# Patient Record
Sex: FEMALE | Race: WHITE | NOT HISPANIC OR LATINO | Employment: FULL TIME | URBAN - METROPOLITAN AREA
[De-identification: names, ages, dates, MRNs, and addresses within clinical notes are randomized per-mention and may not be internally consistent; named-entity substitution may affect disease eponyms.]

---

## 2017-01-11 ENCOUNTER — TELEPHONE (OUTPATIENT)
Dept: FAMILY MEDICINE | Facility: CLINIC | Age: 47
End: 2017-01-11

## 2017-01-11 DIAGNOSIS — G43.109 MIGRAINE WITH AURA AND WITHOUT STATUS MIGRAINOSUS, NOT INTRACTABLE: Primary | ICD-10-CM

## 2017-01-11 RX ORDER — RIZATRIPTAN BENZOATE 10 MG/1
10 TABLET, ORALLY DISINTEGRATING ORAL
Qty: 18 TABLET | Refills: 3 | Status: SHIPPED | OUTPATIENT
Start: 2017-01-11

## 2017-01-11 NOTE — TELEPHONE ENCOUNTER
Pt requested the disintegrating tablets of the rizatriptans. She can't keep the regular tablets down when she has a migraine.  Thanks!  Livia Baptiste [facility]  [phone]

## 2017-04-26 ENCOUNTER — OFFICE VISIT (OUTPATIENT)
Dept: OPTOMETRY | Facility: CLINIC | Age: 47
End: 2017-04-26
Payer: COMMERCIAL

## 2017-04-26 DIAGNOSIS — H10.10 SEASONAL ALLERGIC CONJUNCTIVITIS: ICD-10-CM

## 2017-04-26 DIAGNOSIS — H52.4 MYOPIA OF BOTH EYES WITH ASTIGMATISM AND PRESBYOPIA: Primary | ICD-10-CM

## 2017-04-26 DIAGNOSIS — H52.203 MYOPIA OF BOTH EYES WITH ASTIGMATISM AND PRESBYOPIA: Primary | ICD-10-CM

## 2017-04-26 DIAGNOSIS — H52.13 MYOPIA OF BOTH EYES WITH ASTIGMATISM AND PRESBYOPIA: Primary | ICD-10-CM

## 2017-04-26 PROCEDURE — 92004 COMPRE OPH EXAM NEW PT 1/>: CPT | Performed by: OPTOMETRIST

## 2017-04-26 PROCEDURE — 92310 CONTACT LENS FITTING OU: CPT | Performed by: OPTOMETRIST

## 2017-04-26 PROCEDURE — 92015 DETERMINE REFRACTIVE STATE: CPT | Performed by: OPTOMETRIST

## 2017-04-26 ASSESSMENT — VISUAL ACUITY
OS_SC: 20/40
OD_SC+: -1
OD_SC: 20/30
OS_SC: 20/25
METHOD: SNELLEN - LINEAR
OD_SC: 20/70

## 2017-04-26 ASSESSMENT — KERATOMETRY
OD_K1POWER_DIOPTERS: 41.37
OS_K2POWER_DIOPTERS: 42.75
OS_AXISANGLE2_DEGREES: 37
METHOD_AUTO_MANUAL: AUTOMATED
OS_AXISANGLE_DEGREES: 127
OD_AXISANGLE_DEGREES: 63
OD_AXISANGLE2_DEGREES: 153
OD_K2POWER_DIOPTERS: 42.62
OS_K1POWER_DIOPTERS: 41.75

## 2017-04-26 ASSESSMENT — REFRACTION_CURRENTRX
OD_DIAMETER: 13.8
OS_BASECURVE: 8.6
OD_BRAND: FOCUS DAILIES
OS_DIAMETER: 13.8
OS_SPHERE: -1.75
OS_BRAND: FOCUS DAILIES
OD_BASECURVE: 8.6
OD_SPHERE: -1.75

## 2017-04-26 ASSESSMENT — REFRACTION_MANIFEST
OS_SPHERE: -0.75
OS_CYLINDER: +0.75
OD_AXIS: 164
OD_SPHERE: -2.50
OS_ADD: +1.25
OS_AXIS: 010
METHOD_AUTOREFRACTION: 1
OD_SPHERE: -2.75
OD_CYLINDER: SPHERE
OS_SPHERE: -0.75
OS_AXIS: 180
OS_CYLINDER: +0.50
OD_ADD: +1.25
OD_CYLINDER: +0.75

## 2017-04-26 ASSESSMENT — TONOMETRY
OS_IOP_MMHG: 16
OD_IOP_MMHG: 16
IOP_METHOD: APPLANATION

## 2017-04-26 ASSESSMENT — EXTERNAL EXAM - LEFT EYE: OS_EXAM: NORMAL

## 2017-04-26 ASSESSMENT — CUP TO DISC RATIO
OS_RATIO: 0.3
OD_RATIO: 0.25

## 2017-04-26 ASSESSMENT — SLIT LAMP EXAM - LIDS
COMMENTS: NORMAL
COMMENTS: NORMAL

## 2017-04-26 ASSESSMENT — CONF VISUAL FIELD
OS_NORMAL: 1
OD_NORMAL: 1

## 2017-04-26 ASSESSMENT — EXTERNAL EXAM - RIGHT EYE: OD_EXAM: NORMAL

## 2017-04-26 NOTE — MR AVS SNAPSHOT
"              After Visit Summary   2017    Paige Le    MRN: [id number]           Patient Information     Date Of Birth          1970        Visit Information        Provider Department      2017 12:00 PM Laura Lyon OD [facility]        Today's Diagnoses     Myopia of both eyes with astigmatism and presbyopia    -  1    Seasonal allergic conjunctivitis           Follow-ups after your visit        Follow-up notes from your care team     Return in about 1 year (around 2018) for Annual Visit.      Who to contact     If you have questions or need follow up information about today's clinic visit or your schedule please contact [facility] directly at [phone].  Normal or non-critical lab and imaging results will be communicated to you by MyChart, letter or phone within 4 business days after the clinic has received the results. If you do not hear from us within 7 days, please contact the clinic through MyChart or phone. If you have a critical or abnormal lab result, we will notify you by phone as soon as possible.  Submit refill requests through [employer] or call your pharmacy and they will forward the refill request to us. Please allow 3 business days for your refill to be completed.          Additional Information About Your Visit        MyChart Information     [employer] lets you send messages to your doctor, view your test results, renew your prescriptions, schedule appointments and more. To sign up, go to [email or web address] . Click on \"Log in\" on the left side of the screen, which will take you to the Welcome page. Then click on \"Sign up Now\" on the right side of the page.     You will be asked to enter the access code listed below, as well as some personal information. Please follow the directions to create your username and password.     Your access code is: [email or web address]  Expires: 2017  1:40 PM     Your access code will  in 90 days. If " you need help or a new code, please call your [place] clinic or [phone].        Care EveryWhere ID     This is your Care EveryWhere ID. This could be used by other organizations to access your [place] medical records  [id number]         Blood Pressure from Last 3 Encounters:   12/07/16 132/84   11/03/16 104/64   08/03/16 111/74    Weight from Last 3 Encounters:   12/07/16 110.9 kg (244 lb 9.6 oz)   11/03/16 109.8 kg (242 lb)   08/03/16 112.9 kg (248 lb 14.4 oz)              We Performed the Following     CONTACT LENS FITTING,BILAT     EYE EXAM (SIMPLE-NONBILLABLE)     REFRACTION          Today's Medication Changes          These changes are accurate as of: 4/26/17  1:40 PM.  If you have any questions, ask your nurse or doctor.               These medicines have changed or have updated prescriptions.        Dose/Directions    cetirizine-psuedoePHEDrine 5-120 MG per 12 hr tablet   Commonly known as:  ZYRTEC-D   This may have changed:  when to take this   Used for:  Allergic rhinitis, cause unspecified        Dose:  1 tablet   Take 1 tablet by mouth 2 times daily   Quantity:  90 tablet   Refills:  3                Primary Care Provider Office Phone # Fax #    Christine Farnsworth PA-C [phone]       [address] [address]        Thank you!     Thank you for choosing [facility]  for your care. Our goal is always to provide you with excellent care. Hearing back from our patients is one way we can continue to improve our services. Please take a few minutes to complete the written survey that you may receive in the mail after your visit with us. Thank you!             Your Updated Medication List - Protect others around you: Learn how to safely use, store and throw away your medicines at www.disposemymeds.org.          This list is accurate as of: 4/26/17  1:40 PM.  Always use your most recent med list.                   Brand Name Dispense  Instructions for use    * albuterol (2.5 MG/3ML) 0.083% neb solution     30 vial    Take 1 vial (2.5 mg) by nebulization every 6 hours as needed for shortness of breath / dyspnea or wheezing       * albuterol 108 (90 BASE) MCG/ACT Inhaler    PROAIR HFA/PROVENTIL HFA/VENTOLIN HFA    1 Inhaler    Inhale 2 puffs into the lungs every 6 hours as needed for shortness of breath / dyspnea or wheezing       ALEVE PO      Take 220 mg by mouth       beclomethasone 80 MCG/ACT Inhaler    QVAR    1 Inhaler    Inhale 2 puffs into the lungs 2 times daily       cetirizine-psuedoePHEDrine 5-120 MG per 12 hr tablet    ZYRTEC-D    90 tablet    Take 1 tablet by mouth 2 times daily       cholecalciferol 1000 UNIT tablet    vitamin D     Take 1 tablet by mouth daily.       FLUoxetine 40 MG capsule    PROzac    180 capsule    Take 2 capsules (80 mg) by mouth daily       gabapentin 300 MG capsule    NEURONTIN    270 capsule    Take 1 capsule (300 mg) by mouth 3 times daily       HERBALS          ipratropium - albuterol 0.5 mg/2.5 mg/3 mL 0.5-2.5 (3) MG/3ML neb solution    DUONEB    1 Box    Take 1 vial (3 mLs) by nebulization once for 1 dose       IRON SUPPLEMENT PO          lidocaine 2 % solution    XYLOCAINE    100 mL    Take 15 mLs by mouth every 2 hours as needed for moderate pain swish and spit every 3-8 hours as needed; max 8 doses/24 hour period       order for DME     1 Device    Equipment being ordered: Nebulizer       rizatriptan 10 MG ODT tab    MAXALT-MLT    18 tablet    Take 1 tablet (10 mg) by mouth at onset of headache for migraine May repeat in 2 hours. Max 3 tablets/24 hours.       topiramate 25 MG tablet    TOPAMAX    180 tablet    Take 1 tablet (25 mg) by mouth 2 times daily       traZODone 50 MG tablet    DESYREL    180 tablet    Take 2 tablets by mouth. 1 -2 TABs  PO at bedtime prn insomnia       VITAMIN B COMPLEX PO          * Notice:  This list has 2 medication(s) that are the same as other medications prescribed for  you. Read the directions carefully, and ask your doctor or other care provider to review them with you.

## 2017-04-26 NOTE — PROGRESS NOTES
Chief Complaint   Patient presents with     COMPREHENSIVE EYE EXAM      Would like contacts, Macula concerns in family,has strong family history of amd   watching for cataracts  Broke glasses   Ran out of contacts , so has been wearing same prescription in both eyes  Allergy season right now, very itchy   Does ok without contacts in and just artificial tears    Last Eye Exam: 3yrs  Dilated Previously: Yes    What are you currently using to see?  contacts       Distance Vision Acuity: Noticed gradual change in both eyes    Near Vision Acuity: Satisfied with vision while reading  unaided    Eye Comfort: dry  Do you use eye drops? : Yes: OTC Rewetting  Occupation or Hobbies: Reading     Contact  Lens Exam    Good fit and visual acuity with focus aqua comfort    Trial lenses  OD: -2.50 20/20  OS: -0.50 minimal improvement and detriments near     Cornea clear, no  Papillae, debris in tear film      The above trials, a contact lens care kit, along with a final contact lens prescription were dispensed today  Return to clinic with any problems before ordering your supply of lenses  .  A yearly eye exam is recommended, however, you do not need to have a contact lens fitting annually unless you are having problems with the contact lenses.         Medical, surgical and family histories reviewed and updated 4/26/2017.       OBJECTIVE: See Ophthalmology exam    ASSESSMENT:    ICD-10-CM    1. Myopia of both eyes with astigmatism and presbyopia H52.13 EYE EXAM (SIMPLE-NONBILLABLE)    H52.203 REFRACTION    H52.4 CONTACT LENS FITTING,BILAT          Recommend modified monovision  Seasonal allergic conjunctivitis     PLAN:   Refit dailies with no correction OS, to aid in near vision  Dispensed   -2.50 for OD focus dailies aqua comfort plus 8.7/14.0  Updated glasses and contact lens prescription   Discussed diet for macular health    Laura Lyon OD

## 2017-05-17 ENCOUNTER — OFFICE VISIT (OUTPATIENT)
Dept: FAMILY MEDICINE | Facility: CLINIC | Age: 47
End: 2017-05-17
Payer: COMMERCIAL

## 2017-05-17 ENCOUNTER — TELEPHONE (OUTPATIENT)
Dept: PALLIATIVE MEDICINE | Facility: CLINIC | Age: 47
End: 2017-05-17

## 2017-05-17 VITALS
DIASTOLIC BLOOD PRESSURE: 72 MMHG | OXYGEN SATURATION: 98 % | SYSTOLIC BLOOD PRESSURE: 113 MMHG | WEIGHT: 258 LBS | HEART RATE: 71 BPM | BODY MASS INDEX: 44.29 KG/M2 | TEMPERATURE: 98.2 F | RESPIRATION RATE: 12 BRPM

## 2017-05-17 DIAGNOSIS — M79.7 FIBROMYALGIA: Primary | ICD-10-CM

## 2017-05-17 DIAGNOSIS — R35.0 URINARY FREQUENCY: ICD-10-CM

## 2017-05-17 DIAGNOSIS — Z23 NEED FOR TDAP VACCINATION: ICD-10-CM

## 2017-05-17 DIAGNOSIS — F33.0 MAJOR DEPRESSIVE DISORDER, RECURRENT EPISODE, MILD (H): ICD-10-CM

## 2017-05-17 PROBLEM — E66.01 MORBID OBESITY (H): Status: ACTIVE | Noted: 2017-05-17

## 2017-05-17 LAB
ALBUMIN UR-MCNC: NEGATIVE MG/DL
APPEARANCE UR: CLEAR
BILIRUB UR QL STRIP: NEGATIVE
COLOR UR AUTO: YELLOW
GLUCOSE UR STRIP-MCNC: NEGATIVE MG/DL
HBA1C MFR BLD: 5.1 % (ref 4.3–6)
HGB UR QL STRIP: NEGATIVE
KETONES UR STRIP-MCNC: NEGATIVE MG/DL
LEUKOCYTE ESTERASE UR QL STRIP: NEGATIVE
NITRATE UR QL: NEGATIVE
PH UR STRIP: 8 PH (ref 5–7)
SP GR UR STRIP: 1.02 (ref 1–1.03)
URN SPEC COLLECT METH UR: ABNORMAL
UROBILINOGEN UR STRIP-ACNC: 0.2 EU/DL (ref 0.2–1)

## 2017-05-17 PROCEDURE — 90715 TDAP VACCINE 7 YRS/> IM: CPT | Performed by: FAMILY MEDICINE

## 2017-05-17 PROCEDURE — 90471 IMMUNIZATION ADMIN: CPT | Performed by: FAMILY MEDICINE

## 2017-05-17 PROCEDURE — 36415 COLL VENOUS BLD VENIPUNCTURE: CPT | Performed by: FAMILY MEDICINE

## 2017-05-17 PROCEDURE — 81003 URINALYSIS AUTO W/O SCOPE: CPT | Performed by: FAMILY MEDICINE

## 2017-05-17 PROCEDURE — 83036 HEMOGLOBIN GLYCOSYLATED A1C: CPT | Performed by: FAMILY MEDICINE

## 2017-05-17 PROCEDURE — 80053 COMPREHEN METABOLIC PANEL: CPT | Performed by: FAMILY MEDICINE

## 2017-05-17 PROCEDURE — 99213 OFFICE O/P EST LOW 20 MIN: CPT | Mod: 25 | Performed by: FAMILY MEDICINE

## 2017-05-17 ASSESSMENT — ANXIETY QUESTIONNAIRES
7. FEELING AFRAID AS IF SOMETHING AWFUL MIGHT HAPPEN: NOT AT ALL
GAD7 TOTAL SCORE: 1
IF YOU CHECKED OFF ANY PROBLEMS ON THIS QUESTIONNAIRE, HOW DIFFICULT HAVE THESE PROBLEMS MADE IT FOR YOU TO DO YOUR WORK, TAKE CARE OF THINGS AT HOME, OR GET ALONG WITH OTHER PEOPLE: VERY DIFFICULT
6. BECOMING EASILY ANNOYED OR IRRITABLE: SEVERAL DAYS
3. WORRYING TOO MUCH ABOUT DIFFERENT THINGS: NOT AT ALL
5. BEING SO RESTLESS THAT IT IS HARD TO SIT STILL: NOT AT ALL
2. NOT BEING ABLE TO STOP OR CONTROL WORRYING: NOT AT ALL
1. FEELING NERVOUS, ANXIOUS, OR ON EDGE: NOT AT ALL

## 2017-05-17 ASSESSMENT — PATIENT HEALTH QUESTIONNAIRE - PHQ9: 5. POOR APPETITE OR OVEREATING: NOT AT ALL

## 2017-05-17 NOTE — LETTER
[facility]  [address] [place], [address]  [phone]      May 18, 2017    Paige Le  [address] [place] [address]          Dear Paige,    The results of your recent tests were normal.  Enclosed is a copy of the results.  It was a pleasure to see you at your last appointment.    Results for orders placed or performed in visit on 05/17/17   Comprehensive metabolic panel (BMP + Alb, Alk Phos, ALT, AST, Total. Bili, TP)   Result Value Ref Range    Sodium 144 133 - 144 mmol/L    Potassium 4.1 3.4 - 5.3 mmol/L    Chloride 109 94 - 109 mmol/L    Carbon Dioxide 28 20 - 32 mmol/L    Anion Gap 7 3 - 14 mmol/L    Glucose 77 70 - 99 mg/dL    Urea Nitrogen 6 (L) 7 - 30 mg/dL    Creatinine 0.83 0.52 - 1.04 mg/dL    GFR Estimate 75 >60 mL/min/1.7m2    GFR Estimate If Black >90   GFR Calc   >60 mL/min/1.7m2    Calcium 8.8 8.5 - 10.1 mg/dL    Bilirubin Total 0.2 0.2 - 1.3 mg/dL    Albumin 3.6 3.4 - 5.0 g/dL    Protein Total 7.0 6.8 - 8.8 g/dL    Alkaline Phosphatase 40 40 - 150 U/L    ALT 20 0 - 50 U/L    AST 12 0 - 45 U/L   Hemoglobin A1c   Result Value Ref Range    Hemoglobin A1C 5.1 4.3 - 6.0 %   UA reflex to Microscopic and Culture   Result Value Ref Range    Color Urine Yellow     Appearance Urine Clear     Glucose Urine Negative NEG mg/dL    Bilirubin Urine Negative NEG    Ketones Urine Negative NEG mg/dL    Specific Gravity Urine 1.020 1.003 - 1.035    Blood Urine Negative NEG    pH Urine 8.0 (H) 5.0 - 7.0 pH    Protein Albumin Urine Negative NEG mg/dL    Urobilinogen Urine 0.2 0.2 - 1.0 EU/dL    Nitrite Urine Negative NEG    Leukocyte Esterase Urine Negative NEG    Source Midstream Urine          If you have any questions or concerns, please call myself or my nurse at [phone].          Sincerely,    Charis Buck DO/ZORAIDA

## 2017-05-17 NOTE — MR AVS SNAPSHOT
After Visit Summary   5/17/2017    Paige Le    MRN: [id number]           Patient Information     Date Of Birth          1970        Visit Information        Provider Department      5/17/2017 11:40 AM Charis Buck, DO [facility]        Today's Diagnoses     Fibromyalgia    -  1    Major depressive disorder, recurrent episode, mild (H)        Urinary frequency           Follow-ups after your visit        Additional Services     PAIN MANAGEMENT CENTER ([place]) REFERRAL       Your provider has referred you to the [facility].    Reason for Referral: Comprehensive Evaluation and Management    Please complete the following questions:    What is your diagnosis for the patient's pain? Fibromyalgia    Do you have any specific questions for the pain specialist? Yes: Patient is interested in alternative forms of pain management     Are there any red flags that may impact the assessment or management of the patient? None    **ANY DIAGNOSTIC TESTS THAT ARE NOT IN EPIC SHOULD BE SENT TO THE PAIN CENTER**    Please note the Pre-Op Pain Consult must be scheduled 2-3 weeks prior to the patient's surgery.  Patient's trying to schedule within 2 weeks of surgery may not be accommodated.     Pre-Op Pain Consults are only good for 30 days.    REGARDING OPIOID MEDICATIONS:  We will always address appropriateness of opioid pain medications, but we generally will not automatically take on a prescribing role. When we do take on prescribing of opioids for chronic pain, it is in collaboration with the referring physician for an intermediate period of time (months), with an expectation that the primary physician or provider will assume the prescribing role if medications are effective at stable doses with demonstrated compliance.  Therefore, please do not assume that your prescribing responsibilities end on the day of pain clinic consultation.  Is this agreeable to you?  "YES    For any questions, contact the [facility] at [phone].    Please be aware that coverage of these services is subject to the terms and limitations of your health insurance plan.  Call member services at your health plan with any benefit or coverage questions.      Please bring the following with you to your appointment:    (1) Any X-Rays, CTs or MRIs which have been performed.  Contact the facility where they were done to arrange for  prior to your scheduled appointment.    (2) List of current medications   (3) This referral request   (4) Any documents/labs given to you for this referral                  Who to contact     If you have questions or need follow up information about today's clinic visit or your schedule please contact [facility] directly at [phone].  Normal or non-critical lab and imaging results will be communicated to you by MyChart, letter or phone within 4 business days after the clinic has received the results. If you do not hear from us within 7 days, please contact the clinic through MyChart or phone. If you have a critical or abnormal lab result, we will notify you by phone as soon as possible.  Submit refill requests through [employer] or call your pharmacy and they will forward the refill request to us. Please allow 3 business days for your refill to be completed.          Additional Information About Your Visit        [employer] Information     [employer] lets you send messages to your doctor, view your test results, renew your prescriptions, schedule appointments and more. To sign up, go to [email or web address] . Click on \"Log in\" on the left side of the screen, which will take you to the Welcome page. Then click on \"Sign up Now\" on the right side of the page.     You will be asked to enter the access code listed below, as well as some personal information. Please follow the directions to create your username and password.     Your " access code is: [email or web address]  Expires: 2017  1:40 PM     Your access code will  in 90 days. If you need help or a new code, please call your [place] clinic or [phone].        Care EveryWhere ID     This is your Care EveryWhere ID. This could be used by other organizations to access your [place] medical records  [id number]        Your Vitals Were     Pulse Temperature Respirations Pulse Oximetry BMI (Body Mass Index)       71 98.2  F (36.8  C) (Oral) 12 98% 44.29 kg/m2        Blood Pressure from Last 3 Encounters:   17 113/72   16 132/84   16 104/64    Weight from Last 3 Encounters:   17 258 lb (117 kg)   16 244 lb 9.6 oz (110.9 kg)   16 242 lb (109.8 kg)              We Performed the Following     Comprehensive metabolic panel (BMP + Alb, Alk Phos, ALT, AST, Total. Bili, TP)     DEPRESSION ACTION PLAN (DAP)     Hemoglobin A1c     PAIN MANAGEMENT CENTER ([place]) REFERRAL     UA reflex to Microscopic and Culture          Today's Medication Changes          These changes are accurate as of: 17 12:12 PM.  If you have any questions, ask your nurse or doctor.               These medicines have changed or have updated prescriptions.        Dose/Directions    cetirizine-psuedoePHEDrine 5-120 MG per 12 hr tablet   Commonly known as:  ZYRTEC-D   This may have changed:  when to take this   Used for:  Allergic rhinitis, cause unspecified        Dose:  1 tablet   Take 1 tablet by mouth 2 times daily   Quantity:  90 tablet   Refills:  3                Primary Care Provider Office Phone # Fax #    Christine Farnsworth PA-C [phone]       [address] [address]        Thank you!     Thank you for choosing [facility]  for your care. Our goal is always to provide you with excellent care. Hearing back from our patients is one way we can continue to improve our services. Please take a few minutes  to complete the written survey that you may receive in the mail after your visit with us. Thank you!             Your Updated Medication List - Protect others around you: Learn how to safely use, store and throw away your medicines at www.disposemymeds.org.          This list is accurate as of: 5/17/17 12:12 PM.  Always use your most recent med list.                   Brand Name Dispense Instructions for use    * albuterol (2.5 MG/3ML) 0.083% neb solution     30 vial    Take 1 vial (2.5 mg) by nebulization every 6 hours as needed for shortness of breath / dyspnea or wheezing       * albuterol 108 (90 BASE) MCG/ACT Inhaler    PROAIR HFA/PROVENTIL HFA/VENTOLIN HFA    1 Inhaler    Inhale 2 puffs into the lungs every 6 hours as needed for shortness of breath / dyspnea or wheezing       ALEVE PO      Take 220 mg by mouth       beclomethasone 80 MCG/ACT Inhaler    QVAR    1 Inhaler    Inhale 2 puffs into the lungs 2 times daily       cetirizine-psuedoePHEDrine 5-120 MG per 12 hr tablet    ZYRTEC-D    90 tablet    Take 1 tablet by mouth 2 times daily       cholecalciferol 1000 UNIT tablet    vitamin D     Take 1 tablet by mouth daily.       FLUoxetine 40 MG capsule    PROzac    180 capsule    Take 2 capsules (80 mg) by mouth daily       gabapentin 300 MG capsule    NEURONTIN    270 capsule    Take 1 capsule (300 mg) by mouth 3 times daily       HERBALS          ipratropium - albuterol 0.5 mg/2.5 mg/3 mL 0.5-2.5 (3) MG/3ML neb solution    DUONEB    1 Box    Take 1 vial (3 mLs) by nebulization once for 1 dose       IRON SUPPLEMENT PO          lidocaine 2 % solution    XYLOCAINE    100 mL    Take 15 mLs by mouth every 2 hours as needed for moderate pain swish and spit every 3-8 hours as needed; max 8 doses/24 hour period       order for DME     1 Device    Equipment being ordered: Nebulizer       rizatriptan 10 MG ODT tab    MAXALT-MLT    18 tablet    Take 1 tablet (10 mg) by mouth at onset of headache for migraine May repeat  in 2 hours. Max 3 tablets/24 hours.       topiramate 25 MG tablet    TOPAMAX    180 tablet    Take 1 tablet (25 mg) by mouth 2 times daily       traZODone 50 MG tablet    DESYREL    180 tablet    Take 2 tablets by mouth. 1 -2 TABs  PO at bedtime prn insomnia       Turmeric 450 MG Caps          VITAMIN B COMPLEX PO          * Notice:  This list has 2 medication(s) that are the same as other medications prescribed for you. Read the directions carefully, and ask your doctor or other care provider to review them with you.

## 2017-05-17 NOTE — LETTER
May 23, 2017    Paige Le  [address] [place], [place] [id number]    Dear Paige,                                                                 Welcome to the [facility] at the [facility]. We are located on the 6th floor, [address], of the [facility] located at [address] [place] [address]. For general parking, the [address] is the closest to our building.     Your appointment at the [facility] has been scheduled on Friday, June 9th at 11:00 am with Yvonne Garrido MD.    At your first visit, you will meet your team of caregivers who will help you to develop pain management strategies that will last a lifetime. You will meet with our support staff to validate parking at a reduced rate, review your insurance information, and collect your co-payment if required by your insurance company. You will also meet with a medical pain specialist and care coordinator who will assess your pain and develop a plan of care for your successful pain rehabilitation. You should expect to spend 1-2 hours at your first visit with us. Usually, patients work with us for a period of 6-12 months, and eventually return to their primary doctor once their pain management has stabilized.      To help us make your visit go as smoothly as possible, please bring the following items with you on your visit:   Completed Pain Questionnaire enclosed in this packet.  If you do not bring the completed questionnaire, we may have to reschedule your appointment.  List of any medicines that you are currently taking or have been prescribed  Important NON-Gunlock medical information such as medical records or tests results (X-rays, or laboratory tests)  Your health insurance card  Financial resources to cover your co-payment or balance due at the time of service (cash, personal check, Visa, and MasterCard are acceptable methods of  payment)     Due to the demand for new patient evaluations, you must notify the scheduling department 48 hours in advance if you are not able to keep this appointment.  Failure to do so could affect your ability to reschedule with our clinic. Please do not assume that you will  receive any prescription medications at your first visit.    Please call [phone] with any questions regarding your appointment.  We look forward to meeting you and working to address your health care needs.       Sincerely,      [facility]

## 2017-05-17 NOTE — PROGRESS NOTES
SUBJECTIVE:                                                    Paige Le is a 46 year old female who presents to clinic today for the following health issues:    Chronic Pain Follow-Up       Type / Location of Pain: Fibromyalgia-neck, back, hands, hips  Analgesia/pain control:       Recent changes:  worse      Overall control: Inadequate pain control  Activity level/function:      Daily activities:  Can do most things most days, with some rest    Work:  Able to work part time with limitations  Adverse effects:  No  Adherance    Taking medication as directed?  Yes    Participating in other treatments: no -   Risk Factors:    Sleep:  Fair, can be woken up with pain    Mood/anxiety:  controlled    Recent family or social stressors:  none noted    Other aggravating factors: prolonged sitting and prolonged standing  PHQ-9 SCORE 9/18/2015 2/12/2016 7/14/2016   Total Score - - -   Total Score 0 3 3     KACEY-7 SCORE 9/18/2015 2/12/2016 7/14/2016   Total Score - - -   Total Score 0 0 3     Encounter-Level CSA:     There are no encounter-level csa.           Saw her last in November 2016 and at that time we increased her gabapentin to 300mg TID both for her fibromyalgia and for her menopausal hot flashes.  The increased dose helped with the hot flashes, but not as much with the pain.      In the past she has also tried:  Turmeric (just started this recently)  Massage (helpful for short time)  Yoga every morning (very helpful)  Frequent walking (very helpful)  Physical therapy (helpful)    She wants to avoid taking pain medications and is interested in alternative therapies.  She has worked with a pain clinic before at [facility], but her provider retired.         Urinary issue  Patient has noted increased urinary frequency that has progressively worsened over the past few months.  No dysuria or abd pain.  She drinks a lot of water at work.  Generally requires restroom almost every hour.        Amount of exercise or physical  activity: 6-7 days/week     Problems taking medications regularly: only taking Aleeve, gabapentin     Medication side effects: none    Diet: regular (no restrictions)      Problem list and histories reviewed & adjusted, as indicated.  Additional history: as documented        Reviewed and updated as needed this visit by clinical staff  Tobacco  Allergies  Meds  Med Hx  Surg Hx  Fam Hx  Soc Hx      Reviewed and updated as needed this visit by Provider         ROS:  Constitutional, HEENT, cardiovascular, pulmonary, gi and gu systems are negative, except as otherwise noted.    OBJECTIVE:                                                    /72 (BP Location: Left arm, Patient Position: Chair, Cuff Size: Adult Large)  Pulse 71  Temp 98.2  F (36.8  C) (Oral)  Resp 12  Wt 258 lb (117 kg)  SpO2 98%  BMI 44.29 kg/m2  Body mass index is 44.29 kg/(m^2).  GENERAL: healthy, alert and no distress  PSYCH: mentation appears normal, affect normal/bright     ASSESSMENT/PLAN:                                                      1. Fibromyalgia  - Currently taking gabapentin 300mg TID  - Patient has worked with [facility] pain management clinic before and is interested in establishing with  pain clinic down here instead   - [facility] ([place]) REFERRAL    2. Major depressive disorder, recurrent episode, mild (H)  - Stable on current meds    3. Urinary frequency  - Will check for diabetes, but more likely because she drinks a lot of water   - Comprehensive metabolic panel (BMP + Alb, Alk Phos, ALT, AST, Total. Bili, TP)  - Hemoglobin A1c  - UA reflex to Microscopic and Culture    4. Need for Tdap vaccination  - TDAP VACCINE (ADACEL)    F/U in 3-6 months or sooner PRN     Greater than 50% of this visit was spent counseling regarding the above diagnosis  Visit lasted approximately 15 minutes    Charis Buck,   [facility]

## 2017-05-17 NOTE — TELEPHONE ENCOUNTER
Left voicemail for patient to schedule new evaluation.       Christina HART    [facility] [place]

## 2017-05-17 NOTE — NURSING NOTE
"Chief Complaint   Patient presents with     Fibromyalgia     flare ups      Referral     not sure if needs to go to pain clinic?       Initial /72 (BP Location: Left arm, Patient Position: Chair, Cuff Size: Adult Large)  Pulse 71  Temp 98.2  F (36.8  C) (Oral)  Wt 258 lb (117 kg)  SpO2 98%  BMI 44.29 kg/m2 Estimated body mass index is 44.29 kg/(m^2) as calculated from the following:    Height as of 12/7/16: 5' 4\" (1.626 m).    Weight as of this encounter: 258 lb (117 kg).  Medication Reconciliation: complete   Michelle Almanzar, [facility]      "

## 2017-05-18 LAB
ALBUMIN SERPL-MCNC: 3.6 G/DL (ref 3.4–5)
ALP SERPL-CCNC: 40 U/L (ref 40–150)
ALT SERPL W P-5'-P-CCNC: 20 U/L (ref 0–50)
ANION GAP SERPL CALCULATED.3IONS-SCNC: 7 MMOL/L (ref 3–14)
AST SERPL W P-5'-P-CCNC: 12 U/L (ref 0–45)
BILIRUB SERPL-MCNC: 0.2 MG/DL (ref 0.2–1.3)
BUN SERPL-MCNC: 6 MG/DL (ref 7–30)
CALCIUM SERPL-MCNC: 8.8 MG/DL (ref 8.5–10.1)
CHLORIDE SERPL-SCNC: 109 MMOL/L (ref 94–109)
CO2 SERPL-SCNC: 28 MMOL/L (ref 20–32)
CREAT SERPL-MCNC: 0.83 MG/DL (ref 0.52–1.04)
GFR SERPL CREATININE-BSD FRML MDRD: 75 ML/MIN/1.7M2
GLUCOSE SERPL-MCNC: 77 MG/DL (ref 70–99)
POTASSIUM SERPL-SCNC: 4.1 MMOL/L (ref 3.4–5.3)
PROT SERPL-MCNC: 7 G/DL (ref 6.8–8.8)
SODIUM SERPL-SCNC: 144 MMOL/L (ref 133–144)

## 2017-05-18 ASSESSMENT — PATIENT HEALTH QUESTIONNAIRE - PHQ9: SUM OF ALL RESPONSES TO PHQ QUESTIONS 1-9: 5

## 2017-05-18 ASSESSMENT — ANXIETY QUESTIONNAIRES: GAD7 TOTAL SCORE: 1

## 2017-05-23 NOTE — TELEPHONE ENCOUNTER
Pain Management Center Referral      1. Confirmed address with patient? Yes  2. Confirmed phone number with patient? Yes  3. Confirmed referring provider? Yes  4. Is the PCP the same as the referring provider? No  5. Has the patient been to any previous pain clinics? Yes, [facility], about 7 years ago  (If yes, send TOMASZ with welcome letter)  6. Which insurance are we to bill for this appointment?  [employer]    7. Informed pt of cancellation (48 hour) policy? Yes    REGARDING OPIOID MEDICATIONS: We will always address appropriateness of opioid pain medications, but we generally will not automatically take on a prescribing role. When we do take on prescribing of opioids for chronic pain, it is in collaboration with the referring physician for an intermediate period of time (months), with an expectation that the primary physician or provider will assume the prescribing role if medications are effective at stable doses with demonstrated compliance. Therefore, please do not assume that your prescribing responsibilities end on the day of pain clinic consultation.  7. Informed pt of prescribing policy? Yes      8. Referring Provider: Charis Buck DO    9. Criteria for Triage Eval:   -Missed/Failed 1st DUAL appointment? N/A   -Medication Focused? N/A   -Mental Health Concerns? (e.g. Recent psych hospitalization/snap shot)? N/A   -Active substance abuse? N/A   -Patient behaviors (e.g. Offensive language/raised voice)? N/A    Christina HART    [facility] [place]

## 2017-05-30 ENCOUNTER — TELEPHONE (OUTPATIENT)
Dept: OBGYN | Facility: CLINIC | Age: 47
End: 2017-05-30

## 2017-05-30 DIAGNOSIS — Z30.430 ENCOUNTER FOR IUD INSERTION: Primary | ICD-10-CM

## 2017-05-30 NOTE — TELEPHONE ENCOUNTER
Pt is calling and would like to discuss her birth control options. She was last seen 5/2016.     She is not having regular periods and has recently become sexually active.     Please call pt.     Thanks.   Yin

## 2017-06-01 RX ORDER — MISOPROSTOL 200 UG/1
200 TABLET ORAL ONCE
Qty: 1 TABLET | Refills: 0 | Status: SHIPPED | OUTPATIENT
Start: 2017-06-01 | End: 2017-06-01

## 2017-06-01 NOTE — TELEPHONE ENCOUNTER
Pt called and has decided that she wanted to schedule her IUD.  She is currently bleeding, but only last 2-3 days.  Pt scheduled at  Clinic at 11 am, for IUD Insert  Do you need to order anything for her.  She thought she would get a medicine to open cervix, since she has never had any children.    Pablo ARMENTA RN

## 2017-06-01 NOTE — TELEPHONE ENCOUNTER
Please advise misoprostol called in, to take 1 hour prior to IUD placement   Dr. Livia Barnett, DO    Obstetrics and Gynecology  [facility] [place]

## 2017-06-05 ENCOUNTER — OFFICE VISIT (OUTPATIENT)
Dept: OBGYN | Facility: CLINIC | Age: 47
End: 2017-06-05
Payer: COMMERCIAL

## 2017-06-05 VITALS
TEMPERATURE: 98 F | OXYGEN SATURATION: 98 % | BODY MASS INDEX: 43.31 KG/M2 | HEART RATE: 86 BPM | DIASTOLIC BLOOD PRESSURE: 80 MMHG | HEIGHT: 64 IN | WEIGHT: 253.7 LBS | SYSTOLIC BLOOD PRESSURE: 120 MMHG

## 2017-06-05 DIAGNOSIS — Z30.430 ENCOUNTER FOR IUD INSERTION: Primary | ICD-10-CM

## 2017-06-05 LAB — BETA HCG QUAL IFA URINE: NEGATIVE

## 2017-06-05 PROCEDURE — 84703 CHORIONIC GONADOTROPIN ASSAY: CPT | Performed by: FAMILY MEDICINE

## 2017-06-05 PROCEDURE — 99213 OFFICE O/P EST LOW 20 MIN: CPT | Performed by: FAMILY MEDICINE

## 2017-06-05 NOTE — MR AVS SNAPSHOT
"              After Visit Summary   6/5/2017    Paige Le    MRN: [id number]           Patient Information     Date Of Birth          1970        Visit Information        Provider Department      6/5/2017 11:00 AM Livia Barnett DO [facility]        Today's Diagnoses     Encounter for IUD insertion    -  1       Follow-ups after your visit        Your next 10 appointments already scheduled     Jun 09, 2017 11:00 AM CDT   New Visit with Yvonne Garrido MD   [facility]    [address]  [facility] [address]  [facility] [address] [phone]              Who to contact     If you have questions or need follow up information about today's clinic visit or your schedule please contact [facility] directly at [phone].  Normal or non-critical lab and imaging results will be communicated to you by MyChart, letter or phone within 4 business days after the clinic has received the results. If you do not hear from us within 7 days, please contact the clinic through MyChart or phone. If you have a critical or abnormal lab result, we will notify you by phone as soon as possible.  Submit refill requests through [employer] or call your pharmacy and they will forward the refill request to us. Please allow 3 business days for your refill to be completed.          Additional Information About Your Visit        MyChart Information     [employer] lets you send messages to your doctor, view your test results, renew your prescriptions, schedule appointments and more. To sign up, go to [email or web address] . Click on \"Log in\" on the left side of the screen, which will take you to the Welcome page. Then click on \"Sign up Now\" on the right side of the page.     You will be asked to enter the access code listed below, as well as some personal information. Please follow the directions to create your username and password.     Your access code is: " [email or web address]  Expires: 2017  1:40 PM     Your access code will  in 90 days. If you need help or a new code, please call your [facility] or [phone].        Care EveryWhere ID     This is your Care EveryWhere ID. This could be used by other organizations to access your [facility] medical records  [id number]        Your Vitals Were     Pulse Temperature Height Last Period Pulse Oximetry Breastfeeding?    86 98  F (36.7  C) (Oral) 5' 4\" (1.626 m) 2017 (Exact Date) 98% No    BMI (Body Mass Index)                   43.55 kg/m2            Blood Pressure from Last 3 Encounters:   17 120/80   17 113/72   16 132/84    Weight from Last 3 Encounters:   17 253 lb 11.2 oz (115.1 kg)   17 258 lb (117 kg)   16 244 lb 9.6 oz (110.9 kg)              We Performed the Following     Beta HCG qual IFA urine          Today's Medication Changes          These changes are accurate as of: 17  1:47 PM.  If you have any questions, ask your nurse or doctor.               These medicines have changed or have updated prescriptions.        Dose/Directions    cetirizine-psuedoePHEDrine 5-120 MG per 12 hr tablet   Commonly known as:  ZYRTEC-D   This may have changed:  when to take this   Used for:  Allergic rhinitis, cause unspecified        Dose:  1 tablet   Take 1 tablet by mouth 2 times daily   Quantity:  90 tablet   Refills:  3                Primary Care Provider Office Phone # Fax #    Christine Farnsworth PA-C [phone]       [facility] [id number] [place] [id number]        Thank you!     Thank you for choosing [facility]  for your care. Our goal is always to provide you with excellent care. Hearing back from our patients is one way we can continue to improve our services. Please take a few minutes to complete the written survey that you may receive in the mail after your visit with us. Thank you!             Your Updated " Medication List - Protect others around you: Learn how to safely use, store and throw away your medicines at www.disposemymeds.org.          This list is accurate as of: 6/5/17  1:47 PM.  Always use your most recent med list.                   Brand Name Dispense Instructions for use    * albuterol (2.5 MG/3ML) 0.083% neb solution     30 vial    Take 1 vial (2.5 mg) by nebulization every 6 hours as needed for shortness of breath / dyspnea or wheezing       * albuterol 108 (90 BASE) MCG/ACT Inhaler    PROAIR HFA/PROVENTIL HFA/VENTOLIN HFA    1 Inhaler    Inhale 2 puffs into the lungs every 6 hours as needed for shortness of breath / dyspnea or wheezing       ALEVE PO      Take 220 mg by mouth       beclomethasone 80 MCG/ACT Inhaler    QVAR    1 Inhaler    Inhale 2 puffs into the lungs 2 times daily       cetirizine-psuedoePHEDrine 5-120 MG per 12 hr tablet    ZYRTEC-D    90 tablet    Take 1 tablet by mouth 2 times daily       cholecalciferol 1000 UNIT tablet    vitamin D     Take 1 tablet by mouth daily.       FLUoxetine 40 MG capsule    PROzac    180 capsule    Take 2 capsules (80 mg) by mouth daily       gabapentin 300 MG capsule    NEURONTIN    270 capsule    Take 1 capsule (300 mg) by mouth 3 times daily       HERBALS          ipratropium - albuterol 0.5 mg/2.5 mg/3 mL 0.5-2.5 (3) MG/3ML neb solution    DUONEB    1 Box    Take 1 vial (3 mLs) by nebulization once for 1 dose       IRON SUPPLEMENT PO          lidocaine 2 % solution    XYLOCAINE    100 mL    Take 15 mLs by mouth every 2 hours as needed for moderate pain swish and spit every 3-8 hours as needed; max 8 doses/24 hour period       order for DME     1 Device    Equipment being ordered: Nebulizer       rizatriptan 10 MG ODT tab    MAXALT-MLT    18 tablet    Take 1 tablet (10 mg) by mouth at onset of headache for migraine May repeat in 2 hours. Max 3 tablets/24 hours.       topiramate 25 MG tablet    TOPAMAX    180 tablet    Take 1 tablet (25 mg) by mouth 2  times daily       traZODone 50 MG tablet    DESYREL    180 tablet    Take 2 tablets by mouth. 1 -2 TABs  PO at bedtime prn insomnia       Turmeric 450 MG Caps          VITAMIN B COMPLEX PO          * Notice:  This list has 2 medication(s) that are the same as other medications prescribed for you. Read the directions carefully, and ask your doctor or other care provider to review them with you.

## 2017-06-05 NOTE — PROGRESS NOTES
"SUBJECTIVE:  Paige Le is an 46 year old  woman who presents for IUD insertion No LMP recorded. Periods are irregular Q 3 months. Moderate to severe bleeding with menses, but she notes some improvement. She notes she used to experience syncope with menses and donating platelets. She had pelvic US in July 2016.     Patient states she has hot flashes.         This document serves as a record of the services and decisions personally performed and made by Livia Barnett DO. It was created on her behalf by Jennifer Caceres, a trained medical scribe. The creation of this document is based on the provider's statements to the medical scribe.  Jennifer Caceres 10:45 AM June 5, 2017    OBJECTIVE:  /80 (BP Location: Right arm, Patient Position: Chair, Cuff Size: Adult Large)  Pulse 86  Temp 98  F (36.7  C) (Oral)  Ht 1.626 m (5' 4\")  Wt 115.1 kg (253 lb 11.2 oz)  LMP 06/01/2017 (Exact Date)  SpO2 98%  Breastfeeding? No  BMI 43.55 kg/m2  General appearance: healthy, alert and no distress  RESP: lungs clear to auscultation - no rales, rhonchi or wheezes  CV: regular rates and rhythm, normal S1 S2, no S3 or S4 and no murmur, click or rub  Pelvic: Pelvic:  Pelvic examination  External genitalia normal   and vagina normal rugatted not atrophic  Examination of urethra  normal no masses, tenderness, scarring  bladder, no masses or tenderness  Cervix no lesions or discharge  Bimanual exam with   Uterus 5 weeks size, mid position, mobile,non-tenderness, no descent   Adnexa/parametria  Normal no masses/no tenderness    Procedure:    After obtaining informed consent an exam was done, the pt prepped in the usual sterile fashion, the anterior lip of the cervix grasped w a tenaculum, unable to place Mirena IUD.    ASSESSMENT:  Paige Le is an 46 year old woman who presents for IUD insertion.  Unable to place.    Pre-op physical is done and patient is cleared for ultrasound guided IUD placement under "   anesthesia    PLAN:  Dx:  1)  IUD insertion was not successful. Will schedule IUD insertion under anesthesia at surgery center. Discussed alternative contraceptive options including nexplanon. Patient  Really desires MIRENA IUD.   Do not charge for attempted IUD, as we will do it in the OR.  Patient  Cleared for surgery.     The information in this document, created by the medical scribe for me, accurately reflects the services I personally performed and the decisions made by me. I have reviewed and approved this document for accuracy prior to leaving the patient care area.  June 5, 2017 11:16 AM    Dr. Livia Barnett, DO    Obstetrics and Gynecology  [facility]

## 2017-06-05 NOTE — NURSING NOTE
"Chief Complaint   Patient presents with     IUD       Initial /80 (BP Location: Right arm, Patient Position: Chair, Cuff Size: Adult Large)  Pulse 86  Temp 98  F (36.7  C) (Oral)  Ht 5' 4\" (1.626 m)  Wt 253 lb 11.2 oz (115.1 kg)  LMP 06/01/2017 (Exact Date)  SpO2 98%  Breastfeeding? No  BMI 43.55 kg/m2 Estimated body mass index is 43.55 kg/(m^2) as calculated from the following:    Height as of this encounter: 5' 4\" (1.626 m).    Weight as of this encounter: 253 lb 11.2 oz (115.1 kg).  Medication Reconciliation: complete   ADAM Sherman      "

## 2017-06-05 NOTE — PROGRESS NOTES
Surgeon:SONI VANCE  Assist:  No  Location: [facility]  Date/time preference:  This week or next week asap    Surgery:  Ultrasound guided cervical dilation and Mirena IUD placement, endometrial biopsy   Length of Surgery:  30 minutes   Diagnosis:  Cervical stenosis   Anesthesia type:  GENERAL    Special instructions / equipment:  None  Am admit or same day: SAME DAY  Bowel prep: No  Pre op: SURGEON  Office visit with surgeon prior to surgery: No

## 2017-06-06 ENCOUNTER — TELEPHONE (OUTPATIENT)
Dept: OBGYN | Facility: CLINIC | Age: 47
End: 2017-06-06

## 2017-06-06 NOTE — TELEPHONE ENCOUNTER
Surgeon:SONI VANCE  Assist:  No  Location: [facility]  Date/time preference:  This week or next week asap     Surgery:  Ultrasound guided cervical dilation and Mirena IUD placement, endometrial biopsy   Length of Surgery:  30 minutes   Diagnosis:  Cervical stenosis   Anesthesia type:  GENERAL     Special instructions / equipment:  None  Am admit or same day: SAME DAY  Bowel prep: No  Pre op: SURGEON  Office visit with surgeon prior to surgery: No

## 2017-06-06 NOTE — TELEPHONE ENCOUNTER
Left message for patient to return the call to inform of surgery details listed below.    Surgery:  ULTRASOUND GUIDED CERVICAL DILATION AND MIRENA IUD PLACEMENT, ENDOMETRIAL BIOPSY  Date:  6/14/17  Time:  8:50 AM  Hospital:  [facility]    Patient advised of the following:  The hospital will contact you 24-48 hours prior to surgery to discuss any pre op instructions.  Contact your insurance company to see if a prior authorization or second opinion is needed.  No aspirin or Ibuprofen 10 days prior to surgery.  Make arrangements to have someone drive you home from the hospital.    Surgery specific and hospital information mailed to patient.    Information was placed on the surgery calendar in [place].

## 2017-06-09 ENCOUNTER — TELEPHONE (OUTPATIENT)
Dept: OBGYN | Facility: CLINIC | Age: 47
End: 2017-06-09

## 2017-06-09 ENCOUNTER — OFFICE VISIT (OUTPATIENT)
Dept: PALLIATIVE MEDICINE | Facility: CLINIC | Age: 47
End: 2017-06-09
Payer: COMMERCIAL

## 2017-06-09 VITALS
RESPIRATION RATE: 16 BRPM | SYSTOLIC BLOOD PRESSURE: 118 MMHG | OXYGEN SATURATION: 100 % | HEART RATE: 72 BPM | DIASTOLIC BLOOD PRESSURE: 77 MMHG

## 2017-06-09 DIAGNOSIS — M79.7 FIBROMYALGIA: Primary | ICD-10-CM

## 2017-06-09 DIAGNOSIS — M79.7 FIBROMYALGIA: ICD-10-CM

## 2017-06-09 PROCEDURE — 99205 OFFICE O/P NEW HI 60 MIN: CPT | Performed by: ANESTHESIOLOGY

## 2017-06-09 NOTE — NURSING NOTE
"Chief Complaint   Patient presents with     Pain       Initial /77 (BP Location: Right arm, Patient Position: Chair, Cuff Size: Adult Large)  Pulse 72  Resp 16  LMP 06/01/2017 (Exact Date)  SpO2 100% Estimated body mass index is 43.55 kg/(m^2) as calculated from the following:    Height as of 6/5/17: 1.626 m (5' 4\").    Weight as of 6/5/17: 115.1 kg (253 lb 11.2 oz).  Medication Reconciliation: complete       Logan Vizcarra MA  [facility]      "

## 2017-06-09 NOTE — PROGRESS NOTES
[facility] Consultation    Date of visit: 6/9/2017    Reason for consultation:    Paige Le is a 46 year old female who is seen in consultation today at the request of her provider, Dr. Buck.    Primary Care Provider is Christine Farnsworth.  Pain medications are being prescribed by Dr. Buck.    Please see the [facility] health questionnaire which the patient completed and reviewed with me in detail.    Chief Complaint:    Chief Complaint   Patient presents with     Pain       Pain history:  Paige Le is a 46 year old female who first started having problems with pain between 5 and 10 years ago. At that time she was having a lot of hip, leg and shoulder pains. She would have difficulty getting out of bed and being active. 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 Overall she wasn't taking care of herself as well and not setting good boundaries.    She took a job in  at [employer] instead. She also started seeing Dr. Montoya and using the resources he provided for pain management. She had seen a psychiatrist at the head and neck clinic as well as doing physical therapy at that time. These improved her symptoms significantly. She feels that she was a lot stronger and her chronic pain symptoms were stable for a a couple years. She was engaged in different resources to help manage her chronic pain at that time.    Since that time she has taken a job with [employer] and is coordinator of a reach out program. This job is intermittently stressful. She is seeing a  to help deal with some of her stress, someone that goes to her [employer]. She is involved in spiritual activities. She just feels that overall she hasn't been managing well and wants to be reintroduced to the resources that she used to  use when she was previously involved in the chronic pain clinic.    Currently she states that she feels exhausted all the time and she crashes by the end of the work week. She also feels that her pain is much worse. The pain is worst in the neck and shoulder and when this gets significant it can work up her head and cause headaches, usually when it occurs more than 3 days in a row. She also has pain in her low back and legs. She gets muscle spasms in her back and she has seen a massage therapist to help with this as well as her shoulders and neck.     She has started getting about 2 migraines per month and about 2-3 headaches non migraine headaches per week. Her headaches starting in her posterior/superior neck area and radiates up into her head to the front and can cause migraines when severe.    Pain rating: intensity ranges from 2/10 to 8/10, and Averages 5/10 on a 0-10 scale.  Aggravating factors include: Moving, bending  Relieving factors include: Yoga, meditating, breathing  Any bowel or bladder incontinence: No    Current treatments include:  Gabapentin 300mg TID  Topamax 25mg BID - Preventative for headaches  Yoga exercises tries to do some stretches every morning    Previous medication treatments included:  None    Other treatments have included:  Paige LLANOS Mimi has been seen at a pain clinic in the past.  Used to see Dr. Dale.  PT: Has gone to PT in the past, last round of therapy was with the head and neck pain clinic at least 5 years ago.  Acupuncture: None  TENs Unit: With physical therapy, helped.  Injections: None    Past Medical History:  Past Medical History:   Diagnosis Date     Allergic rhinitis      ALLERGIC RHINITIS NOS 3/8/2005     ANXIETY STATE NOS 3/8/2005     Cystic Fibrosis Screening negative 5/1/2009    [facility]     Dysmenorrhea 10/12/2008     Family history of aneurysm 4/28/2011     Fibromyalgia     Dx by head/neck/pain & Rheumatology     Fibromyalgia 4/15/2010    Pain  Clinic     Hyperlipidemia LDL goal <160 11/1/2013     INSOMNIA NEC 3/8/2005     Migraine      Migraine 4/22/2012    Pain Clinic      Mild major depression (H)      Obesity 11/1/2013     Other and unspecified alcohol dependence, unspecified drinking behavior     Sober since 7/00     Other anxiety states      Patellofemoral disorder     bilateral     Uncomplicated asthma     Not considered Asthma but I have breathing problems     Past Surgical History:  Past Surgical History:   Procedure Laterality Date     ARTHROSCOPY KNEE RT/LT  1997    Left      HC TOOTH EXTRACTION W/FORCEP  1998    wisdom teeth      Medications:  Current Outpatient Prescriptions   Medication Sig Dispense Refill     Turmeric 450 MG CAPS        rizatriptan (MAXALT-MLT) 10 MG ODT tab Take 1 tablet (10 mg) by mouth at onset of headache for migraine May repeat in 2 hours. Max 3 tablets/24 hours. 18 tablet 3     HERBALS        albuterol (2.5 MG/3ML) 0.083% neb solution Take 1 vial (2.5 mg) by nebulization every 6 hours as needed for shortness of breath / dyspnea or wheezing 30 vial 1     albuterol (PROAIR HFA/PROVENTIL HFA/VENTOLIN HFA) 108 (90 BASE) MCG/ACT Inhaler Inhale 2 puffs into the lungs every 6 hours as needed for shortness of breath / dyspnea or wheezing 1 Inhaler 6     beclomethasone (QVAR) 80 MCG/ACT Inhaler Inhale 2 puffs into the lungs 2 times daily 1 Inhaler 3     ipratropium - albuterol 0.5 mg/2.5 mg/3 mL (DUONEB) 0.5-2.5 (3) MG/3ML neb solution Take 1 vial (3 mLs) by nebulization once for 1 dose 1 Box 3     gabapentin (NEURONTIN) 300 MG capsule Take 1 capsule (300 mg) by mouth 3 times daily 270 capsule 3     FLUoxetine (PROZAC) 40 MG capsule Take 2 capsules (80 mg) by mouth daily 180 capsule 3     topiramate (TOPAMAX) 25 MG tablet Take 1 tablet (25 mg) by mouth 2 times daily 180 tablet 3     traZODone (DESYREL) 50 MG tablet Take 2 tablets by mouth. 1 -2 TABs  PO at bedtime prn insomnia 180 tablet 6     lidocaine (XYLOCAINE) 2 % solution  (viscous) Take 15 mLs by mouth every 2 hours as needed for moderate pain swish and spit every 3-8 hours as needed; max 8 doses/24 hour period 100 mL 0     order for DME Equipment being ordered: Nebulizer 1 Device 0     B Complex Vitamins (VITAMIN B COMPLEX PO)        Ferrous Sulfate (IRON SUPPLEMENT PO)        Naproxen Sodium (ALEVE PO) Take 220 mg by mouth       cetirizine-psuedoePHEDrine (ZYRTEC-D) 5-120 MG per tablet Take 1 tablet by mouth 2 times daily (Patient taking differently: Take 1 tablet by mouth daily ) 90 tablet 3     cholecalciferol (VITAMIN D) 1000 UNIT tablet Take 1 tablet by mouth daily.       Allergies:     Allergies   Allergen Reactions     Augmentin      reacted to Augmentin--  stomach upset -- can take penicillin     Codeine Swelling     Milk Protein Extract Difficulty breathing     Products with milk cause congestion, sinus mucous, difficulty breathing     Social History:  Home situation: Lives in a town home in [place].  Occupation/Schooling: Some college level classes were completed. 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  Tobacco use: None  Alcohol use: None  Drug use: None  History of chemical dependency treatment: 15 years abstinent from alcohol use. Involved in the AA program.    Family history:  Family History   Problem Relation Age of Onset     Neurologic Disorder Mother      ruptured cerebral aneurysm age 60      Hypertension Mother      Depression/Anxiety Mother      CEREBROVASCULAR DISEASE Mother      Obesity Mother      Macular Degeneration Mother      Musculoskeletal Disorder Father      OA      GASTROINTESTINAL DISEASE Father      colon polyps age 60     Other Cancer Father      Brain Cancer-neuroblastoma     Depression/Anxiety Father      Obesity Father      Gynecology Sister      Rachel. irregular menses.  pre-eclampsia     HEART DISEASE Paternal Grandfather      Multiple MI's (first MI age 60)     DIABETES Paternal Grandfather      IDDM dx age 60   Severe/brittle/complications     Coronary Artery Disease Paternal Grandfather      Depression/Anxiety Paternal Grandfather      Obesity Paternal Grandfather      Gynecology Paternal Grandmother       of uterine CA in her 40's     Ovarian Cancer Paternal Grandmother       at age 40 from cancer     Eye Disorder Maternal Grandmother      glacucoma     DIABETES Maternal Grandmother      IDDM-onset age 70     Obesity Maternal Grandmother      Macular Degeneration Maternal Grandmother      Neurologic Disorder Sister      Harini aneurysm/screen age 35--watching      Gynecology Paternal Aunt      uterine CA diagnosed @ 40yrs     Family history of headaches: None.    Review of Systems:  Skin: negative  Eyes: Working on getting a good vision prescription, they have been having difficulty figuring out her vision script.  Ears/Nose/Throat: negative  Respiratory: No shortness of breath, dyspnea on exertion, cough, or hemoptysis. Has a history of asthma and allergies.  Cardiovascular: negative  Gastrointestinal: negative  Genitourinary: negative  Musculoskeletal: as above  Neurologic: negative and as above  Psychiatric: depression stable  Hematologic/Lymphatic/Immunologic: negative other than allergies  Endocrine: negative    Physical Exam:  Vitals:    17 1111   BP: 118/77   BP Location: Right arm   Patient Position: Chair   Cuff Size: Adult Large   Pulse: 72   Resp: 16   SpO2: 100%     Exam:  Constitutional: healthy, alert, no distress and over weight  Head: normocephalic. Atraumatic.   Eyes: no redness or jaundice noted   ENT: oropharnx normal.  MMM.  Cardiovascular: RRR   Respiratory: Non-labored on room air  Gastrointestinal: Obese, soft, non-tender  : deferred  Skin: no suspicious lesions or rashes  Psychiatric: mentation appears normal and affect normal/bright    Musculoskeletal exam:  Gait/Station/Posture: Non-antalgic gait, Exaggerated lumbar lordosis  Cervical spine: Side bending moderately limited  bilaterally. Otherwise ROM was intact and wnl.    Lumbar spine: ROM was mildly reduced in all planes.  Myofascial tenderness:  Tenderness along the trapezius, and along the entire paraspinals from cervical to lower lumbar. Tenderness along the lateral thighs as well.  Straight leg exam: negative  Luis's maneuver: negative    Neurologic exam:  CN:  Cranial nerves 2-12 are normal  Motor:  5/5 UE and LE strength, except for hip abduction bilaterally  Reflexes:  Symmetric throughout the upper and lower extremities.  Sensory: Sensation to light touch is symmetric and intact in the upper and lower extremities.    Diagnostic tests:  No pertinent tests available for review     [place] Prescription Monitoring Program reviewed yes, no concerns    Assessment:  1. Fibromyalgia  2. Fatigue  3. Depression  4. Remote history of alcohol abuse - in recovery  5. Chronic myofascial Pain      Paige Le is a 46 year old female who presents with the complaints of chronic generalized myofascial pain and fatigue. She was previously enrolled in the pain clinic and participated diligently with physical therapy and pain psychology. She wishes to re-enroll in these services today as she has had an acute worsening of her pain and fatigue over the past 6 months.    Plan:  Diagnosis reviewed, treatment option addressed, and risk/benefits discussed.  Self-care instructions given.  I am recommending a multidisciplinary treatment plan to help this patient better manage her pain.      1. Physical Therapy: Referred to Pain PT through [facility].  2. Pain Psychologist to address issues of relaxation, behavioral change, coping style, and other factors important to improvement: Referred to [facility] as this would be closer to her home  3. Diagnostic Studies: None  4. Medication Management:  1. Continue Gabapentin 300mg TID.    2. Discussed and prescribed Naltrexone 6mg qhs compound pharmacy  5. Further procedures recommended: None at this  time  6. Acupuncture: Could consider in the future.   7. Urine toxicology screen today: None as patient is not on opiates.   8. Follow up: with Dr. Garrido in 6-8 weeks    I saw and examined the patient with the Pain Fellow/Resident. I have reviewed and agree with the resident's note and plan of care and made changes and corrections directly to the body of the note.    TIME SPENT:  BY FELLOW/RESIDENT ALONE 45 MIN  BY MYSELF AND FELLOW/RESIDENT TOGETHER 20 MIN  BY MYSELF WITHOUT THE FELLOW/RESIDENT 0 MIN    These times included 20 minutes I spent counseling her about her diagnosis and treatment options and coordination of care with the primary team    Yvonne GarridoMD Pain Management

## 2017-06-09 NOTE — TELEPHONE ENCOUNTER
[place], [place] - [address] [address] Called and requested this be sent to the compounding pharmacy. The prescription has been renewed and the compounding pharmacy has been selected. The original will be deleted from the [facility] pharmacy. Routing to provider to sign.    KAYLA ReyesN, RN  Care Coordinator  [facility]

## 2017-06-09 NOTE — PATIENT INSTRUCTIONS
1. [employer] ami for iphone - mindfulness  2. Pain psychology        3. Pain physical therapy - schedule with Michelle at the  location  4. Low dose naltrexone - the [facility] pharmacy will call you after they put it through insurance and mail it to your home at no charge  5. Try to walk daily!!!  6. Follow up with Dr. Garrido in 6-8 weeks in [place], [place]         ----------------------------------------------------------------  Nurse Triage line:  [phone]   Call this number with any questions or concerns. You may leave a detailed message anytime. Calls are typically returned Monday through Friday between 8 AM and 4:30 PM. We usually get back to you within 2 business days depending on the issue/request.       Medication refills:    For non-narcotic medications, call your pharmacy directly to request a refill. The pharmacy will contact the [facility] for authorization. Please allow 3-4 days for these refills to be processed.     For narcotic refills, call the nurse triage line or send a General Blood message. Please contact us 7-10 days before your refill is due. The message MUST include the name of the specific medication(s) requested and how you would like to receive the prescription(s). The options are as follows:    Pain Clinic staff can mail the prescription to your pharmacy. Please tell us the name of the pharmacy.    You may pick the prescription up at the Pain Clinic (tell us the location) or during a clinic visit with your pain provider    Pain Clinic staff can deliver the prescription to the [facility] pharmacy in the clinic building. Please tell us the location.      Scheduling number: [phone].  Call this number to schedule or change appointments.    We believe regular attendance is key to your success in our program.    Any time you are unable to keep your appointment we ask that you call us at least 24 hours in advance to let us know. This will allow us to offer the appointment time to  another patient.

## 2017-06-09 NOTE — MR AVS SNAPSHOT
After Visit Summary   6/9/2017    Paige Le    MRN: [id number]           Patient Information     Date Of Birth          1970        Visit Information        Provider Department      6/9/2017 11:00 AM Yvonne Garrido MD [facility]        Today's Diagnoses     Fibromyalgia    -  1      Care Instructions    1. Headspace ami for iphone - mindfulness  2. Pain psychology        3. Pain physical therapy - schedule with Michelle at the  location  4. [facility] after [facility] through insurance and mail it to your home at no charge  5. Try to walk daily!!!  6. Follow up with Dr. Garrido in 6-8 weeks in [place], [place]         ----------------------------------------------------------------  Nurse Triage line:  [phone]   Call this number with any questions or concerns. You may leave a detailed message anytime. Calls are typically returned Monday through Friday between 8 AM and 4:30 PM. We usually get back to you within 2 business days depending on the issue/request.       Medication refills:    For non-narcotic medications, call your pharmacy directly to request a refill. The pharmacy will contact the [facility] for authorization. Please allow 3-4 days for these refills to be processed.     For narcotic refills, call the nurse triage line or send a [place] message. Please contact us 7-10 days before your refill is due. The message MUST include the name of the specific medication(s) requested and how you would like to receive the prescription(s). The options are as follows:    Pain Clinic staff can mail the prescription to your pharmacy. Please tell us the name of the pharmacy.    You may pick the prescription up at the Pain Clinic (tell us the location) or during a clinic visit with your pain provider    Pain Clinic staff can deliver the prescription to the [facility] pharmacy in the clinic building. Please tell us  the location.      Scheduling number: [phone].  Call this number to schedule or change appointments.    We believe regular attendance is key to your success in our program.    Any time you are unable to keep your appointment we ask that you call us at least 24 hours in advance to let us know. This will allow us to offer the appointment time to another patient.               Follow-ups after your visit        Additional Services     PAIN PHD EVAL/TREAT/FOLLOW UP           PAIN PT EVAL AND TREAT                 Your next 10 appointments already scheduled     Jun 14, 2017   Procedure with Livia Barnett, DO   [facility] PeriOp Services (--)    [address] [place] [address]   [phone]            Jun 19, 2017  8:40 AM CDT   US INTRAOPERATIVE with RHUS1   [facility] Ultrasound ([facility])    [address] [place] [address]   [phone]           Please bring a list of your medicines (including vitamins, minerals and over-the-counter drugs). Also, tell your doctor about any allergies you may have. Wear comfortable clothes and leave your valuables at home.  You do not need to do anything special to prepare for your exam.  Please call the Imaging Department at your exam site with any questions.              Who to contact     If you have questions or need follow up information about today's clinic visit or your schedule please contact [facility] directly at [phone].  Normal or non-critical lab and imaging results will be communicated to you by MyChart, letter or phone within 4 business days after the clinic has received the results. If you do not hear from us within 7 days, please contact the clinic through MyChart or phone. If you have a critical or abnormal lab result, we will notify you by phone as soon as possible.  Submit refill requests through [employer] or call your pharmacy and they will forward the refill request to us.  "Please allow 3 business days for your refill to be completed.          Additional Information About Your Visit        [employer] Information     [employer] lets you send messages to your doctor, view your test results, renew your prescriptions, schedule appointments and more. To sign up, go to [email or web address] . Click on \"Log in\" on the left side of the screen, which will take you to the Welcome page. Then click on \"Sign up Now\" on the right side of the page.     You will be asked to enter the access code listed below, as well as some personal information. Please follow the directions to create your username and password.     Your access code is: [email or web address]  Expires: 2017  1:40 PM     Your access code will  in 90 days. If you need help or a new code, please call your [place] clinic or [phone].        Care EveryWhere ID     This is your Care EveryWhere ID. This could be used by other organizations to access your [facility] medical records  [id number]        Your Vitals Were     Pulse Respirations Last Period Pulse Oximetry          72 16 2017 (Exact Date) 100%         Blood Pressure from Last 3 Encounters:   17 118/77   17 120/80   17 113/72    Weight from Last 3 Encounters:   17 115.1 kg (253 lb 11.2 oz)   17 117 kg (258 lb)   16 110.9 kg (244 lb 9.6 oz)              We Performed the Following     PAIN PHD EVAL/TREAT/FOLLOW UP     PAIN PT EVAL AND TREAT          Today's Medication Changes          These changes are accurate as of: 17 12:18 PM.  If you have any questions, ask your nurse or doctor.               Start taking these medicines.        Dose/Directions    COMPOUND - PHARMACY TO MIX COMPOUNDED MEDICATION   Commonly known as:  CMPD RX   Used for:  Fibromyalgia   Started by:  Yvonne Garrido MD        Low dose Naltrexone:  6mg capsules/tablets one PO qhs   Quantity:  30 capsule   Refills:  3         These medicines have changed or have updated " prescriptions.        Dose/Directions    cetirizine-psuedoePHEDrine 5-120 MG per 12 hr tablet   Commonly known as:  ZYRTEC-D   This may have changed:  when to take this   Used for:  Allergic rhinitis, cause unspecified        Dose:  1 tablet   Take 1 tablet by mouth 2 times daily   Quantity:  90 tablet   Refills:  3            Where to get your medicines      These medications were sent to [address] [address]     Phone:  [phone]     COMPOUND - PHARMACY TO MIX COMPOUNDED MEDICATION                Primary Care Provider Office Phone # Fax #    Christine Magali Farnsworth PA-C [phone]       [address] [address]        Thank you!     Thank you for choosing [facility]  for your care. Our goal is always to provide you with excellent care. Hearing back from our patients is one way we can continue to improve our services. Please take a few minutes to complete the written survey that you may receive in the mail after your visit with us. Thank you!             Your Updated Medication List - Protect others around you: Learn how to safely use, store and throw away your medicines at www.disposemymeds.org.          This list is accurate as of: 6/9/17 12:18 PM.  Always use your most recent med list.                   Brand Name Dispense Instructions for use    * albuterol (2.5 MG/3ML) 0.083% neb solution     30 vial    Take 1 vial (2.5 mg) by nebulization every 6 hours as needed for shortness of breath / dyspnea or wheezing       * albuterol 108 (90 BASE) MCG/ACT Inhaler    PROAIR HFA/PROVENTIL HFA/VENTOLIN HFA    1 Inhaler    Inhale 2 puffs into the lungs every 6 hours as needed for shortness of breath / dyspnea or wheezing       ALEVE PO      Take 220 mg by mouth       beclomethasone 80 MCG/ACT Inhaler    QVAR    1 Inhaler    Inhale 2 puffs into the lungs 2 times  daily       cetirizine-psuedoePHEDrine 5-120 MG per 12 hr tablet    ZYRTEC-D    90 tablet    Take 1 tablet by mouth 2 times daily       cholecalciferol 1000 UNIT tablet    vitamin D     Take 1 tablet by mouth daily.       COMPOUND - PHARMACY TO MIX COMPOUNDED MEDICATION    CMPD RX    30 capsule    Low dose Naltrexone:  6mg capsules/tablets one PO qhs       FLUoxetine 40 MG capsule    PROzac    180 capsule    Take 2 capsules (80 mg) by mouth daily       gabapentin 300 MG capsule    NEURONTIN    270 capsule    Take 1 capsule (300 mg) by mouth 3 times daily       HERBALS          ipratropium - albuterol 0.5 mg/2.5 mg/3 mL 0.5-2.5 (3) MG/3ML neb solution    DUONEB    1 Box    Take 1 vial (3 mLs) by nebulization once for 1 dose       IRON SUPPLEMENT PO          lidocaine 2 % solution    XYLOCAINE    100 mL    Take 15 mLs by mouth every 2 hours as needed for moderate pain swish and spit every 3-8 hours as needed; max 8 doses/24 hour period       order for DME     1 Device    Equipment being ordered: Nebulizer       rizatriptan 10 MG ODT tab    MAXALT-MLT    18 tablet    Take 1 tablet (10 mg) by mouth at onset of headache for migraine May repeat in 2 hours. Max 3 tablets/24 hours.       topiramate 25 MG tablet    TOPAMAX    180 tablet    Take 1 tablet (25 mg) by mouth 2 times daily       traZODone 50 MG tablet    DESYREL    180 tablet    Take 2 tablets by mouth. 1 -2 TABs  PO at bedtime prn insomnia       Turmeric 450 MG Caps          VITAMIN B COMPLEX PO          * Notice:  This list has 2 medication(s) that are the same as other medications prescribed for you. Read the directions carefully, and ask your doctor or other care provider to review them with you.

## 2017-06-09 NOTE — TELEPHONE ENCOUNTER
Call from [facility] preadmitting stating pt is scheduled for surgery 6/14. States MD used an abbreviation in the chart for the procedure that a consent is to be done for. Asking for MD to correct this.

## 2017-06-13 NOTE — H&P (VIEW-ONLY)
"SUBJECTIVE:  Paige Le is an 46 year old  woman who presents for IUD insertion No LMP recorded. Periods are irregular Q 3 months. Moderate to severe bleeding with menses, but she notes some improvement. She notes she used to experience syncope with menses and donating platelets. She had pelvic US in July 2016.     Patient states she has hot flashes.         This document serves as a record of the services and decisions personally performed and made by Livia Barnett DO. It was created on her behalf by Jennifer Caceres, a trained medical scribe. The creation of this document is based on the provider's statements to the medical scribe.  Jennifer Caceres 10:45 AM June 5, 2017    OBJECTIVE:  /80 (BP Location: Right arm, Patient Position: Chair, Cuff Size: Adult Large)  Pulse 86  Temp 98  F (36.7  C) (Oral)  Ht 1.626 m (5' 4\")  Wt 115.1 kg (253 lb 11.2 oz)  LMP 06/01/2017 (Exact Date)  SpO2 98%  Breastfeeding? No  BMI 43.55 kg/m2  General appearance: healthy, alert and no distress  RESP: lungs clear to auscultation - no rales, rhonchi or wheezes  CV: regular rates and rhythm, normal S1 S2, no S3 or S4 and no murmur, click or rub  Pelvic: Pelvic:  Pelvic examination  External genitalia normal   and vagina normal rugatted not atrophic  Examination of urethra  normal no masses, tenderness, scarring  bladder, no masses or tenderness  Cervix no lesions or discharge  Bimanual exam with   Uterus 5 weeks size, mid position, mobile,non-tenderness, no descent   Adnexa/parametria  Normal no masses/no tenderness    Procedure:    After obtaining informed consent an exam was done, the pt prepped in the usual sterile fashion, the anterior lip of the cervix grasped w a tenaculum, unable to place Mirena IUD.    ASSESSMENT:  Paige Le is an 46 year old woman who presents for IUD insertion.  Unable to place.    Pre-op physical is done and patient is cleared for ultrasound guided IUD placement under "   anesthesia    PLAN:  Dx:  1)  IUD insertion was not successful. Will schedule IUD insertion under anesthesia at surgery center. Discussed alternative contraceptive options including nexplanon. Patient  Really desires MIRENA IUD.   Do not charge for attempted IUD, as we will do it in the OR.  Patient  Cleared for surgery.     The information in this document, created by the medical scribe for me, accurately reflects the services I personally performed and the decisions made by me. I have reviewed and approved this document for accuracy prior to leaving the patient care area.  June 5, 2017 11:16 AM    Dr. Livia Barnett, DO    Obstetrics and Gynecology  [facility]

## 2017-06-14 ENCOUNTER — HOSPITAL ENCOUNTER (OUTPATIENT)
Facility: CLINIC | Age: 47
Discharge: HOME | End: 2017-06-14
Attending: FAMILY MEDICINE | Admitting: FAMILY MEDICINE
Payer: COMMERCIAL

## 2017-06-14 ENCOUNTER — HOSPITAL ENCOUNTER (OUTPATIENT)
Dept: ULTRASOUND IMAGING | Facility: CLINIC | Age: 47
End: 2017-06-14
Attending: FAMILY MEDICINE | Admitting: FAMILY MEDICINE
Payer: COMMERCIAL

## 2017-06-14 ENCOUNTER — ANESTHESIA EVENT (OUTPATIENT)
Dept: SURGERY | Facility: CLINIC | Age: 47
End: 2017-06-14
Payer: COMMERCIAL

## 2017-06-14 ENCOUNTER — ANESTHESIA (OUTPATIENT)
Dept: SURGERY | Facility: CLINIC | Age: 47
End: 2017-06-14
Payer: COMMERCIAL

## 2017-06-14 ENCOUNTER — SURGERY (OUTPATIENT)
Age: 47
End: 2017-06-14

## 2017-06-14 VITALS
WEIGHT: 251.4 LBS | DIASTOLIC BLOOD PRESSURE: 70 MMHG | SYSTOLIC BLOOD PRESSURE: 142 MMHG | OXYGEN SATURATION: 97 % | BODY MASS INDEX: 42.92 KG/M2 | RESPIRATION RATE: 16 BRPM | TEMPERATURE: 97.7 F | HEIGHT: 64 IN

## 2017-06-14 DIAGNOSIS — Z98.890 S/P DILATION AND CURETTAGE: Primary | ICD-10-CM

## 2017-06-14 DIAGNOSIS — N88.2 CERVICAL STENOSIS (UTERINE CERVIX): ICD-10-CM

## 2017-06-14 LAB — HCG SERPL QL: NEGATIVE

## 2017-06-14 PROCEDURE — 40000864 US INTRAOPERATIVE

## 2017-06-14 PROCEDURE — 36000052 ZZH SURGERY LEVEL 2 EA 15 ADDTL MIN: Performed by: FAMILY MEDICINE

## 2017-06-14 PROCEDURE — 71000027 ZZH RECOVERY PHASE 2 EACH 15 MINS: Performed by: FAMILY MEDICINE

## 2017-06-14 PROCEDURE — 88305 TISSUE EXAM BY PATHOLOGIST: CPT | Mod: 26 | Performed by: FAMILY MEDICINE

## 2017-06-14 PROCEDURE — 36000050 ZZH SURGERY LEVEL 2 1ST 30 MIN: Performed by: FAMILY MEDICINE

## 2017-06-14 PROCEDURE — 25000125 ZZHC RX 250: Performed by: ANESTHESIOLOGY

## 2017-06-14 PROCEDURE — 84703 CHORIONIC GONADOTROPIN ASSAY: CPT | Performed by: FAMILY MEDICINE

## 2017-06-14 PROCEDURE — 36415 COLL VENOUS BLD VENIPUNCTURE: CPT | Performed by: FAMILY MEDICINE

## 2017-06-14 PROCEDURE — 25000125 ZZHC RX 250: Performed by: NURSE ANESTHETIST, CERTIFIED REGISTERED

## 2017-06-14 PROCEDURE — 58120 DILATION AND CURETTAGE: CPT | Performed by: FAMILY MEDICINE

## 2017-06-14 PROCEDURE — 25000128 H RX IP 250 OP 636: Performed by: ANESTHESIOLOGY

## 2017-06-14 PROCEDURE — 71000012 ZZH RECOVERY PHASE 1 LEVEL 1 FIRST HR: Performed by: FAMILY MEDICINE

## 2017-06-14 PROCEDURE — 40000306 ZZH STATISTIC PRE PROC ASSESS II: Performed by: FAMILY MEDICINE

## 2017-06-14 PROCEDURE — 27210794 ZZH OR GENERAL SUPPLY STERILE: Performed by: FAMILY MEDICINE

## 2017-06-14 PROCEDURE — 58300 INSERT INTRAUTERINE DEVICE: CPT | Performed by: FAMILY MEDICINE

## 2017-06-14 PROCEDURE — 25000566 ZZH SEVOFLURANE, EA 15 MIN: Performed by: FAMILY MEDICINE

## 2017-06-14 PROCEDURE — 88305 TISSUE EXAM BY PATHOLOGIST: CPT | Performed by: FAMILY MEDICINE

## 2017-06-14 PROCEDURE — 37000009 ZZH ANESTHESIA TECHNICAL FEE, EACH ADDTL 15 MIN: Performed by: FAMILY MEDICINE

## 2017-06-14 PROCEDURE — 25000128 H RX IP 250 OP 636: Performed by: FAMILY MEDICINE

## 2017-06-14 PROCEDURE — 25000128 H RX IP 250 OP 636: Performed by: NURSE ANESTHETIST, CERTIFIED REGISTERED

## 2017-06-14 PROCEDURE — 37000008 ZZH ANESTHESIA TECHNICAL FEE, 1ST 30 MIN: Performed by: FAMILY MEDICINE

## 2017-06-14 PROCEDURE — 93010 ELECTROCARDIOGRAM REPORT: CPT | Performed by: INTERNAL MEDICINE

## 2017-06-14 PROCEDURE — 76998 US GUIDE INTRAOP: CPT | Mod: 26 | Performed by: FAMILY MEDICINE

## 2017-06-14 DEVICE — IMPLANTABLE DEVICE: Type: IMPLANTABLE DEVICE | Site: UTERUS | Status: FUNCTIONAL

## 2017-06-14 RX ORDER — MEPERIDINE HYDROCHLORIDE 25 MG/ML
12.5 INJECTION INTRAMUSCULAR; INTRAVENOUS; SUBCUTANEOUS EVERY 5 MIN PRN
Status: DISCONTINUED | OUTPATIENT
Start: 2017-06-14 | End: 2017-06-14 | Stop reason: HOSPADM

## 2017-06-14 RX ORDER — SODIUM CHLORIDE, SODIUM LACTATE, POTASSIUM CHLORIDE, CALCIUM CHLORIDE 600; 310; 30; 20 MG/100ML; MG/100ML; MG/100ML; MG/100ML
INJECTION, SOLUTION INTRAVENOUS CONTINUOUS
Status: DISCONTINUED | OUTPATIENT
Start: 2017-06-14 | End: 2017-06-14 | Stop reason: HOSPADM

## 2017-06-14 RX ORDER — LIDOCAINE 40 MG/G
CREAM TOPICAL
Status: DISCONTINUED | OUTPATIENT
Start: 2017-06-14 | End: 2017-06-14 | Stop reason: HOSPADM

## 2017-06-14 RX ORDER — HYDROMORPHONE HYDROCHLORIDE 1 MG/ML
.3-.5 INJECTION, SOLUTION INTRAMUSCULAR; INTRAVENOUS; SUBCUTANEOUS EVERY 5 MIN PRN
Status: DISCONTINUED | OUTPATIENT
Start: 2017-06-14 | End: 2017-06-14 | Stop reason: HOSPADM

## 2017-06-14 RX ORDER — ACETAMINOPHEN 10 MG/ML
1000 INJECTION, SOLUTION INTRAVENOUS ONCE
Status: COMPLETED | OUTPATIENT
Start: 2017-06-14 | End: 2017-06-14

## 2017-06-14 RX ORDER — KETOROLAC TROMETHAMINE 30 MG/ML
INJECTION, SOLUTION INTRAMUSCULAR; INTRAVENOUS PRN
Status: DISCONTINUED | OUTPATIENT
Start: 2017-06-14 | End: 2017-06-14

## 2017-06-14 RX ORDER — PROPOFOL 10 MG/ML
INJECTION, EMULSION INTRAVENOUS CONTINUOUS PRN
Status: DISCONTINUED | OUTPATIENT
Start: 2017-06-14 | End: 2017-06-14

## 2017-06-14 RX ORDER — GLYCOPYRROLATE 0.2 MG/ML
INJECTION, SOLUTION INTRAMUSCULAR; INTRAVENOUS PRN
Status: DISCONTINUED | OUTPATIENT
Start: 2017-06-14 | End: 2017-06-14

## 2017-06-14 RX ORDER — IBUPROFEN 800 MG/1
800 TABLET, FILM COATED ORAL EVERY 8 HOURS PRN
Qty: 90 TABLET | Refills: 1 | Status: SHIPPED | OUTPATIENT
Start: 2017-06-14 | End: 2018-01-31

## 2017-06-14 RX ORDER — ONDANSETRON 4 MG/1
4 TABLET, ORALLY DISINTEGRATING ORAL EVERY 30 MIN PRN
Status: DISCONTINUED | OUTPATIENT
Start: 2017-06-14 | End: 2017-06-14 | Stop reason: HOSPADM

## 2017-06-14 RX ORDER — ONDANSETRON 2 MG/ML
4 INJECTION INTRAMUSCULAR; INTRAVENOUS EVERY 30 MIN PRN
Status: DISCONTINUED | OUTPATIENT
Start: 2017-06-14 | End: 2017-06-14 | Stop reason: HOSPADM

## 2017-06-14 RX ORDER — FENTANYL CITRATE 50 UG/ML
INJECTION, SOLUTION INTRAMUSCULAR; INTRAVENOUS PRN
Status: DISCONTINUED | OUTPATIENT
Start: 2017-06-14 | End: 2017-06-14

## 2017-06-14 RX ORDER — DIAZEPAM 10 MG/2ML
2.5 INJECTION, SOLUTION INTRAMUSCULAR; INTRAVENOUS
Status: DISCONTINUED | OUTPATIENT
Start: 2017-06-14 | End: 2017-06-14 | Stop reason: HOSPADM

## 2017-06-14 RX ORDER — DEXAMETHASONE SODIUM PHOSPHATE 4 MG/ML
INJECTION, SOLUTION INTRA-ARTICULAR; INTRALESIONAL; INTRAMUSCULAR; INTRAVENOUS; SOFT TISSUE PRN
Status: DISCONTINUED | OUTPATIENT
Start: 2017-06-14 | End: 2017-06-14

## 2017-06-14 RX ORDER — DIMENHYDRINATE 50 MG/ML
25 INJECTION, SOLUTION INTRAMUSCULAR; INTRAVENOUS
Status: DISCONTINUED | OUTPATIENT
Start: 2017-06-14 | End: 2017-06-14 | Stop reason: HOSPADM

## 2017-06-14 RX ORDER — ALBUTEROL SULFATE 0.83 MG/ML
2.5 SOLUTION RESPIRATORY (INHALATION) EVERY 4 HOURS PRN
Status: DISCONTINUED | OUTPATIENT
Start: 2017-06-14 | End: 2017-06-14 | Stop reason: HOSPADM

## 2017-06-14 RX ORDER — LABETALOL HYDROCHLORIDE 5 MG/ML
10 INJECTION, SOLUTION INTRAVENOUS
Status: DISCONTINUED | OUTPATIENT
Start: 2017-06-14 | End: 2017-06-14 | Stop reason: HOSPADM

## 2017-06-14 RX ORDER — DROPERIDOL 2.5 MG/ML
0.62 INJECTION, SOLUTION INTRAMUSCULAR; INTRAVENOUS
Status: DISCONTINUED | OUTPATIENT
Start: 2017-06-14 | End: 2017-06-14 | Stop reason: RX

## 2017-06-14 RX ORDER — KETOROLAC TROMETHAMINE 30 MG/ML
30 INJECTION, SOLUTION INTRAMUSCULAR; INTRAVENOUS ONCE
Status: COMPLETED | OUTPATIENT
Start: 2017-06-14 | End: 2017-06-14

## 2017-06-14 RX ORDER — PROPOFOL 10 MG/ML
INJECTION, EMULSION INTRAVENOUS PRN
Status: DISCONTINUED | OUTPATIENT
Start: 2017-06-14 | End: 2017-06-14

## 2017-06-14 RX ORDER — FENTANYL CITRATE 50 UG/ML
25-50 INJECTION, SOLUTION INTRAMUSCULAR; INTRAVENOUS
Status: DISCONTINUED | OUTPATIENT
Start: 2017-06-14 | End: 2017-06-14 | Stop reason: HOSPADM

## 2017-06-14 RX ORDER — ONDANSETRON 2 MG/ML
INJECTION INTRAMUSCULAR; INTRAVENOUS PRN
Status: DISCONTINUED | OUTPATIENT
Start: 2017-06-14 | End: 2017-06-14

## 2017-06-14 RX ADMIN — ONDANSETRON 4 MG: 2 INJECTION INTRAMUSCULAR; INTRAVENOUS at 09:13

## 2017-06-14 RX ADMIN — SODIUM CHLORIDE, POTASSIUM CHLORIDE, SODIUM LACTATE AND CALCIUM CHLORIDE: 600; 310; 30; 20 INJECTION, SOLUTION INTRAVENOUS at 10:40

## 2017-06-14 RX ADMIN — GLYCOPYRROLATE 0.2 MG: 0.2 INJECTION, SOLUTION INTRAMUSCULAR; INTRAVENOUS at 09:13

## 2017-06-14 RX ADMIN — PROPOFOL 200 MG: 10 INJECTION, EMULSION INTRAVENOUS at 09:13

## 2017-06-14 RX ADMIN — PROPOFOL 80 MCG/KG/MIN: 10 INJECTION, EMULSION INTRAVENOUS at 09:13

## 2017-06-14 RX ADMIN — DEXAMETHASONE SODIUM PHOSPHATE 8 MG: 4 INJECTION, SOLUTION INTRA-ARTICULAR; INTRALESIONAL; INTRAMUSCULAR; INTRAVENOUS; SOFT TISSUE at 09:13

## 2017-06-14 RX ADMIN — SODIUM CHLORIDE, POTASSIUM CHLORIDE, SODIUM LACTATE AND CALCIUM CHLORIDE: 600; 310; 30; 20 INJECTION, SOLUTION INTRAVENOUS at 09:08

## 2017-06-14 RX ADMIN — KETOROLAC TROMETHAMINE 30 MG: 30 INJECTION, SOLUTION INTRAMUSCULAR at 09:13

## 2017-06-14 RX ADMIN — KETOROLAC TROMETHAMINE 30 MG: 30 INJECTION, SOLUTION INTRAMUSCULAR; INTRAVENOUS at 08:07

## 2017-06-14 RX ADMIN — FENTANYL CITRATE 100 MCG: 50 INJECTION, SOLUTION INTRAMUSCULAR; INTRAVENOUS at 09:13

## 2017-06-14 RX ADMIN — Medication 30 MG: at 09:13

## 2017-06-14 RX ADMIN — ACETAMINOPHEN 1000 MG: 10 INJECTION, SOLUTION INTRAVENOUS at 10:18

## 2017-06-14 RX ADMIN — MIDAZOLAM HYDROCHLORIDE 2 MG: 1 INJECTION, SOLUTION INTRAMUSCULAR; INTRAVENOUS at 09:08

## 2017-06-14 NOTE — BRIEF OP NOTE
[facility] Brief Operative Note    Pre-operative diagnosis: Cervical stenosis   Post-operative diagnosis 45 y/o  s/p ultrasound guided endometrial biopsy/dilation and curettage and mirena intrauterine device placement, repair of unavoidable cervical laceration   Procedure: Procedure(s):  Ultrasound guided cervical dilation, IUD placement, Endometrial biopsy, repair of unavoidable cervical laceration - Wound Class: II-Clean Contaminated   - Wound Class: II-Clean Contaminated   Surgeon(s): Surgeon(s) and Role:     * Livia Barnett DO - Primary   Estimated blood loss: * No values recorded between 2017  9:32 AM and 2017  9:51 AM *    Specimens:   ID Type Source Tests Collected by Time Destination   A : Endometrial Curettings Biopsy Tissue Endometrium SURGICAL PATHOLOGY EXAM Livia Barnett DO 2017  9:30 AM       Findings: Severely anteverted uterus,cervica stenosis

## 2017-06-14 NOTE — ANESTHESIA PREPROCEDURE EVALUATION
Anesthesia Evaluation     . Pt has had prior anesthetic. Type: General    No history of anesthetic complications          ROS/MED HX    ENT/Pulmonary:     (+)Intermittent asthma Treatment: Inhaler prn,  , . .    Neurologic:     (+)migraines, other neuro fibromyalgia    Cardiovascular:  - neg cardiovascular ROS       METS/Exercise Tolerance:     Hematologic:  - neg hematologic  ROS       Musculoskeletal:  - neg musculoskeletal ROS       GI/Hepatic:  - neg GI/hepatic ROS       Renal/Genitourinary:         Endo: Comment: Class 3 obesity    (+) Obesity, .      Psychiatric:     (+) psychiatric history anxiety and depression      Infectious Disease:  - neg infectious disease ROS       Malignancy:      - no malignancy   Other:    (+) H/O Chronic Pain,                   Physical Exam  Normal systems: cardiovascular, pulmonary and dental    Airway   Mallampati: III  TM distance: >3 FB  Neck ROM: full    Dental     Cardiovascular   Rhythm and rate: regular and normal      Pulmonary    breath sounds clear to auscultation    Other findings: Lab Test        07/24/16     05/04/16     03/02/15                       1402          1349          1143          WBC          9.9          4.6          5.2           HGB          12.3         12.1         12.7          MCV          90           86           88            PLT          207          207          155            Lab Test        05/17/17     05/04/16     03/02/15                       1216          1349          1143          NA           144          140          138           POTASSIUM    4.1          3.9          3.8           CHLORIDE     109          110*         108           CO2          28           21           24            BUN          6*           8            9             CR           0.83         0.76         0.77          ANIONGAP     7            9            6             DON          8.8          8.6          8.4*          GLC          77           91            85                                   Anesthesia Plan      History & Physical Review  History and physical reviewed and following examination; no interval change.    ASA Status:  3 .        Plan for General and LMA with Intravenous induction. Maintenance will be Balanced.    PONV prophylaxis:  Ondansetron (or other 5HT-3) and Dexamethasone or Solumedrol       Postoperative Care  Postoperative pain management:  IV analgesics, Oral pain medications and Multi-modal analgesia.      Consents  Anesthetic plan, risks, benefits and alternatives discussed with:  Patient.  Use of blood products discussed: No .   .                          .

## 2017-06-14 NOTE — OP NOTE
Pre-procedure diagnosis: 45 y/o  with cervical stenosis, desiring contraception with Mirena intrauterine device, and with menorrhagia.  Post-procedure diagnosis: 45 y/o  s/p ultrasound guided endometrial biopsy/dilation and curettage and mirena intrauterine device placement, repair of unavoidable cervical laceration  Procedure:   1.  Ultrasound guided cervical dilation and endometrial biopsy/curettage  2.  Ultrasound guided intrauterine device placement   3.  Repair of unavoidable cervical laceration  Surgeon: Dr. Barnett   Assistant:  none  Complications: Unavoidable cervical laceration  EBL: 5 cc   Indications: 45 y/o  with cervical stenosis, desiring contraception with Mirena intrauterine device, and with menorrhagia.  Due to cervical stenosis I was unable to place the Intrauterine device in the office.    Findings:  Severely anteverted uterus with cervical stenosis  Procedure: After informed consent was obtained, the patient was taken to the operating room and placed under general anesthesia. She was prepped and draped in the normal fashion. A time out was performed. Ultrasound is placed on the abdomen. The uterus is noted to be severely anteverted.  the anterior lip of the cervix grasped w a tenaculum and endometrial biopsy is attempted.  Due to severe cervical stenosis and severely anteverted uterus the endometrial pipelle would not pass through the cervix.  Dilation under ultrasound guidance was performed and then curettage under ultrasound was performed as well with tissue noted.  Then the mirena IUD was attempted but was not stiff enough to go through the cervix. And the tenaculum pulled off the cervix, resulting in a laceration of the cervix. The IUD then was dropped on the floor an and a new Mirena IUD was obtained.  I then replaced the tenaculum and dilated the cervix further under ultrasound guidance.  The new Mirena IUD was placed in the cervix and the speculum was removed because it  was noted to prevent proper placement of the IUD because it was not allowing for proper anteversion of the IUD placement device.  The Mirena IUD was then placed without concerns or complications under guidance at 8 cm depth. The speculum was replaced and the IUD strings trimmed to approx 1 inch from the os.  The cervical laceration was repaired with a figure-of-8 suture of O-vicryl on a UR-6 needle.  The patient tolerated the procedure well and was awakened from anesthesia and taken to the recovery room in stable condition. Sponge, lap and needle counts are correct x 2.   Dr. Livia Barnett, DO   Obstetrics and Gynecology   [facility]

## 2017-06-14 NOTE — ANESTHESIA POSTPROCEDURE EVALUATION
Patient: Paige Le    Procedure(s):  Ultrasound guided cervical dilation, IUD placement, Endometrial biopsy, repair of unavoidable cervical laceration - Wound Class: II-Clean Contaminated   - Wound Class: II-Clean Contaminated    Diagnosis:Cervical stenosis  Diagnosis Additional Information: 47 y/o  s/p ultrasound guided endometrial biopsy/dilation and curettage and mirena intrauterine device placement, repair of unavoidable cervical laceration  Dr. Barnett    Anesthesia Type:  General, LMA    Note:  Anesthesia Post Evaluation    Patient location during evaluation: PACU  Patient participation: Able to fully participate in evaluation  Level of consciousness: awake  Pain management: adequate  Airway patency: patent  Cardiovascular status: acceptable  Respiratory status: acceptable  Hydration status: acceptable  PONV: none             Last vitals:  Vitals:    17 1045 17 1050 17 1105   BP: 121/85  107/64   Resp: 16  16   Temp:   97  F (36.1  C)   SpO2: 95% 95% 97%         Electronically Signed By: Yousuf Rajput MD  2017  11:15 AM

## 2017-06-14 NOTE — IP AVS SNAPSHOT
MRN:[id number]                      After Visit Summary   6/14/2017    Paige Le    MRN: [id number]           Thank you!     Thank you for choosing [facility] for your care. Our goal is always to provide you with excellent care. Hearing back from our patients is one way we can continue to improve our services. Please take a few minutes to complete the written survey that you may receive in the mail after you visit. If you would like to speak to someone directly about your visit please contact Patient Relations at [phone]. Thank you!          Patient Information     Date Of Birth          1970        About your hospital stay     You were admitted on:  June 14, 2017 You last received care in the:  [facility] PreOP/PostOP    You were discharged on:  June 14, 2017       Who to Call     For medical emergencies, please call 111.  For non-urgent questions about your medical care, please call your primary care provider or clinic, [phone]  For questions related to your surgery, please call your surgery clinic        Attending Provider     Provider Livia Nunez DO OB/Gyn       Primary Care Provider Office Phone # Fax #    Christine Magali Farnsworth PA-C [phone]      Your next 10 appointments already scheduled     Jun 19, 2017  1:00 PM CDT   New Visit with Michelle Dhillon PT   [facility] ([facility])    [id number] [place] [address]   [phone]            Jul 21, 2017  1:30 PM CDT   Return Visit with Yvonne Garrido MD   [facility] ([facility])    [address] [place] [address]   [phone]              Further instructions from your care team       Follow up in 2-4 weeks   Call [phone] or [phone] for appointment     Call and ask to be seen or talk to a doctor for the following: (You can go  to the ob triage button   On answering service line) .     Increased bleeding, fever, general unwell feeling or increased pain.     Dr. Livia Barnett, DO    OB/GYN   [facility]    Maximum acetaminophen (Tylenol) dose from all sources should not exceed 4 grams (4000 mg) per day.  You received 1,000mg Tylenol in your IV today at 10:15.        GENERAL ANESTHESIA OR SEDATION ADULT DISCHARGE INSTRUCTIONS   SPECIAL PRECAUTIONS FOR 24 HOURS AFTER SURGERY    IT IS NOT UNUSUAL TO FEEL LIGHT-HEADED OR FAINT, UP TO 24 HOURS AFTER SURGERY OR WHILE TAKING PAIN MEDICATION.  IF YOU HAVE THESE SYMPTOMS; SIT FOR A FEW MINUTES BEFORE STANDING AND HAVE SOMEONE ASSIST YOU WHEN YOU GET UP TO WALK OR USE THE BATHROOM.    YOU SHOULD REST AND RELAX FOR THE NEXT 24 HOURS AND YOU MUST MAKE ARRANGEMENTS TO HAVE SOMEONE STAY WITH YOU FOR AT LEAST 24 HOURS AFTER YOUR DISCHARGE.  AVOID HAZARDOUS AND STRENUOUS ACTIVITIES.  DO NOT MAKE IMPORTANT DECISIONS FOR 24 HOURS.    DO NOT DRIVE ANY VEHICLE OR OPERATE MECHANICAL EQUIPMENT FOR 24 HOURS FOLLOWING THE END OF YOUR SURGERY.  EVEN THOUGH YOU MAY FEEL NORMAL, YOUR REACTIONS MAY BE AFFECTED BY THE MEDICATION YOU HAVE RECEIVED.    DO NOT DRINK ALCOHOLIC BEVERAGES FOR 24 HOURS FOLLOWING YOUR SURGERY.    DRINK CLEAR LIQUIDS (APPLE JUICE, GINGER ALE, 7-UP, BROTH, ETC.).  PROGRESS TO YOUR REGULAR DIET AS YOU FEEL ABLE.    YOU MAY HAVE A DRY MOUTH, A SORE THROAT, MUSCLES ACHES OR TROUBLE SLEEPING.  THESE SHOULD GO AWAY AFTER 24 HOURS.    CALL YOUR DOCTOR FOR ANY OF THE FOLLOWING:  SIGNS OF INFECTION (FEVER, GROWING TENDERNESS AT THE SURGERY SITE, A LARGE AMOUNT OF DRAINAGE OR BLEEDING, SEVERE PAIN, FOUL-SMELLING DRAINAGE, REDNESS OR SWELLING.    IT HAS BEEN OVER 8 TO 10 HOURS SINCE SURGERY AND YOU ARE STILL NOT ABLE TO URINATE (PASS WATER).         DILATION  DISCHARGE INSTRUCTIONS        DO NOT DRIVE A CAR, DRINK ALCOHOL OR USE MACHINERY FOR THE NEXT 24 HOURS.  YOU  "SHOULD WAIT UNTIL YOU HAVE RECOVERED BEFORE MAKING ANY IMPORTANT DECISIONS.    PAIN AND DISCOMFORT  YOU MAY HAVE CRAMPS OR A LOW BACKACHE FOR 24 TO 48 HOURS.  TYLENOL (ACETAMINOPHEN) OR MOTRIN (IBUPROFEN) MAY HELP, OR YOUR DOCTOR MAY GIVE YOU PAIN MEDICINE.  CALL YOUR DOCTOR IF PAIN CANNOT BE CONTROLLED.  YOU MAY FEEL DROWSY AND WEAK FOR A DAY OR TWO.    VAGINAL DISCHARGE  YOU MAY HAVE SOME BLEEDING OR DISCHARGE FOR UP TO TWO WEEKS.  DO NOT DOUCHE, USE TAMPONS OR HAVE SEX (INTERCOURSE) IN THE FIRST WEEK.  CALL YOUR DOCTOR IF YOU SOAK MORE THAN ONE MAXI PAD (SANITARY NAPKIN) PER HOUR, OR IF YOU PASS LARGE BLOOD CLOTS.    OTHER SYMPTOMS  YOU MAY HAVE A LOW FEVER FOR THE FIRST TWO DAYS.  CALL YOUR DOCTOR IF YOUR FEVER GOES OVER 101 DEGREES FAHRENHEIT.    IF YOU HAVE NAUSEA (FEEL SICK TO YOUR STOMACH), STAY IN BED.  TRY DRINKING A SMALL AMOUNT 7-UP, TEA OR SOUP.    DIET AND ACTIVITY  EAT LIGHT MEALS AND DRINK PLENTY OF FLUIDS FOR THE FIRST 24 HOURS (OR LONGER, IF YOU HAVE NAUSEA).    YOU MAY BATHE, SHOWER AND CLIMB STAIRS.  MOST WOMEN CAN RETURN TO WORK AFTER 24 HOURS.  YOU MAY GO BACK TO YOUR OTHER ACTIVITIES AFTER YOUR PAIN GOES AWAY.          Pending Results     Date and Time Order Name Status Description    6/14/2017 0943 Surgical pathology exam In process             Statement of Approval     Ordered          06/14/17 1002  I have reviewed and agree with all the recommendations and orders detailed in this document.  EFFECTIVE NOW     Approved and electronically signed by:  Livia Barnett DO             Admission Information     Date & Time Provider Department Dept. Phone    6/14/2017 Livia Barnett DO [facility] PreOP/PostOP [phone]      Your Vitals Were     Blood Pressure Temperature Respirations Height Weight Last Period    107/64 97  F (36.1  C) (Temporal) 16 1.626 m (5' 4\") 114 kg (251 lb 6.4 oz) 06/01/2017 (Exact Date)    Pulse Oximetry BMI (Body Mass Index)                97% 43.15 " "kg/m2          MyChart Information     [employer] lets you send messages to your doctor, view your test results, renew your prescriptions, schedule appointments and more. To sign up, go to [email or web address] . Click on \"Log in\" on the left side of the screen, which will take you to the Welcome page. Then click on \"Sign up Now\" on the right side of the page.     You will be asked to enter the access code listed below, as well as some personal information. Please follow the directions to create your username and password.     Your access code is: [email or web address]  Expires: 2017  1:40 PM     Your access code will  in 90 days. If you need help or a new code, please call your [place] clinic or [phone].        Care EveryWhere ID     This is your Care EveryWhere ID. This could be used by other organizations to access your [place] medical records  [id number]           Review of your medicines      START taking        Dose / Directions    ibuprofen 800 MG tablet   Commonly known as:  ADVIL/MOTRIN   Used for:  S/P dilation and curettage        Dose:  800 mg   Take 1 tablet (800 mg) by mouth every 8 hours as needed for moderate pain   Quantity:  90 tablet   Refills:  1         CONTINUE these medicines which may have CHANGED, or have new prescriptions. If we are uncertain of the size of tablets/capsules you have at home, strength may be listed as something that might have changed.        Dose / Directions    cetirizine-psuedoePHEDrine 5-120 MG per 12 hr tablet   Commonly known as:  ZYRTEC-D   This may have changed:  when to take this   Used for:  Allergic rhinitis, cause unspecified        Dose:  1 tablet   Take 1 tablet by mouth 2 times daily   Quantity:  90 tablet   Refills:  3         CONTINUE these medicines which have NOT CHANGED        Dose / Directions    * albuterol (2.5 MG/3ML) 0.083% neb solution   Used for:  Cough, Moderate persistent asthma with acute exacerbation        Dose:  1 vial   Take 1 vial " (2.5 mg) by nebulization every 6 hours as needed for shortness of breath / dyspnea or wheezing   Quantity:  30 vial   Refills:  1       * albuterol 108 (90 BASE) MCG/ACT Inhaler   Commonly known as:  PROAIR HFA/PROVENTIL HFA/VENTOLIN HFA   Used for:  Cough, Moderate persistent asthma with acute exacerbation        Dose:  2 puff   Inhale 2 puffs into the lungs every 6 hours as needed for shortness of breath / dyspnea or wheezing   Quantity:  1 Inhaler   Refills:  6       ALEVE PO        Dose:  220 mg   Take 220 mg by mouth   Refills:  0       beclomethasone 80 MCG/ACT Inhaler   Commonly known as:  QVAR   Used for:  Moderate persistent asthma with acute exacerbation, Cough        Dose:  2 puff   Inhale 2 puffs into the lungs 2 times daily   Quantity:  1 Inhaler   Refills:  3       CALCIUM CARBONATE PO        Take by mouth daily   Refills:  0       cholecalciferol 1000 UNIT tablet   Commonly known as:  vitamin D        Dose:  1 tablet   Take 1 tablet by mouth daily.   Refills:  0       COMPOUND - PHARMACY TO MIX COMPOUNDED MEDICATION   Commonly known as:  CMPD RX   Used for:  Fibromyalgia        Low dose Naltrexone:  6mg capsules/tablets one PO qhs   Quantity:  30 capsule   Refills:  3       FLUoxetine 40 MG capsule   Commonly known as:  PROzac   Used for:  Major depressive disorder, recurrent episode, mild (H)        Dose:  80 mg   Take 2 capsules (80 mg) by mouth daily   Quantity:  180 capsule   Refills:  3       gabapentin 300 MG capsule   Commonly known as:  NEURONTIN   Used for:  Fibromyalgia, Menopausal syndrome (hot flashes)        Dose:  300 mg   Take 1 capsule (300 mg) by mouth 3 times daily   Quantity:  270 capsule   Refills:  3       HERBALS        Refills:  0       ipratropium - albuterol 0.5 mg/2.5 mg/3 mL 0.5-2.5 (3) MG/3ML neb solution   Commonly known as:  DUONEB   Used for:  Cough, Moderate persistent asthma with acute exacerbation        Dose:  1 vial   Take 1 vial (3 mLs) by nebulization once for 1  dose   Quantity:  1 Box   Refills:  3       IRON SUPPLEMENT PO        Refills:  0       lidocaine 2 % solution   Commonly known as:  XYLOCAINE   Used for:  Tonsillitis        Dose:  15 mL   Take 15 mLs by mouth every 2 hours as needed for moderate pain swish and spit every 3-8 hours as needed; max 8 doses/24 hour period   Quantity:  100 mL   Refills:  0       MAGNESIUM CITRATE PO        Take by mouth daily   Refills:  0       order for DME   Used for:  CAP (community acquired pneumonia)        Equipment being ordered: Nebulizer   Quantity:  1 Device   Refills:  0       rizatriptan 10 MG ODT tab   Commonly known as:  MAXALT-MLT   Used for:  Migraine with aura and without status migrainosus, not intractable        Dose:  10 mg   Take 1 tablet (10 mg) by mouth at onset of headache for migraine May repeat in 2 hours. Max 3 tablets/24 hours.   Quantity:  18 tablet   Refills:  3       topiramate 25 MG tablet   Commonly known as:  TOPAMAX   Used for:  TMJ (temporomandibular joint syndrome), Cervicalgia        Dose:  25 mg   Take 1 tablet (25 mg) by mouth 2 times daily   Quantity:  180 tablet   Refills:  3       traZODone 50 MG tablet   Commonly known as:  DESYREL   Used for:  Insomnia, unspecified        Take 2 tablets by mouth. 1 -2 TABs  PO at bedtime prn insomnia   Quantity:  180 tablet   Refills:  6       Turmeric 450 MG Caps        Refills:  0       VITAMIN B COMPLEX PO        Refills:  0       * Notice:  This list has 2 medication(s) that are the same as other medications prescribed for you. Read the directions carefully, and ask your doctor or other care provider to review them with you.         Where to get your medicines      These medications were sent to [facility] [place], [place] - [address] [facility]  [address] [facility] [address]     Phone:  [phone]     ibuprofen 800 MG tablet                Protect others around you: Learn how to safely use, store and throw away your  medicines at www.disposemymeds.org.             Medication List: This is a list of all your medications and when to take them. Check marks below indicate your daily home schedule. Keep this list as a reference.      Medications           Morning Afternoon Evening Bedtime As Needed    * albuterol (2.5 MG/3ML) 0.083% neb solution   Take 1 vial (2.5 mg) by nebulization every 6 hours as needed for shortness of breath / dyspnea or wheezing                                * albuterol 108 (90 BASE) MCG/ACT Inhaler   Commonly known as:  PROAIR HFA/PROVENTIL HFA/VENTOLIN HFA   Inhale 2 puffs into the lungs every 6 hours as needed for shortness of breath / dyspnea or wheezing                                ALEVE PO   Take 220 mg by mouth                                beclomethasone 80 MCG/ACT Inhaler   Commonly known as:  QVAR   Inhale 2 puffs into the lungs 2 times daily                                CALCIUM CARBONATE PO   Take by mouth daily                                cetirizine-psuedoePHEDrine 5-120 MG per 12 hr tablet   Commonly known as:  ZYRTEC-D   Take 1 tablet by mouth 2 times daily                                cholecalciferol 1000 UNIT tablet   Commonly known as:  vitamin D   Take 1 tablet by mouth daily.                                COMPOUND - PHARMACY TO MIX COMPOUNDED MEDICATION   Commonly known as:  CMPD RX   Low dose Naltrexone:  6mg capsules/tablets one PO qhs                                FLUoxetine 40 MG capsule   Commonly known as:  PROzac   Take 2 capsules (80 mg) by mouth daily                                gabapentin 300 MG capsule   Commonly known as:  NEURONTIN   Take 1 capsule (300 mg) by mouth 3 times daily                                HERBALS                                ibuprofen 800 MG tablet   Commonly known as:  ADVIL/MOTRIN   Take 1 tablet (800 mg) by mouth every 8 hours as needed for moderate pain                                ipratropium - albuterol 0.5 mg/2.5 mg/3 mL 0.5-2.5  (3) MG/3ML neb solution   Commonly known as:  DUONEB   Take 1 vial (3 mLs) by nebulization once for 1 dose                                IRON SUPPLEMENT PO                                lidocaine 2 % solution   Commonly known as:  XYLOCAINE   Take 15 mLs by mouth every 2 hours as needed for moderate pain swish and spit every 3-8 hours as needed; max 8 doses/24 hour period                                MAGNESIUM CITRATE PO   Take by mouth daily                                order for DME   Equipment being ordered: Nebulizer                                rizatriptan 10 MG ODT tab   Commonly known as:  MAXALT-MLT   Take 1 tablet (10 mg) by mouth at onset of headache for migraine May repeat in 2 hours. Max 3 tablets/24 hours.                                topiramate 25 MG tablet   Commonly known as:  TOPAMAX   Take 1 tablet (25 mg) by mouth 2 times daily                                traZODone 50 MG tablet   Commonly known as:  DESYREL   Take 2 tablets by mouth. 1 -2 TABs  PO at bedtime prn insomnia                                Turmeric 450 MG Caps                                VITAMIN B COMPLEX PO                                * Notice:  This list has 2 medication(s) that are the same as other medications prescribed for you. Read the directions carefully, and ask your doctor or other care provider to review them with you.

## 2017-06-14 NOTE — DISCHARGE SUMMARY
47 y/o  s/p ultrasound guided dilation and endometrial biopsy/curettage and Mirena Intrauterine device placement and repair of unavoidable cervical laceration   Doc dc home   Doing well.

## 2017-06-14 NOTE — IP AVS SNAPSHOT
[facility] PreOP/PostOP    [address]    [facility] [address]    Phone:  [phone]    Fax:  [phone]                                       After Visit Summary   6/14/2017    Paige Le    MRN: [id number]           After Visit Summary Signature Page     I have received my discharge instructions, and my questions have been answered. I have discussed any challenges I see with this plan with the nurse or doctor.    ..........................................................................................................................................  Patient/Patient Representative Signature      ..........................................................................................................................................  Patient Representative Print Name and Relationship to Patient    ..................................................               ................................................  Date                                            Time    ..........................................................................................................................................  Reviewed by Signature/Title    ...................................................              ..............................................  Date                                                            Time

## 2017-06-14 NOTE — ANESTHESIA CARE TRANSFER NOTE
Patient: Paige Le    Procedure(s):  Ultrasound guided cervical dilation, IUD placement, Endometrial biopsy, repair of unavoidable cervical laceration - Wound Class: II-Clean Contaminated   - Wound Class: II-Clean Contaminated    Diagnosis: Cervical stenosis  Diagnosis Additional Information: No value filed.    Anesthesia Type:   General, LMA     Note:  Airway :Face Mask  Patient transferred to:PACU  Comments: To PACU, Awake, VSS, SV, O2, Report to RN      Vitals: (Last set prior to Anesthesia Care Transfer)    CRNA VITALS  6/14/2017 0925 - 6/14/2017 1001      6/14/2017             Resp Rate (observed): (!)  1                Electronically Signed By: Dean Dennis Severson, APRN CRNA  June 14, 2017  10:01 AM

## 2017-06-14 NOTE — DISCHARGE INSTRUCTIONS
Follow up in 2-4 weeks   Call [phone] or [phone] for appointment     Call and ask to be seen or talk to a doctor for the following: (You can go to the ob triage button   On answering service line) .     Increased bleeding, fever, general unwell feeling or increased pain.     Dr. Livia Barnett, DO    OB/GYN   [facility]    Maximum acetaminophen (Tylenol) dose from all sources should not exceed 4 grams (4000 mg) per day.  You received 1,000mg Tylenol in your IV today at 10:15.        GENERAL ANESTHESIA OR SEDATION ADULT DISCHARGE INSTRUCTIONS   SPECIAL PRECAUTIONS FOR 24 HOURS AFTER SURGERY    IT IS NOT UNUSUAL TO FEEL LIGHT-HEADED OR FAINT, UP TO 24 HOURS AFTER SURGERY OR WHILE TAKING PAIN MEDICATION.  IF YOU HAVE THESE SYMPTOMS; SIT FOR A FEW MINUTES BEFORE STANDING AND HAVE SOMEONE ASSIST YOU WHEN YOU GET UP TO WALK OR USE THE BATHROOM.    YOU SHOULD REST AND RELAX FOR THE NEXT 24 HOURS AND YOU MUST MAKE ARRANGEMENTS TO HAVE SOMEONE STAY WITH YOU FOR AT LEAST 24 HOURS AFTER YOUR DISCHARGE.  AVOID HAZARDOUS AND STRENUOUS ACTIVITIES.  DO NOT MAKE IMPORTANT DECISIONS FOR 24 HOURS.    DO NOT DRIVE ANY VEHICLE OR OPERATE MECHANICAL EQUIPMENT FOR 24 HOURS FOLLOWING THE END OF YOUR SURGERY.  EVEN THOUGH YOU MAY FEEL NORMAL, YOUR REACTIONS MAY BE AFFECTED BY THE MEDICATION YOU HAVE RECEIVED.    DO NOT DRINK ALCOHOLIC BEVERAGES FOR 24 HOURS FOLLOWING YOUR SURGERY.    DRINK CLEAR LIQUIDS (APPLE JUICE, GINGER ALE, 7-UP, BROTH, ETC.).  PROGRESS TO YOUR REGULAR DIET AS YOU FEEL ABLE.    YOU MAY HAVE A DRY MOUTH, A SORE THROAT, MUSCLES ACHES OR TROUBLE SLEEPING.  THESE SHOULD GO AWAY AFTER 24 HOURS.    CALL YOUR DOCTOR FOR ANY OF THE FOLLOWING:  SIGNS OF INFECTION (FEVER, GROWING TENDERNESS AT THE SURGERY SITE, A LARGE AMOUNT OF DRAINAGE OR BLEEDING, SEVERE PAIN, FOUL-SMELLING DRAINAGE, REDNESS OR SWELLING.    IT HAS BEEN OVER 8 TO 10 HOURS SINCE SURGERY AND YOU ARE STILL NOT  ABLE TO URINATE (PASS WATER).         DILATION  DISCHARGE INSTRUCTIONS        DO NOT DRIVE A CAR, DRINK ALCOHOL OR USE MACHINERY FOR THE NEXT 24 HOURS.  YOU SHOULD WAIT UNTIL YOU HAVE RECOVERED BEFORE MAKING ANY IMPORTANT DECISIONS.    PAIN AND DISCOMFORT  YOU MAY HAVE CRAMPS OR A LOW BACKACHE FOR 24 TO 48 HOURS.  TYLENOL (ACETAMINOPHEN) OR MOTRIN (IBUPROFEN) MAY HELP, OR YOUR DOCTOR MAY GIVE YOU PAIN MEDICINE.  CALL YOUR DOCTOR IF PAIN CANNOT BE CONTROLLED.  YOU MAY FEEL DROWSY AND WEAK FOR A DAY OR TWO.    VAGINAL DISCHARGE  YOU MAY HAVE SOME BLEEDING OR DISCHARGE FOR UP TO TWO WEEKS.  DO NOT DOUCHE, USE TAMPONS OR HAVE SEX (INTERCOURSE) IN THE FIRST WEEK.  CALL YOUR DOCTOR IF YOU SOAK MORE THAN ONE MAXI PAD (SANITARY NAPKIN) PER HOUR, OR IF YOU PASS LARGE BLOOD CLOTS.    OTHER SYMPTOMS  YOU MAY HAVE A LOW FEVER FOR THE FIRST TWO DAYS.  CALL YOUR DOCTOR IF YOUR FEVER GOES OVER 101 DEGREES FAHRENHEIT.    IF YOU HAVE NAUSEA (FEEL SICK TO YOUR STOMACH), STAY IN BED.  TRY DRINKING A SMALL AMOUNT 7-UP, TEA OR SOUP.    DIET AND ACTIVITY  EAT LIGHT MEALS AND DRINK PLENTY OF FLUIDS FOR THE FIRST 24 HOURS (OR LONGER, IF YOU HAVE NAUSEA).    YOU MAY BATHE, SHOWER AND CLIMB STAIRS.  MOST WOMEN CAN RETURN TO WORK AFTER 24 HOURS.  YOU MAY GO BACK TO YOUR OTHER ACTIVITIES AFTER YOUR PAIN GOES AWAY.

## 2017-06-15 LAB
COPATH REPORT: NORMAL
INTERPRETATION ECG - MUSE: NORMAL

## 2017-06-19 ENCOUNTER — OFFICE VISIT (OUTPATIENT)
Dept: PALLIATIVE MEDICINE | Facility: CLINIC | Age: 47
End: 2017-06-19
Payer: COMMERCIAL

## 2017-06-19 DIAGNOSIS — M79.7 FIBROMYALGIA: Primary | ICD-10-CM

## 2017-06-19 PROCEDURE — 97530 THERAPEUTIC ACTIVITIES: CPT | Mod: GP | Performed by: PHYSICAL THERAPIST

## 2017-06-19 PROCEDURE — 97162 PT EVAL MOD COMPLEX 30 MIN: CPT | Mod: GP | Performed by: PHYSICAL THERAPIST

## 2017-06-19 NOTE — MR AVS SNAPSHOT
After Visit Summary   6/19/2017    Paige Le    MRN: [id number]           Patient Information     Date Of Birth          1970        Visit Information        Provider Department      6/19/2017 1:00 PM Michelle Dhillon PT [facility]        Today's Diagnoses     Fibromyalgia    -  1       Follow-ups after your visit        Your next 10 appointments already scheduled     Jun 26, 2017  1:45 PM CDT   Return Visit with Michelle Dhillon PT   [facility] ([facility])    [id number] [place] [address]   [phone]            Jul 03, 2017  1:45 PM CDT   Return Visit with Michelle Dhillon PT   [facility] ([facility])    [address] [place] [address]   [phone]            Jul 10, 2017  1:45 PM CDT   Return Visit with Michelle Dhillon PT   [facility] ([facility])    [address] [place] [address]   [phone]            Jul 17, 2017  1:45 PM CDT   Return Visit with Michelle Dhillon PT   [facility] ([facility])    [address] [place] [address]   [phone]            Jul 21, 2017  1:30 PM CDT   Return Visit with Yvonne Garrido MD   [facility] ([facility])    [address] [place] [address]   [phone]              Who to contact     If you have questions or need follow up information about today's clinic visit or your schedule please contact [facility] directly at [phone].  Normal or non-critical lab and imaging results will be communicated to you by MyChart, letter or phone within 4 business days after the clinic has received the results. If you do not hear from us within 7 days, please contact the clinic through MyChart or phone. If you  "have a critical or abnormal lab result, we will notify you by phone as soon as possible.  Submit refill requests through [employer] or call your pharmacy and they will forward the refill request to us. Please allow 3 business days for your refill to be completed.          Additional Information About Your Visit        [employer] Information     [employer] lets you send messages to your doctor, view your test results, renew your prescriptions, schedule appointments and more. To sign up, go to [email or web address] . Click on \"Log in\" on the left side of the screen, which will take you to the Welcome page. Then click on \"Sign up Now\" on the right side of the page.     You will be asked to enter the access code listed below, as well as some personal information. Please follow the directions to create your username and password.     Your access code is: [email or web address]  Expires: 2017  1:40 PM     Your access code will  in 90 days. If you need help or a new code, please call your [facility] clinic or [phone].        Care EveryWhere ID     This is your Care EveryWhere ID. This could be used by other organizations to access your [facility] medical records  [id number]        Your Vitals Were     Last Period                   2017 (Exact Date)            Blood Pressure from Last 3 Encounters:   17 142/70   17 118/77   17 120/80    Weight from Last 3 Encounters:   17 114 kg (251 lb 6.4 oz)   17 115.1 kg (253 lb 11.2 oz)   17 117 kg (258 lb)              We Performed the Following     HC PT EVAL, MODERATE COMPLEXITY     THERAPEUTIC ACTIVITIES          Today's Medication Changes          These changes are accurate as of: 17 11:59 PM.  If you have any questions, ask your nurse or doctor.               These medicines have changed or have updated prescriptions.        Dose/Directions    cetirizine-psuedoePHEDrine 5-120 MG per 12 hr tablet   Commonly known as:  ZYRTEC-D   This may " have changed:  when to take this   Used for:  Allergic rhinitis, cause unspecified        Dose:  1 tablet   Take 1 tablet by mouth 2 times daily   Quantity:  90 tablet   Refills:  3                Primary Care Provider Office Phone # Fax #    Christine Magali Farnsworth PA-C [phone]       [address] [address]        Thank you!     Thank you for choosing [facility]  for your care. Our goal is always to provide you with excellent care. Hearing back from our patients is one way we can continue to improve our services. Please take a few minutes to complete the written survey that you may receive in the mail after your visit with us. Thank you!             Your Updated Medication List - Protect others around you: Learn how to safely use, store and throw away your medicines at www.disposemymeds.org.          This list is accurate as of: 6/19/17 11:59 PM.  Always use your most recent med list.                   Brand Name Dispense Instructions for use    * albuterol (2.5 MG/3ML) 0.083% neb solution     30 vial    Take 1 vial (2.5 mg) by nebulization every 6 hours as needed for shortness of breath / dyspnea or wheezing       * albuterol 108 (90 BASE) MCG/ACT Inhaler    PROAIR HFA/PROVENTIL HFA/VENTOLIN HFA    1 Inhaler    Inhale 2 puffs into the lungs every 6 hours as needed for shortness of breath / dyspnea or wheezing       ALEVE PO      Take 220 mg by mouth       beclomethasone 80 MCG/ACT Inhaler    QVAR    1 Inhaler    Inhale 2 puffs into the lungs 2 times daily       CALCIUM CARBONATE PO      Take by mouth daily       cetirizine-psuedoePHEDrine 5-120 MG per 12 hr tablet    ZYRTEC-D    90 tablet    Take 1 tablet by mouth 2 times daily       cholecalciferol 1000 UNIT tablet    vitamin D     Take 1 tablet by mouth daily.       COMPOUND - PHARMACY TO MIX COMPOUNDED MEDICATION    CMPD RX    30 capsule    Low dose Naltrexone:  6mg capsules/tablets one PO  qhs       FLUoxetine 40 MG capsule    PROzac    180 capsule    Take 2 capsules (80 mg) by mouth daily       gabapentin 300 MG capsule    NEURONTIN    270 capsule    Take 1 capsule (300 mg) by mouth 3 times daily       HERBALS          ibuprofen 800 MG tablet    ADVIL/MOTRIN    90 tablet    Take 1 tablet (800 mg) by mouth every 8 hours as needed for moderate pain       ipratropium - albuterol 0.5 mg/2.5 mg/3 mL 0.5-2.5 (3) MG/3ML neb solution    DUONEB    1 Box    Take 1 vial (3 mLs) by nebulization once for 1 dose       IRON SUPPLEMENT PO          lidocaine 2 % solution    XYLOCAINE    100 mL    Take 15 mLs by mouth every 2 hours as needed for moderate pain swish and spit every 3-8 hours as needed; max 8 doses/24 hour period       MAGNESIUM CITRATE PO      Take by mouth daily       order for DME     1 Device    Equipment being ordered: Nebulizer       rizatriptan 10 MG ODT tab    MAXALT-MLT    18 tablet    Take 1 tablet (10 mg) by mouth at onset of headache for migraine May repeat in 2 hours. Max 3 tablets/24 hours.       topiramate 25 MG tablet    TOPAMAX    180 tablet    Take 1 tablet (25 mg) by mouth 2 times daily       traZODone 50 MG tablet    DESYREL    180 tablet    Take 2 tablets by mouth. 1 -2 TABs  PO at bedtime prn insomnia       Turmeric 450 MG Caps          VITAMIN B COMPLEX PO          * Notice:  This list has 2 medication(s) that are the same as other medications prescribed for you. Read the directions carefully, and ask your doctor or other care provider to review them with you.

## 2017-06-19 NOTE — PROGRESS NOTES
PHYSICAL THERAPY INITIAL EVALUATION and PLAN OF CARE    Patient Name: Paige Le     : 1970    MRN: [id number]   Pain Management Provider:  Yvonne Garrido MD    Diagnosis: Fibromyalgia    SUBJECTIVE:    PRESENTATION AND ETIOLOGY    Chief Complaint: Pt presents to PT with primary complaints of shoulder, neck and upper back pain.  Worsening pain in her neck and shoulders will progress to tension headaches and sometimes migraine headaches.  Pt reports she began having problems with pain about 10 years ago.  She participated in the [facility] working with Dr. Montoya about 7 years ago.  Her symptoms have improved with self management until the last 6 months when she began to notice increased pain and exhaustion.  Her current self care routine includes morning meditation x 5-10 minutes followed by yoga stretches x 5-30 minutes.  She incorporates relaxation breathing into both meditation and yoga.  Other self care strategies include walking 30' 2x/daily and spending time with her pets.  She has been experiencing perimenopausal symptoms over the past year and feels this may be contributing to her symptoms.        Onset / Etiology: gradual onset 10 years ago    Pattern Since Onset: Worsened over the last 6 months with noticing increased pain and exhaustion      Frequency: Constant      Intensity: Best 2/10, Worst 8/10;  Current 6/10 ; Average 5/10    Fatigue Level: (scale 0-10)  9-10/10 average    LEVEL OF FUNCTION AT START OF CARE    Walking tolerance: varies from 5-60 minutes  Sitting tolerance: 30 minutes  Standing tolerance: 10-15 minutes  Housework tolerance:  20 minutes  Sleep:  Wakes 4-5x / night    Current Aggrevating Activities / Functional Limitations: moving, bending, stress      CURRENT / PREVIOUS INTERVENTION(S):     Relieving Activities / Self Care: yoga, meditation, breathing    Previous / Current therapies for current chief complaint: PT: 5 years ago at [facility]; TENS unit at PT helped    Prior Functional Level: No functional limitations prior to onset of chief complaint.       DEMOGRAPHICS  Employment Status: works full time as coordinator of a reach out program for [employer]    Social Support: lives alone with one dog and 2 cats    Pertinent Medical  / Surgical History: Epic Snapshot Reviewed, See provider's note: anxiety, dysmenorrhea, fibromyalgia, migraine, mild major depression,     Patient's goals for physical therapy: To review and learn additional self care pain management strategies so she is able to reduce pain and fatigue levels.    ===============================================================  OBJECTIVE:  POSTURE:  Observation: Pt went to wrong building so arrived 20 minutes late for her appointment.  She appears anxious and pleasant.  Demonstrates forward head, protracted shoulders and increased thoracic kyphosis in sitting posture.  Changes position frequently while sitting       GAIT, LOCOMOTION, and BALANCE:  Gait and Locomotion: Normal maged and velocity, non antalgic  Balance: next    RANGE OF MOTION:   Cervical: next  Lumbar: next  Shoulders: next  Hips: next      MUSCLE PERFORMANCE:   Strength: demonstrates core and scapular postural weakness      Flexibility: tightness noted in bilateral upper traps, and along entire paraspinals from cervical to lower lumbar    FUNCTIONAL TESTING/OBSERVATION: Pt performs transitional movements independently.     Pain behaviors: None    ===============================================================  Today's Treatment:  Initial evaluation  Therapeutic Activity:   For 15 minutes including review of current self cares home program; issued stress management CD for home use  ===============================================================  ASSESSMENT:  Physical Therapy Diagnosis:Impaired Posture and Impaired Muscle Performance    Patient requires PT intervention for the following impairments: Impaired  posture / muscle imbalance, Pain, ROM limitations and Deconditioning    Anticipated Goals and Expected Outcomes:  8 weeks  Patient will report the use of 2 self care practices during their day.  Patient will report the participation in 30 minutes of aerobic activity daily and practicing stretching daily.  Patient will demonstrate the ability to find core strength in neutral posture.  Patient will demonstrate the ability to relax muscle group before stretching.  16 weeks  Patient will be independent with a home exercise program.  Patient will be independent with posture correction.  Patient will report independence with a self care/flare management program.  Patient will demonstrate improved functional strength and endurance as reports by increased tolerance for IADLs and more consistent participation in daily activity.     Rehab potential for achieving goals: excellent.    ===============================================================  PLAN:   Patient will benefit from skilled physical therapy consisting of:  neuromuscular reeducation of: kinesthetic sense and posture for sitting and/or standing activities, education in self care / home management training to include instruction in: symptom control techniques, therapeutic activities to achieve improved functional performance in: daily actvities and therapeutic exercise to develop: strength and endurance, flexibility and core stability    Assessment will be ongoing with changes in treatment as indicated.  Benefits/risks/alternatives to treatment have been reviewed and the patient has been instructed to contact this office if they have any questions or concerns.  This plan of care has been discussed with the patient and the patient is in agreement.     Frequency / Duration:  Patient will be seen for a total of 13 visits; 16 weeks    Total Visit Time: 30  minutes            Michelle Dhillon, PT                                      Date:   6/19/2017      =====================================================  **  Referring Provider Certification: Referring Provider reviewing certifies that the above treatment / plan of care is required and authorized, and that the patient's plan will be reviewed every thirty (30) days **.   ======================================================     PRESENT:  NA    MULTIDISCIPLINARY PATIENT / FAMILY EDUCATION RECORD  Department:  Physical Therapy    Readiness to Learn: Ability to understand verbal instructions, Ability to understand written instructions, Knowledge of educational needs / treatment plan  Specific Barriers to Learning: None  Referrals: None  Learning Needs: Rehabilitation techniques to improve functional independence Pain management education to improve daily activity tolerance.  Who: Patient  How: Demonstration, Verbal instructions, Written instructions  Response: Appropriate verbal response, Asked questions, Demonstrated ability, Verbalized recall / understanding

## 2017-06-26 ENCOUNTER — OFFICE VISIT (OUTPATIENT)
Dept: PALLIATIVE MEDICINE | Facility: CLINIC | Age: 47
End: 2017-06-26
Payer: COMMERCIAL

## 2017-06-26 DIAGNOSIS — M79.7 FIBROMYALGIA: Primary | ICD-10-CM

## 2017-06-26 PROCEDURE — 97112 NEUROMUSCULAR REEDUCATION: CPT | Mod: GP | Performed by: PHYSICAL THERAPIST

## 2017-06-26 NOTE — MR AVS SNAPSHOT
After Visit Summary   6/26/2017    Paige Le    MRN: [id number]           Patient Information     Date Of Birth          1970        Visit Information        Provider Department      6/26/2017 1:45 PM Michelle Dhillon PT [facility]        Today's Diagnoses     Fibromyalgia    -  1       Follow-ups after your visit        Your next 10 appointments already scheduled     Jun 29, 2017  9:15 AM CDT   SHORT with Livia Barnett DO   [facility] ([facility])    [address]  [facility] [address]   [phone]            Jul 03, 2017  1:45 PM CDT   Return Visit with Michelle Dhillon PT   [facility] ([facility])    [address] [place] [address]   [phone]            Jul 10, 2017  1:45 PM CDT   Return Visit with Michelle Dhillon PT   [facility] ([facility])    [address] [place] [address]   [phone]            Jul 17, 2017  1:45 PM CDT   Return Visit with Michelle Dhiloln PT   [facility] ([facility])    [address] [place] [address]   [phone]            Jul 21, 2017  1:30 PM CDT   Return Visit with Yvonne Garrido MD   [facility] ([facility])    [address] [place] [address]   [phone]              Who to contact     If you have questions or need follow up information about today's clinic visit or your schedule please contact [facility] directly at [phone].  Normal or non-critical lab and imaging results will be communicated to you by MyChart, letter or phone within 4 business days after the clinic has received the results. If you do not hear from us within 7 days, please contact the clinic through MyChart or phone. If you have a critical  "or abnormal lab result, we will notify you by phone as soon as possible.  Submit refill requests through [employer] or call your pharmacy and they will forward the refill request to us. Please allow 3 business days for your refill to be completed.          Additional Information About Your Visit        [employer] Information     [employer] lets you send messages to your doctor, view your test results, renew your prescriptions, schedule appointments and more. To sign up, go to [email or web address] . Click on \"Log in\" on the left side of the screen, which will take you to the Welcome page. Then click on \"Sign up Now\" on the right side of the page.     You will be asked to enter the access code listed below, as well as some personal information. Please follow the directions to create your username and password.     Your access code is: [email or web address]  Expires: 2017  1:40 PM     Your access code will  in 90 days. If you need help or a new code, please call your [facility] clinic or [phone].        Care EveryWhere ID     This is your Care EveryWhere ID. This could be used by other organizations to access your [facility] medical records  [id number]        Your Vitals Were     Last Period                   2017 (Exact Date)            Blood Pressure from Last 3 Encounters:   17 142/70   17 118/77   17 120/80    Weight from Last 3 Encounters:   17 114 kg (251 lb 6.4 oz)   17 115.1 kg (253 lb 11.2 oz)   17 117 kg (258 lb)              We Performed the Following     NEUROMUSCULAR RE-EDUCATION          Today's Medication Changes          These changes are accurate as of: 17  4:22 PM.  If you have any questions, ask your nurse or doctor.               These medicines have changed or have updated prescriptions.        Dose/Directions    cetirizine-psuedoePHEDrine 5-120 MG per 12 hr tablet   Commonly known as:  ZYRTEC-D   This may have changed:  when to take this   Used for:  " Allergic rhinitis, cause unspecified        Dose:  1 tablet   Take 1 tablet by mouth 2 times daily   Quantity:  90 tablet   Refills:  3                Primary Care Provider Office Phone # Fax #    Christine Magali Farnsworth PA-C [phone]       [address] [address]        Equal Access to Services     CLARK KABA : Hadii aad ku hadasho Soomaali, waaxda luqadaha, qaybta kaalmada adeegyada, waxay idiin hayaan adeeg khshaniquesh lakassandran . So [facility] [phone].    ATENCIÓN: Si habla español, tiene a greene disposición servicios gratuitos de asistencia lingüística. Nila al [phone].    We comply with applicable federal civil rights laws and [place] laws. We do not discriminate on the basis of race, color, national origin, age, disability sex, sexual orientation or gender identity.            Thank you!     Thank you for choosing [facility]  for your care. Our goal is always to provide you with excellent care. Hearing back from our patients is one way we can continue to improve our services. Please take a few minutes to complete the written survey that you may receive in the mail after your visit with us. Thank you!             Your Updated Medication List - Protect others around you: Learn how to safely use, store and throw away your medicines at www.disposemymeds.org.          This list is accurate as of: 6/26/17  4:22 PM.  Always use your most recent med list.                   Brand Name Dispense Instructions for use Diagnosis    * albuterol (2.5 MG/3ML) 0.083% neb solution     30 vial    Take 1 vial (2.5 mg) by nebulization every 6 hours as needed for shortness of breath / dyspnea or wheezing    Cough, Moderate persistent asthma with acute exacerbation       * albuterol 108 (90 BASE) MCG/ACT Inhaler    PROAIR HFA/PROVENTIL HFA/VENTOLIN HFA    1 Inhaler    Inhale 2 puffs into the lungs every 6 hours as needed for shortness of breath / dyspnea or  wheezing    Cough, Moderate persistent asthma with acute exacerbation       ALEVE PO      Take 220 mg by mouth        beclomethasone 80 MCG/ACT Inhaler    QVAR    1 Inhaler    Inhale 2 puffs into the lungs 2 times daily    Moderate persistent asthma with acute exacerbation, Cough       CALCIUM CARBONATE PO      Take by mouth daily        cetirizine-psuedoePHEDrine 5-120 MG per 12 hr tablet    ZYRTEC-D    90 tablet    Take 1 tablet by mouth 2 times daily    Allergic rhinitis, cause unspecified       cholecalciferol 1000 UNIT tablet    vitamin D     Take 1 tablet by mouth daily.        COMPOUND - PHARMACY TO MIX COMPOUNDED MEDICATION    CMPD RX    30 capsule    Low dose Naltrexone:  6mg capsules/tablets one PO qhs    Fibromyalgia       FLUoxetine 40 MG capsule    PROzac    180 capsule    Take 2 capsules (80 mg) by mouth daily    Major depressive disorder, recurrent episode, mild (H)       gabapentin 300 MG capsule    NEURONTIN    270 capsule    Take 1 capsule (300 mg) by mouth 3 times daily    Fibromyalgia, Menopausal syndrome (hot flashes)       HERBALS           ibuprofen 800 MG tablet    ADVIL/MOTRIN    90 tablet    Take 1 tablet (800 mg) by mouth every 8 hours as needed for moderate pain    S/P dilation and curettage       ipratropium - albuterol 0.5 mg/2.5 mg/3 mL 0.5-2.5 (3) MG/3ML neb solution    DUONEB    1 Box    Take 1 vial (3 mLs) by nebulization once for 1 dose    Cough, Moderate persistent asthma with acute exacerbation       IRON SUPPLEMENT PO           lidocaine 2 % solution    XYLOCAINE    100 mL    Take 15 mLs by mouth every 2 hours as needed for moderate pain swish and spit every 3-8 hours as needed; max 8 doses/24 hour period    Tonsillitis       MAGNESIUM CITRATE PO      Take by mouth daily        order for DME     1 Device    Equipment being ordered: Nebulizer    CAP (community acquired pneumonia)       rizatriptan 10 MG ODT tab    MAXALT-MLT    18 tablet    Take 1 tablet (10 mg) by mouth at onset  of headache for migraine May repeat in 2 hours. Max 3 tablets/24 hours.    Migraine with aura and without status migrainosus, not intractable       topiramate 25 MG tablet    TOPAMAX    180 tablet    Take 1 tablet (25 mg) by mouth 2 times daily    TMJ (temporomandibular joint syndrome), Cervicalgia       traZODone 50 MG tablet    DESYREL    180 tablet    Take 2 tablets by mouth. 1 -2 TABs  PO at bedtime prn insomnia    Insomnia, unspecified       Turmeric 450 MG Caps           VITAMIN B COMPLEX PO           * Notice:  This list has 2 medication(s) that are the same as other medications prescribed for you. Read the directions carefully, and ask your doctor or other care provider to review them with you.

## 2017-06-26 NOTE — PROGRESS NOTES
PAIN PHYSICAL THERAPY PROGRESS NOTE  Patient Name: Paige Le      YOB: 1970     Medical Record Number: [id number]  Diagnosis: Fibromyalgia    Visit: 2/13    Subjective: Patient reports she listened to the first recording on the relaxation CD and found it helpful.  Reports having spasms in mid back at the end of the day. Feels better with stretching, lying down and going to sleep.  Rates pain 5/10 and fatigue 8-9/10 level today.  States she gets up to move around at work, but does not take a mental break and is constantly thinking about things.    Self Care  HEP: next  Walking/Aerobic Activity: 30' x 2 daily  Posture: next  Breathing/Relaxation: indep with CD, yoga, meditation  Heat/Ice: next  Mini Breaks: next  Pacing: next    Objective Findings:  OBSERVATION: Pt arrived late for appointment.  Demonstrates forward head, protracted shoulders in sitting and standing posture  RANGE OF MOTION:   Cervical: limited to 75% WFL all directions due to myofacial tightness  Lumbar: WFL all directions; tightness at end range side bending bilaterally  Shoulders:  WFL all directions with tightness reported reaching IR/ER  Hips: Limited to 75% all directions  Noted significant muscle guarding and myofascial tightness bilateral upper traps, levator scapula, cervical and thoracic paraspinals.  Restricted scapular mobility with impaired motor coordination retraction/protraction.  Instructed in supine kinesthetic body scan.    Instructed in side lying left shoulder and left hip a-p glides.  Pt reported left shoulder feels more relaxed and unfamiliar.      Treatment Interventions:  Neuromuscular Reeducation:   For 45 minutes including instruction in body scan and side lying left shoulder/hip glides  __________________________________________________________________  Instruction on home program: next    Assessment:  Ongoing Functional Limitations Include:  Patient tolerated/responded well to  treatments    Recommendations: follow up with Dr. Benjamin    Intensity Level: 3 (1=low intensity; 5=high intensity)  Demonstrates/Verbalizes Technique: 3 (1= poor technique-difficulty performing exercises,significant cues required; 5= good technique-performs exercises without cues)  Body Awareness: 2 (1=low awareness; 5=high awareness)  Posture/Stability: 2 (1= poor posture, stability; 5= good posture, stability)  Motivational Level: Cooperative  Response to Teaching: cooperative and needs reinforcement  Factors that affect learning: None    _______________________________________________________________________  Plan of Care  Continue PT to support reactivation and integration of self regulation pain management skills;  Continue with prescribed plan of care - progress as tolerated.  Focus next session will be on: self cares, right shoulder /hip glides     Present:  DANO     Total Visit Time:  45 minutes    Therapist: Michelle Dhillon PT             Date: 6/26/2017

## 2017-06-29 ENCOUNTER — OFFICE VISIT (OUTPATIENT)
Dept: OBGYN | Facility: CLINIC | Age: 47
End: 2017-06-29
Payer: COMMERCIAL

## 2017-06-29 VITALS
SYSTOLIC BLOOD PRESSURE: 100 MMHG | TEMPERATURE: 98.3 F | DIASTOLIC BLOOD PRESSURE: 68 MMHG | HEIGHT: 64 IN | BODY MASS INDEX: 42.72 KG/M2 | WEIGHT: 250.2 LBS

## 2017-06-29 PROCEDURE — 99024 POSTOP FOLLOW-UP VISIT: CPT | Performed by: FAMILY MEDICINE

## 2017-06-29 NOTE — MR AVS SNAPSHOT
After Visit Summary   6/29/2017    Paige Le    MRN: [id number]           Patient Information     Date Of Birth          1970        Visit Information        Provider Department      6/29/2017 9:15 AM Livia Barnett, DO [facility]        Care Instructions    For spotting take ibuprofen 800 mg 3 x daily or naproxen 500 mg 2x day (limit to a week at a time)     OR   Oral Contraceptive or estrogen for 20-30 days     Dr. Livia Barnett, DO    Obstetrics and Gynecology  [facility] [place]                 Follow-ups after your visit        Your next 10 appointments already scheduled     Jul 03, 2017  1:45 PM CDT   Return Visit with Michelle Dhillon PT   [facility] ([facility])    [address] [place] [address]   [phone]            Jul 10, 2017  1:45 PM CDT   Return Visit with Michelle Dhillon PT   [facility] ([facility])    [address] [place] [address]   [phone]            Jul 17, 2017  1:45 PM CDT   Return Visit with Michelle Dhillon PT   [facility] ([facility])    [address] [place] [address]   [phone]            Jul 21, 2017  1:30 PM CDT   Return Visit with Yvonne Garrido MD   [facility] ([facility])    [address] [place] [address]   [phone]              Who to contact     If you have questions or need follow up information about today's clinic visit or your schedule please contact [facility] directly at [phone].  Normal or non-critical lab and imaging results will be communicated to you by MyChart, letter or phone within 4 business days after the clinic has received the results. If you do not hear from us within 7 days, please contact the clinic  "through Janrain or phone. If you have a critical or abnormal lab result, we will notify you by phone as soon as possible.  Submit refill requests through Janrain or call your pharmacy and they will forward the refill request to us. Please allow 3 business days for your refill to be completed.          Additional Information About Your Visit        Janrain Information     Janrain lets you send messages to your doctor, view your test results, renew your prescriptions, schedule appointments and more. To sign up, go to [email or web address] . Click on \"Log in\" on the left side of the screen, which will take you to the Welcome page. Then click on \"Sign up Now\" on the right side of the page.     You will be asked to enter the access code listed below, as well as some personal information. Please follow the directions to create your username and password.     Your access code is: [email or web address]  Expires: 2017  1:40 PM     Your access code will  in 90 days. If you need help or a new code, please call your [place] clinic or [phone].        Care EveryWhere ID     This is your Care EveryWhere ID. This could be used by other organizations to access your [place] medical records  [id number]        Your Vitals Were     Temperature Height Last Period BMI (Body Mass Index)          98.3  F (36.8  C) (Oral) 5' 4\" (1.626 m) 2017 (Exact Date) 42.95 kg/m2         Blood Pressure from Last 3 Encounters:   17 100/68   17 142/70   17 118/77    Weight from Last 3 Encounters:   17 250 lb 3.2 oz (113.5 kg)   17 251 lb 6.4 oz (114 kg)   17 253 lb 11.2 oz (115.1 kg)              Today, you had the following     No orders found for display         Today's Medication Changes          These changes are accurate as of: 17  9:32 AM.  If you have any questions, ask your nurse or doctor.               These medicines have changed or have updated prescriptions.        Dose/Directions    " cetirizine-psuedoePHEDrine 5-120 MG per 12 hr tablet   Commonly known as:  ZYRTEC-D   This may have changed:  when to take this   Used for:  Allergic rhinitis, cause unspecified        Dose:  1 tablet   Take 1 tablet by mouth 2 times daily   Quantity:  90 tablet   Refills:  3                Primary Care Provider Office Phone # Fax #    Christine Magali Farnsworth PA-C [phone]       [address] [address]        Equal Access to Services     CLARK KABA : Hadii aad ku hadasho Soomaali, waaxda luqadaha, qaybta kaalmada adeegyada, waxay leonelin hayaan adeelba milner . So [facility] [phone].    ATENCIÓN: Si habla español, tiene a greene disposición servicios gratuitos de asistencia lingüística. [facility] [phone].    We comply with applicable federal civil rights laws and [place] laws. We do not discriminate on the basis of race, color, national origin, age, disability sex, sexual orientation or gender identity.            Thank you!     Thank you for choosing [facility]  for your care. Our goal is always to provide you with excellent care. Hearing back from our patients is one way we can continue to improve our services. Please take a few minutes to complete the written survey that you may receive in the mail after your visit with us. Thank you!             Your Updated Medication List - Protect others around you: Learn how to safely use, store and throw away your medicines at www.disposemymeds.org.          This list is accurate as of: 6/29/17  9:32 AM.  Always use your most recent med list.                   Brand Name Dispense Instructions for use Diagnosis    * albuterol (2.5 MG/3ML) 0.083% neb solution     30 vial    Take 1 vial (2.5 mg) by nebulization every 6 hours as needed for shortness of breath / dyspnea or wheezing    Cough, Moderate persistent asthma with acute exacerbation       * albuterol 108 (90 BASE) MCG/ACT Inhaler    PROAIR  HFA/PROVENTIL HFA/VENTOLIN HFA    1 Inhaler    Inhale 2 puffs into the lungs every 6 hours as needed for shortness of breath / dyspnea or wheezing    Cough, Moderate persistent asthma with acute exacerbation       ALEVE PO      Take 220 mg by mouth        beclomethasone 80 MCG/ACT Inhaler    QVAR    1 Inhaler    Inhale 2 puffs into the lungs 2 times daily    Moderate persistent asthma with acute exacerbation, Cough       CALCIUM CARBONATE PO      Take by mouth daily        cetirizine-psuedoePHEDrine 5-120 MG per 12 hr tablet    ZYRTEC-D    90 tablet    Take 1 tablet by mouth 2 times daily    Allergic rhinitis, cause unspecified       cholecalciferol 1000 UNIT tablet    vitamin D     Take 1 tablet by mouth daily.        COMPOUND - PHARMACY TO MIX COMPOUNDED MEDICATION    CMPD RX    30 capsule    Low dose Naltrexone:  6mg capsules/tablets one PO qhs    Fibromyalgia       FLUoxetine 40 MG capsule    PROzac    180 capsule    Take 2 capsules (80 mg) by mouth daily    Major depressive disorder, recurrent episode, mild (H)       gabapentin 300 MG capsule    NEURONTIN    270 capsule    Take 1 capsule (300 mg) by mouth 3 times daily    Fibromyalgia, Menopausal syndrome (hot flashes)       HERBALS           ibuprofen 800 MG tablet    ADVIL/MOTRIN    90 tablet    Take 1 tablet (800 mg) by mouth every 8 hours as needed for moderate pain    S/P dilation and curettage       ipratropium - albuterol 0.5 mg/2.5 mg/3 mL 0.5-2.5 (3) MG/3ML neb solution    DUONEB    1 Box    Take 1 vial (3 mLs) by nebulization once for 1 dose    Cough, Moderate persistent asthma with acute exacerbation       IRON SUPPLEMENT PO           lidocaine 2 % solution    XYLOCAINE    100 mL    Take 15 mLs by mouth every 2 hours as needed for moderate pain swish and spit every 3-8 hours as needed; max 8 doses/24 hour period    Tonsillitis       MAGNESIUM CITRATE PO      Take by mouth daily        order for DME     1 Device    Equipment being ordered: Nebulizer     CAP (community acquired pneumonia)       rizatriptan 10 MG ODT tab    MAXALT-MLT    18 tablet    Take 1 tablet (10 mg) by mouth at onset of headache for migraine May repeat in 2 hours. Max 3 tablets/24 hours.    Migraine with aura and without status migrainosus, not intractable       topiramate 25 MG tablet    TOPAMAX    180 tablet    Take 1 tablet (25 mg) by mouth 2 times daily    TMJ (temporomandibular joint syndrome), Cervicalgia       traZODone 50 MG tablet    DESYREL    180 tablet    Take 2 tablets by mouth. 1 -2 TABs  PO at bedtime prn insomnia    Insomnia, unspecified       Turmeric 450 MG Caps           VITAMIN B COMPLEX PO           * Notice:  This list has 2 medication(s) that are the same as other medications prescribed for you. Read the directions carefully, and ask your doctor or other care provider to review them with you.

## 2017-06-29 NOTE — PATIENT INSTRUCTIONS
For spotting take ibuprofen 800 mg 3 x daily or naproxen 500 mg 2x day (limit to a week at a time)     OR   Oral Contraceptive or estrogen for 20-30 days     Dr. Livia Barnett, DO    Obstetrics and Gynecology  [facility] [place]

## 2017-06-29 NOTE — PROGRESS NOTES
"Subjective: 46 year old female   status post D&C with IUD insertion on 6/14/17, here for incision check.  Doing well, denies fever, significant pain. Patient notes she has been spotting every day since surgery. Surgical path was normal.     This document serves as a record of the services and decisions personally performed and made by Livia Barnett DO. It was created on his/her behalf by Steph Cárdenas, a trained medical scribe. The creation of this document is based the provider's statements to the medical scribe.  Jn Cárdenas 9:24 AM, June 29, 2017      Objective:  EXAM:  /68  Temp 98.3  F (36.8  C) (Oral)  Ht 1.626 m (5' 4\")  Wt 113.5 kg (250 lb 3.2 oz)  LMP 06/01/2017 (Exact Date)  BMI 42.95 kg/m2  Constitutional: healthy, alert and no distress      Assessment/Plan: 46 year old female   status post D&C with IUD insertion on 6/14/17.  V67.00C Surgery Follow-Up Examination  (primary encounter diagnosis)  Comment:  Surgical path normal  Plan: If spotting continues, will add oral estrogen.   Return as needed    The information in this document, created by the medical scribe for me, accurately reflects the services I personally performed and the decisions made by me. I have reviewed and approved this document for accuracy prior to leaving the patient care area.  Livia Barnett DO  9:24 AM, 06/29/17    Dr. Livia Barnett DO    Obstetrics and Gynecology  [facility]           "

## 2017-06-29 NOTE — NURSING NOTE
"Chief Complaint   Patient presents with     Surgical Followup     surgery 6/14/17       Initial /68  Temp 98.3  F (36.8  C) (Oral)  Ht 5' 4\" (1.626 m)  Wt 250 lb 3.2 oz (113.5 kg)  LMP 06/01/2017 (Exact Date)  BMI 42.95 kg/m2 Estimated body mass index is 42.95 kg/(m^2) as calculated from the following:    Height as of this encounter: 5' 4\" (1.626 m).    Weight as of this encounter: 250 lb 3.2 oz (113.5 kg).  Medication Reconciliation: complete   Shrai Santillan CMA      "

## 2017-07-11 ENCOUNTER — OFFICE VISIT (OUTPATIENT)
Dept: PALLIATIVE MEDICINE | Facility: CLINIC | Age: 47
End: 2017-07-11
Payer: COMMERCIAL

## 2017-07-11 DIAGNOSIS — M79.7 FIBROMYALGIA: Primary | ICD-10-CM

## 2017-07-11 PROCEDURE — 97530 THERAPEUTIC ACTIVITIES: CPT | Mod: GP | Performed by: PHYSICAL THERAPIST

## 2017-07-11 NOTE — PROGRESS NOTES
PAIN PHYSICAL THERAPY PROGRESS NOTE  Patient Name: Paige Le      YOB: 1970     Medical Record Number: [id number]  Diagnosis: Fibromyalgia    Visit: 3/13    Subjective: Patient reports she has been feeling better and able to increase tolerance with gardening and hiking since taking 800 mg ibuprofen x 2 daily as per ob/gyn s/p surgery.  She is wondering why ibuprofen is also helping her overall pain.  Advised her discuss further with Dr. Garrido.  Rates pain and fatigue levels 5/10 today. Pain is mostly in shoulder, neck and upper back areas.    Self Care  HEP: next  Walking/Aerobic Activity: indep 30' x 2 daily  Posture: next  Breathing/Relaxation: indep with CD, yoga, meditation  Heat/Ice: indep  Mini Breaks: issued handout, reinforced  Pacing: issued handout, reinforced    Objective Findings:  OBSERVATION:  Pt arrived 10 minutes late for appointment.  Patient was educated about nerve sensitivity caused by central sensitization and driven by both biological and psychosocial factors.  The patient was encouraged to identify personal stressors which contribute to pain.  Patient was educated about the importance of setting both short and long-term goals and instructed in how to break tasks down in order to make them more achievable.  Mini breaks, body awareness and Pacing principles discussed. Patient was educated on various chemicals released by the brain in response to stress, exercise and food choices, and how these chemicals contribute to increased or decreased pain levels.  Patient was educated about the relationship between tissue injury and pain, and that pain is an output by the brain.  This includes the principle that tissue injury does not have to hurt, and that you can have pain without having tissue injury, especially with hypersensitivity in the nervous system.  Patient was educated about the function of the sympathetic nervous system and how it is impacted by persistent input/pain, as  well as the importance of self-care techniques useful in calming the sympathetic nervous system.  Pt educated on the pain neuomatrix or pain map via fMRI examples and how various areas of the brain are involved in pain experience.  These areas have alternative primary focus which is disturbed when brain is producing pain (memory, focus, concentration, emotion, fine motor control, temperature) causing potential patient issues/struggles.    Treatment Interventions:  Therapeutic Activity:   For 40 minutes including therapeutic neuroscience education, pacing, mini breaks and monitor self cares.  __________________________________________________________________  Instruction on home program: next    Assessment:  Ongoing Functional Limitations Include:  Patient tolerated/responded well to treatment    Intensity Level: 4 (1=low intensity; 5=high intensity)  Demonstrates/Verbalizes Technique: 5 (1= poor technique-difficulty performing exercises,significant cues required; 5= good technique-performs exercises without cues)  Body Awareness: 3 (1=low awareness; 5=high awareness)  Posture/Stability: 3 (1= poor posture, stability; 5= good posture, stability)  Motivational Level: Ask questions, Eager to learn and Cooperative  Response to Teaching: enthusiastic and cooperative  Factors that affect learning: None    _______________________________________________________________________  Plan of Care  Continue PT to support reactivation and integration of self regulation pain management skills;  Continue with prescribed plan of care - progress as tolerated.  Focus next session will be on: monitor self cares, add instruction in self-hug, shoulder/hip glides or rotation on roller functional movement exercises to help reduce tension and improve mobility in neck, shoulders/upper back     Present:  NA     Total Visit Time:  40 minutes    Therapist: Michelle Dhillon PT             Date: 7/11/2017

## 2017-07-11 NOTE — MR AVS SNAPSHOT
"              After Visit Summary   7/11/2017    Paige Le    MRN: [id number]           Patient Information     Date Of Birth          1970        Visit Information        Provider Department      7/11/2017 8:00 AM Michelle Dhillon PT [facility]        Today's Diagnoses     Fibromyalgia    -  1       Follow-ups after your visit        Your next 10 appointments already scheduled     Jul 17, 2017  1:45 PM CDT   Return Visit with Michelle Dhillon PT   [facility] ([facility])    [address]  [facility] [address]   [phone]            Jul 21, 2017  1:30 PM CDT   Return Visit with Yvonne Garrido MD   [facility] ([facility])    [address]  [facility] [address]   [phone]              Who to contact     If you have questions or need follow up information about today's clinic visit or your schedule please contact [facility] directly at [phone].  Normal or non-critical lab and imaging results will be communicated to you by Bushidohart, letter or phone within 4 business days after the clinic has received the results. If you do not hear from us within 7 days, please contact the clinic through NetTalont or phone. If you have a critical or abnormal lab result, we will notify you by phone as soon as possible.  Submit refill requests through Protein Bar or call your pharmacy and they will forward the refill request to us. Please allow 3 business days for your refill to be completed.          Additional Information About Your Visit        BushidohariClinical Information     Protein Bar lets you send messages to your doctor, view your test results, renew your prescriptions, schedule appointments and more. To sign up, go to [email or web address] . Click on \"Log in\" on the left side of the screen, which will take you to the Welcome page. Then click on \"Sign up Now\" on the right " side of the page.     You will be asked to enter the access code listed below, as well as some personal information. Please follow the directions to create your username and password.     Your access code is: [email or web address]  Expires: 2017  1:40 PM     Your access code will  in 90 days. If you need help or a new code, please call your [place] clinic or [phone].        Care EveryWhere ID     This is your Care EveryWhere ID. This could be used by other organizations to access your [facility] medical records  [id number]         Blood Pressure from Last 3 Encounters:   17 100/68   17 142/70   17 118/77    Weight from Last 3 Encounters:   17 113.5 kg (250 lb 3.2 oz)   17 114 kg (251 lb 6.4 oz)   17 115.1 kg (253 lb 11.2 oz)              We Performed the Following     THERAPEUTIC ACTIVITIES          Today's Medication Changes          These changes are accurate as of: 17  9:16 AM.  If you have any questions, ask your nurse or doctor.               These medicines have changed or have updated prescriptions.        Dose/Directions    cetirizine-psuedoePHEDrine 5-120 MG per 12 hr tablet   Commonly known as:  ZYRTEC-D   This may have changed:  when to take this   Used for:  Allergic rhinitis, cause unspecified        Dose:  1 tablet   Take 1 tablet by mouth 2 times daily   Quantity:  90 tablet   Refills:  3                Primary Care Provider Office Phone # Fax #    Christine Magali Farnsworth PA-C [phone]       [address] [address]        Equal Access to Services     CLARK KABA : Anastasiia Villalpando, vaughn muñiz, qamarisol chavez. So [facility] [phone].    ATENCIÓN: Si habla español, tiene a greene disposición servicios gratuitos de asistencia lingüística. Nila al [phone].    We comply with applicable federal civil rights laws and [place] laws.  We do not discriminate on the basis of race, color, national origin, age, disability sex, sexual orientation or gender identity.            Thank you!     Thank you for choosing [facility]  for your care. Our goal is always to provide you with excellent care. Hearing back from our patients is one way we can continue to improve our services. Please take a few minutes to complete the written survey that you may receive in the mail after your visit with us. Thank you!             Your Updated Medication List - Protect others around you: Learn how to safely use, store and throw away your medicines at www.disposemymeds.org.          This list is accurate as of: 7/11/17  9:16 AM.  Always use your most recent med list.                   Brand Name Dispense Instructions for use Diagnosis    * albuterol (2.5 MG/3ML) 0.083% neb solution     30 vial    Take 1 vial (2.5 mg) by nebulization every 6 hours as needed for shortness of breath / dyspnea or wheezing    Cough, Moderate persistent asthma with acute exacerbation       * albuterol 108 (90 BASE) MCG/ACT Inhaler    PROAIR HFA/PROVENTIL HFA/VENTOLIN HFA    1 Inhaler    Inhale 2 puffs into the lungs every 6 hours as needed for shortness of breath / dyspnea or wheezing    Cough, Moderate persistent asthma with acute exacerbation       ALEVE PO      Take 220 mg by mouth        beclomethasone 80 MCG/ACT Inhaler    QVAR    1 Inhaler    Inhale 2 puffs into the lungs 2 times daily    Moderate persistent asthma with acute exacerbation, Cough       CALCIUM CARBONATE PO      Take by mouth daily        cetirizine-psuedoePHEDrine 5-120 MG per 12 hr tablet    ZYRTEC-D    90 tablet    Take 1 tablet by mouth 2 times daily    Allergic rhinitis, cause unspecified       cholecalciferol 1000 UNIT tablet    vitamin D     Take 1 tablet by mouth daily.        COMPOUND - PHARMACY TO MIX COMPOUNDED MEDICATION    CMPD RX    30 capsule    Low dose Naltrexone:  6mg capsules/tablets one  PO qhs    Fibromyalgia       FLUoxetine 40 MG capsule    PROzac    180 capsule    Take 2 capsules (80 mg) by mouth daily    Major depressive disorder, recurrent episode, mild (H)       gabapentin 300 MG capsule    NEURONTIN    270 capsule    Take 1 capsule (300 mg) by mouth 3 times daily    Fibromyalgia, Menopausal syndrome (hot flashes)       HERBALS           ibuprofen 800 MG tablet    ADVIL/MOTRIN    90 tablet    Take 1 tablet (800 mg) by mouth every 8 hours as needed for moderate pain    S/P dilation and curettage       ipratropium - albuterol 0.5 mg/2.5 mg/3 mL 0.5-2.5 (3) MG/3ML neb solution    DUONEB    1 Box    Take 1 vial (3 mLs) by nebulization once for 1 dose    Cough, Moderate persistent asthma with acute exacerbation       IRON SUPPLEMENT PO           lidocaine (viscous) 2 % solution    XYLOCAINE    100 mL    Take 15 mLs by mouth every 2 hours as needed for moderate pain swish and spit every 3-8 hours as needed; max 8 doses/24 hour period    Tonsillitis       MAGNESIUM CITRATE PO      Take by mouth daily        order for DME     1 Device    Equipment being ordered: Nebulizer    CAP (community acquired pneumonia)       rizatriptan 10 MG ODT tab    MAXALT-MLT    18 tablet    Take 1 tablet (10 mg) by mouth at onset of headache for migraine May repeat in 2 hours. Max 3 tablets/24 hours.    Migraine with aura and without status migrainosus, not intractable       topiramate 25 MG tablet    TOPAMAX    180 tablet    Take 1 tablet (25 mg) by mouth 2 times daily    TMJ (temporomandibular joint syndrome), Cervicalgia       traZODone 50 MG tablet    DESYREL    180 tablet    Take 2 tablets by mouth. 1 -2 TABs  PO at bedtime prn insomnia    Insomnia, unspecified       Turmeric 450 MG Caps           VITAMIN B COMPLEX PO           * Notice:  This list has 2 medication(s) that are the same as other medications prescribed for you. Read the directions carefully, and ask your doctor or other care provider to review them  with you.

## 2017-07-17 ENCOUNTER — OFFICE VISIT (OUTPATIENT)
Dept: PALLIATIVE MEDICINE | Facility: CLINIC | Age: 47
End: 2017-07-17
Payer: COMMERCIAL

## 2017-07-17 DIAGNOSIS — M79.7 FIBROMYALGIA: Primary | ICD-10-CM

## 2017-07-17 PROCEDURE — 97112 NEUROMUSCULAR REEDUCATION: CPT | Mod: GP | Performed by: PHYSICAL THERAPIST

## 2017-07-17 NOTE — PROGRESS NOTES
PAIN PHYSICAL THERAPY PROGRESS NOTE  Patient Name: Paige Le      YOB: 1970     Medical Record Number: [id number]  Diagnosis: Fibromyalgia    Visit: 4/13    Subjective: Patient reports being able to do body scans has been beneficial.  Feeling frustrated that she has not been able to schedule appointment with Dr. Benjamin due to issues with insurance coverage.  Rate pain level 4/10 and fatigue level 6/10.  .    Self Care  HEP: issued handout, compliant with stretches  Walking/Aerobic Activity: indep 30' x 2 daily  Posture: begin postural awareness today with relaxed shoulders  Breathing/Relaxation: indep with CD, yoga, meditation  Heat/Ice: indep  Mini Breaks: indep  Pacing: indep, continue to monitor      Objective Findings:  OBSERVATION: pt demonstrates forward head, protracted and elevated shoulders; frequent furrowing of brow while explaining insurance issues  Instructed in supine kinesthetic body scan.  Instructed in side lying shoulder glides and right shoulder circles.  Noted overuse of latissimus dorsi on the right with posterior shoulder glide.  Pt able to self correct with manual cuing.  Pt demonstrated marked improvement in upright posture with reduced shoulder elevation/protaction.  Pt noted feeling less tension throughout neck and upper back with improved posture and greater ease in walking at end of session.  Issued handout for home use.      Treatment Interventions:  Neuromuscular Reeducation:   For 45 minutes including instruction in side lying shoulder glides  __________________________________________________________________  Instruction on home program: shoulder glides    Assessment:  Ongoing Functional Limitations Include:  Patient tolerated/responded well to treatment    Intensity Level: 4 (1=low intensity; 5=high intensity)  Demonstrates/Verbalizes Technique: 4 (1= poor technique-difficulty performing exercises,significant cues required; 5= good technique-performs exercises  without cues)  Body Awareness: 4 (1=low awareness; 5=high awareness)  Posture/Stability: 4 (1= poor posture, stability; 5= good posture, stability)  Motivational Level: Ask questions, Eager to learn and Cooperative  Response to Teaching: cooperative  Factors that affect learning: None    _______________________________________________________________________  Plan of Care  Continue PT to support reactivation and integration of self regulation pain management skills;  Continue with prescribed plan of care - progress as tolerated.  Focus next session will be on: monitor self cares, add instruction in self hug, progress shoulder/hip glides or rotation on roller to help reduce tension and improve mobility of neck, shoulders and upper back.     Present:  DANO     Total Visit Time:  45 minutes    Therapist: Michelle Dhillon PT             Date: 7/17/2017

## 2017-07-17 NOTE — MR AVS SNAPSHOT
After Visit Summary   7/17/2017    Paige Le    MRN: [id number]           Patient Information     Date Of Birth          1970        Visit Information        Provider Department      7/17/2017 1:45 PM Michelle Dhillon PT [facility]        Today's Diagnoses     Fibromyalgia    -  1       Follow-ups after your visit        Your next 10 appointments already scheduled     Jul 21, 2017  1:30 PM CDT   Return Visit with Yvonne Garrido MD   [facility] ([facility])    [address] [place] [address]   [phone]            Jul 24, 2017  1:45 PM CDT   Return Visit with Michelle Dhillon PT   [facility] ([facility])    [address] [place] [address]   [phone]            Jul 31, 2017  1:45 PM CDT   Return Visit with Michelle Dhillon PT   [facility] ([facility])    [address] [place] [address]   [phone]              Who to contact     If you have questions or need follow up information about today's clinic visit or your schedule please contact [facility] directly at [phone].  Normal or non-critical lab and imaging results will be communicated to you by Octoniushart, letter or phone within 4 business days after the clinic has received the results. If you do not hear from us within 7 days, please contact the clinic through Octoniushart or phone. If you have a critical or abnormal lab result, we will notify you by phone as soon as possible.  Submit refill requests through SynapSense or call your pharmacy and they will forward the refill request to us. Please allow 3 business days for your refill to be completed.          Additional Information About Your Visit        SynapSense Information     SynapSense lets you send messages to your doctor, view your test results, renew your  "prescriptions, schedule appointments and more. To sign up, go to [email or web address] . Click on \"Log in\" on the left side of the screen, which will take you to the Welcome page. Then click on \"Sign up Now\" on the right side of the page.     You will be asked to enter the access code listed below, as well as some personal information. Please follow the directions to create your username and password.     Your access code is: [email or web address]  Expires: 2017  1:40 PM     Your access code will  in 90 days. If you need help or a new code, please call your [place] clinic or [phone].        Care EveryWhere ID     This is your Care EveryWhere ID. This could be used by other organizations to access your [facility] medical records  [id number]         Blood Pressure from Last 3 Encounters:   17 100/68   17 142/70   17 118/77    Weight from Last 3 Encounters:   17 113.5 kg (250 lb 3.2 oz)   17 114 kg (251 lb 6.4 oz)   17 115.1 kg (253 lb 11.2 oz)              We Performed the Following     NEUROMUSCULAR RE-EDUCATION          Today's Medication Changes          These changes are accurate as of: 17  2:59 PM.  If you have any questions, ask your nurse or doctor.               These medicines have changed or have updated prescriptions.        Dose/Directions    cetirizine-psuedoePHEDrine 5-120 MG per 12 hr tablet   Commonly known as:  ZYRTEC-D   This may have changed:  when to take this   Used for:  Allergic rhinitis, cause unspecified        Dose:  1 tablet   Take 1 tablet by mouth 2 times daily   Quantity:  90 tablet   Refills:  3                Primary Care Provider Office Phone # Fax #    Christine Farnsworth PA-C [phone]       [address] [address]        Equal Access to Services     CLARK KABA AH: Anastasiia Villalpando, vaughn muñiz, marisol chanel " lajay jay stephens. So [facility] [phone].    ATENCIÓN: Si habla danna, tiene a greene disposición servicios gratuitos de asistencia lingüística. Nila johnston [phone].    We comply with applicable federal civil rights laws and [place] laws. We do not discriminate on the basis of race, color, national origin, age, disability sex, sexual orientation or gender identity.            Thank you!     Thank you for choosing [facility]  for your care. Our goal is always to provide you with excellent care. Hearing back from our patients is one way we can continue to improve our services. Please take a few minutes to complete the written survey that you may receive in the mail after your visit with us. Thank you!             Your Updated Medication List - Protect others around you: Learn how to safely use, store and throw away your medicines at www.disposemymeds.org.          This list is accurate as of: 7/17/17  2:59 PM.  Always use your most recent med list.                   Brand Name Dispense Instructions for use Diagnosis    * albuterol (2.5 MG/3ML) 0.083% neb solution     30 vial    Take 1 vial (2.5 mg) by nebulization every 6 hours as needed for shortness of breath / dyspnea or wheezing    Cough, Moderate persistent asthma with acute exacerbation       * albuterol 108 (90 BASE) MCG/ACT Inhaler    PROAIR HFA/PROVENTIL HFA/VENTOLIN HFA    1 Inhaler    Inhale 2 puffs into the lungs every 6 hours as needed for shortness of breath / dyspnea or wheezing    Cough, Moderate persistent asthma with acute exacerbation       ALEVE PO      Take 220 mg by mouth        beclomethasone 80 MCG/ACT Inhaler    QVAR    1 Inhaler    Inhale 2 puffs into the lungs 2 times daily    Moderate persistent asthma with acute exacerbation, Cough       CALCIUM CARBONATE PO      Take by mouth daily        cetirizine-psuedoePHEDrine 5-120 MG per 12 hr tablet    ZYRTEC-D    90 tablet    Take 1 tablet by mouth 2 times daily    Allergic rhinitis,  cause unspecified       cholecalciferol 1000 UNIT tablet    vitamin D     Take 1 tablet by mouth daily.        COMPOUND - PHARMACY TO MIX COMPOUNDED MEDICATION    CMPD RX    30 capsule    Low dose Naltrexone:  6mg capsules/tablets one PO qhs    Fibromyalgia       FLUoxetine 40 MG capsule    PROzac    180 capsule    Take 2 capsules (80 mg) by mouth daily    Major depressive disorder, recurrent episode, mild (H)       gabapentin 300 MG capsule    NEURONTIN    270 capsule    Take 1 capsule (300 mg) by mouth 3 times daily    Fibromyalgia, Menopausal syndrome (hot flashes)       HERBALS           ibuprofen 800 MG tablet    ADVIL/MOTRIN    90 tablet    Take 1 tablet (800 mg) by mouth every 8 hours as needed for moderate pain    S/P dilation and curettage       ipratropium - albuterol 0.5 mg/2.5 mg/3 mL 0.5-2.5 (3) MG/3ML neb solution    DUONEB    1 Box    Take 1 vial (3 mLs) by nebulization once for 1 dose    Cough, Moderate persistent asthma with acute exacerbation       IRON SUPPLEMENT PO           lidocaine (viscous) 2 % solution    XYLOCAINE    100 mL    Take 15 mLs by mouth every 2 hours as needed for moderate pain swish and spit every 3-8 hours as needed; max 8 doses/24 hour period    Tonsillitis       MAGNESIUM CITRATE PO      Take by mouth daily        order for DME     1 Device    Equipment being ordered: Nebulizer    CAP (community acquired pneumonia)       rizatriptan 10 MG ODT tab    MAXALT-MLT    18 tablet    Take 1 tablet (10 mg) by mouth at onset of headache for migraine May repeat in 2 hours. Max 3 tablets/24 hours.    Migraine with aura and without status migrainosus, not intractable       topiramate 25 MG tablet    TOPAMAX    180 tablet    Take 1 tablet (25 mg) by mouth 2 times daily    TMJ (temporomandibular joint syndrome), Cervicalgia       traZODone 50 MG tablet    DESYREL    180 tablet    Take 2 tablets by mouth. 1 -2 TABs  PO at bedtime prn insomnia    Insomnia, unspecified       Turmeric 450 MG  Caps           VITAMIN B COMPLEX PO           * Notice:  This list has 2 medication(s) that are the same as other medications prescribed for you. Read the directions carefully, and ask your doctor or other care provider to review them with you.

## 2017-07-21 ENCOUNTER — OFFICE VISIT (OUTPATIENT)
Dept: PALLIATIVE MEDICINE | Facility: CLINIC | Age: 47
End: 2017-07-21
Payer: COMMERCIAL

## 2017-07-21 VITALS
OXYGEN SATURATION: 98 % | HEART RATE: 62 BPM | BODY MASS INDEX: 44.11 KG/M2 | DIASTOLIC BLOOD PRESSURE: 62 MMHG | WEIGHT: 257 LBS | SYSTOLIC BLOOD PRESSURE: 102 MMHG

## 2017-07-21 DIAGNOSIS — M79.7 FIBROMYALGIA: ICD-10-CM

## 2017-07-21 PROCEDURE — 99214 OFFICE O/P EST MOD 30 MIN: CPT | Performed by: ANESTHESIOLOGY

## 2017-07-21 ASSESSMENT — PAIN SCALES - GENERAL: PAINLEVEL: MODERATE PAIN (5)

## 2017-07-21 NOTE — NURSING NOTE
"Chief Complaint   Patient presents with     Pain     back, neck, shoulers, around head, and hands       Initial /62  Pulse 62  Wt 116.6 kg (257 lb)  SpO2 98%  Breastfeeding? No  BMI 44.11 kg/m2 Estimated body mass index is 44.11 kg/(m^2) as calculated from the following:    Height as of 6/29/17: 1.626 m (5' 4\").    Weight as of this encounter: 116.6 kg (257 lb).  Medication Reconciliation: complete     Rukhsana Garcia CMA   [facility]    "

## 2017-07-21 NOTE — MR AVS SNAPSHOT
After Visit Summary   7/21/2017    Paige Le    MRN: [id number]           Patient Information     Date Of Birth          1970        Visit Information        Provider Department      7/21/2017 1:30 PM Yvonne Garrido MD [facility]        Today's Diagnoses     Fibromyalgia          Care Instructions    1.  Black Seed Oil (cumin)  2000mg daily    2. Follow up with me when you are done with [facility] and pain PT and we can talk about acupuncture.     Yvonne Garrido MD           Follow-ups after your visit        Your next 10 appointments already scheduled     Jul 24, 2017  1:45 PM CDT   Return Visit with Michelle Dhillon PT   [facility] ([facility])    [address]  [address]  [facility] [id number] [phone]            Jul 31, 2017  1:45 PM CDT   Return Visit with Michelle Dhillon PT   [facility] ([facility])    [address]  [address]  [place] [address]   [phone]              Who to contact     If you have questions or need follow up information about today's clinic visit or your schedule please contact [facility] directly at [phone].  Normal or non-critical lab and imaging results will be communicated to you by MyChart, letter or phone within 4 business days after the clinic has received the results. If you do not hear from us within 7 days, please contact the clinic through Askemhart or phone. If you have a critical or abnormal lab result, we will notify you by phone as soon as possible.  Submit refill requests through GameDuell or call your pharmacy and they will forward the refill request to us. Please allow 3 business days for your refill to be completed.          Additional Information About Your Visit        AskemharPhraxis Information     Game[employer]ell lets you send messages to your doctor, view your test results, renew your prescriptions, schedule appointments  "and more. To sign up, go to [email or web address] . Click on \"Log in\" on the left side of the screen, which will take you to the Welcome page. Then click on \"Sign up Now\" on the right side of the page.     You will be asked to enter the access code listed below, as well as some personal information. Please follow the directions to create your username and password.     Your access code is: [email or web address]  Expires: 2017  1:40 PM     Your access code will  in 90 days. If you need help or a new code, please call your [place] clinic or [phone].        Care EveryWhere ID     This is your Care EveryWhere ID. This could be used by other organizations to access your [place] medical records  [id number]        Your Vitals Were     Pulse Pulse Oximetry Breastfeeding? BMI (Body Mass Index)          62 98% No 44.11 kg/m2         Blood Pressure from Last 3 Encounters:   17 102/62   17 100/68   17 142/70    Weight from Last 3 Encounters:   17 116.6 kg (257 lb)   17 113.5 kg (250 lb 3.2 oz)   17 114 kg (251 lb 6.4 oz)              Today, you had the following     No orders found for display         Today's Medication Changes          These changes are accurate as of: 17  2:10 PM.  If you have any questions, ask your nurse or doctor.               These medicines have changed or have updated prescriptions.        Dose/Directions    cetirizine-psuedoePHEDrine 5-120 MG per 12 hr tablet   Commonly known as:  ZYRTEC-D   This may have changed:  when to take this   Used for:  Allergic rhinitis, cause unspecified        Dose:  1 tablet   Take 1 tablet by mouth 2 times daily   Quantity:  90 tablet   Refills:  3                Primary Care Provider Office Phone # Fax #    Christine Farnsworth PA-C [phone]       [address] [address]        Equal Access to Services     CLARK KABA AH: vaughn Baldwin " haileyarchana marisol chanel. So [facility] [phone].    ATENCIÓN: Si chaitanya clay, tiene a greene disposición servicios gratuitos de asistencia lingüística. Nila al [phone].    We comply with applicable federal civil rights laws and [place] laws. We do not discriminate on the basis of race, color, national origin, age, disability sex, sexual orientation or gender identity.            Thank you!     Thank you for choosing [facility]  for your care. Our goal is always to provide you with excellent care. Hearing back from our patients is one way we can continue to improve our services. Please take a few minutes to complete the written survey that you may receive in the mail after your visit with us. Thank you!             Your Updated Medication List - Protect others around you: Learn how to safely use, store and throw away your medicines at www.disposemymeds.org.          This list is accurate as of: 7/21/17  2:10 PM.  Always use your most recent med list.                   Brand Name Dispense Instructions for use Diagnosis    * albuterol (2.5 MG/3ML) 0.083% neb solution     30 vial    Take 1 vial (2.5 mg) by nebulization every 6 hours as needed for shortness of breath / dyspnea or wheezing    Cough, Moderate persistent asthma with acute exacerbation       * albuterol 108 (90 BASE) MCG/ACT Inhaler    PROAIR HFA/PROVENTIL HFA/VENTOLIN HFA    1 Inhaler    Inhale 2 puffs into the lungs every 6 hours as needed for shortness of breath / dyspnea or wheezing    Cough, Moderate persistent asthma with acute exacerbation       ALEVE PO      Take 220 mg by mouth        beclomethasone 80 MCG/ACT Inhaler    QVAR    1 Inhaler    Inhale 2 puffs into the lungs 2 times daily    Moderate persistent asthma with acute exacerbation, Cough       CALCIUM CARBONATE PO      Take by mouth daily        cetirizine-psuedoePHEDrine 5-120 MG per 12 hr tablet    ZYRTEC-D    90  tablet    Take 1 tablet by mouth 2 times daily    Allergic rhinitis, cause unspecified       cholecalciferol 1000 UNIT tablet    vitamin D     Take 1 tablet by mouth daily.        COMPOUNDED NON-CONTROLLED SUBSTANCE - PHARMACY TO MIX COMPOUNDED MEDICATION    CMPD RX    30 capsule    Low dose Naltrexone:  6mg capsules/tablets one PO qhs    Fibromyalgia       FLUoxetine 40 MG capsule    PROzac    180 capsule    Take 2 capsules (80 mg) by mouth daily    Major depressive disorder, recurrent episode, mild (H)       gabapentin 300 MG capsule    NEURONTIN    270 capsule    Take 1 capsule (300 mg) by mouth 3 times daily    Fibromyalgia, Menopausal syndrome (hot flashes)       HERBALS           ibuprofen 800 MG tablet    ADVIL/MOTRIN    90 tablet    Take 1 tablet (800 mg) by mouth every 8 hours as needed for moderate pain    S/P dilation and curettage       ipratropium - albuterol 0.5 mg/2.5 mg/3 mL 0.5-2.5 (3) MG/3ML neb solution    DUONEB    1 Box    Take 1 vial (3 mLs) by nebulization once for 1 dose    Cough, Moderate persistent asthma with acute exacerbation       IRON SUPPLEMENT PO           lidocaine (viscous) 2 % solution    XYLOCAINE    100 mL    Take 15 mLs by mouth every 2 hours as needed for moderate pain swish and spit every 3-8 hours as needed; max 8 doses/24 hour period    Tonsillitis       MAGNESIUM CITRATE PO      Take by mouth daily        order for DME     1 Device    Equipment being ordered: Nebulizer    CAP (community acquired pneumonia)       rizatriptan 10 MG ODT tab    MAXALT-MLT    18 tablet    Take 1 tablet (10 mg) by mouth at onset of headache for migraine May repeat in 2 hours. Max 3 tablets/24 hours.    Migraine with aura and without status migrainosus, not intractable       topiramate 25 MG tablet    TOPAMAX    180 tablet    Take 1 tablet (25 mg) by mouth 2 times daily    TMJ (temporomandibular joint syndrome), Cervicalgia       traZODone 50 MG tablet    DESYREL    180 tablet    Take 2 tablets  by mouth. 1 -2 TABs  PO at bedtime prn insomnia    Insomnia, unspecified       Turmeric 450 MG Caps           VITAMIN B COMPLEX PO           * Notice:  This list has 2 medication(s) that are the same as other medications prescribed for you. Read the directions carefully, and ask your doctor or other care provider to review them with you.

## 2017-07-21 NOTE — PROGRESS NOTES
[facility]    Date of visit: 7/21/2017    Chief complaint:   Chief Complaint   Patient presents with     Pain     back, neck, shoulers, around head, and hands       Interval history:  Paige Le is a 46 year old female last seen by me on 6/09/2017.      Recommendations/plan at the last visit included:    1. Physical Therapy: Referred to Pain PT through [facility].  2. Pain Psychologist to address issues of relaxation, behavioral change, coping style, and other factors important to improvement: Referred to [facility] as this would be closer to her home  3. Diagnostic Studies: None  4. Medication Management:  1. Continue Gabapentin 300mg TID.                       2. Discussed and prescribed Naltrexone 6mg qhs compound pharmacy  5. Further procedures recommended: None at this time  6. Acupuncture: Could consider in the future.   7. Urine toxicology screen today: None as patient is not on opiates.     Since her last visit, Paige Le reports:  - Has seen Michelle in PT 4 times and is getting a lot out of it.   - Pain is improved on the low dose naltrexone  - She is not taking naps during the day anymore.   - She is taking 800mg ibuprofen twice a day.  - Ovarian biopsy and IUD placement since last visit  - She has not had a migraine since June      Pain scores:  Pain intensity on average is 5 on a scale of 0-10.     Current pain treatments:   Gabapentin 300mg TID  Topamax 25mg BID - Preventative for headaches  Low Dose Naltrexone 6mg qhs  Trazodone 50mg -100mg qhs  Maxalt 10mg PRN headache  Tumeric 450mg daily  Ibuprofen 800mg BID PRN    Past pain treatments:      Side Effects: no side effect    Medications:  Current Outpatient Prescriptions   Medication Sig Dispense Refill     CALCIUM CARBONATE PO Take by mouth daily       MAGNESIUM CITRATE PO Take by mouth daily       ibuprofen (ADVIL/MOTRIN) 800 MG tablet Take 1 tablet (800 mg) by mouth every 8 hours as needed for  moderate pain (Patient not taking: Reported on 6/29/2017) 90 tablet 1     COMPOUND (CMPD RX) - PHARMACY TO MIX COMPOUNDED MEDICATION Low dose Naltrexone:  6mg capsules/tablets one PO qhs 30 capsule 3     Turmeric 450 MG CAPS        rizatriptan (MAXALT-MLT) 10 MG ODT tab Take 1 tablet (10 mg) by mouth at onset of headache for migraine May repeat in 2 hours. Max 3 tablets/24 hours. 18 tablet 3     HERBALS        albuterol (2.5 MG/3ML) 0.083% neb solution Take 1 vial (2.5 mg) by nebulization every 6 hours as needed for shortness of breath / dyspnea or wheezing 30 vial 1     albuterol (PROAIR HFA/PROVENTIL HFA/VENTOLIN HFA) 108 (90 BASE) MCG/ACT Inhaler Inhale 2 puffs into the lungs every 6 hours as needed for shortness of breath / dyspnea or wheezing 1 Inhaler 6     beclomethasone (QVAR) 80 MCG/ACT Inhaler Inhale 2 puffs into the lungs 2 times daily 1 Inhaler 3     ipratropium - albuterol 0.5 mg/2.5 mg/3 mL (DUONEB) 0.5-2.5 (3) MG/3ML neb solution Take 1 vial (3 mLs) by nebulization once for 1 dose 1 Box 3     gabapentin (NEURONTIN) 300 MG capsule Take 1 capsule (300 mg) by mouth 3 times daily 270 capsule 3     FLUoxetine (PROZAC) 40 MG capsule Take 2 capsules (80 mg) by mouth daily 180 capsule 3     topiramate (TOPAMAX) 25 MG tablet Take 1 tablet (25 mg) by mouth 2 times daily 180 tablet 3     traZODone (DESYREL) 50 MG tablet Take 2 tablets by mouth. 1 -2 TABs  PO at bedtime prn insomnia 180 tablet 6     lidocaine (XYLOCAINE) 2 % solution (viscous) Take 15 mLs by mouth every 2 hours as needed for moderate pain swish and spit every 3-8 hours as needed; max 8 doses/24 hour period 100 mL 0     order for DME Equipment being ordered: Nebulizer 1 Device 0     B Complex Vitamins (VITAMIN B COMPLEX PO)        Ferrous Sulfate (IRON SUPPLEMENT PO)        Naproxen Sodium (ALEVE PO) Take 220 mg by mouth       cetirizine-psuedoePHEDrine (ZYRTEC-D) 5-120 MG per tablet Take 1 tablet by mouth 2 times daily (Patient taking  "differently: Take 1 tablet by mouth daily ) 90 tablet 3     cholecalciferol (VITAMIN D) 1000 UNIT tablet Take 1 tablet by mouth daily.         Medical History: any changes in medical history since they were last seen? No    Review of Systems:  The 14 system ROS was reviewed from the intake questionnaire, and is positive for: fatigue  Any bowel or bladder problems: none  Mood: \"really good\"    Physical Exam:  Blood pressure 102/62, pulse 62, weight 116.6 kg (257 lb), SpO2 98 %, not currently breastfeeding.  General: NAD, awake and alert, energetic  Gait: Normal  MSK exam: tight muscle bands bilateral cervical paraspinals    Assessment:  1. Fibromyalgia  2. Fatigue  3. Depression  4. Remote history of alcohol abuse - in recovery          Paige Le is a 46 year old female who presents with the complaints of chronic generalized myofascial pain and fatigue. She was previously enrolled in the pain clinic and participated diligently with physical therapy and pain psychology. She wishes to re-enroll in these services today as she has had an acute worsening of her pain and fatigue over the past 6 months.     Plan:  Diagnosis reviewed, treatment option addressed, and risk/benefits discussed.  Self-care instructions given.  I am recommending a multidisciplinary treatment plan to help this patient better manage her pain.       1. Physical Therapy: Referred to Pain PT through [facility].  2. Pain Psychologist to address issues of relaxation, behavioral change, coping style, and other factors important to improvement: Will Schedule next week with [facility]  3. Diagnostic Studies: None  4. Medication Management:  5. Continue Gabapentin 300mg TID.                       6. Discussed and prescribed Naltrexone 6mg qhs compound pharmacy  7. Black seed oil 2000mg daily  8. Tumeric 450mg daily  9. Further procedures recommended: None at this time  10. Acupuncture:  consider in the future when she is done with PT and pain psychology.  "       Follow up: with Dr. Garrido in 6-8 weeks    I spent 30 minutes of time face to face with the patient.  Greater than 50% of this time was spent in patient counseling and/or coordination of care.      Yvonne GarridoMD Pain Management

## 2017-07-21 NOTE — PATIENT INSTRUCTIONS
1.  Black Seed Oil (cumin)  2000mg daily    2. Follow up with me when you are done with [facility] and pain PT and we can talk about acupuncture.     Yvonne Garrido MD

## 2017-07-24 ENCOUNTER — OFFICE VISIT (OUTPATIENT)
Dept: PALLIATIVE MEDICINE | Facility: CLINIC | Age: 47
End: 2017-07-24
Payer: COMMERCIAL

## 2017-07-24 DIAGNOSIS — M79.7 FIBROMYALGIA: Primary | ICD-10-CM

## 2017-07-24 PROCEDURE — 97112 NEUROMUSCULAR REEDUCATION: CPT | Mod: GP | Performed by: PHYSICAL THERAPIST

## 2017-07-24 NOTE — MR AVS SNAPSHOT
After Visit Summary   7/24/2017    Paige Le    MRN: [id number]           Patient Information     Date Of Birth          1970        Visit Information        Provider Department      7/24/2017 1:45 PM Michlele Dhillon PT [facility]        Today's Diagnoses     Fibromyalgia    -  1       Follow-ups after your visit        Your next 10 appointments already scheduled     Jul 31, 2017  1:45 PM CDT   Return Visit with Michelle Dhillon PT   [facility] ([facility])    [address] [place] [address]   [phone]            Aug 07, 2017  1:45 PM CDT   Return Visit with Michelle Dhillon PT   [facility] ([facility])    [address] [address] [address]   [phone]            Sep 29, 2017  1:30 PM CDT   Return Visit with Yvonne Garrido MD   [facility] ([facility])    [address] [address] [address]   [phone]              Who to contact     If you have questions or need follow up information about today's clinic visit or your schedule please contact [facility] directly at [phone].  Normal or non-critical lab and imaging results will be communicated to you by Omisehart, letter or phone within 4 business days after the clinic has received the results. If you do not hear from us within 7 days, please contact the clinic through Omisehart or phone. If you have a critical or abnormal lab result, we will notify you by phone as soon as possible.  Submit refill requests through [employer] or call your pharmacy and they will forward the refill request to us. Please allow 3 business days for your refill to be completed.          Additional Information About Your Visit        [employer] Information     [employer] lets you send messages to your doctor, view your test results, renew your  "prescriptions, schedule appointments and more. To sign up, go to [email or web address] . Click on \"Log in\" on the left side of the screen, which will take you to the Welcome page. Then click on \"Sign up Now\" on the right side of the page.     You will be asked to enter the access code listed below, as well as some personal information. Please follow the directions to create your username and password.     Your access code is: [email or web address]  Expires: 2017  1:40 PM     Your access code will  in 90 days. If you need help or a new code, please call your [place] clinic or [phone].        Care EveryWhere ID     This is your Care EveryWhere ID. This could be used by other organizations to access your [facility] medical records  [id number]         Blood Pressure from Last 3 Encounters:   17 102/62   17 100/68   17 142/70    Weight from Last 3 Encounters:   17 116.6 kg (257 lb)   17 113.5 kg (250 lb 3.2 oz)   17 114 kg (251 lb 6.4 oz)              We Performed the Following     NEUROMUSCULAR RE-EDUCATION          Today's Medication Changes          These changes are accurate as of: 17  3:04 PM.  If you have any questions, ask your nurse or doctor.               These medicines have changed or have updated prescriptions.        Dose/Directions    cetirizine-psuedoePHEDrine 5-120 MG per 12 hr tablet   Commonly known as:  ZYRTEC-D   This may have changed:  when to take this   Used for:  Allergic rhinitis, cause unspecified        Dose:  1 tablet   Take 1 tablet by mouth 2 times daily   Quantity:  90 tablet   Refills:  3                Primary Care Provider Office Phone # Fax #    Christine Farnsworth PA-C [phone]       [facility] [address] [place] [address]        Equal Access to Services     CLARK KABA AH: Anastasiia Villalpando, waaxda luqadaha, qaybta kaalmasong porras, marisol stephens. " So [facility] [phone].    ATENCIÓN: Si shivanila danna, tiene a greene disposición servicios gratuitos de asistencia lingüística. Nila al [phone].    We comply with applicable federal civil rights laws and [place] laws. We do not discriminate on the basis of race, color, national origin, age, disability sex, sexual orientation or gender identity.            Thank you!     Thank you for choosing [facility]  for your care. Our goal is always to provide you with excellent care. Hearing back from our patients is one way we can continue to improve our services. Please take a few minutes to complete the written survey that you may receive in the mail after your visit with us. Thank you!             Your Updated Medication List - Protect others around you: Learn how to safely use, store and throw away your medicines at www.disposemymeds.org.          This list is accurate as of: 7/24/17  3:04 PM.  Always use your most recent med list.                   Brand Name Dispense Instructions for use Diagnosis    * albuterol (2.5 MG/3ML) 0.083% neb solution     30 vial    Take 1 vial (2.5 mg) by nebulization every 6 hours as needed for shortness of breath / dyspnea or wheezing    Cough, Moderate persistent asthma with acute exacerbation       * albuterol 108 (90 BASE) MCG/ACT Inhaler    PROAIR HFA/PROVENTIL HFA/VENTOLIN HFA    1 Inhaler    Inhale 2 puffs into the lungs every 6 hours as needed for shortness of breath / dyspnea or wheezing    Cough, Moderate persistent asthma with acute exacerbation       ALEVE PO      Take 220 mg by mouth        beclomethasone 80 MCG/ACT Inhaler    QVAR    1 Inhaler    Inhale 2 puffs into the lungs 2 times daily    Moderate persistent asthma with acute exacerbation, Cough       CALCIUM CARBONATE PO      Take by mouth daily        cetirizine-psuedoePHEDrine 5-120 MG per 12 hr tablet    ZYRTEC-D    90 tablet    Take 1 tablet by mouth 2 times daily    Allergic rhinitis, cause  unspecified       cholecalciferol 1000 UNIT tablet    vitamin D     Take 1 tablet by mouth daily.        COMPOUNDED NON-CONTROLLED SUBSTANCE - PHARMACY TO MIX COMPOUNDED MEDICATION    CMPD RX    30 capsule    Low dose Naltrexone:  6mg capsules/tablets one PO qhs    Fibromyalgia       FLUoxetine 40 MG capsule    PROzac    180 capsule    Take 2 capsules (80 mg) by mouth daily    Major depressive disorder, recurrent episode, mild (H)       gabapentin 300 MG capsule    NEURONTIN    270 capsule    Take 1 capsule (300 mg) by mouth 3 times daily    Fibromyalgia, Menopausal syndrome (hot flashes)       HERBALS           ibuprofen 800 MG tablet    ADVIL/MOTRIN    90 tablet    Take 1 tablet (800 mg) by mouth every 8 hours as needed for moderate pain    S/P dilation and curettage       ipratropium - albuterol 0.5 mg/2.5 mg/3 mL 0.5-2.5 (3) MG/3ML neb solution    DUONEB    1 Box    Take 1 vial (3 mLs) by nebulization once for 1 dose    Cough, Moderate persistent asthma with acute exacerbation       IRON SUPPLEMENT PO           lidocaine (viscous) 2 % solution    XYLOCAINE    100 mL    Take 15 mLs by mouth every 2 hours as needed for moderate pain swish and spit every 3-8 hours as needed; max 8 doses/24 hour period    Tonsillitis       MAGNESIUM CITRATE PO      Take by mouth daily        order for DME     1 Device    Equipment being ordered: Nebulizer    CAP (community acquired pneumonia)       rizatriptan 10 MG ODT tab    MAXALT-MLT    18 tablet    Take 1 tablet (10 mg) by mouth at onset of headache for migraine May repeat in 2 hours. Max 3 tablets/24 hours.    Migraine with aura and without status migrainosus, not intractable       topiramate 25 MG tablet    TOPAMAX    180 tablet    Take 1 tablet (25 mg) by mouth 2 times daily    TMJ (temporomandibular joint syndrome), Cervicalgia       traZODone 50 MG tablet    DESYREL    180 tablet    Take 2 tablets by mouth. 1 -2 TABs  PO at bedtime prn insomnia    Insomnia, unspecified        Turmeric 450 MG Caps           VITAMIN B COMPLEX PO           * Notice:  This list has 2 medication(s) that are the same as other medications prescribed for you. Read the directions carefully, and ask your doctor or other care provider to review them with you.

## 2017-07-24 NOTE — PROGRESS NOTES
"PAIN PHYSICAL THERAPY PROGRESS NOTE  Patient Name: Paige Le      YOB: 1970     Medical Record Number: [id number]  Diagnosis: Fibromyalgia    Visit: 5/13    Subjective: Patient reports significant improved neck motion following last session.  Helpful with turning movements while driving. Was able to do \"check-in's\" [place].  Rates pain 6/10 and fatigue 6/10 levels today.  Notes primary complaints of mid-lower back muscle tightness.    Self Care  HEP: indep  Walking/Aerobic Activity: indep 30' x 2 daily  Posture: indep  Breathing/Relaxation: indep with CD, yoga, meditation  Heat/Ice: indep  Mini Breaks: indep  Pacing: indep      Objective Findings:  OBSERVATION: Noted myofacial tension T-L paraspinals bilaterally.  Bilateral pes planus affecting lower extremity and hip postural alignment.  Wears orthotics in tennis shoes.  Instructed in supine beginning spinal rotation; global and differentiated patterns.  Pt reported decreased back muscle tightness, improved relaxation and pain level reduced to 4/10 at end of session.  Issued handout for home use.      Treatment Interventions:  Neuromuscular Reeducation:   For 45 minutes including review of self cares and instruction in functional movement exercise above.  __________________________________________________________________  Instruction on home program: shoulder glides, beginning rotation    Assessment:  Ongoing Functional Limitations Include:  Patient tolerated/responded well to treatment    Intensity Level: 4 (1=low intensity; 5=high intensity)  Demonstrates/Verbalizes Technique: 5 (1= poor technique-difficulty performing exercises,significant cues required; 5= good technique-performs exercises without cues)  Body Awareness: 4 (1=low awareness; 5=high awareness)  Posture/Stability: 4 (1= poor posture, stability; 5= good posture, stability)  Motivational Level: Ask questions  Response to Teaching: cooperative  Factors " that affect learning: None    _______________________________________________________________________  Plan of Care  Continue PT to support reactivation and integration of self regulation pain management skills;  Continue with prescribed plan of care - progress as tolerated.  Focus next session will be on: add self hug, rotation on roller and progress shoulder/hip glides.     Present:  NA     Total Visit Time:  45 minutes    Therapist: Michelle Dhillon PT             Date: 7/24/2017

## 2017-07-27 ENCOUNTER — TRANSFERRED RECORDS (OUTPATIENT)
Dept: HEALTH INFORMATION MANAGEMENT | Facility: CLINIC | Age: 47
End: 2017-07-27

## 2017-07-31 ENCOUNTER — OFFICE VISIT (OUTPATIENT)
Dept: PALLIATIVE MEDICINE | Facility: CLINIC | Age: 47
End: 2017-07-31
Payer: COMMERCIAL

## 2017-07-31 DIAGNOSIS — M79.7 FIBROMYALGIA: Primary | ICD-10-CM

## 2017-07-31 PROCEDURE — 97112 NEUROMUSCULAR REEDUCATION: CPT | Mod: GP | Performed by: PHYSICAL THERAPIST

## 2017-07-31 NOTE — MR AVS SNAPSHOT
"              After Visit Summary   7/31/2017    Paige Le    MRN: [id number]           Patient Information     Date Of Birth          1970        Visit Information        Provider Department      7/31/2017 1:45 PM Michelle Dhillon PT [facility]        Today's Diagnoses     Fibromyalgia    -  1       Follow-ups after your visit        Your next 10 appointments already scheduled     Aug 07, 2017  1:45 PM CDT   Return Visit with Michelle Dhillon PT   [facility] ([facility])    [address]  [facility] [address]   [phone]            Sep 29, 2017  1:30 PM CDT   Return Visit with Yvonne Garrido MD   [facility] ([facility])    [address]  [facility] [address]   [phone]              Who to contact     If you have questions or need follow up information about today's clinic visit or your schedule please contact [facility] directly at [phone].  Normal or non-critical lab and imaging results will be communicated to you by Siege Paintballhart, letter or phone within 4 business days after the clinic has received the results. If you do not hear from us within 7 days, please contact the clinic through CereSoftt or phone. If you have a critical or abnormal lab result, we will notify you by phone as soon as possible.  Submit refill requests through [employer] or call your pharmacy and they will forward the refill request to us. Please allow 3 business days for your refill to be completed.          Additional Information About Your Visit        Siege Paintball[employer]End[employer] Information     [employer] lets you send messages to your doctor, view your test results, renew your prescriptions, schedule appointments and more. To sign up, go to [email or web address] . Click on \"Log in\" on the left side of the screen, which will take you to the Welcome page. Then click on \"Sign up Now\" on the right " side of the page.     You will be asked to enter the access code listed below, as well as some personal information. Please follow the directions to create your username and password.     Your access code is: [email or web address]  Expires: 10/29/2017  2:38 PM     Your access code will  in 90 days. If you need help or a new code, please call your [place] clinic or [phone].        Care EveryWhere ID     This is your Care EveryWhere ID. This could be used by other organizations to access your [place] medical records  [id number]         Blood Pressure from Last 3 Encounters:   17 102/62   17 100/68   17 142/70    Weight from Last 3 Encounters:   17 116.6 kg (257 lb)   17 113.5 kg (250 lb 3.2 oz)   17 114 kg (251 lb 6.4 oz)              We Performed the Following     NEUROMUSCULAR RE-EDUCATION          Today's Medication Changes          These changes are accurate as of: 17  2:38 PM.  If you have any questions, ask your nurse or doctor.               These medicines have changed or have updated prescriptions.        Dose/Directions    cetirizine-psuedoePHEDrine 5-120 MG per 12 hr tablet   Commonly known as:  ZYRTEC-D   This may have changed:  when to take this   Used for:  Allergic rhinitis, cause unspecified        Dose:  1 tablet   Take 1 tablet by mouth 2 times daily   Quantity:  90 tablet   Refills:  3                Primary Care Provider Office Phone # Fax #    Christine Magali Farnsworth PA-C [phone]       [address] [address]        Equal Access to Services     CLARK KABA AH: Haddea Villalpando, vaughn muñiz, qadestiny leonalmarisol marquez. So [facility] [phone].    ATENCIÓN: Si habla español, tiene a greene disposición servicios gratuitos de asistencia lingüística. Nila johnston [phone].    We comply with applicable federal civil rights laws and [place] laws. We  do not discriminate on the basis of race, color, national origin, age, disability sex, sexual orientation or gender identity.            Thank you!     Thank you for choosing [facility]  for your care. Our goal is always to provide you with excellent care. Hearing back from our patients is one way we can continue to improve our services. Please take a few minutes to complete the written survey that you may receive in the mail after your visit with us. Thank you!             Your Updated Medication List - Protect others around you: Learn how to safely use, store and throw away your medicines at www.disposemymeds.org.          This list is accurate as of: 7/31/17  2:38 PM.  Always use your most recent med list.                   Brand Name Dispense Instructions for use Diagnosis    * albuterol (2.5 MG/3ML) 0.083% neb solution     30 vial    Take 1 vial (2.5 mg) by nebulization every 6 hours as needed for shortness of breath / dyspnea or wheezing    Cough, Moderate persistent asthma with acute exacerbation       * albuterol 108 (90 BASE) MCG/ACT Inhaler    PROAIR HFA/PROVENTIL HFA/VENTOLIN HFA    1 Inhaler    Inhale 2 puffs into the lungs every 6 hours as needed for shortness of breath / dyspnea or wheezing    Cough, Moderate persistent asthma with acute exacerbation       ALEVE PO      Take 220 mg by mouth        beclomethasone 80 MCG/ACT Inhaler    QVAR    1 Inhaler    Inhale 2 puffs into the lungs 2 times daily    Moderate persistent asthma with acute exacerbation, Cough       CALCIUM CARBONATE PO      Take by mouth daily        cetirizine-psuedoePHEDrine 5-120 MG per 12 hr tablet    ZYRTEC-D    90 tablet    Take 1 tablet by mouth 2 times daily    Allergic rhinitis, cause unspecified       cholecalciferol 1000 UNIT tablet    vitamin D     Take 1 tablet by mouth daily.        COMPOUNDED NON-CONTROLLED SUBSTANCE - PHARMACY TO MIX COMPOUNDED MEDICATION    CMPD RX    30 capsule    Low dose Naltrexone:   6mg capsules/tablets one PO qhs    Fibromyalgia       FLUoxetine 40 MG capsule    PROzac    180 capsule    Take 2 capsules (80 mg) by mouth daily    Major depressive disorder, recurrent episode, mild (H)       gabapentin 300 MG capsule    NEURONTIN    270 capsule    Take 1 capsule (300 mg) by mouth 3 times daily    Fibromyalgia, Menopausal syndrome (hot flashes)       HERBALS           ibuprofen 800 MG tablet    ADVIL/MOTRIN    90 tablet    Take 1 tablet (800 mg) by mouth every 8 hours as needed for moderate pain    S/P dilation and curettage       ipratropium - albuterol 0.5 mg/2.5 mg/3 mL 0.5-2.5 (3) MG/3ML neb solution    DUONEB    1 Box    Take 1 vial (3 mLs) by nebulization once for 1 dose    Cough, Moderate persistent asthma with acute exacerbation       IRON SUPPLEMENT PO           lidocaine (viscous) 2 % solution    XYLOCAINE    100 mL    Take 15 mLs by mouth every 2 hours as needed for moderate pain swish and spit every 3-8 hours as needed; max 8 doses/24 hour period    Tonsillitis       MAGNESIUM CITRATE PO      Take by mouth daily        order for DME     1 Device    Equipment being ordered: Nebulizer    CAP (community acquired pneumonia)       rizatriptan 10 MG ODT tab    MAXALT-MLT    18 tablet    Take 1 tablet (10 mg) by mouth at onset of headache for migraine May repeat in 2 hours. Max 3 tablets/24 hours.    Migraine with aura and without status migrainosus, not intractable       topiramate 25 MG tablet    TOPAMAX    180 tablet    Take 1 tablet (25 mg) by mouth 2 times daily    TMJ (temporomandibular joint syndrome), Cervicalgia       traZODone 50 MG tablet    DESYREL    180 tablet    Take 2 tablets by mouth. 1 -2 TABs  PO at bedtime prn insomnia    Insomnia, unspecified       Turmeric 450 MG Caps           VITAMIN B COMPLEX PO           * Notice:  This list has 2 medication(s) that are the same as other medications prescribed for you. Read the directions carefully, and ask your doctor or other care  provider to review them with you.

## 2017-07-31 NOTE — PROGRESS NOTES
PAIN PHYSICAL THERAPY PROGRESS NOTE  Patient Name: Paige Le      YOB: 1970     Medical Record Number: [id number]  Diagnosis: Fibromyalgia    Visit: 6/13    Subjective: Patient reports she had a relaxing weekend with family and friends and is feeling tired today, but no complaints of pain.    Self Care  HEP: indep  Walking/Aerobic Activity: indep 30' 2 daily  Posture:indep  Breathing/Relaxation: indep with CD, yoga, meditatin  Heat/Ice: indep  Mini Breaks: indep  Pacing: indep      Objective Findings:  OBSERVATION: Appears tired; impaired left hip mobility with mild antalgic gait WB on L LE  Instructed in supine body scan and spinal rotation on roller.  Issued handout for home use.  Pt noted feeling taller with improved posture and less antalgic gait on L LE.      Treatment Interventions:  Neuromuscular Reeducation:   For 45 minutes including review of self cares, instruction in functional movement exercise as above.  __________________________________________________________________  Instruction on home program: shoulder glides, beginning rotation, rotation on roller    Assessment:  Ongoing Functional Limitations Include:  Patient tolerated/responded well to treatment    Intensity Level: 4 (1=low intensity; 5=high intensity)  Demonstrates/Verbalizes Technique: 5 (1= poor technique-difficulty performing exercises,significant cues required; 5= good technique-performs exercises without cues)  Body Awareness: 4 (1=low awareness; 5=high awareness)  Posture/Stability: 4 (1= poor posture, stability; 5= good posture, stability)  Motivational Level: Ask questions, Eager to learn and Cooperative  Response to Teaching: cooperative  Factors that affect learning: None    _______________________________________________________________________  Plan of Care  Continue PT to support reactivation and integration of self regulation pain management skills;  Continue with prescribed plan of care - progress as  tolerated.  Focus next session will be on: add self hug or progress shoulder/hip glides.     Present:  NA     Total Visit Time:  45 minutes    Therapist: Michelle Dhillon PT             Date: 7/31/2017

## 2017-08-02 ENCOUNTER — TELEPHONE (OUTPATIENT)
Dept: FAMILY MEDICINE | Facility: CLINIC | Age: 47
End: 2017-08-02

## 2017-08-02 NOTE — TELEPHONE ENCOUNTER
"Pt calls, informs was at campground this weekend and around water and grassy areas, noticed \"bug bites\" on feet and lower legs, denies blisters, itchy, \"small mosquito bites\", denies fever, otherwise well, discussed \"swimmers itch\" type occurrence, will treat symptomatically, cool compresses, otc HC creams etc, informed need to run its course, may take couple of days, try not to itch, watch for signs of infection etc.patient agrees, will f/u prn sxs worse or persist  Sofie Guzman RN, BSN  Message handled by Nurse Triage .    "

## 2017-08-07 ENCOUNTER — OFFICE VISIT (OUTPATIENT)
Dept: PALLIATIVE MEDICINE | Facility: CLINIC | Age: 47
End: 2017-08-07
Payer: COMMERCIAL

## 2017-08-07 DIAGNOSIS — M79.7 FIBROMYALGIA: Primary | ICD-10-CM

## 2017-08-07 PROCEDURE — 97112 NEUROMUSCULAR REEDUCATION: CPT | Mod: GP | Performed by: PHYSICAL THERAPIST

## 2017-08-07 NOTE — MR AVS SNAPSHOT
"              After Visit Summary   8/7/2017    Paige Le    MRN: [id number]           Patient Information     Date Of Birth          1970        Visit Information        Provider Department      8/7/2017 1:45 PM Michelle Dhillon PT [facility]        Today's Diagnoses     Fibromyalgia    -  1       Follow-ups after your visit        Your next 10 appointments already scheduled     Aug 14, 2017  1:45 PM CDT   Return Visit with Michelle Dhillon PT   [facility] ([facility])    [address]  [facility] [address]   [phone]            Sep 29, 2017  1:30 PM CDT   Return Visit with Yvonne Garrido MD   [facility] ([facility])    [address]  [facility] [address]   [phone]              Who to contact     If you have questions or need follow up information about today's clinic visit or your schedule please contact [facility] directly at [phone].  Normal or non-critical lab and imaging results will be communicated to you by Spinal Integrationhart, letter or phone within 4 business days after the clinic has received the results. If you do not hear from us within 7 days, please contact the clinic through [employer] or phone. If you have a critical or abnormal lab result, we will notify you by phone as soon as possible.  Submit refill requests through [employer] or call your pharmacy and they will forward the refill request to us. Please allow 3 business days for your refill to be completed.          Additional Information About Your Visit        Spinal IntegrationharVyyo Information     [employer] lets you send messages to your doctor, view your test results, renew your prescriptions, schedule appointments and more. To sign up, go to [email or web address] . Click on \"Log in\" on the left side of the screen, which will take you to the Welcome page. Then click on \"Sign up Now\" on the right side " of the page.     You will be asked to enter the access code listed below, as well as some personal information. Please follow the directions to create your username and password.     Your access code is: [email or web address]  Expires: 10/29/2017  2:38 PM     Your access code will  in 90 days. If you need help or a new code, please call your [place] clinic or [phone].        Care EveryWhere ID     This is your Care EveryWhere ID. This could be used by other organizations to access your [place] medical records  [id number]         Blood Pressure from Last 3 Encounters:   17 102/62   17 100/68   17 142/70    Weight from Last 3 Encounters:   17 116.6 kg (257 lb)   17 113.5 kg (250 lb 3.2 oz)   17 114 kg (251 lb 6.4 oz)              We Performed the Following     NEUROMUSCULAR RE-EDUCATION          Today's Medication Changes          These changes are accurate as of: 17  2:41 PM.  If you have any questions, ask your nurse or doctor.               These medicines have changed or have updated prescriptions.        Dose/Directions    cetirizine-psuedoePHEDrine 5-120 MG per 12 hr tablet   Commonly known as:  ZYRTEC-D   This may have changed:  when to take this   Used for:  Allergic rhinitis, cause unspecified        Dose:  1 tablet   Take 1 tablet by mouth 2 times daily   Quantity:  90 tablet   Refills:  3                Primary Care Provider Office Phone # Fax #    Christine Magali Farnsworth PA-C [phone]       [address] [address]        Equal Access to Services     LITZY [facility]HEL[facility] AH: Haddea Villalpando, waandrea luariel, qaybta kaalmarisol marquez. So [facility] [phone].    ATENCIÓN: Si habla español, tiene a greene disposición servicios gratuitos de asistencia lingüística. Nila johnston [phone].    We comply with applicable federal civil rights laws and [place] laws. We do not  discriminate on the basis of race, color, national origin, age, disability sex, sexual orientation or gender identity.            Thank you!     Thank you for choosing [facility]  for your care. Our goal is always to provide you with excellent care. Hearing back from our patients is one way we can continue to improve our services. Please take a few minutes to complete the written survey that you may receive in the mail after your visit with us. Thank you!             Your Updated Medication List - Protect others around you: Learn how to safely use, store and throw away your medicines at www.disposemymeds.org.          This list is accurate as of: 8/7/17  2:41 PM.  Always use your most recent med list.                   Brand Name Dispense Instructions for use Diagnosis    * albuterol (2.5 MG/3ML) 0.083% neb solution     30 vial    Take 1 vial (2.5 mg) by nebulization every 6 hours as needed for shortness of breath / dyspnea or wheezing    Cough, Moderate persistent asthma with acute exacerbation       * albuterol 108 (90 BASE) MCG/ACT Inhaler    PROAIR HFA/PROVENTIL HFA/VENTOLIN HFA    1 Inhaler    Inhale 2 puffs into the lungs every 6 hours as needed for shortness of breath / dyspnea or wheezing    Cough, Moderate persistent asthma with acute exacerbation       ALEVE PO      Take 220 mg by mouth        beclomethasone 80 MCG/ACT Inhaler    QVAR    1 Inhaler    Inhale 2 puffs into the lungs 2 times daily    Moderate persistent asthma with acute exacerbation, Cough       CALCIUM CARBONATE PO      Take by mouth daily        cetirizine-psuedoePHEDrine 5-120 MG per 12 hr tablet    ZYRTEC-D    90 tablet    Take 1 tablet by mouth 2 times daily    Allergic rhinitis, cause unspecified       cholecalciferol 1000 UNIT tablet    vitamin D     Take 1 tablet by mouth daily.        COMPOUNDED NON-CONTROLLED SUBSTANCE - PHARMACY TO MIX COMPOUNDED MEDICATION    CMPD RX    30 capsule    Low dose Naltrexone:  6mg  capsules/tablets one PO qhs    Fibromyalgia       FLUoxetine 40 MG capsule    PROzac    180 capsule    Take 2 capsules (80 mg) by mouth daily    Major depressive disorder, recurrent episode, mild (H)       gabapentin 300 MG capsule    NEURONTIN    270 capsule    Take 1 capsule (300 mg) by mouth 3 times daily    Fibromyalgia, Menopausal syndrome (hot flashes)       HERBALS           ibuprofen 800 MG tablet    ADVIL/MOTRIN    90 tablet    Take 1 tablet (800 mg) by mouth every 8 hours as needed for moderate pain    S/P dilation and curettage       ipratropium - albuterol 0.5 mg/2.5 mg/3 mL 0.5-2.5 (3) MG/3ML neb solution    DUONEB    1 Box    Take 1 vial (3 mLs) by nebulization once for 1 dose    Cough, Moderate persistent asthma with acute exacerbation       IRON SUPPLEMENT PO           lidocaine (viscous) 2 % solution    XYLOCAINE    100 mL    Take 15 mLs by mouth every 2 hours as needed for moderate pain swish and spit every 3-8 hours as needed; max 8 doses/24 hour period    Tonsillitis       MAGNESIUM CITRATE PO      Take by mouth daily        order for DME     1 Device    Equipment being ordered: Nebulizer    CAP (community acquired pneumonia)       rizatriptan 10 MG ODT tab    MAXALT-MLT    18 tablet    Take 1 tablet (10 mg) by mouth at onset of headache for migraine May repeat in 2 hours. Max 3 tablets/24 hours.    Migraine with aura and without status migrainosus, not intractable       topiramate 25 MG tablet    TOPAMAX    180 tablet    Take 1 tablet (25 mg) by mouth 2 times daily    TMJ (temporomandibular joint syndrome), Cervicalgia       traZODone 50 MG tablet    DESYREL    180 tablet    Take 2 tablets by mouth. 1 -2 TABs  PO at bedtime prn insomnia    Insomnia, unspecified       Turmeric 450 MG Caps           VITAMIN B COMPLEX PO           * Notice:  This list has 2 medication(s) that are the same as other medications prescribed for you. Read the directions carefully, and ask your doctor or other care  provider to review them with you.

## 2017-08-07 NOTE — PROGRESS NOTES
PAIN PHYSICAL THERAPY PROGRESS NOTE  Patient Name: Paige Le      YOB: 1970     Medical Record Number: [id number]  Diagnosis: Fibromyalgia    Visit: 7/13    Subjective: Patient reports neck feels sore after going to the water park x 5-6 hours this weekend.  Spending time with god children and not able to perform mini breaks or pacing consistently.  Rates pain  7-8/10 and fatigue 5-6/10 levels today.  Has been doing self cares including neck AROM/stretches and using alternating cold/heat..    Self Care  HEP: indep  Walking/Aerobic Activity: indep 30' x 2 daily  Posture: indep  Breathing/Relaxation: indep with CD, yoga, meditation  Heat/Ice: indep  Mini Breaks: indep  Pacing: indep      Objective Findings:  RANGE OF MOTION: AROM cervical spine limited to 45% bilateral rotation and side bending with restricted segmental mobility at C5 and C6.  Instructed in supine cervical rotation SNAGS, hand to forehead PROM, gentle head lift and gentle cervical traction.  Noted reduced muscle guarding and improved segmental mobility at end of session.  AROM cervical spine WFL all directions at end of session.  No complaints of neck pain.      Treatment Interventions:  Neuromuscular Reeducation:   For 45 minutes including SNAGS, ROM, and gentle traction as above.  __________________________________________________________________  Instruction on home program: shoulder glides, beginning rotation,rotation on roller    Assessment:  Ongoing Functional Limitations Include:  Patient tolerated/responded well to treatment    Intensity Level: 3 (1=low intensity; 5=high intensity)  Demonstrates/Verbalizes Technique: 5 (1= poor technique-difficulty performing exercises,significant cues required; 5= good technique-performs exercises without cues)  Body Awareness: 4 (1=low awareness; 5=high awareness)  Posture/Stability: 4 (1= poor posture, stability; 5= good posture, stability)  Motivational Level: Ask questions, Eager to  learn and Cooperative  Response to Teaching: enthusiastic and cooperative  Factors that affect learning: None    _______________________________________________________________________  Plan of Care  Continue PT to support reactivation and integration of self regulation pain management skills;  Continue with prescribed plan of care - progress as tolerated.  Focus next session will be on: add self hug or progress shoulder/hip lonnydes     Present:  NA     Total Visit Time:  45 minutes    Therapist: Michelle Dhillon PT             Date: 8/7/2017

## 2017-08-14 ENCOUNTER — OFFICE VISIT (OUTPATIENT)
Dept: PALLIATIVE MEDICINE | Facility: CLINIC | Age: 47
End: 2017-08-14
Payer: COMMERCIAL

## 2017-08-14 DIAGNOSIS — M79.7 FIBROMYALGIA: Primary | ICD-10-CM

## 2017-08-14 PROCEDURE — 97112 NEUROMUSCULAR REEDUCATION: CPT | Mod: GP | Performed by: PHYSICAL THERAPIST

## 2017-08-14 NOTE — PROGRESS NOTES
PAIN PHYSICAL THERAPY PROGRESS NOTE  Patient Name: Paige Le      YOB: 1970     Medical Record Number: [id number]  Diagnosis: Fibromyalgia    Visit: 8/13    Subjective: Patient reports improved sleep and waking feeling more rested.  Able to sleep x 5 hours without waking.  Active with installing justyna at her home.  Able to use pacing strategies and getting help from a friend.    Self Care  HEP: indep  Walking/Aerobic Activity: indep 30' x 2 daily  Posture: indep  Breathing/Relaxation: indep with CD, yoga, meditation  Heat/Ice: indep  Mini Breaks: indep  Pacing: indep      Objective Findings:  OBSERVATION: appears congested due to allergies.  Increased muscle tension bilateral shoulders and upper back.  Instructed in supine rolling shoulders and pelvis with global and differentiated head movements (self hug).  Noted difficulty with lifting right hip in supine due to restricted lumbo-sacral mobility.      Treatment Interventions:  Neuromuscular Reeducation:   For 45 minutes as above.  Issued handout for home use.  __________________________________________________________________  Instruction on home program: shoulder glides, beginning rotation, rotation on roller, self hug    Assessment:  Ongoing Functional Limitations Include:  Patient tolerated/responded well to treatment    Intensity Level: 4 (1=low intensity; 5=high intensity)  Demonstrates/Verbalizes Technique: 4 (1= poor technique-difficulty performing exercises,significant cues required; 5= good technique-performs exercises without cues)  Body Awareness: 4 (1=low awareness; 5=high awareness)  Posture/Stability: 4 (1= poor posture, stability; 5= good posture, stability)  Motivational Level: Ask questions, Eager to learn and Cooperative  Response to Teaching: cooperative  Factors that affect learning: None    _______________________________________________________________________  Plan of Care  Continue PT to support reactivation and  integration of self regulation pain management skills;  Continue with prescribed plan of care - progress as tolerated.  Focus next session will be on: add clarifying hip joint, right > left and progress SL shoulder /hip glides     Present:  NA     Total Visit Time:  45 minutes    Therapist: Michelle Dhillon PT             Date: 8/14/2017

## 2017-08-14 NOTE — MR AVS SNAPSHOT
"              After Visit Summary   8/14/2017    Paige Le    MRN: [id number]           Patient Information     Date Of Birth          1970        Visit Information        Provider Department      8/14/2017 1:45 PM Michelle Dhillon PT [facility]        Today's Diagnoses     Fibromyalgia    -  1       Follow-ups after your visit        Your next 10 appointments already scheduled     Aug 21, 2017  1:45 PM CDT   Return Visit with Michelle Dhillon PT   [facility] ([facility])    [address]  [facility] [address]   [phone]            Sep 29, 2017  1:30 PM CDT   Return Visit with Yvonne Garrido MD   [facility] ([facility])    [address]  [facility] [address]   [phone]              Who to contact     If you have questions or need follow up information about today's clinic visit or your schedule please contact [facility] directly at [phone].  Normal or non-critical lab and imaging results will be communicated to you by Mecox Lanehart, letter or phone within 4 business days after the clinic has received the results. If you do not hear from us within 7 days, please contact the clinic through [employer] or phone. If you have a critical or abnormal lab result, we will notify you by phone as soon as possible.  Submit refill requests through [employer] or call your pharmacy and they will forward the refill request to us. Please allow 3 business days for your refill to be completed.          Additional Information About Your Visit        Mecox LaneharAvailink Information     Mercatus lets you send messages to your doctor, view your test results, renew your prescriptions, schedule appointments and more. To sign up, go to [email or web address] . Click on \"Log in\" on the left side of the screen, which will take you to the Welcome page. Then click on \"Sign up Now\" on the right " side of the page.     You will be asked to enter the access code listed below, as well as some personal information. Please follow the directions to create your username and password.     Your access code is: [email or web address]  Expires: 10/29/2017  2:38 PM     Your access code will  in 90 days. If you need help or a new code, please call your [facility] clinic or [phone].        Care EveryWhere ID     This is your Care EveryWhere ID. This could be used by other organizations to access your [facility] medical records  [id number]         Blood Pressure from Last 3 Encounters:   17 102/62   17 100/68   17 142/70    Weight from Last 3 Encounters:   17 116.6 kg (257 lb)   17 113.5 kg (250 lb 3.2 oz)   17 114 kg (251 lb 6.4 oz)              We Performed the Following     NEUROMUSCULAR RE-EDUCATION          Today's Medication Changes          These changes are accurate as of: 17  2:39 PM.  If you have any questions, ask your nurse or doctor.               These medicines have changed or have updated prescriptions.        Dose/Directions    cetirizine-psuedoePHEDrine 5-120 MG per 12 hr tablet   Commonly known as:  ZYRTEC-D   This may have changed:  when to take this   Used for:  Allergic rhinitis, cause unspecified        Dose:  1 tablet   Take 1 tablet by mouth 2 times daily   Quantity:  90 tablet   Refills:  3                Primary Care Provider Office Phone # Fax #    Christine Magali Farnsworth PA-C [phone] [address] [facility] [address]        Equal Access to Services     [facility] : Haddea Villalpando, wamarilynda luariel, qaybta edwardalmarisol marquez. So [facility] [phone].    ATENCIÓN: Si habla español, tiene a greene disposición servicios gratuitos de asistencia lingüística. Nila al [phone].    We comply with applicable federal civil rights laws and [place] laws. We do not discriminate on  the basis of race, color, national origin, age, disability sex, sexual orientation or gender identity.            Thank you!     Thank you for choosing [facility]  for your care. Our goal is always to provide you with excellent care. Hearing back from our patients is one way we can continue to improve our services. Please take a few minutes to complete the written survey that you may receive in the mail after your visit with us. Thank you!             Your Updated Medication List - Protect others around you: Learn how to safely use, store and throw away your medicines at www.disposemymeds.org.          This list is accurate as of: 8/14/17  2:39 PM.  Always use your most recent med list.                   Brand Name Dispense Instructions for use Diagnosis    * albuterol (2.5 MG/3ML) 0.083% neb solution     30 vial    Take 1 vial (2.5 mg) by nebulization every 6 hours as needed for shortness of breath / dyspnea or wheezing    Cough, Moderate persistent asthma with acute exacerbation       * albuterol 108 (90 BASE) MCG/ACT Inhaler    PROAIR HFA/PROVENTIL HFA/VENTOLIN HFA    1 Inhaler    Inhale 2 puffs into the lungs every 6 hours as needed for shortness of breath / dyspnea or wheezing    Cough, Moderate persistent asthma with acute exacerbation       ALEVE PO      Take 220 mg by mouth        beclomethasone 80 MCG/ACT Inhaler    QVAR    1 Inhaler    Inhale 2 puffs into the lungs 2 times daily    Moderate persistent asthma with acute exacerbation, Cough       CALCIUM CARBONATE PO      Take by mouth daily        cetirizine-psuedoePHEDrine 5-120 MG per 12 hr tablet    ZYRTEC-D    90 tablet    Take 1 tablet by mouth 2 times daily    Allergic rhinitis, cause unspecified       cholecalciferol 1000 UNIT tablet    vitamin D     Take 1 tablet by mouth daily.        COMPOUNDED NON-CONTROLLED SUBSTANCE - PHARMACY TO MIX COMPOUNDED MEDICATION    CMPD RX    30 capsule    Low dose Naltrexone:  6mg capsules/tablets one  PO qhs    Fibromyalgia       FLUoxetine 40 MG capsule    PROzac    180 capsule    Take 2 capsules (80 mg) by mouth daily    Major depressive disorder, recurrent episode, mild (H)       gabapentin 300 MG capsule    NEURONTIN    270 capsule    Take 1 capsule (300 mg) by mouth 3 times daily    Fibromyalgia, Menopausal syndrome (hot flashes)       HERBALS           ibuprofen 800 MG tablet    ADVIL/MOTRIN    90 tablet    Take 1 tablet (800 mg) by mouth every 8 hours as needed for moderate pain    S/P dilation and curettage       ipratropium - albuterol 0.5 mg/2.5 mg/3 mL 0.5-2.5 (3) MG/3ML neb solution    DUONEB    1 Box    Take 1 vial (3 mLs) by nebulization once for 1 dose    Cough, Moderate persistent asthma with acute exacerbation       IRON SUPPLEMENT PO           lidocaine (viscous) 2 % solution    XYLOCAINE    100 mL    Take 15 mLs by mouth every 2 hours as needed for moderate pain swish and spit every 3-8 hours as needed; max 8 doses/24 hour period    Tonsillitis       MAGNESIUM CITRATE PO      Take by mouth daily        order for DME     1 Device    Equipment being ordered: Nebulizer    CAP (community acquired pneumonia)       rizatriptan 10 MG ODT tab    MAXALT-MLT    18 tablet    Take 1 tablet (10 mg) by mouth at onset of headache for migraine May repeat in 2 hours. Max 3 tablets/24 hours.    Migraine with aura and without status migrainosus, not intractable       topiramate 25 MG tablet    TOPAMAX    180 tablet    Take 1 tablet (25 mg) by mouth 2 times daily    TMJ (temporomandibular joint syndrome), Cervicalgia       traZODone 50 MG tablet    DESYREL    180 tablet    Take 2 tablets by mouth. 1 -2 TABs  PO at bedtime prn insomnia    Insomnia, unspecified       Turmeric 450 MG Caps           VITAMIN B COMPLEX PO           * Notice:  This list has 2 medication(s) that are the same as other medications prescribed for you. Read the directions carefully, and ask your doctor or other care provider to review them  with you.

## 2017-09-05 ENCOUNTER — OFFICE VISIT (OUTPATIENT)
Dept: FAMILY MEDICINE | Facility: CLINIC | Age: 47
End: 2017-09-05
Payer: COMMERCIAL

## 2017-09-05 VITALS
OXYGEN SATURATION: 100 % | WEIGHT: 255 LBS | TEMPERATURE: 99.1 F | HEART RATE: 95 BPM | BODY MASS INDEX: 43.77 KG/M2 | DIASTOLIC BLOOD PRESSURE: 71 MMHG | SYSTOLIC BLOOD PRESSURE: 102 MMHG

## 2017-09-05 DIAGNOSIS — J20.9 ACUTE BRONCHITIS, UNSPECIFIED ORGANISM: Primary | ICD-10-CM

## 2017-09-05 PROCEDURE — 99213 OFFICE O/P EST LOW 20 MIN: CPT | Performed by: PHYSICIAN ASSISTANT

## 2017-09-05 RX ORDER — MONTELUKAST SODIUM 10 MG/1
10 TABLET ORAL AT BEDTIME
Qty: 30 TABLET | Refills: 1 | Status: SHIPPED | OUTPATIENT
Start: 2017-09-05 | End: 2018-09-13

## 2017-09-05 RX ORDER — FLUCONAZOLE 150 MG/1
150 TABLET ORAL
Qty: 4 TABLET | Refills: 0 | Status: SHIPPED | OUTPATIENT
Start: 2017-09-05 | End: 2018-02-14

## 2017-09-05 RX ORDER — PREDNISONE 20 MG/1
TABLET ORAL
Qty: 20 TABLET | Refills: 0 | Status: SHIPPED | OUTPATIENT
Start: 2017-09-05 | End: 2018-01-31

## 2017-09-05 RX ORDER — AZITHROMYCIN 250 MG/1
TABLET, FILM COATED ORAL
Qty: 6 TABLET | Refills: 0 | Status: SHIPPED | OUTPATIENT
Start: 2017-09-05 | End: 2018-01-31

## 2017-09-05 NOTE — MR AVS SNAPSHOT
"              After Visit Summary   9/5/2017    Paige Le    MRN: [id number]           Patient Information     Date Of Birth          1970        Visit Information        Provider Department      9/5/2017 3:45 PM Christine Farnsworth PA-C [facility]        Today's Diagnoses     Acute bronchitis, unspecified organism    -  1       Follow-ups after your visit        Your next 10 appointments already scheduled     Sep 29, 2017  1:30 PM CDT   Return Visit with Yvonne Garrido MD   [facility] ([facility])    [address] [facility]  [address] [place] [address]   [phone]              Who to contact     If you have questions or need follow up information about today's clinic visit or your schedule please contact [facility] directly at [phone].  Normal or non-critical lab and imaging results will be communicated to you by Krakenhart, letter or phone within 4 business days after the clinic has received the results. If you do not hear from us within 7 days, please contact the clinic through MyChart or phone. If you have a critical or abnormal lab result, we will notify you by phone as soon as possible.  Submit refill requests through [employer] or call your pharmacy and they will forward the refill request to us. Please allow 3 business days for your refill to be completed.          Additional Information About Your Visit        MyChart Information     [employer] lets you send messages to your doctor, view your test results, renew your prescriptions, schedule appointments and more. To sign up, go to [email or web address] . Click on \"Log in\" on the left side of the screen, which will take you to the Welcome page. Then click on \"Sign up Now\" on the right side of the page.     You will be asked to enter the access code listed below, as well as some personal information. Please follow the directions to create your username and " password.     Your access code is: [email or web address]  Expires: 10/29/2017  2:38 PM     Your access code will  in 90 days. If you need help or a new code, please call your [place] clinic or [phone].        Care EveryWhere ID     This is your Care EveryWhere ID. This could be used by other organizations to access your [place] medical records  [id number]        Your Vitals Were     Pulse Temperature Pulse Oximetry Breastfeeding? BMI (Body Mass Index)       95 99.1  F (37.3  C) (Oral) 100% No 43.77 kg/m2        Blood Pressure from Last 3 Encounters:   17 102/71   17 102/62   17 100/68    Weight from Last 3 Encounters:   17 255 lb (115.7 kg)   17 257 lb (116.6 kg)   17 250 lb 3.2 oz (113.5 kg)              Today, you had the following     No orders found for display         Today's Medication Changes          These changes are accurate as of: 17  4:06 PM.  If you have any questions, ask your nurse or doctor.               Start taking these medicines.        Dose/Directions    azithromycin 250 MG tablet   Commonly known as:  ZITHROMAX   Used for:  Acute bronchitis, unspecified organism   Started by:  Christine Farnsworth PA-C        Two tablets first day, then one tablet daily for four days.   Quantity:  6 tablet   Refills:  0       fluconazole 150 MG tablet   Commonly known as:  DIFLUCAN   Used for:  Acute bronchitis, unspecified organism   Started by:  Christine Farnsworth PA-C        Dose:  150 mg   Take 1 tablet (150 mg) by mouth every 3 days   Quantity:  4 tablet   Refills:  0       montelukast 10 MG tablet   Commonly known as:  SINGULAIR   Used for:  Acute bronchitis, unspecified organism   Started by:  Christine Farnsworth PA-C        Dose:  10 mg   Take 1 tablet (10 mg) by mouth At Bedtime   Quantity:  30 tablet   Refills:  1       predniSONE 20 MG tablet   Commonly known as:  DELTASONE   Used for:  Acute bronchitis, unspecified organism   Started by:   Christine Farnsworth PA-C        Take 3 tabs (60 mg) by mouth daily x 3 days, 2 tabs (40 mg) daily x 3 days, 1 tab (20 mg) daily x 3 days, then 1/2 tab (10 mg) x 3 days.   Quantity:  20 tablet   Refills:  0         These medicines have changed or have updated prescriptions.        Dose/Directions    cetirizine-psuedoePHEDrine 5-120 MG per 12 hr tablet   Commonly known as:  ZYRTEC-D   This may have changed:  when to take this   Used for:  Allergic rhinitis, cause unspecified        Dose:  1 tablet   Take 1 tablet by mouth 2 times daily   Quantity:  90 tablet   Refills:  3            Where to get your medicines      These medications were sent to [facility] [address]  [address] [place] [address]     Phone:  [phone]     azithromycin 250 MG tablet    fluconazole 150 MG tablet    montelukast 10 MG tablet    predniSONE 20 MG tablet                Primary Care Provider Office Phone # Fax #    Christine Farnsworth PA-C [phone] [address] [facility] [address]        Equal Access to Services     LITZY KABA : Hadii michelle barryo Sojuditali, waaxda luqadaha, qaybta kaalmada adeegyada, waxay jeovany hayhilarion jose elias milner . So [facility] [phone].    ATENCIÓN: Si habla español, tiene a greene disposición servicios gratuitos de asistencia lingüística. [facility] [phone].    We comply with applicable federal civil rights laws and [place] laws. We do not discriminate on the basis of race, color, national origin, age, disability sex, sexual orientation or gender identity.            Thank you!     Thank you for choosing [facility]  for your care. Our goal is always to provide you with excellent care. Hearing back from our patients is one way we can continue to improve our services. Please take a few minutes to complete the written survey that you may receive in the mail after your visit with us. Thank you!              Your Updated Medication List - Protect others around you: Learn how to safely use, store and throw away your medicines at www.disposemymeds.org.          This list is accurate as of: 9/5/17  4:06 PM.  Always use your most recent med list.                   Brand Name Dispense Instructions for use Diagnosis    * albuterol (2.5 MG/3ML) 0.083% neb solution     30 vial    Take 1 vial (2.5 mg) by nebulization every 6 hours as needed for shortness of breath / dyspnea or wheezing    Cough, Moderate persistent asthma with acute exacerbation       * albuterol 108 (90 BASE) MCG/ACT Inhaler    PROAIR HFA/PROVENTIL HFA/VENTOLIN HFA    1 Inhaler    Inhale 2 puffs into the lungs every 6 hours as needed for shortness of breath / dyspnea or wheezing    Cough, Moderate persistent asthma with acute exacerbation       ALEVE PO      Take 220 mg by mouth        azithromycin 250 MG tablet    ZITHROMAX    6 tablet    Two tablets first day, then one tablet daily for four days.    Acute bronchitis, unspecified organism       beclomethasone 80 MCG/ACT Inhaler    QVAR    1 Inhaler    Inhale 2 puffs into the lungs 2 times daily    Moderate persistent asthma with acute exacerbation, Cough       CALCIUM CARBONATE PO      Take by mouth daily        cetirizine-psuedoePHEDrine 5-120 MG per 12 hr tablet    ZYRTEC-D    90 tablet    Take 1 tablet by mouth 2 times daily    Allergic rhinitis, cause unspecified       cholecalciferol 1000 UNIT tablet    vitamin D     Take 1 tablet by mouth daily.        COMPOUNDED NON-CONTROLLED SUBSTANCE - PHARMACY TO MIX COMPOUNDED MEDICATION    CMPD RX    30 capsule    Low dose Naltrexone:  6mg capsules/tablets one PO qhs    Fibromyalgia       fluconazole 150 MG tablet    DIFLUCAN    4 tablet    Take 1 tablet (150 mg) by mouth every 3 days    Acute bronchitis, unspecified organism       FLUoxetine 40 MG capsule    PROzac    180 capsule    Take 2 capsules (80 mg) by mouth daily    Major depressive disorder,  recurrent episode, mild (H)       gabapentin 300 MG capsule    NEURONTIN    270 capsule    Take 1 capsule (300 mg) by mouth 3 times daily    Fibromyalgia, Menopausal syndrome (hot flashes)       HERBALS           ibuprofen 800 MG tablet    ADVIL/MOTRIN    90 tablet    Take 1 tablet (800 mg) by mouth every 8 hours as needed for moderate pain    S/P dilation and curettage       ipratropium - albuterol 0.5 mg/2.5 mg/3 mL 0.5-2.5 (3) MG/3ML neb solution    DUONEB    1 Box    Take 1 vial (3 mLs) by nebulization once for 1 dose    Cough, Moderate persistent asthma with acute exacerbation       IRON SUPPLEMENT PO           lidocaine (viscous) 2 % solution    XYLOCAINE    100 mL    Take 15 mLs by mouth every 2 hours as needed for moderate pain swish and spit every 3-8 hours as needed; max 8 doses/24 hour period    Tonsillitis       MAGNESIUM CITRATE PO      Take by mouth daily        montelukast 10 MG tablet    SINGULAIR    30 tablet    Take 1 tablet (10 mg) by mouth At Bedtime    Acute bronchitis, unspecified organism       [place] [facility]     [place] Device    Equipment being ordered: Nebulizer    CAP (community acquired pneumonia)       predniSONE 20 MG tablet    DELTASONE    20 tablet    Take 3 tabs (60 mg) by mouth daily x 3 days, 2 tabs (40 mg) daily x 3 days, 1 tab (20 mg) daily x 3 days, then 1/2 tab (10 mg) x 3 days.    Acute bronchitis, unspecified organism       rizatriptan 10 MG ODT tab    MAXALT-MLT    18 tablet    Take 1 tablet (10 mg) by mouth at onset of headache for migraine May repeat in 2 hours. Max 3 tablets/24 hours.    Migraine with aura and without status migrainosus, not intractable       topiramate 25 MG tablet    TOPAMAX    180 tablet    Take 1 tablet (25 mg) by mouth 2 times daily    TMJ (temporomandibular joint syndrome), Cervicalgia       traZODone 50 MG tablet    DESYREL    180 tablet    Take 2 tablets by mouth. 1 -2 TABs  PO at bedtime prn insomnia    Insomnia, unspecified       Turmeric 450 MG Caps            VITAMIN B COMPLEX PO           * Notice:  This list has 2 medication(s) that are the same as other medications prescribed for you. Read the directions carefully, and ask your doctor or other care provider to review them with you.

## 2017-09-05 NOTE — PROGRESS NOTES
SUBJECTIVE:   Paige Le is a 46 year old female who presents to clinic today for the following health issues:      Acute Illness   Acute illness concerns: URi  Onset: 1 month    Fever: no    Chills/Sweats: YES    Headache (location?): no    Sinus Pressure:YES    Conjunctivitis:  no    Ear Pain: itching    Rhinorrhea: no    Congestion: no    Sore Throat: no     Cough: YES-productive of yellow sputum    Wheeze: YES    Decreased Appetite: YES    Nausea: no    Vomiting: no    Diarrhea:  no    Dysuria/Freq.: no    Fatigue/Achiness: YES- fatigue    Sick/Strep Exposure: no     Therapies Tried and outcome:             Problem list and histories reviewed & adjusted, as indicated.  Additional history: as documented    Patient Active Problem List   Diagnosis     Anxiety state     Insomnia     Allergic rhinitis     Dysmenorrhea     Cystic Fibrosis Screening negative     Mild major depression (H)     Fibromyalgia     Patellofemoral disorder     Family history of aneurysm     Migraine     Obesity     Hyperlipidemia LDL goal <160     Grieving     Insomnia     History of alcoholism (H)     Morbid obesity (H)     Past Surgical History:   Procedure Laterality Date     ARTHROSCOPY KNEE RT/LT  1997    Left      DILATION AND CURETTAGE N/A 6/14/2017    Procedure: DILATION AND CURETTAGE;;  Surgeon: Livia Barnett DO;  Location: [facility] OR     EXAM UNDER ANESTHESIA, ULTRASOUND N/A 6/14/2017    Procedure: EXAM UNDER ANESTHESIA, ULTRASOUND;  Ultrasound guided cervical dilation, IUD placement, Endometrial biopsy, repair of unavoidable cervical laceration;  Surgeon: Livia Barnett DO;  Location:  OR      TOOTH EXTRACTION W/FORCEP  1998    wisdom teeth      INSERT INTRAUTERINE DEVICE N/A 6/14/2017    Procedure: INSERT INTRAUTERINE DEVICE;;  Surgeon: Livia Barnett DO;  Location:  OR       Social History   Substance Use Topics     Smoking status: Never Smoker     Smokeless tobacco: Never Used     Alcohol use No       Comment: recovering     Family History   Problem Relation Age of Onset     Neurologic Disorder Mother      ruptured cerebral aneurysm age 60      Hypertension Mother      Depression/Anxiety Mother      CEREBROVASCULAR DISEASE Mother      Obesity Mother      Macular Degeneration Mother      Musculoskeletal Disorder Father      OA      GASTROINTESTINAL DISEASE Father      colon polyps age 60     Other Cancer Father      Brain Cancer-neuroblastoma     Depression/Anxiety Father      Obesity Father      Gynecology Sister      Rachel. irregular menses.  pre-eclampsia     HEART DISEASE Paternal Grandfather      Multiple MI's (first MI age 60)     DIABETES Paternal Grandfather      IDDM dx age 60  Severe/brittle/complications     Coronary Artery Disease Paternal Grandfather      Depression/Anxiety Paternal Grandfather      Obesity Paternal Grandfather      Gynecology Paternal Grandmother       of uterine CA in her 40's     Ovarian Cancer Paternal Grandmother       at age 40 from cancer     Eye Disorder Maternal Grandmother      glacucoma     DIABETES Maternal Grandmother      IDDM-onset age 70     Obesity Maternal Grandmother      Macular Degeneration Maternal Grandmother      Neurologic Disorder Sister      Rachel.Aguirre aneurysm/screen age 35--watching      Gynecology Paternal Aunt      uterine CA diagnosed @ 40yrs             Reviewed and updated as needed this visit by clinical staffTobacco  Allergies  Med Hx  Surg Hx  Fam Hx  Soc Hx      Reviewed and updated as needed this visit by Provider         ROS:  Constitutional, HEENT, cardiovascular, pulmonary, gi and gu systems are negative, except as otherwise noted.      OBJECTIVE:   /71 (BP Location: Right arm, Patient Position: Chair, Cuff Size: Adult Large)  Pulse 95  Temp 99.1  F (37.3  C) (Oral)  Wt 255 lb (115.7 kg)  SpO2 100%  Breastfeeding? No  BMI 43.77 kg/m2  Body mass index is 43.77 kg/(m^2).  GENERAL APPEARANCE: healthy, alert and no  distress  HENT: TM congested/bulging right, TM fluid bilateral, nasal mucosa edematous without rhinorrhea, oral mucous membranes moist and oropharynx clear  RESP: rhonchi throughout  CV: regular rates and rhythm, normal S1 S2, no S3 or S4 and no murmur, click or rub  LYMPHATICS: normal ant/post cervical and supraclavicular nodes        ASSESSMENT/PLAN:             1. Acute bronchitis, unspecified organism  Fluids and rest  Continue with regular inhalers.   F/u as needed   - montelukast (SINGULAIR) 10 MG tablet; Take 1 tablet (10 mg) by mouth At Bedtime  Dispense: 30 tablet; Refill: 1  - predniSONE (DELTASONE) 20 MG tablet; Take 3 tabs (60 mg) by mouth daily x 3 days, 2 tabs (40 mg) daily x 3 days, 1 tab (20 mg) daily x 3 days, then 1/2 tab (10 mg) x 3 days.  Dispense: 20 tablet; Refill: 0  - azithromycin (ZITHROMAX) 250 MG tablet; Two tablets first day, then one tablet daily for four days.  Dispense: 6 tablet; Refill: 0  - fluconazole (DIFLUCAN) 150 MG tablet; Take 1 tablet (150 mg) by mouth every 3 days  Dispense: 4 tablet; Refill: 0        Christine Farnsworth PA-C, THAI  [facility]

## 2017-09-05 NOTE — NURSING NOTE
"Chief Complaint   Patient presents with     URI       Initial /71 (BP Location: Right arm, Patient Position: Chair, Cuff Size: Adult Large)  Pulse 95  Temp 99.1  F (37.3  C) (Oral)  Wt 255 lb (115.7 kg)  SpO2 100%  Breastfeeding? No  BMI 43.77 kg/m2 Estimated body mass index is 43.77 kg/(m^2) as calculated from the following:    Height as of 6/29/17: 5' 4\" (1.626 m).    Weight as of this encounter: 255 lb (115.7 kg).  Medication Reconciliation: complete Ruth Masterson MA  Health Maintenance has been reviewed.       "

## 2017-10-06 ENCOUNTER — OFFICE VISIT (OUTPATIENT)
Dept: PALLIATIVE MEDICINE | Facility: CLINIC | Age: 47
End: 2017-10-06
Payer: COMMERCIAL

## 2017-10-06 VITALS — SYSTOLIC BLOOD PRESSURE: 98 MMHG | DIASTOLIC BLOOD PRESSURE: 62 MMHG | OXYGEN SATURATION: 97 % | HEART RATE: 80 BPM

## 2017-10-06 DIAGNOSIS — M79.7 FIBROMYALGIA: Primary | ICD-10-CM

## 2017-10-06 DIAGNOSIS — N95.1 MENOPAUSAL SYNDROME (HOT FLASHES): ICD-10-CM

## 2017-10-06 DIAGNOSIS — G43.109 MIGRAINE WITH AURA AND WITHOUT STATUS MIGRAINOSUS, NOT INTRACTABLE: ICD-10-CM

## 2017-10-06 PROCEDURE — 99214 OFFICE O/P EST MOD 30 MIN: CPT | Performed by: ANESTHESIOLOGY

## 2017-10-06 RX ORDER — GABAPENTIN 300 MG/1
600 CAPSULE ORAL 3 TIMES DAILY
Qty: 270 CAPSULE | Refills: 3 | Status: SHIPPED | OUTPATIENT
Start: 2017-10-06 | End: 2018-07-20

## 2017-10-06 ASSESSMENT — PAIN SCALES - GENERAL: PAINLEVEL: MODERATE PAIN (5)

## 2017-10-06 NOTE — PATIENT INSTRUCTIONS
1.  Black seed oil cumin 2000mg daily  2. Increase Gabapentin from 300mg three times a day to 600mg three times a day  3. Refill given for your low dose naltrexone.   4. Follow up with me in 3-6 months    Yvonne Garrido MD         ----------------------------------------------------------------  Nurse Triage line:  [phone]   Call this number with any questions or concerns. You may leave a detailed message anytime. Calls are typically returned Monday through Friday between 8 AM and 4:30 PM. We usually get back to you within 2 business days depending on the issue/request.       Medication refills:    For non-narcotic medications, call your pharmacy directly to request a refill. The pharmacy will contact the [facility] for authorization. Please allow 3-4 days for these refills to be processed.     Scheduling number: [phone].  Call this number to schedule or change appointments.    We believe regular attendance is key to your success in our program.    Any time you are unable to keep your appointment we ask that you call us at least 24 hours in advance to let us know. This will allow us to offer the appointment time to another patient.

## 2017-10-06 NOTE — MR AVS SNAPSHOT
After Visit Summary   10/6/2017    Paige Le    MRN: [id number]           Patient Information     Date Of Birth          1970        Visit Information        Provider Department      10/6/2017 1:30 PM Yvonne Garrido MD [facility]        Today's Diagnoses     Fibromyalgia        Menopausal syndrome (hot flashes)          Care Instructions    1.  Black seed oil cumin 2000mg daily  2. Increase Gabapentin from 300mg three times a day to 600mg three times a day  3. Refill given for your low dose naltrexone.   4. Follow up with me in 3-6 months    Yvonne Garrido MD         ----------------------------------------------------------------  Nurse Triage line:  [phone]   Call this number with any questions or concerns. You may leave a detailed message anytime. Calls are typically returned Monday through Friday between 8 AM and 4:30 PM. We usually get back to you within 2 business days depending on the issue/request.       Medication refills:    For non-narcotic medications, call your pharmacy directly to request a refill. The pharmacy will contact the [facility] for authorization. Please allow 3-4 days for these refills to be processed.     Scheduling number: [phone].  Call this number to schedule or change appointments.    We believe regular attendance is key to your success in our program.    Any time you are unable to keep your appointment we ask that you call us at least 24 hours in advance to let us know. This will allow us to offer the appointment time to another patient.               Follow-ups after your visit        Who to contact     If you have questions or need follow up information about today's clinic visit or your schedule please contact [facility] directly at [phone].  Normal or non-critical lab and imaging results will be communicated to you by MyChart, letter or phone within 4 business days after the clinic has received the  "results. If you do not hear from us within 7 days, please contact the clinic through [employer] or phone. If you have a critical or abnormal lab result, we will notify you by phone as soon as possible.  Submit refill requests through [employer] or call your pharmacy and they will forward the refill request to us. Please allow 3 business days for your refill to be completed.          Additional Information About Your Visit        [employer] Information     [employer] lets you send messages to your doctor, view your test results, renew your prescriptions, schedule appointments and more. To sign up, go to [email or web address] . Click on \"Log in\" on the left side of the screen, which will take you to the Welcome page. Then click on \"Sign up Now\" on the right side of the page.     You will be asked to enter the access code listed below, as well as some personal information. Please follow the directions to create your username and password.     Your access code is: [email or web address]  Expires: 10/29/2017  2:38 PM     Your access code will  in 90 days. If you need help or a new code, please call your [place] clinic or [phone].        Care EveryWhere ID     This is your Care EveryWhere ID. This could be used by other organizations to access your [place] medical records  [id number]        Your Vitals Were     Pulse Pulse Oximetry Breastfeeding?             80 97% No          Blood Pressure from Last 3 Encounters:   10/06/17 98/62   17 102/71   17 102/62    Weight from Last 3 Encounters:   17 115.7 kg (255 lb)   17 116.6 kg (257 lb)   17 113.5 kg (250 lb 3.2 oz)              Today, you had the following     No orders found for display         Today's Medication Changes          These changes are accurate as of: 10/6/17  2:09 PM.  If you have any questions, ask your nurse or doctor.               These medicines have changed or have updated prescriptions.        Dose/Directions    " cetirizine-psuedoePHEDrine 5-120 MG per 12 hr tablet   Commonly known as:  ZYRTEC-D   This may have changed:  when to take this   Used for:  Allergic rhinitis, cause unspecified        Dose:  1 tablet   Take 1 tablet by mouth 2 times daily   Quantity:  90 tablet   Refills:  3       gabapentin 300 MG capsule   Commonly known as:  NEURONTIN   This may have changed:  how much to take   Used for:  Fibromyalgia, Menopausal syndrome (hot flashes)   Changed by:  Yvonne Garrido MD        Dose:  600 mg   Take 2 capsules (600 mg) by mouth 3 times daily   Quantity:  270 capsule   Refills:  3            Where to get your medicines      These medications were sent to [facility] - [place], [place] - [address]  [address] [facility] [address]     Phone:  [phone]     COMPOUNDED NON-CONTROLLED SUBSTANCE - PHARMACY TO MIX COMPOUNDED MEDICATION         These medications were sent to [facility] [address]  [address] [facility] [address]     Phone:  [phone]     gabapentin 300 MG capsule                Primary Care Provider Office Phone # Fax #    Christine Magali Farnsworth PA-C [phone] [address] [facility] [address]        Equal Access to Services     CLARK KABA AH: Hadii michelle patel hadasho Soomaali, waaxda luqadaha, qaybta kaalmada adeegyada, waxay jeovany stephens. So [facility] [phone].    ATENCIÓN: Si habla español, tiene a greene disposición servicios gratuitos de asistencia lingüística. ame al [phone].    We comply with applicable federal civil rights laws and [place] laws. We do not discriminate on the basis of race, color, national origin, age, disability, sex, sexual orientation, or gender identity.            Thank you!     Thank you for choosing [facility]  for your care. Our goal is always to provide you with excellent care. Hearing back from our patients is one  way we can continue to improve our services. Please take a few minutes to complete the written survey that you may receive in the mail after your visit with us. Thank you!             Your Updated Medication List - Protect others around you: Learn how to safely use, store and throw away your medicines at www.disposemymeds.org.          This list is accurate as of: 10/6/17  2:09 PM.  Always use your most recent med list.                   Brand Name Dispense Instructions for use Diagnosis    * albuterol (2.5 MG/3ML) 0.083% neb solution     30 vial    Take 1 vial (2.5 mg) by nebulization every 6 hours as needed for shortness of breath / dyspnea or wheezing    Cough, Moderate persistent asthma with acute exacerbation       * albuterol 108 (90 BASE) MCG/ACT Inhaler    PROAIR HFA/PROVENTIL HFA/VENTOLIN HFA    1 Inhaler    Inhale 2 puffs into the lungs every 6 hours as needed for shortness of breath / dyspnea or wheezing    Cough, Moderate persistent asthma with acute exacerbation       ALEVE PO      Take 220 mg by mouth        azithromycin 250 MG tablet    ZITHROMAX    6 tablet    Two tablets first day, then one tablet daily for four days.    Acute bronchitis, unspecified organism       beclomethasone 80 MCG/ACT Inhaler    QVAR    1 Inhaler    Inhale 2 puffs into the lungs 2 times daily    Moderate persistent asthma with acute exacerbation, Cough       CALCIUM CARBONATE PO      Take by mouth daily        cetirizine-psuedoePHEDrine 5-120 MG per 12 hr tablet    ZYRTEC-D    90 tablet    Take 1 tablet by mouth 2 times daily    Allergic rhinitis, cause unspecified       cholecalciferol 1000 UNIT tablet    vitamin D     Take 1 tablet by mouth daily.        COMPOUNDED NON-CONTROLLED SUBSTANCE - PHARMACY TO MIX COMPOUNDED MEDICATION    CMPD RX    30 capsule    Low dose Naltrexone:  6mg capsules/tablets one PO qhs    Fibromyalgia       fluconazole 150 MG tablet    DIFLUCAN    4 tablet    Take 1 tablet (150 mg) by mouth every 3  days    Acute bronchitis, unspecified organism       FLUoxetine 40 MG capsule    PROzac    180 capsule    Take 2 capsules (80 mg) by mouth daily    Major depressive disorder, recurrent episode, mild (H)       gabapentin 300 MG capsule    NEURONTIN    270 capsule    Take 2 capsules (600 mg) by mouth 3 times daily    Fibromyalgia, Menopausal syndrome (hot flashes)       HERBALS           ibuprofen 800 MG tablet    ADVIL/MOTRIN    90 tablet    Take 1 tablet (800 mg) by mouth every 8 hours as needed for moderate pain    S/P dilation and curettage       ipratropium - albuterol 0.5 mg/2.5 mg/3 mL 0.5-2.5 (3) MG/3ML neb solution    DUONEB    1 Box    Take 1 vial (3 mLs) by nebulization once for 1 dose    Cough, Moderate persistent asthma with acute exacerbation       IRON SUPPLEMENT PO           lidocaine (viscous) 2 % solution    XYLOCAINE    100 mL    Take 15 mLs by mouth every 2 hours as needed for moderate pain swish and spit every 3-8 hours as needed; max 8 doses/24 hour period    Tonsillitis       MAGNESIUM CITRATE PO      Take by mouth daily        montelukast 10 MG tablet    SINGULAIR    30 tablet    Take 1 tablet (10 mg) by mouth At Bedtime    Acute bronchitis, unspecified organism       order for DME     1 Device    Equipment being ordered: Nebulizer    CAP (community acquired pneumonia)       predniSONE 20 MG tablet    DELTASONE    20 tablet    Take 3 tabs (60 mg) by mouth daily x 3 days, 2 tabs (40 mg) daily x 3 days, 1 tab (20 mg) daily x 3 days, then 1/2 tab (10 mg) x 3 days.    Acute bronchitis, unspecified organism       rizatriptan 10 MG ODT tab    MAXALT-MLT    18 tablet    Take 1 tablet (10 mg) by mouth at onset of headache for migraine May repeat in 2 hours. Max 3 tablets/24 hours.    Migraine with aura and without status migrainosus, not intractable       topiramate 25 MG tablet    TOPAMAX    180 tablet    Take 1 tablet (25 mg) by mouth 2 times daily    TMJ (temporomandibular joint syndrome),  Cervicalgia       traZODone 50 MG tablet    DESYREL    180 tablet    Take 2 tablets by mouth. 1 -2 TABs  PO at bedtime prn insomnia    Insomnia, unspecified       Turmeric 450 MG Caps           VITAMIN B COMPLEX PO           * Notice:  This list has 2 medication(s) that are the same as other medications prescribed for you. Read the directions carefully, and ask your doctor or other care provider to review them with you.

## 2017-10-06 NOTE — PROGRESS NOTES
"                          [facility]    Date of visit: 10/06/2017    Chief complaint:   Chief Complaint   Patient presents with     Pain     all over, neck and back worse       Interval history:  Paige Le is a 46 year old female last seen by me on 7/21/2017.      Recommendations/plan at the last visit included:    1. Physical Therapy: Referred to Pain PT through [facility].  2. Pain Psychologist to address issues of relaxation, behavioral change, coping style, and other factors important to improvement: Will Schedule next week with Bebe Benjamin  3. Diagnostic Studies: None  4. Medication Management:  5. Continue Gabapentin 300mg TID.                       6. Discussed and prescribed Naltrexone 6mg qhs compound pharmacy  7. Black seed oil 2000mg daily  8. Tumeric 450mg daily  9. Further procedures recommended: None at this time  10. Acupuncture:  consider in the future when she is done with PT and pain psychology.        Since her last visit, Paige Le reports:  - Has seen Michelle in PT 8 times and is getting a lot out of it.   She continues to do a HEP.   - Was seeing Bebe weekly and is now seeing her every other week.  She has learned a lot and really enjoys her time with her.    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  She put some boundaries around her job.  Her job was taking over her life. She was working 60 hours/week.  She plans to work 40 hours/week now. 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  She was doing events 11 days out of the month.    - She got sick with bronchitis in August and went on prednisone and antibiotics. Her pain ramped up during this time of stress.  Fibromyalgia pain has been bad.    - She had one migraine that was stress related in the last 3 months.  She took her Maxalt and it lasted about 24 hours.  She missed one day of work because of this.   - Pain is improved on the low dose naltrexone.  She has this delivered from the compound pharmacy and " has to be home to receive this which is difficult.   - She is not taking naps during the day anymore.   - She is trying to get as much exercise as possible.  Walking her dog and walking while at work.  She is considering getting a membership to [employer] in [place] for the winter.       Pain scores:  Pain intensity on average is 7 on a scale of 0-10.     Current pain treatments:   Gabapentin 300mg TID - increased to 600mg TID today  Topamax 25mg BID - Preventative for headaches  Low Dose Naltrexone 6mg qhs  Trazodone 50mg -100mg qhs  Maxalt 10mg PRN headache  Tumeric 450mg daily  Ibuprofen 800mg BID PRN  Prozac 80mg daily    Past pain treatments:      Side Effects: no side effect    Medications:  Current Outpatient Prescriptions   Medication Sig Dispense Refill     montelukast (SINGULAIR) 10 MG tablet Take 1 tablet (10 mg) by mouth At Bedtime 30 tablet 1     predniSONE (DELTASONE) 20 MG tablet Take 3 tabs (60 mg) by mouth daily x 3 days, 2 tabs (40 mg) daily x 3 days, 1 tab (20 mg) daily x 3 days, then 1/2 tab (10 mg) x 3 days. 20 tablet 0     azithromycin (ZITHROMAX) 250 MG tablet Two tablets first day, then one tablet daily for four days. 6 tablet 0     fluconazole (DIFLUCAN) 150 MG tablet Take 1 tablet (150 mg) by mouth every 3 days 4 tablet 0     CALCIUM CARBONATE PO Take by mouth daily       MAGNESIUM CITRATE PO Take by mouth daily       ibuprofen (ADVIL/MOTRIN) 800 MG tablet Take 1 tablet (800 mg) by mouth every 8 hours as needed for moderate pain 90 tablet 1     COMPOUND (CMPD RX) - PHARMACY TO MIX COMPOUNDED MEDICATION Low dose Naltrexone:  6mg capsules/tablets one PO qhs 30 capsule 3     Turmeric 450 MG CAPS        rizatriptan (MAXALT-MLT) 10 MG ODT tab Take 1 tablet (10 mg) by mouth at onset of headache for migraine May repeat in 2 hours. Max 3 tablets/24 hours. 18 tablet 3     HERBALS        albuterol (2.5 MG/3ML) 0.083% neb solution Take 1 vial (2.5 mg) by nebulization every 6 hours as needed for  "shortness of breath / dyspnea or wheezing 30 vial 1     albuterol (PROAIR HFA/PROVENTIL HFA/VENTOLIN HFA) 108 (90 BASE) MCG/ACT Inhaler Inhale 2 puffs into the lungs every 6 hours as needed for shortness of breath / dyspnea or wheezing 1 Inhaler 6     beclomethasone (QVAR) 80 MCG/ACT Inhaler Inhale 2 puffs into the lungs 2 times daily 1 Inhaler 3     ipratropium - albuterol 0.5 mg/2.5 mg/3 mL (DUONEB) 0.5-2.5 (3) MG/3ML neb solution Take 1 vial (3 mLs) by nebulization once for 1 dose 1 Box 3     gabapentin (NEURONTIN) 300 MG capsule Take 1 capsule (300 mg) by mouth 3 times daily 270 capsule 3     FLUoxetine (PROZAC) 40 MG capsule Take 2 capsules (80 mg) by mouth daily 180 capsule 3     topiramate (TOPAMAX) 25 MG tablet Take 1 tablet (25 mg) by mouth 2 times daily 180 tablet 3     traZODone (DESYREL) 50 MG tablet Take 2 tablets by mouth. 1 -2 TABs  PO at bedtime prn insomnia 180 tablet 6     lidocaine (XYLOCAINE) 2 % solution (viscous) Take 15 mLs by mouth every 2 hours as needed for moderate pain swish and spit every 3-8 hours as needed; max 8 doses/24 hour period 100 mL 0     order for DME Equipment being ordered: Nebulizer 1 Device 0     B Complex Vitamins (VITAMIN B COMPLEX PO)        Ferrous Sulfate (IRON SUPPLEMENT PO)        Naproxen Sodium (ALEVE PO) Take 220 mg by mouth       cetirizine-psuedoePHEDrine (ZYRTEC-D) 5-120 MG per tablet Take 1 tablet by mouth 2 times daily (Patient taking differently: Take 1 tablet by mouth daily ) 90 tablet 3     cholecalciferol (VITAMIN D) 1000 UNIT tablet Take 1 tablet by mouth daily.         Medical History: any changes in medical history since they were last seen? No    Review of Systems:  The 14 system ROS was reviewed from the intake questionnaire, and is positive for: fatigue, headache, dizziness, vision changes, earache, easy bruising, cough, wheezing, SOB, steroid use, stiffness, back pain, neck pain.  Any bowel or bladder problems: none  Mood: \"really " "good\"    Physical Exam:  Blood pressure 98/62, pulse 80, SpO2 97 %, not currently breastfeeding.  General: NAD, awake and alert, energetic  Gait: Normal  MSK exam: tight muscle bands bilateral cervical paraspinals    Assessment:  Paige Le is a 46 year old female who presents with the complaints of chronic generalized myofascial pain and fatigue. She was previously enrolled in the pain clinic and participated diligently with physical therapy and pain psychology. She wishes to re-enroll in these services today as she has had an acute worsening of her pain and fatigue over the past 6 months.    1. Fibromyalgia  2. Fatigue  3. Depression  4. Remote history of alcohol abuse - in recovery       Plan:  Diagnosis reviewed, treatment option addressed, and risk/benefits discussed.  Self-care instructions given.  I am recommending a multidisciplinary treatment plan to help this patient better manage her pain.       1. Physical Therapy: She is doing a HEP, completed 8 sessions with Michelle  2. Pain Psychologist to address issues of relaxation, behavioral change, coping style, and other factors important to improvement: Continues to see Bebe Benjamin every other week now  3. Diagnostic Studies: None  4. Medication Management:  5. Increase Gabapentin to 600mg TID.                       6. Naltrexone 6mg qhs - compound pharmacy  7. Black seed oil 2000mg daily  8. Tumeric 450mg daily  9. Further procedures recommended: None at this time  10. Acupuncture:  Consider in the future when she is done with PT and pain psychology.    11. Follow up with Dr. Garrido in 3-6 months      I spent 30 minutes of time face to face with the patient.  Greater than 50% of this time was spent in patient counseling and/or coordination of care.      Yvonne GarridoMD Pain Management    "

## 2017-10-06 NOTE — NURSING NOTE
"Chief Complaint   Patient presents with     Pain     all over, neck and back worse       Initial BP 98/62  Pulse 80  SpO2 97%  Breastfeeding? No Estimated body mass index is 43.77 kg/(m^2) as calculated from the following:    Height as of 6/29/17: 1.626 m (5' 4\").    Weight as of 9/5/17: 115.7 kg (255 lb).    Rukhsana Garcia [facility]    "

## 2017-12-26 ENCOUNTER — OFFICE VISIT (OUTPATIENT)
Dept: URGENT CARE | Facility: URGENT CARE | Age: 47
End: 2017-12-26
Payer: COMMERCIAL

## 2017-12-26 VITALS
SYSTOLIC BLOOD PRESSURE: 100 MMHG | BODY MASS INDEX: 43.65 KG/M2 | HEART RATE: 82 BPM | DIASTOLIC BLOOD PRESSURE: 62 MMHG | WEIGHT: 254.3 LBS | TEMPERATURE: 97.2 F | OXYGEN SATURATION: 97 % | RESPIRATION RATE: 20 BRPM

## 2017-12-26 DIAGNOSIS — M26.609 TMJ (TEMPOROMANDIBULAR JOINT SYNDROME): ICD-10-CM

## 2017-12-26 DIAGNOSIS — M54.2 CERVICALGIA: ICD-10-CM

## 2017-12-26 DIAGNOSIS — F33.0 MAJOR DEPRESSIVE DISORDER, RECURRENT EPISODE, MILD (H): ICD-10-CM

## 2017-12-26 DIAGNOSIS — J45.41 MODERATE PERSISTENT ASTHMA WITH ACUTE EXACERBATION: Primary | ICD-10-CM

## 2017-12-26 PROCEDURE — 99213 OFFICE O/P EST LOW 20 MIN: CPT | Performed by: FAMILY MEDICINE

## 2017-12-26 RX ORDER — PREDNISONE 20 MG/1
TABLET ORAL
Qty: 20 TABLET | Refills: 0 | Status: SHIPPED | OUTPATIENT
Start: 2017-12-26 | End: 2018-01-31

## 2017-12-26 NOTE — MR AVS SNAPSHOT
After Visit Summary   12/26/2017    Paige Le    MRN: [id number]           Patient Information     Date Of Birth          1970        Visit Information        Provider Department      12/26/2017 3:35 PM Dexter Tran MD [facility]        Today's Diagnoses     Moderate persistent asthma with acute exacerbation    -  1      Care Instructions    Take full course of prednisone taper.  Continue with inhalers and nebulizer treatments.      Asthma (Adult)  Asthma is a disease where the medium and  small air passages within the lung go into spasm and restrict the flow of air. Inflammation and swelling of the airways cause further blockage. During an acute asthma attack, these factors cause trouble breathing, wheezing, cough and chest tightness.    An asthma attack can be triggered by many things. Common triggers include infections such as the common cold, bronchitis, and pneumonia. Irritants such as smoke or pollutants in the air, very cold air, emotional upset, and exercise can also trigger an attack. In many adults with asthma, allergies to dust, mold, pollen and animal dander can cause an asthma attack. Skipping doses of daily asthma medicine can also bring on an asthma attack.  Asthma can be controlled using the proper medicines prescribed by your healthcare provider and avoiding exposure to known triggers including allergens and irritants.  Home care    Take prescribed medicine exactly at the times advised. If you need medicine such as from a hand held inhaler or aerosol breathing machine more than every 4 hours, contact your healthcare provider or seek immediate medical attention. If prescribed an antibiotic or prednisone, take all of the medicine as prescribed, even if you are feeling better after a few days.    Do not smoke. Avoid being exposed to the smoke of others.    Some people with asthma have worsening of their symptoms when they take aspirin and non-steroidal or  "fever-reducing medicines like ibuprofen and naproxen. Talk to your healthcare provider if you think this may apply to you.  Follow-up care  Follow up with your healthcare provider, or as advised. Always bring all of your current medicines to any appointments with your healthcare provider. Also bring a complete list of medicines even those not taken for asthma. If you do not already have one, talk to your healthcare provider about developing a personalized \"Asthma Action Plan.\"  A pneumococcal (pneumonia) vaccine and yearly flu shot (every fall) are recommended. Ask your doctor about this.  When to seek medical advice  Call your healthcare provider right away if any of these occur:     Increased wheezing or shortness of breath    Need to use your inhalers more often than usual without relief    Fever of 100.4 F (38 C) or higher, or as directed by your healthcare provider    Coughing up lots of dark-colored or bloody sputum (mucus)    Chest pain with each breath    If you use a peak flow meter as part of an Asthma Action Plan, and you are still in the yellow zone (50% to 80%) 15 minutes after using inhaler medicine.  Call 911  Call 911 if any of the following occur    Trouble walking or talking because of shortness of breath    If you use a peak flow meter as part of an Asthma Action Plan and you are still in the red zone (less than 50%) 15 minutes after using inhaler medicine    Lips or fingernails turning gray or blue  Date Last Reviewed: 5/1/2017 2000-2017 [employer]. [address] [place] [address]. All rights reserved. This information is not intended as a substitute for professional medical care. Always follow your healthcare professional's instructions.                Follow-ups after your visit        Your next 10 appointments already scheduled     Jan 19, 2018  1:30 PM CST   Return Visit with MD [facility] ([facility])    " "[address]  [address]  [place] [address]   [phone]              Who to contact     If you have questions or need follow up information about today's clinic visit or your schedule please contact [facility] directly at [phone].  Normal or non-critical lab and imaging results will be communicated to you by MyChart, letter or phone within 4 business days after the clinic has received the results. If you do not hear from us within 7 days, please contact the clinic through MyChart or phone. If you have a critical or abnormal lab result, we will notify you by phone as soon as possible.  Submit refill requests through [employer] or call your pharmacy and they will forward the refill request to us. Please allow 3 business days for your refill to be completed.          Additional Information About Your Visit        MyChart Information     [employer] lets you send messages to your doctor, view your test results, renew your prescriptions, schedule appointments and more. To sign up, go to [email or web address] . Click on \"Log in\" on the left side of the screen, which will take you to the Welcome page. Then click on \"Sign up Now\" on the right side of the page.     You will be asked to enter the access code listed below, as well as some personal information. Please follow the directions to create your username and password.     Your access code is: [email or web address]  Expires: 3/26/2018  5:27 PM     Your access code will  in 90 days. If you need help or a new code, please call your [facility] clinic or [phone].        Care EveryWhere ID     This is your Care EveryWhere ID. This could be used by other organizations to access your [facility] medical records  [id number]        Your Vitals Were     Pulse Temperature Respirations Pulse Oximetry BMI (Body Mass Index)       82 97.2  F (36.2  C) (Oral) 20 97% 43.65 kg/m2        Blood Pressure from Last 3 Encounters:   17 100/62   10/06/17 98/62 "   09/05/17 102/71    Weight from Last 3 Encounters:   12/26/17 254 lb 4.8 oz (115.3 kg)   09/05/17 255 lb (115.7 kg)   07/21/17 257 lb (116.6 kg)              Today, you had the following     No orders found for display         Today's Medication Changes          These changes are accurate as of: 12/26/17  5:27 PM.  If you have any questions, ask your nurse or doctor.               These medicines have changed or have updated prescriptions.        Dose/Directions    * predniSONE 20 MG tablet   Commonly known as:  DELTASONE   This may have changed:  Another medication with the same name was added. Make sure you understand how and when to take each.   Used for:  Acute bronchitis, unspecified organism   Changed by:  Christine Farnsworth PA-C        Take 3 tabs (60 mg) by mouth daily x 3 days, 2 tabs (40 mg) daily x 3 days, 1 tab (20 mg) daily x 3 days, then 1/2 tab (10 mg) x 3 days.   Quantity:  20 tablet   Refills:  0       * predniSONE 20 MG tablet   Commonly known as:  DELTASONE   This may have changed:  You were already taking a medication with the same name, and this prescription was added. Make sure you understand how and when to take each.   Used for:  Moderate persistent asthma with acute exacerbation   Changed by:  Dexter Tran MD        Take 3 tabs (60 mg) by mouth daily x 3 days, 2 tabs (40 mg) daily x 3 days, 1 tab (20 mg) daily x 3 days, then 1/2 tab (10 mg) x 3 days.   Quantity:  20 tablet   Refills:  0       * Notice:  This list has 2 medication(s) that are the same as other medications prescribed for you. Read the directions carefully, and ask your doctor or other care provider to review them with you.         Where to get your medicines      These medications were sent to [facility] [address]   [address]  [address] [address]     Phone:  [phone]     predniSONE 20 MG tablet                Primary Care Provider Office Phone # Fax #     Christine Farnsworth PA-C [phone]       [address] [facility] [address]        Equal Access to Services     CLARK KABA : Anastasiia michelle patel boyd Villalpando, wamarilynda mary kay, qaroneyta kaalmada vern, marisol stephens. So [facility] [phone].    ATENCIÓN: Si habla español, tiene a greene disposición servicios gratuitos de asistencia lingüística. Llame al [phone].    We comply with applicable federal civil rights laws and [place] laws. We do not discriminate on the basis of race, color, national origin, age, disability, sex, sexual orientation, or gender identity.            Thank you!     Thank you for choosing [facility]  for your care. Our goal is always to provide you with excellent care. Hearing back from our patients is one way we can continue to improve our services. Please take a few minutes to complete the written survey that you may receive in the mail after your visit with us. Thank you!             Your Updated Medication List - Protect others around you: Learn how to safely use, store and throw away your medicines at www.disposemymeds.org.          This list is accurate as of: 12/26/17  5:27 PM.  Always use your most recent med list.                   Brand Name Dispense Instructions for use Diagnosis    * albuterol (2.5 MG/3ML) 0.083% neb solution     30 vial    Take 1 vial (2.5 mg) by nebulization every 6 hours as needed for shortness of breath / dyspnea or wheezing    Cough, Moderate persistent asthma with acute exacerbation       * albuterol 108 (90 BASE) MCG/ACT Inhaler    PROAIR HFA/PROVENTIL HFA/VENTOLIN HFA    1 Inhaler    Inhale 2 puffs into the lungs every 6 hours as needed for shortness of breath / dyspnea or wheezing    Cough, Moderate persistent asthma with acute exacerbation       ALEVE PO      Take 220 mg by mouth        azithromycin 250 MG tablet    ZITHROMAX    6 tablet    Two tablets first day, then one tablet daily for four  days.    Acute bronchitis, unspecified organism       beclomethasone 80 MCG/ACT Inhaler    QVAR    1 Inhaler    Inhale 2 puffs into the lungs 2 times daily    Moderate persistent asthma with acute exacerbation, Cough       CALCIUM CARBONATE PO      Take by mouth daily        cetirizine-psuedoePHEDrine 5-120 MG per 12 hr tablet    ZYRTEC-D    90 tablet    Take 1 tablet by mouth 2 times daily    Allergic rhinitis, cause unspecified       cholecalciferol 1000 UNIT tablet    vitamin D3     Take 1 tablet by mouth daily.        COMPOUNDED NON-CONTROLLED SUBSTANCE - PHARMACY TO MIX COMPOUNDED MEDICATION    CMPD RX    30 capsule    Low dose Naltrexone:  6mg capsules/tablets one PO qhs    Fibromyalgia       fluconazole 150 MG tablet    DIFLUCAN    4 tablet    Take 1 tablet (150 mg) by mouth every 3 days    Acute bronchitis, unspecified organism       FLUoxetine 40 MG capsule    PROzac    180 capsule    Take 2 capsules (80 mg) by mouth daily    Major depressive disorder, recurrent episode, mild (H)       gabapentin 300 MG capsule    NEURONTIN    270 capsule    Take 2 capsules (600 mg) by mouth 3 times daily    Fibromyalgia, Menopausal syndrome (hot flashes)       HERBALS           ibuprofen 800 MG tablet    ADVIL/MOTRIN    90 tablet    Take 1 tablet (800 mg) by mouth every 8 hours as needed for moderate pain    S/P dilation and curettage       ipratropium - albuterol 0.5 mg/2.5 mg/3 mL 0.5-2.5 (3) MG/3ML neb solution    DUONEB    1 Box    Take 1 vial (3 mLs) by nebulization once for 1 dose    Cough, Moderate persistent asthma with acute exacerbation       IRON SUPPLEMENT PO           lidocaine (viscous) 2 % solution    XYLOCAINE    100 mL    Take 15 mLs by mouth every 2 hours as needed for moderate pain swish and spit every 3-8 hours as needed; max 8 doses/24 hour period    Tonsillitis       MAGNESIUM CITRATE PO      Take by mouth daily        montelukast 10 MG tablet    SINGULAIR    30 tablet    Take 1 tablet (10 mg) by  mouth At Bedtime    Acute bronchitis, unspecified organism       order for DME     1 Device    Equipment being ordered: Nebulizer    CAP (community acquired pneumonia)       * predniSONE 20 MG tablet    DELTASONE    20 tablet    Take 3 tabs (60 mg) by mouth daily x 3 days, 2 tabs (40 mg) daily x 3 days, 1 tab (20 mg) daily x 3 days, then 1/2 tab (10 mg) x 3 days.    Acute bronchitis, unspecified organism       * predniSONE 20 MG tablet    DELTASONE    20 tablet    Take 3 tabs (60 mg) by mouth daily x 3 days, 2 tabs (40 mg) daily x 3 days, 1 tab (20 mg) daily x 3 days, then 1/2 tab (10 mg) x 3 days.    Moderate persistent asthma with acute exacerbation       rizatriptan 10 MG ODT tab    MAXALT-MLT    18 tablet    Take 1 tablet (10 mg) by mouth at onset of headache for migraine May repeat in 2 hours. Max 3 tablets/24 hours.    Migraine with aura and without status migrainosus, not intractable       topiramate 25 MG tablet    TOPAMAX    180 tablet    Take 1 tablet (25 mg) by mouth 2 times daily    TMJ (temporomandibular joint syndrome), Cervicalgia       traZODone 50 MG tablet    DESYREL    180 tablet    Take 2 tablets by mouth. 1 -2 TABs  PO at bedtime prn insomnia    Insomnia, unspecified       Turmeric 450 MG Caps           VITAMIN B COMPLEX PO           * Notice:  This list has 4 medication(s) that are the same as other medications prescribed for you. Read the directions carefully, and ask your doctor or other care provider to review them with you.

## 2017-12-26 NOTE — PATIENT INSTRUCTIONS
Take full course of prednisone taper.  Continue with inhalers and nebulizer treatments.      Asthma (Adult)  Asthma is a disease where the medium and  small air passages within the lung go into spasm and restrict the flow of air. Inflammation and swelling of the airways cause further blockage. During an acute asthma attack, these factors cause trouble breathing, wheezing, cough and chest tightness.    An asthma attack can be triggered by many things. Common triggers include infections such as the common cold, bronchitis, and pneumonia. Irritants such as smoke or pollutants in the air, very cold air, emotional upset, and exercise can also trigger an attack. In many adults with asthma, allergies to dust, mold, pollen and animal dander can cause an asthma attack. Skipping doses of daily asthma medicine can also bring on an asthma attack.  Asthma can be controlled using the proper medicines prescribed by your healthcare provider and avoiding exposure to known triggers including allergens and irritants.  Home care    Take prescribed medicine exactly at the times advised. If you need medicine such as from a hand held inhaler or aerosol breathing machine more than every 4 hours, contact your healthcare provider or seek immediate medical attention. If prescribed an antibiotic or prednisone, take all of the medicine as prescribed, even if you are feeling better after a few days.    Do not smoke. Avoid being exposed to the smoke of others.    Some people with asthma have worsening of their symptoms when they take aspirin and non-steroidal or fever-reducing medicines like ibuprofen and naproxen. Talk to your healthcare provider if you think this may apply to you.  Follow-up care  Follow up with your healthcare provider, or as advised. Always bring all of your current medicines to any appointments with your healthcare provider. Also bring a complete list of medicines even those not taken for asthma. If you do not already have  "one, talk to your healthcare provider about developing a personalized \"Asthma Action Plan.\"  A pneumococcal (pneumonia) vaccine and yearly flu shot (every fall) are recommended. Ask your doctor about this.  When to seek medical advice  Call your healthcare provider right away if any of these occur:     Increased wheezing or shortness of breath    Need to use your inhalers more often than usual without relief    Fever of 100.4 F (38 C) or higher, or as directed by your healthcare provider    Coughing up lots of dark-colored or bloody sputum (mucus)    Chest pain with each breath    If you use a peak flow meter as part of an Asthma Action Plan, and you are still in the yellow zone (50% to 80%) 15 minutes after using inhaler medicine.  Call 911  Call 911 if any of the following occur    Trouble walking or talking because of shortness of breath    If you use a peak flow meter as part of an Asthma Action Plan and you are still in the red zone (less than 50%) 15 minutes after using inhaler medicine    Lips or fingernails turning gray or blue  Date Last Reviewed: 5/1/2017 2000-2017 [employer]. [address] [place] [id number]. All rights reserved. This information is not intended as a substitute for professional medical care. Always follow your healthcare professional's instructions.        "

## 2017-12-26 NOTE — PROGRESS NOTES
SUBJECTIVE:   Paige Le is a 47 year old female presenting with a chief complaint of cough, wheezing, SOB.  More fatigue, no energy.  Last asthma flare was about 6 months ago.  Onset of symptoms was 1 week(s) ago.  Course of illness is worsening.    Severity moderate  Current and Associated symptoms: cough, SOB  Treatment measures tried include : inhalers, nebulizer.  Predisposing factors include seasonal allergies and HX of asthma.    [place], [place] triggers asthma flare.  Just got back into town today.  Denies any fever, no purulent rhinitis or sinus pressure, no productive cough.    Past Medical History:   Diagnosis Date     Allergic rhinitis      ALLERGIC RHINITIS NOS 3/8/2005     ANXIETY STATE NOS 3/8/2005     Cystic Fibrosis Screening negative 5/1/2009    [facility]     Dysmenorrhea 10/12/2008     Family history of aneurysm 4/28/2011     Fibromyalgia     Dx by head/neck/pain & Rheumatology     Fibromyalgia 4/15/2010    [facility]     Hyperlipidemia LDL goal <160 11/1/2013     INSOMNIA NEC 3/8/2005     Migraine      Migraine 4/22/2012    [facility]      Mild major depression (H)      Obesity 11/1/2013     Other and unspecified alcohol dependence, unspecified drinking behavior     Sober since 7/00     Other anxiety states      Other chronic pain     from fibromyalgia     Patellofemoral disorder     bilateral     Uncomplicated asthma     Not considered Asthma but I have breathing problems     Current Outpatient Prescriptions   Medication Sig Dispense Refill     COMPOUNDED NON-CONTROLLED SUBSTANCE (CMPD RX) - PHARMACY TO MIX COMPOUNDED MEDICATION Low dose Naltrexone:  6mg capsules/tablets one PO qhs 30 capsule 6     gabapentin (NEURONTIN) 300 MG capsule Take 2 capsules (600 mg) by mouth 3 times daily 270 capsule 3     montelukast (SINGULAIR) 10 MG tablet Take 1 tablet (10 mg) by mouth At Bedtime 30 tablet 1     CALCIUM CARBONATE PO Take by mouth daily       MAGNESIUM CITRATE PO Take  by mouth daily       ibuprofen (ADVIL/MOTRIN) 800 MG tablet Take 1 tablet (800 mg) by mouth every 8 hours as needed for moderate pain 90 tablet 1     Turmeric 450 MG CAPS        rizatriptan (MAXALT-MLT) 10 MG ODT tab Take 1 tablet (10 mg) by mouth at onset of headache for migraine May repeat in 2 hours. Max 3 tablets/24 hours. 18 tablet 3     HERBALS        albuterol (2.5 MG/3ML) 0.083% neb solution Take 1 vial (2.5 mg) by nebulization every 6 hours as needed for shortness of breath / dyspnea or wheezing 30 vial 1     albuterol (PROAIR HFA/PROVENTIL HFA/VENTOLIN HFA) 108 (90 BASE) MCG/ACT Inhaler Inhale 2 puffs into the lungs every 6 hours as needed for shortness of breath / dyspnea or wheezing 1 Inhaler 6     beclomethasone (QVAR) 80 MCG/ACT Inhaler Inhale 2 puffs into the lungs 2 times daily 1 Inhaler 3     FLUoxetine (PROZAC) 40 MG capsule Take 2 capsules (80 mg) by mouth daily 180 capsule 3     topiramate (TOPAMAX) 25 MG tablet Take 1 tablet (25 mg) by mouth 2 times daily 180 tablet 3     traZODone (DESYREL) 50 MG tablet Take 2 tablets by mouth. 1 -2 TABs  PO at bedtime prn insomnia 180 tablet 6     order for DME Equipment being ordered: Nebulizer 1 Device 0     B Complex Vitamins (VITAMIN B COMPLEX PO)        Ferrous Sulfate (IRON SUPPLEMENT PO)        Naproxen Sodium (ALEVE PO) Take 220 mg by mouth       cholecalciferol (VITAMIN D) 1000 UNIT tablet Take 1 tablet by mouth daily.       predniSONE (DELTASONE) 20 MG tablet Take 3 tabs (60 mg) by mouth daily x 3 days, 2 tabs (40 mg) daily x 3 days, 1 tab (20 mg) daily x 3 days, then 1/2 tab (10 mg) x 3 days. (Patient not taking: Reported on 10/6/2017) 20 tablet 0     azithromycin (ZITHROMAX) 250 MG tablet Two tablets first day, then one tablet daily for four days. (Patient not taking: Reported on 10/6/2017) 6 tablet 0     fluconazole (DIFLUCAN) 150 MG tablet Take 1 tablet (150 mg) by mouth every 3 days (Patient not taking: Reported on 10/6/2017) 4 tablet 0      ipratropium - albuterol 0.5 mg/2.5 mg/3 mL (DUONEB) 0.5-2.5 (3) MG/3ML neb solution Take 1 vial (3 mLs) by nebulization once for 1 dose 1 Box 3     lidocaine (XYLOCAINE) 2 % solution (viscous) Take 15 mLs by mouth every 2 hours as needed for moderate pain swish and spit every 3-8 hours as needed; max 8 doses/24 hour period (Patient not taking: Reported on 12/26/2017) 100 mL 0     cetirizine-psuedoePHEDrine (ZYRTEC-D) 5-120 MG per tablet Take 1 tablet by mouth 2 times daily (Patient not taking: Reported on 12/26/2017) 90 tablet 3     Social History   Substance Use Topics     Smoking status: Never Smoker     Smokeless tobacco: Never Used     Alcohol use No      Comment: recovering       ROS:  CONSTITUTIONAL:NEGATIVE for fever, chills, change in weight and POSITIVE  for fatigue, malaise and myalgias  INTEGUMENTARY/SKIN: NEGATIVE for worrisome rashes, moles or lesions  ENT/MOUTH: NEGATIVE for ear, mouth and throat problems and POSITIVE for hoarseness, nasal congestion, postnasal drainage and rhinorrhea-clear  RESP:POSITIVE for cough-non productive, Hx asthma, SOB/dyspnea and wheezing  CV: NEGATIVE for chest pain, palpitations or peripheral edema  GI: NEGATIVE for nausea, abdominal pain, heartburn, or change in bowel habits    OBJECTIVE:  /62 (BP Location: Right arm, Patient Position: Chair, Cuff Size: Adult Large)  Pulse 82  Temp 97.2  F (36.2  C) (Oral)  Resp 20  Wt 254 lb 4.8 oz (115.3 kg)  SpO2 97%  BMI 43.65 kg/m2  GENERAL APPEARANCE: healthy, alert and no distress  EYES: EOMI,  PERRL, conjunctiva clear  HENT: ear canals and TM's normal.  Nose and mouth without ulcers, erythema or lesions  NECK: supple, nontender, no lymphadenopathy  RESP: lungs clear to auscultation - no rales, rhonchi or wheezes  CV: regular rates and rhythm, normal S1 S2, no murmur noted  PSYCH: mentation appears normal and affect normal/bright    ASSESSMENT/PLAN:  (J45.41) Moderate persistent asthma with acute exacerbation  (primary  encounter diagnosis)  Plan: predniSONE (DELTASONE) 20 MG tablet            RX prednisone taper, encourage to continue with nebulizer and inhaler treatment.  To ER if develops acute worsening of respiratory symptoms.  Reviewed indications for sinus infection or bronchitis with more purulent discharge or phlegm.    Return to clinic if no resolution of symptoms.    Dexter Tran MD  December 26, 2017 5:59 PM

## 2017-12-26 NOTE — NURSING NOTE
"Chief Complaint   Patient presents with     Urgent Care     Asthma     sob x 1 week, crackling in the morning. Referred to go to Urgent care from Primary.       Initial /62 (BP Location: Right arm, Patient Position: Chair, Cuff Size: Adult Large)  Pulse 82  Temp 97.2  F (36.2  C) (Oral)  Resp 20  Wt 254 lb 4.8 oz (115.3 kg)  SpO2 97%  BMI 43.65 kg/m2 Estimated body mass index is 43.65 kg/(m^2) as calculated from the following:    Height as of 6/29/17: 5' 4\" (1.626 m).    Weight as of this encounter: 254 lb 4.8 oz (115.3 kg).  Medication Reconciliation: unable or not appropriate to perform   Xiomy Benoit Medical Assistant      "

## 2017-12-29 RX ORDER — FLUOXETINE 40 MG/1
CAPSULE ORAL
Qty: 180 CAPSULE | Refills: 0 | Status: SHIPPED | OUTPATIENT
Start: 2017-12-29 | End: 2018-05-18

## 2017-12-29 RX ORDER — TOPIRAMATE 25 MG/1
TABLET, FILM COATED ORAL
Qty: 180 TABLET | Refills: 0 | Status: SHIPPED | OUTPATIENT
Start: 2017-12-29 | End: 2018-03-20

## 2017-12-29 NOTE — TELEPHONE ENCOUNTER
Topiramate 25  Creatinine   Date Value Ref Range Status   05/17/2017 0.83 0.52 - 1.04 mg/dL Final   ]  fluoxetine 40   PHQ-9 score:    PHQ-9 SCORE 5/17/2017   Total Score -   Total Score 5     Routing refill request to provider for review/approval because:  A break in medications? Last prescribed by Dr. Buck 11/3/16.  Jayden Sparks RN

## 2018-01-31 ENCOUNTER — RADIANT APPOINTMENT (OUTPATIENT)
Dept: MAMMOGRAPHY | Facility: CLINIC | Age: 48
End: 2018-01-31
Attending: OBSTETRICS & GYNECOLOGY
Payer: COMMERCIAL

## 2018-01-31 ENCOUNTER — OFFICE VISIT (OUTPATIENT)
Dept: OBGYN | Facility: CLINIC | Age: 48
End: 2018-01-31
Payer: COMMERCIAL

## 2018-01-31 VITALS
HEIGHT: 65 IN | SYSTOLIC BLOOD PRESSURE: 113 MMHG | BODY MASS INDEX: 42.97 KG/M2 | HEART RATE: 67 BPM | OXYGEN SATURATION: 98 % | WEIGHT: 257.9 LBS | DIASTOLIC BLOOD PRESSURE: 78 MMHG

## 2018-01-31 DIAGNOSIS — Z12.31 ENCOUNTER FOR SCREENING MAMMOGRAM FOR BREAST CANCER: ICD-10-CM

## 2018-01-31 DIAGNOSIS — Z13.220 LIPID SCREENING: ICD-10-CM

## 2018-01-31 DIAGNOSIS — Z13.1 SCREENING FOR DIABETES MELLITUS: ICD-10-CM

## 2018-01-31 DIAGNOSIS — Z00.00 ROUTINE GENERAL MEDICAL EXAMINATION AT A HEALTH CARE FACILITY: Primary | ICD-10-CM

## 2018-01-31 DIAGNOSIS — Z13.29 SCREENING FOR THYROID DISORDER: ICD-10-CM

## 2018-01-31 DIAGNOSIS — N92.6 IRREGULAR MENSTRUAL CYCLE: ICD-10-CM

## 2018-01-31 PROCEDURE — 77067 SCR MAMMO BI INCL CAD: CPT | Performed by: STUDENT IN AN ORGANIZED HEALTH CARE EDUCATION/TRAINING PROGRAM

## 2018-01-31 PROCEDURE — 58100 BIOPSY OF UTERUS LINING: CPT | Performed by: OBSTETRICS & GYNECOLOGY

## 2018-01-31 PROCEDURE — 88305 TISSUE EXAM BY PATHOLOGIST: CPT | Performed by: OBSTETRICS & GYNECOLOGY

## 2018-01-31 PROCEDURE — 77063 BREAST TOMOSYNTHESIS BI: CPT | Performed by: STUDENT IN AN ORGANIZED HEALTH CARE EDUCATION/TRAINING PROGRAM

## 2018-01-31 PROCEDURE — 99396 PREV VISIT EST AGE 40-64: CPT | Mod: 25 | Performed by: OBSTETRICS & GYNECOLOGY

## 2018-01-31 ASSESSMENT — ANXIETY QUESTIONNAIRES
GAD7 TOTAL SCORE: 0
6. BECOMING EASILY ANNOYED OR IRRITABLE: NOT AT ALL
1. FEELING NERVOUS, ANXIOUS, OR ON EDGE: NOT AT ALL
2. NOT BEING ABLE TO STOP OR CONTROL WORRYING: NOT AT ALL
5. BEING SO RESTLESS THAT IT IS HARD TO SIT STILL: NOT AT ALL
3. WORRYING TOO MUCH ABOUT DIFFERENT THINGS: NOT AT ALL
7. FEELING AFRAID AS IF SOMETHING AWFUL MIGHT HAPPEN: NOT AT ALL

## 2018-01-31 ASSESSMENT — PATIENT HEALTH QUESTIONNAIRE - PHQ9: 5. POOR APPETITE OR OVEREATING: NOT AT ALL

## 2018-01-31 NOTE — PROGRESS NOTES
Paige is a 47 year old female, , who is here for her annual exam and is a new patient to the [facility] dept.  She uses the Mirena IUD for contraception but c/o irregular spotting since it was placed on 17.  Informed consent was reviewed and obtained for an endometrial biopsy but she did not think that it could be completed due to her hx of severe cervical stenosis.  Her IUD had to be placed using US guidance due to severe stenosis.  Her Mirena IUD will be due for removal/replacement in 2022.  She is overdue for a mammogram and would like this today, if possible.  She states that she has not had an alcoholic beverage x 17 years and continues to attend meetings for former alcoholics.  Her next pap smear is due in 2019 and she denies any hx of abnormals.      ROS: Ten point review of systems was reviewed and negative except the above.    Health Maintenance   Topic Date Due     ASTHMA CONTROL TEST Q6 MOS  2013     ASTHMA ACTION PLAN Q1 YR  2013     INFLUENZA VACCINE (SYSTEM ASSIGNED)  2017     PHQ-9 Q6 MONTHS  2017     DEPRESSION ACTION PLAN Q1 YR  2018     PAP Q3 YR  2019     LIPID SCREEN Q5 YR FEMALE (SYSTEM ASSIGNED)  2021     TETANUS Q10 YR  2027     MIGRAINE ACTION PLAN  Completed      Last pap: 16 so due in 2019  Last Mammogram: overdue  Last Dexa: not applicable  Last Colonoscopy: not applicable  Lab Results   Component Value Date    CHOL 181 2016     Lab Results   Component Value Date    HDL 69 2016     Lab Results   Component Value Date    LDL 98 2016     Lab Results   Component Value Date    TRIG 70 2016     Lab Results   Component Value Date    CHOLHDLRATIO 2.8 2015         OBHX:      PSH:   Past Surgical History:   Procedure Laterality Date     ARTHROSCOPY KNEE RT/LT      Left      DILATION AND CURETTAGE N/A 2017    Procedure: DILATION AND CURETTAGE;;  Surgeon: Livia Barnett DO;  Location:   "OR     EXAM UNDER ANESTHESIA, ULTRASOUND N/A 6/14/2017    Procedure: EXAM UNDER ANESTHESIA, ULTRASOUND;  Ultrasound guided cervical dilation, IUD placement, Endometrial biopsy, repair of unavoidable cervical laceration;  Surgeon: Livia Barnett DO;  Location: [facility] OR     HC TOOTH EXTRACTION W/FORCEP  1998    wisdom teeth      INSERT INTRAUTERINE DEVICE N/A 6/14/2017    Procedure: INSERT INTRAUTERINE DEVICE;;  Surgeon: Livia Barnett DO;  Location: [facility] OR         PMH: Her past medical, surgical, and obstetric histories were reviewed and are documented in their appropriate chart areas.    ALL/Meds: Her medication and allergy histories were reviewed and are documented in their appropriate chart areas.    SH/FMH: Her social and family history was reviewed and documented in its appropriate chart area.    PE: /78  Pulse 67  Ht 5' 4.5\" (1.638 m)  Wt 257 lb 14.4 oz (117 kg)  LMP  (LMP Unknown)  SpO2 98%  Breastfeeding? No  BMI 43.58 kg/m2  Body mass index is 43.58 kg/(m^2).    General Appearance:  healthy, alert, active, no distress  Cardiovascular:  Regular rate and Rhythm without murmur  Neck: Supple, no adenopathy and thyroid normal  Lungs:  Clear, without wheeze, rale or rhonchi  Breast: normal breast exam  Abdomen: Benign, Soft, flat, non-tender, No masses, organomegaly, No inguinal nodes and Bowel sounds normoactive.   Pelvic:       - Ext: Vulva and perineum are normal without lesion, mass or discharge        - Urethra: normal without discharge        - Urethral Meatus: normal appearance       - Bladder: no tenderness, no masses       - Vagina: Normal mucosa, no discharge        - Cervix: normal and nulliparous with 2 IUD strings present and 3 cm in length       - Uterus:Normal shape, position and consistency        - Adnexa: Normal without masses or tenderness       - Rectal: deferred      Procedure Note:  After informed consent was reviewed and obtained, a bi-valve speculum was placed and the " 2 IUD strings were visualized.  Using sterile technique, her cervix was cleansed with betadine x 3 swabs and the pipelle was inserted taking care to avoid interfering with her IUD.  One rather than two swipes of the endometrium was obtained and submitted due to patient discomfort and was submitted.  All instruments were removed and the specimen was submitted to pathology.  Counts were correct and the EBL was 2 ccs.  F/u instructions were reviewed with the patient.  Her uterus sounded to 7 cm and there were no complications.    A/P:  Well Woman Exam     -  I discussed the new pap recommendations regarding screening.  Explained the rationale for increased intervals between paps.  Questions asked and answered.  She does agree to this regiment.  Pap was not obtained since not due until 5/2019   -  BC: IUD (Mirena) due for removal/replacement in 6/2022   -  Draw labwork when in a fasting state - future orders were placed   -  Schedule a mammogram   -  Encouraged to continue attendance at her meetings for history of alcoholism  Orders Placed This Encounter   Procedures     ENDOMETRIAL BIOPSY W/O CERVICAL DILATION     Surgical pathology exam      - Encouraged self-breast exam   - Encouraged low fat diet, regular exercise, and adequate calcium intake.  This was a 30-minute visit and over 50% of the time was spent in direct pt consultation and 5 minutes was spent in procedure.    Krystal Zuñiga DO  FACOG, FACS

## 2018-01-31 NOTE — MR AVS SNAPSHOT
After Visit Summary   1/31/2018    Paige Le    MRN: [id number]           Patient Information     Date Of Birth          1970        Visit Information        Provider Department      1/31/2018 10:45 AM Krystal Zuñiga DO [place]        Care Instructions                                                         If you have any questions regarding your visit, Please contact your care team.    [facility] CLINIC HOURS TELEPHONE NUMBER   Krystal Zuñiga DO.    CHICO Farooq -    NATALI Guthrie RN       [facility], [facility]  8:30a.m-5:00 p.m   [facility]  [address]  [place], [place] [address] [phone] ask for [facility]    Imaging [phone]       Urgent Care locations:    [facility] Saturday and Sunday   9 am - 5 pm    Monday-Friday   12 pm - 8 pm  Saturday and Sunday   9 am - 5 pm   [phone]     [facility] Labor and Delivery:  [phone]    If you need a medication refill, please contact your pharmacy. Please allow 3 business days for your refill to be completed.  As always, Thank you for trusting us with your healthcare needs!                Follow-ups after your visit        Your next 10 appointments already scheduled     Feb 23, 2018 10:30 AM CST   Return Visit with Yvonne Garrido MD   [facility] ([facility])    [address] [address] [id number] [phone]              Who to contact     If you have questions or need follow up information about today's clinic visit or your schedule please contact [facility] directly at [phone].  Normal or non-critical lab and imaging results will be communicated to you by MyChart, letter or phone within 4 business days after the clinic has received the results. If you do not hear  "from us within 7 days, please contact the clinic through [employer] or phone. If you have a critical or abnormal lab result, we will notify you by phone as soon as possible.  Submit refill requests through [employer] or call your pharmacy and they will forward the refill request to us. Please allow 3 business days for your refill to be completed.          Additional Information About Your Visit        Re.[email or web address] Information     [employer] lets you send messages to your doctor, view your test results, renew your prescriptions, schedule appointments and more. To sign up, go to [email or web address] . Click on \"Log in\" on the left side of the screen, which will take you to the Welcome page. Then click on \"Sign up Now\" on the right side of the page.     You will be asked to enter the access code listed below, as well as some personal information. Please follow the directions to create your username and password.     Your access code is: [email or web address]  Expires: 3/26/2018  5:27 PM     Your access code will  in 90 days. If you need help or a new code, please call your [facility] clinic or [phone].        Care EveryWhere ID     This is your Care EveryWhere ID. This could be used by other organizations to access your [facility] medical records  [id number]        Your Vitals Were     Pulse Height Last Period Pulse Oximetry Breastfeeding? BMI (Body Mass Index)    67 5' 4.5\" (1.638 m) (LMP Unknown) 98% No 43.58 kg/m2       Blood Pressure from Last 3 Encounters:   18 113/78   17 100/62   10/06/17 98/62    Weight from Last 3 Encounters:   18 257 lb 14.4 oz (117 kg)   17 254 lb 4.8 oz (115.3 kg)   17 255 lb (115.7 kg)              Today, you had the following     No orders found for display         Today's Medication Changes          These changes are accurate as of 18 11:32 AM.  If you have any questions, ask your nurse or doctor.               These medicines have changed or have updated " prescriptions.        Dose/Directions    gabapentin 300 MG capsule   Commonly known as:  NEURONTIN   This may have changed:    - how much to take  - when to take this   Used for:  Fibromyalgia, Menopausal syndrome (hot flashes)        Dose:  600 mg   Take 2 capsules (600 mg) by mouth 3 times daily   Quantity:  270 capsule   Refills:  3         Stop taking these medicines if you haven't already. Please contact your care team if you have questions.     azithromycin 250 MG tablet   Commonly known as:  ZITHROMAX   Stopped by:  Krystal Zuñiga DO           HERBALS   Stopped by:  Krystal Zuñiga DO           ibuprofen 800 MG tablet   Commonly known as:  ADVIL/MOTRIN   Stopped by:  Krystal Zuñiga DO           lidocaine (viscous) 2 % solution   Commonly known as:  XYLOCAINE   Stopped by:  Krystal Zuñiga DO           predniSONE 20 MG tablet   Commonly known as:  DELTASONE   Stopped by:  Krystal Zuñiga DO                    Primary Care Provider Office Phone # Fax #    Christine TAWNY Farnsworth PA-C [phone] [address] [facility] [address]        Equal Access to Services     [facility]: Hadii michelle patel hadasho Soomaali, waaxda luqadaha, qaybta kaalmada adeegyasong, marisol milner . So [facility] [phone].    ATENCIÓN: Si habla español, tiene a greene disposición servicios gratuitos de asistencia lingüística. [facility] [place] [phone].    We comply with applicable federal civil rights laws and [place] laws. We do not discriminate on the basis of race, color, national origin, age, disability, sex, sexual orientation, or gender identity.            Thank you!     Thank you for choosing [facility]  for your care. Our goal is always to provide you with excellent care. Hearing back from our patients is one way we can continue to improve our services. Please take a few minutes to complete the written survey that you may receive in the mail  after your visit with us. Thank you!             Your Updated Medication List - Protect others around you: Learn how to safely use, store and throw away your medicines at www.disposemymeds.org.          This list is accurate as of 1/31/18 11:32 AM.  Always use your most recent med list.                   Brand Name Dispense Instructions for use Diagnosis    * albuterol (2.5 MG/3ML) 0.083% neb solution     30 vial    Take 1 vial (2.5 mg) by nebulization every 6 hours as needed for shortness of breath / dyspnea or wheezing    Cough, Moderate persistent asthma with acute exacerbation       * albuterol 108 (90 BASE) MCG/ACT Inhaler    PROAIR HFA/PROVENTIL HFA/VENTOLIN HFA    1 Inhaler    Inhale 2 puffs into the lungs every 6 hours as needed for shortness of breath / dyspnea or wheezing    Cough, Moderate persistent asthma with acute exacerbation       ALEVE PO      Take 220 mg by mouth        beclomethasone 80 MCG/ACT Inhaler    QVAR    1 Inhaler    Inhale 2 puffs into the lungs 2 times daily    Moderate persistent asthma with acute exacerbation, Cough       CALCIUM CARBONATE PO      Take by mouth daily        cetirizine-psuedoePHEDrine 5-120 MG per 12 hr tablet    ZYRTEC-D    90 tablet    Take 1 tablet by mouth 2 times daily    Allergic rhinitis, cause unspecified       cholecalciferol 1000 UNIT tablet    vitamin D3     Take 1 tablet by mouth daily.        COMPOUNDED NON-CONTROLLED SUBSTANCE - PHARMACY TO MIX COMPOUNDED MEDICATION    CMPD RX    30 capsule    Low dose Naltrexone:  6mg capsules/tablets one PO qhs    Fibromyalgia       fluconazole 150 MG tablet    DIFLUCAN    4 tablet    Take 1 tablet (150 mg) by mouth every 3 days    Acute bronchitis, unspecified organism       FLUoxetine 40 MG capsule    PROzac    180 capsule    TAKE TWO CAPSULES BY MOUTH EVERY DAY    Major depressive disorder, recurrent episode, mild (H)       gabapentin 300 MG capsule    NEURONTIN    270 capsule    Take 2 capsules (600 mg) by mouth 3  times daily    Fibromyalgia, Menopausal syndrome (hot flashes)       ipratropium - albuterol 0.5 mg/2.5 mg/3 mL 0.5-2.5 (3) MG/3ML neb solution    DUONEB    1 Box    Take 1 vial (3 mLs) by nebulization once for 1 dose    Cough, Moderate persistent asthma with acute exacerbation       IRON SUPPLEMENT PO           MAGNESIUM CITRATE PO      Take by mouth daily        montelukast 10 MG tablet    SINGULAIR    30 tablet    Take 1 tablet (10 mg) by mouth At Bedtime    Acute bronchitis, unspecified organism       order for DME     1 Device    Equipment being ordered: Nebulizer    CAP (community acquired pneumonia)       rizatriptan 10 MG ODT tab    MAXALT-MLT    18 tablet    Take 1 tablet (10 mg) by mouth at onset of headache for migraine May repeat in 2 hours. Max 3 tablets/24 hours.    Migraine with aura and without status migrainosus, not intractable       topiramate 25 MG tablet    TOPAMAX    180 tablet    TAKE ONE TABLET BY MOUTH TWICE A DAY    TMJ (temporomandibular joint syndrome), Cervicalgia       traZODone 50 MG tablet    DESYREL    180 tablet    Take 2 tablets by mouth. 1 -2 TABs  PO at bedtime prn insomnia    Insomnia, unspecified       Turmeric 450 MG Caps           VITAMIN B COMPLEX PO           * Notice:  This list has 2 medication(s) that are the same as other medications prescribed for you. Read the directions carefully, and ask your doctor or other care provider to review them with you.

## 2018-01-31 NOTE — PATIENT INSTRUCTIONS
If you have any questions regarding your visit, Please contact your care team.    [facility] CLINIC HOURS TELEPHONE NUMBER   Krystal DO Yogesh.    CHICO Farooq -    NATALI Guthrie RN       Monday, Wednesday, Thursday and Friday, [place]  8:30a.m-5:00 p.m   [facility]  [address] [place] [address]  [phone] ask for [facility]    Imaging [phone]       Urgent Care locations:    [facility] Saturday and Sunday   9 am - 5 pm    Monday-Friday   12 pm - 8 pm  Saturday and Sunday   9 am - 5 pm   [phone]     [facility] Labor and Delivery:  [phone]    If you need a medication refill, please contact your pharmacy. Please allow 3 business days for your refill to be completed.  As always, Thank you for trusting us with your healthcare needs!

## 2018-02-01 ASSESSMENT — ANXIETY QUESTIONNAIRES: GAD7 TOTAL SCORE: 0

## 2018-02-01 ASSESSMENT — PATIENT HEALTH QUESTIONNAIRE - PHQ9: SUM OF ALL RESPONSES TO PHQ QUESTIONS 1-9: 2

## 2018-02-02 LAB — COPATH REPORT: NORMAL

## 2018-02-14 ENCOUNTER — OFFICE VISIT (OUTPATIENT)
Dept: FAMILY MEDICINE | Facility: CLINIC | Age: 48
End: 2018-02-14
Payer: COMMERCIAL

## 2018-02-14 VITALS
HEART RATE: 73 BPM | TEMPERATURE: 98.1 F | HEIGHT: 65 IN | BODY MASS INDEX: 42.65 KG/M2 | SYSTOLIC BLOOD PRESSURE: 102 MMHG | DIASTOLIC BLOOD PRESSURE: 70 MMHG | RESPIRATION RATE: 16 BRPM | WEIGHT: 256 LBS | OXYGEN SATURATION: 97 %

## 2018-02-14 DIAGNOSIS — F10.21 HISTORY OF ALCOHOLISM (H): ICD-10-CM

## 2018-02-14 DIAGNOSIS — F32.0 MILD MAJOR DEPRESSION (H): ICD-10-CM

## 2018-02-14 DIAGNOSIS — R05.9 COUGH: ICD-10-CM

## 2018-02-14 DIAGNOSIS — E66.01 MORBID OBESITY (H): ICD-10-CM

## 2018-02-14 DIAGNOSIS — J45.40 MODERATE PERSISTENT ASTHMA WITHOUT COMPLICATION: Primary | ICD-10-CM

## 2018-02-14 DIAGNOSIS — J20.9 ACUTE BRONCHITIS, UNSPECIFIED ORGANISM: ICD-10-CM

## 2018-02-14 DIAGNOSIS — J01.00 ACUTE MAXILLARY SINUSITIS, RECURRENCE NOT SPECIFIED: ICD-10-CM

## 2018-02-14 PROCEDURE — 99214 OFFICE O/P EST MOD 30 MIN: CPT | Performed by: FAMILY MEDICINE

## 2018-02-14 RX ORDER — DOXYCYCLINE 100 MG/1
100 CAPSULE ORAL 2 TIMES DAILY
Qty: 20 CAPSULE | Refills: 0 | Status: SHIPPED | OUTPATIENT
Start: 2018-02-14 | End: 2018-07-06

## 2018-02-14 RX ORDER — FLUCONAZOLE 150 MG/1
150 TABLET ORAL
Qty: 4 TABLET | Refills: 0 | Status: SHIPPED | OUTPATIENT
Start: 2018-02-14 | End: 2019-01-03

## 2018-02-14 NOTE — MR AVS SNAPSHOT
"              After Visit Summary   2/14/2018    Paige Le    MRN: [id number]           Patient Information     Date Of Birth          1970        Visit Information        Provider Department      2/14/2018 3:40 PM Emmy White MD [facility] [facility]        Today's Diagnoses     Moderate persistent asthma without complication    -  1    Cough        Acute maxillary sinusitis, recurrence not specified        Acute bronchitis, unspecified organism          Care Instructions    Start Q-sabra. Rinse your mouth after using.     Start doxycycline. Recommend probiotic use.           Follow-ups after your visit        Your next 10 appointments already scheduled     Feb 23, 2018 10:30 AM CST   Return Visit with Yvonne Garrido MD   [facility] ([facility])    [address] [facility]  [address] [place] [address]   [phone]              Who to contact     If you have questions or need follow up information about today's clinic visit or your schedule please contact [facility] directly at [phone].  Normal or non-critical lab and imaging results will be communicated to you by Woot[employer]hart, letter or phone within 4 business days after the clinic has received the results. If you do not hear from us within 7 days, please contact the clinic through [employer] or phone. If you have a critical or abnormal lab result, we will notify you by phone as soon as possible.  Submit refill requests through [employer] or call your pharmacy and they will forward the refill request to us. Please allow 3 business days for your refill to be completed.          Additional Information About Your Visit        MyChart Information     [employer] lets you send messages to your doctor, view your test results, renew your prescriptions, schedule appointments and more. To sign up, go to [email or web address] . Click on \"Log in\" on the left side of the screen, which will take " "you to the Welcome page. Then click on \"Sign up Now\" on the right side of the page.     You will be asked to enter the access code listed below, as well as some personal information. Please follow the directions to create your username and password.     Your access code is: [email or web address]  Expires: 3/26/2018  5:27 PM     Your access code will  in 90 days. If you need help or a new code, please call your [facility] clinic or [phone].        Care EveryWhere ID     This is your Care EveryWhere ID. This could be used by other organizations to access your [facility] medical records  [id number]        Your Vitals Were     Pulse Temperature Respirations Height Last Period Pulse Oximetry    73 98.1  F (36.7  C) (Oral) 16 1.651 m (5' 5\") (LMP Unknown) 97%    BMI (Body Mass Index)                   42.6 kg/m2            Blood Pressure from Last 3 Encounters:   18 102/70   18 113/78   17 100/62    Weight from Last 3 Encounters:   18 116.1 kg (256 lb)   18 117 kg (257 lb 14.4 oz)   17 115.3 kg (254 lb 4.8 oz)              We Performed the Following     Asthma Action Plan (AAP)          Today's Medication Changes          These changes are accurate as of 18  4:50 PM.  If you have any questions, ask your nurse or doctor.               Start taking these medicines.        Dose/Directions    doxycycline 100 MG capsule   Commonly known as:  VIBRAMYCIN   Used for:  Acute maxillary sinusitis, recurrence not specified   Started by:  Emmy White MD        Dose:  100 mg   Take 1 capsule (100 mg) by mouth 2 times daily   Quantity:  20 capsule   Refills:  0         These medicines have changed or have updated prescriptions.        Dose/Directions    gabapentin 300 MG capsule   Commonly known as:  NEURONTIN   This may have changed:    - how much to take  - when to take this   Used for:  Fibromyalgia, Menopausal syndrome (hot flashes)        Dose:  600 mg   Take 2 capsules (600 " mg) by mouth 3 times daily   Quantity:  270 capsule   Refills:  3            Where to get your medicines      These medications were sent to [facility] - [place], [place] - [address]  [address], [facility] [address]     Phone:  [phone]     beclomethasone 80 MCG/ACT Inhaler    doxycycline 100 MG capsule    fluconazole 150 MG tablet                Primary Care Provider Office Phone # Fax #    Christine Farnsworth PA-C [phone] [address]  [facility] [address]        Equal Access to Services     [facility]: Hadii aad ku hadasho Soomaali, waaxda luqadaha, qaybta kaalmada adeegyada, marisol perez adeelba milner . So [facility] [phone].    ATENCIÓN: Si habla español, tiene a greene disposición servicios gratuitos de asistencia lingüística. [facility] [phone].    We comply with applicable federal civil rights laws and [place] laws. We do not discriminate on the basis of race, color, national origin, age, disability, sex, sexual orientation, or gender identity.            Thank you!     Thank you for choosing [facility]  for your care. Our goal is always to provide you with excellent care. Hearing back from our patients is one way we can continue to improve our services. Please take a few minutes to complete the written survey that you may receive in the mail after your visit with us. Thank you!             Your Updated Medication List - Protect others around you: Learn how to safely use, store and throw away your medicines at www.disposemymeds.org.          This list is accurate as of 2/14/18  4:50 PM.  Always use your most recent med list.                   Brand Name Dispense Instructions for use Diagnosis    * albuterol (2.5 MG/3ML) 0.083% neb solution     30 vial    Take 1 vial (2.5 mg) by nebulization every 6 hours as needed for shortness of breath / dyspnea or wheezing    Cough, Moderate persistent  asthma with acute exacerbation       * albuterol 108 (90 BASE) MCG/ACT Inhaler    PROAIR HFA/PROVENTIL HFA/VENTOLIN HFA    1 Inhaler    Inhale 2 puffs into the lungs every 6 hours as needed for shortness of breath / dyspnea or wheezing    Cough, Moderate persistent asthma with acute exacerbation       ALEVE PO      Take 220 mg by mouth        beclomethasone 80 MCG/ACT Inhaler    QVAR    1 Inhaler    Inhale 2 puffs into the lungs 2 times daily    Cough       CALCIUM CARBONATE PO      Take by mouth daily        cetirizine-psuedoePHEDrine 5-120 MG per 12 hr tablet    ZYRTEC-D    90 tablet    Take 1 tablet by mouth 2 times daily    Allergic rhinitis, cause unspecified       cholecalciferol 1000 UNIT tablet    vitamin D3     Take 1 tablet by mouth daily.        COMPOUNDED NON-CONTROLLED SUBSTANCE - PHARMACY TO MIX COMPOUNDED MEDICATION    CMPD RX    30 capsule    Low dose Naltrexone:  6mg capsules/tablets one PO qhs    Fibromyalgia       doxycycline 100 MG capsule    VIBRAMYCIN    20 capsule    Take 1 capsule (100 mg) by mouth 2 times daily    Acute maxillary sinusitis, recurrence not specified       fluconazole 150 MG tablet    DIFLUCAN    4 tablet    Take 1 tablet (150 mg) by mouth every 3 days    Acute bronchitis, unspecified organism       FLUoxetine 40 MG capsule    PROzac    180 capsule    TAKE TWO CAPSULES BY MOUTH EVERY DAY    Major depressive disorder, recurrent episode, mild (H)       gabapentin 300 MG capsule    NEURONTIN    270 capsule    Take 2 capsules (600 mg) by mouth 3 times daily    Fibromyalgia, Menopausal syndrome (hot flashes)       ipratropium - albuterol 0.5 mg/2.5 mg/3 mL 0.5-2.5 (3) MG/3ML neb solution    DUONEB    1 Box    Take 1 vial (3 mLs) by nebulization once for 1 dose    Cough, Moderate persistent asthma with acute exacerbation       IRON SUPPLEMENT PO           MAGNESIUM CITRATE PO      Take by mouth daily        montelukast 10 MG tablet    SINGULAIR    30 tablet    Take 1 tablet (10 mg)  by mouth At Bedtime    Acute bronchitis, unspecified organism       order for DME     1 Device    Equipment being ordered: Nebulizer    CAP (community acquired pneumonia)       rizatriptan 10 MG ODT tab    MAXALT-MLT    18 tablet    Take 1 tablet (10 mg) by mouth at onset of headache for migraine May repeat in 2 hours. Max 3 tablets/24 hours.    Migraine with aura and without status migrainosus, not intractable       topiramate 25 MG tablet    TOPAMAX    180 tablet    TAKE ONE TABLET BY MOUTH TWICE A DAY    TMJ (temporomandibular joint syndrome), Cervicalgia       traZODone 50 MG tablet    DESYREL    180 tablet    Take 2 tablets by mouth. 1 -2 TABs  PO at bedtime prn insomnia    Insomnia, unspecified       Turmeric 450 MG Caps           VITAMIN B COMPLEX PO           * Notice:  This list has 2 medication(s) that are the same as other medications prescribed for you. Read the directions carefully, and ask your doctor or other care provider to review them with you.

## 2018-02-14 NOTE — LETTER
My Asthma Action Plan  Name: Paige Le   YOB: 1970  Date: 2/14/2018   My doctor: Emmy White MD   My clinic: [facility]        My Control Medicine: Beclomethasone (QVar) -  80 mcg 2 puffs twice daily  My Rescue Medicine: Albuterol/ipratroprium  (Duoneb) nebulizer solution every 4 hours  My Oral Steroid Medicine: prednisone 40 mg x 5 days My Asthma Severity: moderate persistent  Avoid your asthma triggers: upper respiratory infections               GREEN ZONE   Good Control    I feel good    No cough or wheeze    Can work, sleep and play without asthma symptoms       Take your asthma control medicine every day.     1. If exercise triggers your asthma, take your rescue medication    15 minutes before exercise or sports, and    During exercise if you have asthma symptoms  2. Spacer to use with inhaler: If you have a spacer, make sure to use it with your inhaler             YELLOW ZONE Getting Worse  I have ANY of these:    I do not feel good    Cough or wheeze    Chest feels tight    Wake up at night   1. Keep taking your Green Zone medications  2. Start taking your rescue medicine:    every 20 minutes for up to 1 hour. Then every 4 hours for 24-48 hours.  3. If you stay in the Yellow Zone for more than 12-24 hours, contact your doctor.  4. If you do not return to the Green Zone in 12-24 hours or you get worse, start taking your oral steroid medicine if prescribed by your provider.           RED ZONE Medical Alert - Get Help  I have ANY of these:    I feel awful    Medicine is not helping    Breathing getting harder    Trouble walking or talking    Nose opens wide to breathe       1. Take your rescue medicine NOW  2. If your provider has prescribed an oral steroid medicine, start taking it NOW  3. Call your doctor NOW  4. If you are still in the Red Zone after 20 minutes and you have not reached your doctor:    Take your rescue medicine again and    Call 911 or go to  the emergency room right away    See your regular doctor within 2 weeks of an Emergency Room or Urgent Care visit for follow-up treatment.        Electronically signed by: Emmy White, February 14, 2018    Annual Reminders:  Meet with Asthma Educator,  Flu Shot in the Fall, consider Pneumonia Vaccination for patients with asthma (aged 19 and older).    Pharmacy:    [facility]  [facility] [address]                    Asthma Triggers  How To Control Things That Make Your Asthma Worse    Triggers are things that make your asthma worse.  Look at the list below to help you find your triggers and what you can do about them.  You can help prevent asthma flare-ups by staying away from your triggers.      Trigger                                                          What you can do   Cigarette Smoke  Tobacco smoke can make asthma worse. Do not allow smoking in your home, car or around you.  Be sure no one smokes at a child s day care or school.  If you smoke, ask your health care provider for ways to help you quit.  Ask family members to quit too.  Ask your health care provider for a referral to Quit Plan to help you quit smoking, or call 6-581-086-PLAN.     Colds, Flu, Bronchitis  These are common triggers of asthma. Wash your hands often.  Don t touch your eyes, nose or mouth.  Get a flu shot every year.     Dust Mites  These are tiny bugs that live in cloth or carpet. They are too small to see. Wash sheets and blankets in hot water every week.   Encase pillows and mattress in dust mite proof covers.  Avoid having carpet if you can. If you have carpet, vacuum weekly.   Use a dust mask and HEPA vacuum.   Pollen and Outdoor Mold  Some people are allergic to trees, grass, or weed pollen, or molds. Try to keep your windows closed.  Limit time out doors when pollen count is high.   Ask you health care provider about taking medicine  during allergy season.     Animal Dander  Some people are allergic to skin flakes, urine or saliva from pets with fur or feathers. Keep pets with fur or feathers out of your home.    If you can t keep the pet outdoors, then keep the pet out of your bedroom.  Keep the bedroom door closed.  Keep pets off cloth furniture and away from stuffed toys.     Mice, Rats, and Cockroaches  Some people are allergic to the waste from these pests.   Cover food and garbage.  Clean up spills and food crumbs.  Store grease in the refrigerator.   Keep food out of the bedroom.   Indoor Mold  This can be a trigger if your home has high moisture. Fix leaking faucets, pipes, or other sources of water.   Clean moldy surfaces.  Dehumidify basement if it is damp and smelly.   Smoke, Strong Odors, and Sprays  These can reduce air quality. Stay away from strong odors and sprays, such as perfume, powder, hair spray, paints, smoke incense, paint, cleaning products, candles and new carpet.   Exercise or Sports  Some people with asthma have this trigger. Be active!  Ask your doctor about taking medicine before sports or exercise to prevent symptoms.    Warm up for 5-10 minutes before and after sports or exercise.     Other Triggers of Asthma  Cold air:  Cover your nose and mouth with a scarf.  Sometimes laughing or crying can be a trigger.  Some medicines and food can trigger asthma.

## 2018-02-14 NOTE — NURSING NOTE
"Chief Complaint   Patient presents with     New Patient     Establish Care     Forms     Sinus Problem       Initial Ht 1.651 m (5' 5\")  Wt 116.1 kg (256 lb)  LMP  (LMP Unknown)  BMI 42.6 kg/m2 Estimated body mass index is 42.6 kg/(m^2) as calculated from the following:    Height as of this encounter: 1.651 m (5' 5\").    Weight as of this encounter: 116.1 kg (256 lb).  Medication Reconciliation: complete     Tova Tapia, Medical Assistant      "

## 2018-02-15 ENCOUNTER — CARE COORDINATION (OUTPATIENT)
Dept: PULMONOLOGY | Facility: CLINIC | Age: 48
End: 2018-02-15

## 2018-02-15 ASSESSMENT — ASTHMA QUESTIONNAIRES: ACT_TOTALSCORE: 24

## 2018-02-15 NOTE — PROGRESS NOTES
Pt called in stating no longer needed Dr. Reid to fill out paperwork regarding scuba diving as her PCP did this for her.   Arlette Verma RN BSN

## 2018-02-22 NOTE — PROGRESS NOTES
"                          [facility]    Date of visit: 2/23/2018    Chief complaint:   Chief Complaint   Patient presents with     Pain       Interval history:  Paige Le is a 47 year old female last seen by me on 10/06/2017.      Recommendations/plan at the last visit included:    1. Physical Therapy: She is doing a HEP, completed 8 sessions with Michelle  2. Pain Psychologist to address issues of relaxation, behavioral change, coping style, and other factors important to improvement: Continues to see Bebe Benjamin every other week now  3. Diagnostic Studies: None  4. Medication Management:  5. Increase Gabapentin to 600mg TID.                       6. Naltrexone 6mg qhs - compound pharmacy  7. Black seed oil 2000mg daily  8. Tumeric 450mg daily  9. Further procedures recommended: None at this time  10. Acupuncture:  Consider in the future when she is done with PT and pain psychology.    11. Follow up with Dr. Garrido in 3-6 months        Since her last visit, Paige Le reports:  - Doing REALLY well  - Her pain has been well controlled aside from her arms.  She has some muscle pain that does not follow any dermatomal distribution in both her arms. They feel \"heavy\"  This is new.   - Has been sick and was recently prescribed doxycycline for a sinus infection -  2/14/2018  - Got certified for scuba diving and is going to [place] next week for a 7 day vacation with her boyfriend. She feels comfortable in the water and it is really helpful for her chronic pain. She cannot run a 5K but she is good getting exercise in the pool.  The plan to [facility] line also.  She is knocking things off her bucket list.   - Would like to get into the travel clinic prior to leaving so she can get typhoid shot and malaria medications.   - Her boyfriend is a CD counselor and is in recovery also.  They met online and she states \"he is amazing\".   - Recently moved to [place], [place] because it is closer to her job.  [facility] is amazing.  Made sure she had a home group prior to moving there.   - Has not seen Michelle since October.  Saw her 8 times prior and continues to do a HEP.   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  She put some boundaries around her job.  Her job was taking over her life. She was working 60 hours/week.  She plans to work 40 hours/week now. 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  She was doing events 11 days out of the month.    - Pain is improved on the low dose naltrexone.  She has this delivered from the compound pharmacy and has to be home to receive this which is difficult.  Has some questions about research and evidence supporting this and wonders if she should stay on it.   - She is not taking naps during the day anymore.   - She is trying to get as much exercise as possible.  Walking her dog and walking while at work.        Pain scores:  Pain intensity on average is 2 on a scale of 0-10.     Current pain treatments:   Gabapentin 300mg qam and 600mg qhs  Topamax 25mg BID - Preventative for headaches  Low Dose Naltrexone 6mg qhs  Trazodone 50mg -100mg qhs  Maxalt 10mg PRN headache  Tumeric 450mg daily  Ibuprofen 800mg BID PRN  Prozac 80mg daily    Past pain treatments:      Side Effects: no side effect    Medications:  Current Outpatient Prescriptions   Medication Sig Dispense Refill     beclomethasone (QVAR) 80 MCG/ACT Inhaler Inhale 2 puffs into the lungs 2 times daily 1 Inhaler 11     doxycycline (VIBRAMYCIN) 100 MG capsule Take 1 capsule (100 mg) by mouth 2 times daily 20 capsule 0     fluconazole (DIFLUCAN) 150 MG tablet Take 1 tablet (150 mg) by mouth every 3 days 4 tablet 0     topiramate (TOPAMAX) 25 MG tablet TAKE ONE TABLET BY MOUTH TWICE A  tablet 0     FLUoxetine (PROZAC) 40 MG capsule TAKE TWO CAPSULES BY MOUTH EVERY  capsule 0     COMPOUNDED NON-CONTROLLED SUBSTANCE (CMPD RX) - PHARMACY TO MIX COMPOUNDED MEDICATION Low dose Naltrexone:  6mg " "capsules/tablets one PO qhs 30 capsule 6     gabapentin (NEURONTIN) 300 MG capsule Take 2 capsules (600 mg) by mouth 3 times daily (Patient taking differently: Take 300 mg by mouth 2 times daily ) 270 capsule 3     montelukast (SINGULAIR) 10 MG tablet Take 1 tablet (10 mg) by mouth At Bedtime 30 tablet 1     CALCIUM CARBONATE PO Take by mouth daily       MAGNESIUM CITRATE PO Take by mouth daily       Turmeric 450 MG CAPS        rizatriptan (MAXALT-MLT) 10 MG ODT tab Take 1 tablet (10 mg) by mouth at onset of headache for migraine May repeat in 2 hours. Max 3 tablets/24 hours. 18 tablet 3     albuterol (2.5 MG/3ML) 0.083% neb solution Take 1 vial (2.5 mg) by nebulization every 6 hours as needed for shortness of breath / dyspnea or wheezing 30 vial 1     albuterol (PROAIR HFA/PROVENTIL HFA/VENTOLIN HFA) 108 (90 BASE) MCG/ACT Inhaler Inhale 2 puffs into the lungs every 6 hours as needed for shortness of breath / dyspnea or wheezing 1 Inhaler 6     ipratropium - albuterol 0.5 mg/2.5 mg/3 mL (DUONEB) 0.5-2.5 (3) MG/3ML neb solution Take 1 vial (3 mLs) by nebulization once for 1 dose 1 Box 3     traZODone (DESYREL) 50 MG tablet Take 2 tablets by mouth. 1 -2 TABs  PO at bedtime prn insomnia 180 tablet 6     order for DME Equipment being ordered: Nebulizer 1 Device 0     B Complex Vitamins (VITAMIN B COMPLEX PO)        Ferrous Sulfate (IRON SUPPLEMENT PO)        Naproxen Sodium (ALEVE PO) Take 220 mg by mouth       cetirizine-psuedoePHEDrine (ZYRTEC-D) 5-120 MG per tablet Take 1 tablet by mouth 2 times daily 90 tablet 3     cholecalciferol (VITAMIN D) 1000 UNIT tablet Take 1 tablet by mouth daily.         Medical History: any changes in medical history since they were last seen? No    Review of Systems:  The 14 system ROS was reviewed from the intake questionnaire, and is positive for: sinus infection, cough and bilateral arm pain  Any bowel or bladder problems: none  Mood: \"really good\"    Physical Exam:  Blood pressure " 129/76, pulse 69, weight 113.4 kg (250 lb), SpO2 97 %, not currently breastfeeding.  General: NAD, awake and alert, energetic  Gait: Normal  MSK exam: tight muscle bands bilateral cervical paraspinals    Assessment:  Paige Le is a 47 year old female who presents with the complaints of chronic generalized myofascial pain and fatigue. She was previously enrolled in the pain clinic and participated diligently with physical therapy and pain psychology. She wishes to re-enroll in these services today as she has had an acute worsening of her pain and fatigue over the past 6 months.    1. Fibromyalgia  2. Migraine headaches - controlled with topamax   3. Depression - contolled  4. Alcohol use disorder - in sustained remission and active recovery    Plan:  Diagnosis reviewed, treatment option addressed, and risk/benefits discussed.  Self-care instructions given.  I am recommending a multidisciplinary treatment plan to help this patient better manage her pain.       1. Physical Therapy: She continues to do a HEP, completed 8 sessions of pain PT with Michelle  and got a lot of benefit from this.   2. Pain Psychologist to address issues of relaxation, behavioral change, coping style, and other factors important to improvement: Continues to see Bebe YUN  3. Diagnostic Studies: None  4. Medication Management:  -Continue Gabapentin at 300mg qam and 600mg qhs.                       -Naltrexone 6mg qhs - compound pharmacy  -Black seed oil 2000mg daily  -Tumeric 450mg daily  5. Further procedures recommended: None at this time  6. Acupuncture:  Could consider in the future.    7. Follow up with Dr. Garrido in 3-6 months      I spent 30 minutes of time face to face with the patient.  Greater than 50% of this time was spent in patient counseling and/or coordination of care.      Yvonne GarridoMD Pain Management

## 2018-02-23 ENCOUNTER — OFFICE VISIT (OUTPATIENT)
Dept: FAMILY MEDICINE | Facility: CLINIC | Age: 48
End: 2018-02-23
Payer: COMMERCIAL

## 2018-02-23 ENCOUNTER — TELEPHONE (OUTPATIENT)
Dept: FAMILY MEDICINE | Facility: CLINIC | Age: 48
End: 2018-02-23

## 2018-02-23 ENCOUNTER — OFFICE VISIT (OUTPATIENT)
Dept: PALLIATIVE MEDICINE | Facility: CLINIC | Age: 48
End: 2018-02-23
Payer: COMMERCIAL

## 2018-02-23 VITALS
DIASTOLIC BLOOD PRESSURE: 70 MMHG | HEART RATE: 65 BPM | WEIGHT: 258 LBS | OXYGEN SATURATION: 98 % | HEIGHT: 65 IN | SYSTOLIC BLOOD PRESSURE: 110 MMHG | TEMPERATURE: 98 F | RESPIRATION RATE: 16 BRPM | BODY MASS INDEX: 42.99 KG/M2

## 2018-02-23 VITALS
OXYGEN SATURATION: 97 % | SYSTOLIC BLOOD PRESSURE: 129 MMHG | HEART RATE: 69 BPM | BODY MASS INDEX: 41.6 KG/M2 | WEIGHT: 250 LBS | DIASTOLIC BLOOD PRESSURE: 76 MMHG

## 2018-02-23 DIAGNOSIS — F32.0 MILD MAJOR DEPRESSION (H): ICD-10-CM

## 2018-02-23 DIAGNOSIS — Z23 NEED FOR HEPATITIS A VACCINATION: ICD-10-CM

## 2018-02-23 DIAGNOSIS — M79.7 FIBROMYALGIA: Primary | ICD-10-CM

## 2018-02-23 DIAGNOSIS — G43.109 MIGRAINE WITH AURA AND WITHOUT STATUS MIGRAINOSUS, NOT INTRACTABLE: ICD-10-CM

## 2018-02-23 DIAGNOSIS — J45.901 EXACERBATION OF ASTHMA, UNSPECIFIED ASTHMA SEVERITY, UNSPECIFIED WHETHER PERSISTENT: ICD-10-CM

## 2018-02-23 DIAGNOSIS — Z29.89 NEED FOR MALARIA PROPHYLAXIS: Primary | ICD-10-CM

## 2018-02-23 PROCEDURE — 99214 OFFICE O/P EST MOD 30 MIN: CPT | Performed by: ANESTHESIOLOGY

## 2018-02-23 PROCEDURE — 99213 OFFICE O/P EST LOW 20 MIN: CPT | Performed by: NURSE PRACTITIONER

## 2018-02-23 RX ORDER — PREDNISONE 20 MG/1
20 TABLET ORAL DAILY
Qty: 5 TABLET | Refills: 0 | Status: SHIPPED | OUTPATIENT
Start: 2018-02-23 | End: 2019-01-18

## 2018-02-23 RX ORDER — CHLOROQUINE PHOSPHATE 500 MG/1
500 TABLET, COATED ORAL DAILY
Status: CANCELLED | OUTPATIENT
Start: 2018-02-23

## 2018-02-23 RX ORDER — DOXYCYCLINE 100 MG/1
100 CAPSULE ORAL DAILY
Qty: 40 CAPSULE | Refills: 0 | Status: SHIPPED | OUTPATIENT
Start: 2018-02-23 | End: 2018-07-06

## 2018-02-23 ASSESSMENT — PAIN SCALES - GENERAL: PAINLEVEL: MILD PAIN (2)

## 2018-02-23 NOTE — PATIENT INSTRUCTIONS
Oral: 500 mg (300 mg base) weekly on the same day each week; begin 1 to 2 weeks prior to exposure; continue while in endemic area and for 4 weeks after leaving endemic area (CDC 2018)

## 2018-02-23 NOTE — PATIENT INSTRUCTIONS
1. Continue low dose naltrexone  2. Follow up with me in 3-6 months.  Okay to call [facility] - I can see her for pain in the addiction clinic on Mondays or Tuesdays.     Yvonne Garrido MD     ----------------------------------------------------------------  Nurse Triage line:  [phone]   Call this number with any questions or concerns. You may leave a detailed message anytime. Calls are typically returned Monday through Friday between 8 AM and 4:30 PM. We usually get back to you within 2 business days depending on the issue/request.       Medication refills:    For non-narcotic medications, call your pharmacy directly to request a refill. The pharmacy will contact the [facility] Management Center for authorization. Please allow 3-4 days for these refills to be processed.     Scheduling number: [phone].  Call this number to schedule or change appointments.    We believe regular attendance is key to your success in our program.    Any time you are unable to keep your appointment we ask that you call us at least 24 hours in advance to let us know. This will allow us to offer the appointment time to another patient.

## 2018-02-23 NOTE — NURSING NOTE
"Chief Complaint   Patient presents with     Pain       Initial /76  Pulse 69  Wt 113.4 kg (250 lb)  LMP  (LMP Unknown)  SpO2 97%  BMI 41.6 kg/m2 Estimated body mass index is 41.6 kg/(m^2) as calculated from the following:    Height as of 2/14/18: 1.651 m (5' 5\").    Weight as of this encounter: 113.4 kg (250 lb).        Deborah RODRÍGUEZ LPN  [facility]     "

## 2018-02-23 NOTE — PROGRESS NOTES
SUBJECTIVE:   Paige Le is a 47 year old female who presents to clinic today for the following health issues:      Patient is here today requesting for Malaria Rx. She will be traveling to [place] for 7 day vacation and scuba diving. [place] said very bad mosquitos lately. There is a hyperbaric chamber on the resort for scuba divers. She has asthma, is bringing her nebulizer and will not scuba dive if she feels her asthma is acting up at all. She recently had a full physical with Dr. White on 2/14/18 to ensure she is safe to go. Also had visit with pain clinic today as her fibromyalgia is stable.         Needs typhoid. [facility] so she will get it there.   Needs hep A          Problem list and histories reviewed & adjusted, as indicated.  Additional history: as documented    Patient Active Problem List   Diagnosis     Anxiety state     Insomnia     Allergic rhinitis     Dysmenorrhea     Cystic Fibrosis Screening negative     Mild major depression (H)     Fibromyalgia     Patellofemoral disorder     Family history of aneurysm     Migraine     Obesity     Grieving     Insomnia     History of alcoholism (H)     Morbid obesity (H)     Moderate persistent asthma     Past Surgical History:   Procedure Laterality Date     ARTHROSCOPY KNEE RT/LT  1997    Left      DILATION AND CURETTAGE N/A 6/14/2017    Procedure: DILATION AND CURETTAGE;;  Surgeon: Livia Barnett DO;  Location:  OR     EXAM UNDER ANESTHESIA, ULTRASOUND N/A 6/14/2017    Procedure: EXAM UNDER ANESTHESIA, ULTRASOUND;  Ultrasound guided cervical dilation, IUD placement, Endometrial biopsy, repair of unavoidable cervical laceration;  Surgeon: Livia Barnett DO;  Location:  OR      TOOTH EXTRACTION W/FORCEP  1998    wisdom teeth      INSERT INTRAUTERINE DEVICE N/A 6/14/2017    Procedure: INSERT INTRAUTERINE DEVICE;;  Surgeon: Ericka  Livia Louie DO;  Location: [facility] OR       Social History   Substance Use Topics     Smoking status: Never Smoker     Smokeless tobacco: Never Used     Alcohol use No      Comment: recovering     Family History   Problem Relation Age of Onset     Neurologic Disorder Mother      ruptured cerebral aneurysm age 60      Hypertension Mother      Depression/Anxiety Mother      CEREBROVASCULAR DISEASE Mother      Obesity Mother      Macular Degeneration Mother      Musculoskeletal Disorder Father      OA      GASTROINTESTINAL DISEASE Father      colon polyps age 60     Other Cancer Father      Brain Cancer-neuroblastoma     Depression/Anxiety Father      Obesity Father      Gynecology Sister      Rachel. irregular menses.  pre-eclampsia     Aneurysm Sister      cerebral     HEART DISEASE Paternal Grandfather      Multiple MI's (first MI age 60)     DIABETES Paternal Grandfather      IDDM dx age 60  Severe/brittle/complications     Coronary Artery Disease Paternal Grandfather      Depression/Anxiety Paternal Grandfather      Obesity Paternal Grandfather      Gynecology Paternal Grandmother       of uterine CA in her 40's     Ovarian Cancer Paternal Grandmother       at age 40 from cancer     Eye Disorder Maternal Grandmother      glacucoma     DIABETES Maternal Grandmother      IDDM-onset age 70     Obesity Maternal Grandmother      Macular Degeneration Maternal Grandmother      Gynecology Paternal Aunt      uterine CA diagnosed @ 40yrs         Current Outpatient Prescriptions   Medication Sig Dispense Refill     doxycycline (VIBRAMYCIN) 100 MG capsule Take 1 capsule (100 mg) by mouth daily 40 capsule 0     predniSONE (DELTASONE) 20 MG tablet Take 1 tablet (20 mg) by mouth daily Take daily x 5 days prn asthma exacerbation. 5 tablet 0     beclomethasone (QVAR) 80 MCG/ACT Inhaler Inhale 2 puffs into the lungs 2 times daily 1 Inhaler 11     doxycycline (VIBRAMYCIN) 100 MG capsule Take 1 capsule (100 mg) by mouth 2 times daily  20 capsule 0     fluconazole (DIFLUCAN) 150 MG tablet Take 1 tablet (150 mg) by mouth every 3 days 4 tablet 0     topiramate (TOPAMAX) 25 MG tablet TAKE ONE TABLET BY MOUTH TWICE A  tablet 0     FLUoxetine (PROZAC) 40 MG capsule TAKE TWO CAPSULES BY MOUTH EVERY  capsule 0     COMPOUNDED NON-CONTROLLED SUBSTANCE (CMPD RX) - PHARMACY TO MIX COMPOUNDED MEDICATION Low dose Naltrexone:  6mg capsules/tablets one PO qhs 30 capsule 6     gabapentin (NEURONTIN) 300 MG capsule Take 2 capsules (600 mg) by mouth 3 times daily (Patient taking differently: Take 300 mg by mouth 2 times daily ) 270 capsule 3     montelukast (SINGULAIR) 10 MG tablet Take 1 tablet (10 mg) by mouth At Bedtime 30 tablet 1     CALCIUM CARBONATE PO Take by mouth daily       MAGNESIUM CITRATE PO Take by mouth daily       Turmeric 450 MG CAPS        rizatriptan (MAXALT-MLT) 10 MG ODT tab Take 1 tablet (10 mg) by mouth at onset of headache for migraine May repeat in 2 hours. Max 3 tablets/24 hours. 18 tablet 3     albuterol (2.5 MG/3ML) 0.083% neb solution Take 1 vial (2.5 mg) by nebulization every 6 hours as needed for shortness of breath / dyspnea or wheezing 30 vial 1     albuterol (PROAIR HFA/PROVENTIL HFA/VENTOLIN HFA) 108 (90 BASE) MCG/ACT Inhaler Inhale 2 puffs into the lungs every 6 hours as needed for shortness of breath / dyspnea or wheezing 1 Inhaler 6     traZODone (DESYREL) 50 MG tablet Take 2 tablets by mouth. 1 -2 TABs  PO at bedtime prn insomnia 180 tablet 6     order for DME Equipment being ordered: Nebulizer 1 Device 0     B Complex Vitamins (VITAMIN B COMPLEX PO)        Ferrous Sulfate (IRON SUPPLEMENT PO)        Naproxen Sodium (ALEVE PO) Take 220 mg by mouth       cetirizine-psuedoePHEDrine (ZYRTEC-D) 5-120 MG per tablet Take 1 tablet by mouth 2 times daily 90 tablet 3     cholecalciferol (VITAMIN D) 1000 UNIT tablet Take 1 tablet by mouth daily.       ipratropium - albuterol 0.5 mg/2.5 mg/3 mL (DUONEB) 0.5-2.5 (3) MG/3ML  "neb solution Take 1 vial (3 mLs) by nebulization once for 1 dose 1 Box 3     Allergies   Allergen Reactions     Augmentin      reacted to Augmentin--  stomach upset -- can take penicillin     Codeine Swelling     Milk Protein Extract Difficulty breathing     Products with milk cause congestion, sinus mucous, difficulty breathing       Reviewed and updated as needed this visit by clinical staff  Tobacco  Allergies  Meds  Problems  Med Hx  Surg Hx  Fam Hx  Soc Hx        Reviewed and updated as needed this visit by Provider  Allergies  Meds  Problems         ROS:  Constitutional, HEENT, cardiovascular, pulmonary, gi and gu systems are negative, except as otherwise noted.    OBJECTIVE:     /70 (BP Location: Right arm, Patient Position: Sitting, Cuff Size: Adult Large)  Pulse 65  Temp 98  F (36.7  C)  Resp 16  Ht 1.651 m (5' 5\")  Wt 117 kg (258 lb)  LMP  (LMP Unknown)  SpO2 98%  BMI 42.93 kg/m2  Body mass index is 42.93 kg/(m^2).  GENERAL: healthy, alert and no distress  RESP: lungs clear to auscultation - no rales, rhonchi or wheezes  CV: regular rate and rhythm, normal S1 S2, no S3 or S4, no murmur, click or rub, no peripheral edema and peripheral pulses strong  MS: no gross musculoskeletal defects noted, no edema    Diagnostic Test Results:  none     ASSESSMENT/PLAN:         1. Need for malaria prophylaxis  Take few days prior to going to [place] daily, then daily for 4 weeks after returning.   - doxycycline (VIBRAMYCIN) 100 MG capsule; Take 1 capsule (100 mg) by mouth daily  Dispense: 40 capsule; Refill: 0    2. Exacerbation of asthma, unspecified asthma severity, unspecified whether persistent  Take prn if developing acute asthma attack while in [place]. If you don't use this, and end up using it in the future, let your PCP know if/when you take it.   - predniSONE (DELTASONE) 20 MG tablet; Take 1 tablet (20 mg) by mouth daily Take daily x 5 days prn asthma exacerbation.  Dispense: 5 tablet; " Refill: 0    3. Need for hepatitis A vaccination  Will give.   - HEPATITIS A VACCINE (ADULT); Future    FUTURE APPOINTMENTS:       - Follow-up visit prn    SWETHA Meehan, NP-C  [facility]

## 2018-02-23 NOTE — NURSING NOTE
"Chief Complaint   Patient presents with     Medication Request     Malaria Rx        Initial Ht 1.651 m (5' 5\")  Wt 117 kg (258 lb)  LMP  (LMP Unknown)  BMI 42.93 kg/m2 Estimated body mass index is 42.93 kg/(m^2) as calculated from the following:    Height as of this encounter: 1.651 m (5' 5\").    Weight as of this encounter: 117 kg (258 lb).  Medication Reconciliation: complete     Tova Tapia, Medical Assistant      "

## 2018-02-23 NOTE — MR AVS SNAPSHOT
"              After Visit Summary   2/23/2018    Paige Le    MRN: [id number]           Patient Information     Date Of Birth          1970        Visit Information        Provider Department      2/23/2018 4:20 PM Sarah Devries NP [facility]        Today's Diagnoses     Need for malaria prophylaxis    -  1    Exacerbation of asthma, unspecified asthma severity, unspecified whether persistent        Need for hepatitis A vaccination          Care Instructions    Oral: 500 mg (300 mg base) weekly on the same day each week; begin 1 to 2 weeks prior to exposure; continue while in endemic area and for 4 weeks after leaving endemic area ([facility] 2018)            Follow-ups after your visit        Who to contact     If you have questions or need follow up information about today's clinic visit or your schedule please contact [facility] directly at [phone].  Normal or non-critical lab and imaging results will be communicated to you by VIOlifehart, letter or phone within 4 business days after the clinic has received the results. If you do not hear from us within 7 days, please contact the clinic through MyChart or phone. If you have a critical or abnormal lab result, we will notify you by phone as soon as possible.  Submit refill requests through [employer] or call your pharmacy and they will forward the refill request to us. Please allow 3 business days for your refill to be completed.          Additional Information About Your Visit        VIOlifehart Information     [employer] lets you send messages to your doctor, view your test results, renew your prescriptions, schedule appointments and more. To sign up, go to [email or web address] . Click on \"Log in\" on the left side of the screen, which will take you to the Welcome page. Then click on \"Sign up Now\" on the right side of the page.     You will be asked to enter the access code listed below, as well as some personal information. Please " "follow the directions to create your username and password.     Your access code is: [email or web address]  Expires: 3/26/2018  5:27 PM     Your access code will  in 90 days. If you need help or a new code, please call your [place] clinic or [phone].        Care EveryWhere ID     This is your Care EveryWhere ID. This could be used by other organizations to access your [place] medical records  [id number]        Your Vitals Were     Pulse Temperature Respirations Height Last Period Pulse Oximetry    65 98  F (36.7  C) 16 1.651 m (5' 5\") (LMP Unknown) 98%    BMI (Body Mass Index)                   42.93 kg/m2            Blood Pressure from Last 3 Encounters:   18 110/70   18 129/76   18 102/70    Weight from Last 3 Encounters:   18 117 kg (258 lb)   18 113.4 kg (250 lb)   18 116.1 kg (256 lb)                 Today's Medication Changes          These changes are accurate as of 18 11:59 PM.  If you have any questions, ask your nurse or doctor.               Start taking these medicines.        Dose/Directions    predniSONE 20 MG tablet   Commonly known as:  DELTASONE   Used for:  Exacerbation of asthma, unspecified asthma severity, unspecified whether persistent   Started by:  Sarah Devries NP        Dose:  20 mg   Take 1 tablet (20 mg) by mouth daily Take daily x 5 days prn asthma exacerbation.   Quantity:  5 tablet   Refills:  0         These medicines have changed or have updated prescriptions.        Dose/Directions    * doxycycline 100 MG capsule   Commonly known as:  VIBRAMYCIN   This may have changed:  Another medication with the same name was added. Make sure you understand how and when to take each.   Used for:  Acute maxillary sinusitis, recurrence not specified   Changed by:  Sarah Devries NP        Dose:  100 mg   Take 1 capsule (100 mg) by mouth 2 times daily   Quantity:  20 capsule   Refills:  0       * doxycycline 100 MG capsule   Commonly known as:  VIBRAMYCIN "   This may have changed:  You were already taking a medication with the same name, and this prescription was added. Make sure you understand how and when to take each.   Used for:  Need for malaria prophylaxis   Changed by:  Sarah Devries NP        Dose:  100 mg   Take 1 capsule (100 mg) by mouth daily   Quantity:  40 capsule   Refills:  0       gabapentin 300 MG capsule   Commonly known as:  NEURONTIN   This may have changed:    - how much to take  - when to take this   Used for:  Fibromyalgia, Menopausal syndrome (hot flashes)        Dose:  600 mg   Take 2 capsules (600 mg) by mouth 3 times daily   Quantity:  270 capsule   Refills:  3       * Notice:  This list has 2 medication(s) that are the same as other medications prescribed for you. Read the directions carefully, and ask your doctor or other care provider to review them with you.         Where to get your medicines      These medications were sent to [facility] [place], [place] - [address]  [address], [facility] [address]     Phone:  [phone]     doxycycline 100 MG capsule    predniSONE 20 MG tablet                Primary Care Provider Office Phone # Fax #    Christine Farnsworth PA-C [phone] [address]  [facility] [address]        Equal Access to Services     CLARK KABA AH: Hadii michelle barryo Sokyle, waaxda luqadaha, qaybta kaalmada adeelbayada, marisol stephens. So [facility] [phone].    ATENCIÓN: Si habla español, tiene a greene disposición servicios gratuitos de asistencia lingüística. Nila al [phone].    We comply with applicable federal civil rights laws and [place] laws. We do not discriminate on the basis of race, color, national origin, age, disability, sex, sexual orientation, or gender identity.            Thank you!     Thank you for choosing [facility]  for your care. Our goal is always to provide you with  excellent care. Hearing back from our patients is one way we can continue to improve our services. Please take a few minutes to complete the written survey that you may receive in the mail after your visit with us. Thank you!             Your Updated Medication List - Protect others around you: Learn how to safely use, store and throw away your medicines at www.disposemymeds.org.          This list is accurate as of 2/23/18 11:59 PM.  Always use your most recent med list.                   Brand Name Dispense Instructions for use Diagnosis    * albuterol (2.5 MG/3ML) 0.083% neb solution     30 vial    Take 1 vial (2.5 mg) by nebulization every 6 hours as needed for shortness of breath / dyspnea or wheezing    Cough, Moderate persistent asthma with acute exacerbation       * albuterol 108 (90 BASE) MCG/ACT Inhaler    PROAIR HFA/PROVENTIL HFA/VENTOLIN HFA    1 Inhaler    Inhale 2 puffs into the lungs every 6 hours as needed for shortness of breath / dyspnea or wheezing    Cough, Moderate persistent asthma with acute exacerbation       ALEVE PO      Take 220 mg by mouth        beclomethasone 80 MCG/ACT Inhaler    QVAR    1 Inhaler    Inhale 2 puffs into the lungs 2 times daily    Cough       CALCIUM CARBONATE PO      Take by mouth daily        cetirizine-psuedoePHEDrine 5-120 MG per 12 hr tablet    ZYRTEC-D    90 tablet    Take 1 tablet by mouth 2 times daily    Allergic rhinitis, cause unspecified       cholecalciferol 1000 UNIT tablet    vitamin D3     Take 1 tablet by mouth daily.        COMPOUNDED NON-CONTROLLED SUBSTANCE - PHARMACY TO MIX COMPOUNDED MEDICATION    CMPD RX    30 capsule    Low dose Naltrexone:  6mg capsules/tablets one PO qhs    Fibromyalgia       * doxycycline 100 MG capsule    VIBRAMYCIN    20 capsule    Take 1 capsule (100 mg) by mouth 2 times daily    Acute maxillary sinusitis, recurrence not specified       * doxycycline 100 MG capsule    VIBRAMYCIN    40 capsule    Take 1 capsule (100 mg) by  mouth daily    Need for malaria prophylaxis       fluconazole 150 MG tablet    DIFLUCAN    4 tablet    Take 1 tablet (150 mg) by mouth every 3 days    Acute bronchitis, unspecified organism       FLUoxetine 40 MG capsule    PROzac    180 capsule    TAKE TWO CAPSULES BY MOUTH EVERY DAY    Major depressive disorder, recurrent episode, mild (H)       gabapentin 300 MG capsule    NEURONTIN    270 capsule    Take 2 capsules (600 mg) by mouth 3 times daily    Fibromyalgia, Menopausal syndrome (hot flashes)       ipratropium - albuterol 0.5 mg/2.5 mg/3 mL 0.5-2.5 (3) MG/3ML neb solution    DUONEB    1 Box    Take 1 vial (3 mLs) by nebulization once for 1 dose    Cough, Moderate persistent asthma with acute exacerbation       IRON SUPPLEMENT PO           MAGNESIUM CITRATE PO      Take by mouth daily        montelukast 10 MG tablet    SINGULAIR    30 tablet    Take 1 tablet (10 mg) by mouth At Bedtime    Acute bronchitis, unspecified organism       order for DME     1 Device    Equipment being ordered: Nebulizer    CAP (community acquired pneumonia)       predniSONE 20 MG tablet    DELTASONE    5 tablet    Take 1 tablet (20 mg) by mouth daily Take daily x 5 days prn asthma exacerbation.    Exacerbation of asthma, unspecified asthma severity, unspecified whether persistent       rizatriptan 10 MG ODT tab    MAXALT-MLT    18 tablet    Take 1 tablet (10 mg) by mouth at onset of headache for migraine May repeat in 2 hours. Max 3 tablets/24 hours.    Migraine with aura and without status migrainosus, not intractable       topiramate 25 MG tablet    TOPAMAX    180 tablet    TAKE ONE TABLET BY MOUTH TWICE A DAY    TMJ (temporomandibular joint syndrome), Cervicalgia       traZODone 50 MG tablet    DESYREL    180 tablet    Take 2 tablets by mouth. 1 -2 TABs  PO at bedtime prn insomnia    Insomnia, unspecified       Turmeric 450 MG Caps           VITAMIN B COMPLEX PO           * Notice:  This list has 4 medication(s) that are the same  as other medications prescribed for you. Read the directions carefully, and ask your doctor or other care provider to review them with you.

## 2018-02-23 NOTE — MR AVS SNAPSHOT
After Visit Summary   2/23/2018    Paige Le    MRN: [id number]           Patient Information     Date Of Birth          1970        Visit Information        Provider Department      2/23/2018 10:30 AM Yvonne Garrido MD [facility]        Care Instructions        ----------------------------------------------------------------  Nurse Triage line:  [phone]   Call this number with any questions or concerns. You may leave a detailed message anytime. Calls are typically returned Monday through Friday between 8 AM and 4:30 PM. We usually get back to you within 2 business days depending on the issue/request.       Medication refills:    For non-narcotic medications, call your pharmacy directly to request a refill. The pharmacy will contact the [facility] for authorization. Please allow 3-4 days for these refills to be processed.     Scheduling number: [phone].  Call this number to schedule or change appointments.    We believe regular attendance is key to your success in our program.    Any time you are unable to keep your appointment we ask that you call us at least 24 hours in advance to let us know. This will allow us to offer the appointment time to another patient.               Follow-ups after your visit        Who to contact     If you have questions or need follow up information about today's clinic visit or your schedule please contact [facility] directly at [phone].  Normal or non-critical lab and imaging results will be communicated to you by MyChart, letter or phone within 4 business days after the clinic has received the results. If you do not hear from us within 7 days, please contact the clinic through [employer] or phone. If you have a critical or abnormal lab result, we will notify you by phone as soon as possible.  Submit refill requests through [employer] or call your pharmacy and they will forward the refill request to us.  "Please allow 3 business days for your refill to be completed.          Additional Information About Your Visit        PharmAbcinehart Information     PharmAbcinehart lets you send messages to your doctor, view your test results, renew your prescriptions, schedule appointments and more. To sign up, go to [email or web address] . Click on \"Log in\" on the left side of the screen, which will take you to the Welcome page. Then click on \"Sign up Now\" on the right side of the page.     You will be asked to enter the access code listed below, as well as some personal information. Please follow the directions to create your username and password.     Your access code is: [email or web address]  Expires: 3/26/2018  5:27 PM     Your access code will  in 90 days. If you need help or a new code, please call your [place] clinic or [phone].        Care EveryWhere ID     This is your Care EveryWhere ID. This could be used by other organizations to access your [place] medical records  [id number]        Your Vitals Were     Pulse Last Period Pulse Oximetry BMI (Body Mass Index)          69 (LMP Unknown) 97% 41.6 kg/m2         Blood Pressure from Last 3 Encounters:   18 129/76   18 102/70   18 113/78    Weight from Last 3 Encounters:   18 113.4 kg (250 lb)   18 116.1 kg (256 lb)   18 117 kg (257 lb 14.4 oz)              Today, you had the following     No orders found for display         Today's Medication Changes          These changes are accurate as of 18 12:11 PM.  If you have any questions, ask your nurse or doctor.               These medicines have changed or have updated prescriptions.        Dose/Directions    gabapentin 300 MG capsule   Commonly known as:  NEURONTIN   This may have changed:    - how much to take  - when to take this   Used for:  Fibromyalgia, Menopausal syndrome (hot flashes)        Dose:  600 mg   Take 2 capsules (600 mg) by mouth 3 times daily   Quantity:  270 capsule "   Refills:  3                Primary Care Provider Office Phone # Fax #    Christine TAWNY Farnsworth PA-C [phone] [address] [facility] [address]        Equal Access to Services     CLARK KABA : Haddea michelle patel jhonnyo Sojuditali, waaxda luqadaha, qaybta kaalmada adeegyada, marisol hu laArabellatrina stephens. So [facility] [phone].    ATENCIÓN: Si habla español, tiene a greene disposición servicios gratuitos de asistencia lingüística. Llame al [phone].    We comply with applicable federal civil rights laws and [place] laws. We do not discriminate on the basis of race, color, national origin, age, disability, sex, sexual orientation, or gender identity.            Thank you!     Thank you for choosing [facility]  for your care. Our goal is always to provide you with excellent care. Hearing back from our patients is one way we can continue to improve our services. Please take a few minutes to complete the written survey that you may receive in the mail after your visit with us. Thank you!             Your Updated Medication List - Protect others around you: Learn how to safely use, store and throw away your medicines at www.disposemymeds.org.          This list is accurate as of 2/23/18 12:11 PM.  Always use your most recent med list.                   Brand Name Dispense Instructions for use Diagnosis    * albuterol (2.5 MG/3ML) 0.083% neb solution     30 vial    Take 1 vial (2.5 mg) by nebulization every 6 hours as needed for shortness of breath / dyspnea or wheezing    Cough, Moderate persistent asthma with acute exacerbation       * albuterol 108 (90 BASE) MCG/ACT Inhaler    PROAIR HFA/PROVENTIL HFA/VENTOLIN HFA    1 Inhaler    Inhale 2 puffs into the lungs every 6 hours as needed for shortness of breath / dyspnea or wheezing    Cough, Moderate persistent asthma with acute exacerbation       ALEVE PO      Take 220 mg by mouth        beclomethasone 80 MCG/ACT Inhaler     QVAR    1 Inhaler    Inhale 2 puffs into the lungs 2 times daily    Cough       CALCIUM CARBONATE PO      Take by mouth daily        cetirizine-psuedoePHEDrine 5-120 MG per 12 hr tablet    ZYRTEC-D    90 tablet    Take 1 tablet by mouth 2 times daily    Allergic rhinitis, cause unspecified       cholecalciferol 1000 UNIT tablet    vitamin D3     Take 1 tablet by mouth daily.        COMPOUNDED NON-CONTROLLED SUBSTANCE - PHARMACY TO MIX COMPOUNDED MEDICATION    CMPD RX    30 capsule    Low dose Naltrexone:  6mg capsules/tablets one PO qhs    Fibromyalgia       doxycycline 100 MG capsule    VIBRAMYCIN    20 capsule    Take 1 capsule (100 mg) by mouth 2 times daily    Acute maxillary sinusitis, recurrence not specified       fluconazole 150 MG tablet    DIFLUCAN    4 tablet    Take 1 tablet (150 mg) by mouth every 3 days    Acute bronchitis, unspecified organism       FLUoxetine 40 MG capsule    PROzac    180 capsule    TAKE TWO CAPSULES BY MOUTH EVERY DAY    Major depressive disorder, recurrent episode, mild (H)       gabapentin 300 MG capsule    NEURONTIN    270 capsule    Take 2 capsules (600 mg) by mouth 3 times daily    Fibromyalgia, Menopausal syndrome (hot flashes)       ipratropium - albuterol 0.5 mg/2.5 mg/3 mL 0.5-2.5 (3) MG/3ML neb solution    DUONEB    1 Box    Take 1 vial (3 mLs) by nebulization once for 1 dose    Cough, Moderate persistent asthma with acute exacerbation       IRON SUPPLEMENT PO           MAGNESIUM CITRATE PO      Take by mouth daily        montelukast 10 MG tablet    SINGULAIR    30 tablet    Take 1 tablet (10 mg) by mouth At Bedtime    Acute bronchitis, unspecified organism       order for DME     1 Device    Equipment being ordered: Nebulizer    CAP (community acquired pneumonia)       rizatriptan 10 MG ODT tab    MAXALT-MLT    18 tablet    Take 1 tablet (10 mg) by mouth at onset of headache for migraine May repeat in 2 hours. Max 3 tablets/24 hours.    Migraine with aura and without  status migrainosus, not intractable       topiramate 25 MG tablet    TOPAMAX    180 tablet    TAKE ONE TABLET BY MOUTH TWICE A DAY    TMJ (temporomandibular joint syndrome), Cervicalgia       traZODone 50 MG tablet    DESYREL    180 tablet    Take 2 tablets by mouth. 1 -2 TABs  PO at bedtime prn insomnia    Insomnia, unspecified       Turmeric 450 MG Caps           VITAMIN B COMPLEX PO           * Notice:  This list has 2 medication(s) that are the same as other medications prescribed for you. Read the directions carefully, and ask your doctor or other care provider to review them with you.

## 2018-02-27 ENCOUNTER — ALLIED HEALTH/NURSE VISIT (OUTPATIENT)
Dept: NURSING | Facility: CLINIC | Age: 48
End: 2018-02-27
Payer: COMMERCIAL

## 2018-02-27 DIAGNOSIS — Z23 NEED FOR HEPATITIS A VACCINATION: ICD-10-CM

## 2018-02-27 PROCEDURE — 90471 IMMUNIZATION ADMIN: CPT

## 2018-02-27 PROCEDURE — 90632 HEPA VACCINE ADULT IM: CPT

## 2018-02-27 NOTE — MR AVS SNAPSHOT
"              After Visit Summary   2018    Paige Le    MRN: [id number]           Patient Information     Date Of Birth          1970        Visit Information        Provider Department      2018 1:45 PM CP ANCILLARY [facility]        Today's Diagnoses     Need for hepatitis A vaccination           Follow-ups after your visit        Who to contact     If you have questions or need follow up information about today's clinic visit or your schedule please contact [facility] directly at [phone].  Normal or non-critical lab and imaging results will be communicated to you by MyChart, letter or phone within 4 business days after the clinic has received the results. If you do not hear from us within 7 days, please contact the clinic through [employer] or phone. If you have a critical or abnormal lab result, we will notify you by phone as soon as possible.  Submit refill requests through [employer] or call your pharmacy and they will forward the refill request to us. Please allow 3 business days for your refill to be completed.          Additional Information About Your Visit        MyChart Information     [employer] lets you send messages to your doctor, view your test results, renew your prescriptions, schedule appointments and more. To sign up, go to [email or web address] . Click on \"Log in\" on the left side of the screen, which will take you to the Welcome page. Then click on \"Sign up Now\" on the right side of the page.     You will be asked to enter the access code listed below, as well as some personal information. Please follow the directions to create your username and password.     Your access code is: [email or web address]  Expires: 3/26/2018  5:27 PM     Your access code will  in 90 days. If you need help or a new code, please call your [facility] or [phone].        Care EveryWhere ID     This is your Care EveryWhere ID. This could be used by " other organizations to access your [place] medical records  [id number]        Your Vitals Were     Last Period                   (LMP Unknown)            Blood Pressure from Last 3 Encounters:   02/23/18 110/70   02/23/18 129/76   02/14/18 102/70    Weight from Last 3 Encounters:   02/23/18 258 lb (117 kg)   02/23/18 250 lb (113.4 kg)   02/14/18 256 lb (116.1 kg)              We Performed the Following     HEPATITIS A VACCINE (ADULT)     VACCINE ADMINISTRATION, INITIAL          Today's Medication Changes          These changes are accurate as of 2/27/18  2:14 PM.  If you have any questions, ask your nurse or doctor.               These medicines have changed or have updated prescriptions.        Dose/Directions    gabapentin 300 MG capsule   Commonly known as:  NEURONTIN   This may have changed:    - how much to take  - when to take this   Used for:  Fibromyalgia, Menopausal syndrome (hot flashes)        Dose:  600 mg   Take 2 capsules (600 mg) by mouth 3 times daily   Quantity:  270 capsule   Refills:  3                Primary Care Provider Office Phone # Fax #    Christine Farnsworth PA-C [phone] [address] [facility] [address]        Equal Access to Services     LITZY KABA : Hadii michelle Villalpando, waaxda mary kay, qaybta kaalmada vern, marisol milner . So [facility] [phone].    ATENCIÓN: Si habla español, tiene a greene disposición servicios gratuitos de asistencia lingüística. Llame al [phone].    We comply with applicable federal civil rights laws and [place] laws. We do not discriminate on the basis of race, color, national origin, age, disability, sex, sexual orientation, or gender identity.            Thank you!     Thank you for choosing [facility]  for your care. Our goal is always to provide you with excellent care. Hearing back from our patients is one way we can continue to improve our services. Please take  a few minutes to complete the written survey that you may receive in the mail after your visit with us. Thank you!             Your Updated Medication List - Protect others around you: Learn how to safely use, store and throw away your medicines at www.disposemymeds.org.          This list is accurate as of 2/27/18  2:14 PM.  Always use your most recent med list.                   Brand Name Dispense Instructions for use Diagnosis    * albuterol (2.5 MG/3ML) 0.083% neb solution     30 vial    Take 1 vial (2.5 mg) by nebulization every 6 hours as needed for shortness of breath / dyspnea or wheezing    Cough, Moderate persistent asthma with acute exacerbation       * albuterol 108 (90 BASE) MCG/ACT Inhaler    PROAIR HFA/PROVENTIL HFA/VENTOLIN HFA    1 Inhaler    Inhale 2 puffs into the lungs every 6 hours as needed for shortness of breath / dyspnea or wheezing    Cough, Moderate persistent asthma with acute exacerbation       ALEVE PO      Take 220 mg by mouth        beclomethasone 80 MCG/ACT Inhaler    QVAR    1 Inhaler    Inhale 2 puffs into the lungs 2 times daily    Cough       CALCIUM CARBONATE PO      Take by mouth daily        cetirizine-psuedoePHEDrine 5-120 MG per 12 hr tablet    ZYRTEC-D    90 tablet    Take 1 tablet by mouth 2 times daily    Allergic rhinitis, cause unspecified       cholecalciferol 1000 UNIT tablet    vitamin D3     Take 1 tablet by mouth daily.        COMPOUNDED NON-CONTROLLED SUBSTANCE - PHARMACY TO MIX COMPOUNDED MEDICATION    CMPD RX    30 capsule    Low dose Naltrexone:  6mg capsules/tablets one PO qhs    Fibromyalgia       * doxycycline 100 MG capsule    VIBRAMYCIN    20 capsule    Take 1 capsule (100 mg) by mouth 2 times daily    Acute maxillary sinusitis, recurrence not specified       * doxycycline 100 MG capsule    VIBRAMYCIN    40 capsule    Take 1 capsule (100 mg) by mouth daily    Need for malaria prophylaxis       fluconazole 150 MG tablet    DIFLUCAN    4 tablet    Take 1  tablet (150 mg) by mouth every 3 days    Acute bronchitis, unspecified organism       FLUoxetine 40 MG capsule    PROzac    180 capsule    TAKE TWO CAPSULES BY MOUTH EVERY DAY    Major depressive disorder, recurrent episode, mild (H)       gabapentin 300 MG capsule    NEURONTIN    270 capsule    Take 2 capsules (600 mg) by mouth 3 times daily    Fibromyalgia, Menopausal syndrome (hot flashes)       ipratropium - albuterol 0.5 mg/2.5 mg/3 mL 0.5-2.5 (3) MG/3ML neb solution    DUONEB    1 Box    Take 1 vial (3 mLs) by nebulization once for 1 dose    Cough, Moderate persistent asthma with acute exacerbation       IRON SUPPLEMENT PO           MAGNESIUM CITRATE PO      Take by mouth daily        montelukast 10 MG tablet    SINGULAIR    30 tablet    Take 1 tablet (10 mg) by mouth At Bedtime    Acute bronchitis, unspecified organism       order for DME     1 Device    Equipment being ordered: Nebulizer    CAP (community acquired pneumonia)       predniSONE 20 MG tablet    DELTASONE    5 tablet    Take 1 tablet (20 mg) by mouth daily Take daily x 5 days prn asthma exacerbation.    Exacerbation of asthma, unspecified asthma severity, unspecified whether persistent       rizatriptan 10 MG ODT tab    MAXALT-MLT    18 tablet    Take 1 tablet (10 mg) by mouth at onset of headache for migraine May repeat in 2 hours. Max 3 tablets/24 hours.    Migraine with aura and without status migrainosus, not intractable       topiramate 25 MG tablet    TOPAMAX    180 tablet    TAKE ONE TABLET BY MOUTH TWICE A DAY    TMJ (temporomandibular joint syndrome), Cervicalgia       traZODone 50 MG tablet    DESYREL    180 tablet    Take 2 tablets by mouth. 1 -2 TABs  PO at bedtime prn insomnia    Insomnia, unspecified       Turmeric 450 MG Caps           VITAMIN B COMPLEX PO           * Notice:  This list has 4 medication(s) that are the same as other medications prescribed for you. Read the directions carefully, and ask your doctor or other care  provider to review them with you.

## 2018-02-27 NOTE — NURSING NOTE
Screening Questionnaire for Adult Immunization    Are you sick today?   No   Do you have allergies to medications, food, a vaccine component or latex?   No   Have you ever had a serious reaction after receiving a vaccination?   No   Do you have a long-term health problem with heart disease, lung disease, asthma, kidney disease, metabolic disease (e.g. diabetes), anemia, or other blood disorder?   No   Do you have cancer, leukemia, HIV/AIDS, or any other immune system problem?   No   In the past 3 months, have you taken medications that affect  your immune system, such as prednisone, other steroids, or anticancer drugs; drugs for the treatment of rheumatoid arthritis, Crohn s disease, or psoriasis; or have you had radiation treatments?   No   Have you had a seizure, or a brain or other nervous system problem?   No   During the past year, have you received a transfusion of blood or blood     products, or been given immune (gamma) globulin or antiviral drug?   No   For women: Are you pregnant or is there a chance you could become        pregnant during the next month?   No   Have you received any vaccinations in the past 4 weeks?   Yes     Immunization questionnaire was positive for at least one answer.  Notified Evelyn Rodriguez.        Patient instructed to remain in clinic for 15 minutes afterwards, and to report any adverse reaction to me immediately.  Prior to injection verified patient identity using patient's name and date of birth.  Vaccine information supplied.     Screening performed by Juany Tapia on 2/27/2018 at 2:13 PM.

## 2018-03-20 DIAGNOSIS — M26.609 TMJ (TEMPOROMANDIBULAR JOINT SYNDROME): ICD-10-CM

## 2018-03-20 DIAGNOSIS — M54.2 CERVICALGIA: ICD-10-CM

## 2018-03-20 NOTE — TELEPHONE ENCOUNTER
"Requested Prescriptions   Pending Prescriptions Disp Refills     topiramate (TOPAMAX) 25 MG tablet  Last Written Prescription Date:  12/29/17  Last Fill Quantity: 180 tablet,  # refills: 0   Last office visit: 2/23/2018 with prescribing provider:  Dr. White   Future Office Visit:   180 tablet 0     Sig: Take 1 tablet (25 mg) by mouth 2 times daily    Anticonvulsants Protocol  Failed    3/20/2018 10:49 AM       Failed - Review Authorizing provider's last note.     Refer to last progress notes: confirm request is for original authorizing provider (cannot be through other providers).         Failed - Normal serum creatinine on file in past 6 months    Recent Labs   Lab Test  05/17/17   1216   CR  0.83            Passed - No active pregnancy on record       Passed - No positive pregnancy test in last 12 months       Passed - Recent (6 mo) or future (30 days) visit within the authorizing provider's specialty    Patient had office visit in the last 6 months or has a visit in the next 30 days with authorizing provider or within the authorizing provider's specialty.  See \"Patient Info\" tab in inbasket, or \"Choose Columns\" in Meds & Orders section of the refill encounter.              "

## 2018-03-21 RX ORDER — TOPIRAMATE 25 MG/1
25 TABLET, FILM COATED ORAL 2 TIMES DAILY
Qty: 180 TABLET | Refills: 0 | Status: SHIPPED | OUTPATIENT
Start: 2018-03-21 | End: 2018-07-12

## 2018-03-21 NOTE — TELEPHONE ENCOUNTER
Routing refill request to provider for review/approval because:  Labs not current:  Creatinine    Min Pa RN, BSN

## 2018-05-18 DIAGNOSIS — F33.0 MAJOR DEPRESSIVE DISORDER, RECURRENT EPISODE, MILD (H): ICD-10-CM

## 2018-05-18 NOTE — TELEPHONE ENCOUNTER
"Requested Prescriptions   Pending Prescriptions Disp Refills     FLUoxetine (PROZAC) 40 MG capsule [Pharmacy Med Name: FLUOXETINE HCL 40MG CAPS]    Last Written Prescription Date:  12/29/17  Last Fill Quantity: 180 capsule,  # refills: 0   Last office visit: 2/23/2018 with prescribing provider:  Jaycob   Future Office Visit:     180 capsule 0     Sig: TAKE TWO CAPSULES BY MOUTH EVERY DAY    SSRIs Protocol Passed    5/18/2018 12:55 PM       Passed - PHQ-9 score less than 5 in past 6 months    Please review last PHQ-9 score.          Passed - Patient is age 18 or older       Passed - No active pregnancy on record       Passed - No positive pregnancy test in last 12 months       Passed - Recent (6 mo) or future (30 days) visit within the authorizing provider's specialty    Patient had office visit in the last 6 months or has a visit in the next 30 days with authorizing provider or within the authorizing provider's specialty.  See \"Patient Info\" tab in inbasket, or \"Choose Columns\" in Meds & Orders section of the refill encounter.              "

## 2018-05-18 NOTE — TELEPHONE ENCOUNTER
"Requested Prescriptions   Pending Prescriptions Disp Refills     FLUoxetine (PROZAC) 40 MG capsule [Pharmacy Med Name: FLUOXETINE HCL 40MG CAPS] 180 capsule 0    Last Written Prescription Date:  12/29/2017  Last Fill Quantity: 180 CAPSULE,  # refills: 0   Last office visit: 2/23/2018 with prescribing provider:  02/23/2018   Future Office Visit:     Sig: TAKE TWO CAPSULES BY MOUTH EVERY DAY    SSRIs Protocol Passed    5/18/2018  9:33 AM       Passed - PHQ-9 score less than 5 in past 6 months    Please review last PHQ-9 score.          Passed - Patient is age 18 or older       Passed - No active pregnancy on record       Passed - No positive pregnancy test in last 12 months       Passed - Recent (6 mo) or future (30 days) visit within the authorizing provider's specialty    Patient had office visit in the last 6 months or has a visit in the next 30 days with authorizing provider or within the authorizing provider's specialty.  See \"Patient Info\" tab in inbasket, or \"Choose Columns\" in Meds & Orders section of the refill encounter.              Yousuf Thacker XRT  "

## 2018-05-21 RX ORDER — FLUOXETINE 40 MG/1
CAPSULE ORAL
Qty: 180 CAPSULE | Refills: 0 | Status: SHIPPED | OUTPATIENT
Start: 2018-05-21 | End: 2018-09-13

## 2018-05-21 NOTE — TELEPHONE ENCOUNTER
Prescription approved per [facility] Refill Protocol.    Jazlyn Hwang, RN  Patient Care Supervisor  [facility]  [phone]

## 2018-05-21 NOTE — TELEPHONE ENCOUNTER
PHQ-9 SCORE 7/14/2016 5/17/2017 1/31/2018   Total Score - - -   Total Score 3 5 2     KACEY-7 SCORE 7/14/2016 5/17/2017 1/31/2018   Total Score - - -   Total Score 3 1 0         Routing refill request to provider for review/approval because:    Pt living in [place], has been seen lately in [place] and [facility], last appt in [place] with PCP was 9.5.17  Do you want last provider to fill?    Sanaz Hernandez RN, BS  Clinical Nurse Triage.

## 2018-05-22 RX ORDER — FLUOXETINE 40 MG/1
CAPSULE ORAL
Qty: 180 CAPSULE | Refills: 0 | Status: SHIPPED | OUTPATIENT
Start: 2018-05-22 | End: 2018-08-25

## 2018-06-04 ENCOUNTER — TELEPHONE (OUTPATIENT)
Dept: PALLIATIVE MEDICINE | Facility: CLINIC | Age: 48
End: 2018-06-04

## 2018-06-04 NOTE — TELEPHONE ENCOUNTER
Pt is requesting a referral to a sleep clinic. Pt states that this was discussed during her last OV.

## 2018-06-04 NOTE — TELEPHONE ENCOUNTER
Please have her ask her PCP to place a referral for this.   I agree she should have one.     Yvonne Garrido MD

## 2018-06-04 NOTE — TELEPHONE ENCOUNTER
I don't see any mention of this in the last OV. Routing to provider to review.     Tricia Duran   BSN-RN Care Coordinator  [facility] [place]

## 2018-06-05 NOTE — TELEPHONE ENCOUNTER
Received call from patient in regards to sleep study. Advised of message below. Patient expressed understanding.      Mirela Hendrix    [facility]

## 2018-06-20 ENCOUNTER — OFFICE VISIT (OUTPATIENT)
Dept: SLEEP MEDICINE | Facility: CLINIC | Age: 48
End: 2018-06-20
Payer: COMMERCIAL

## 2018-06-20 VITALS
RESPIRATION RATE: 16 BRPM | SYSTOLIC BLOOD PRESSURE: 120 MMHG | HEART RATE: 59 BPM | WEIGHT: 259 LBS | OXYGEN SATURATION: 95 % | BODY MASS INDEX: 43.15 KG/M2 | HEIGHT: 65 IN | DIASTOLIC BLOOD PRESSURE: 77 MMHG

## 2018-06-20 DIAGNOSIS — R29.818 SUSPECTED SLEEP APNEA: Primary | ICD-10-CM

## 2018-06-20 DIAGNOSIS — R06.83 SNORING: ICD-10-CM

## 2018-06-20 DIAGNOSIS — G47.19 EXCESSIVE DAYTIME SLEEPINESS: ICD-10-CM

## 2018-06-20 PROCEDURE — 99204 OFFICE O/P NEW MOD 45 MIN: CPT | Performed by: INTERNAL MEDICINE

## 2018-06-20 NOTE — MR AVS SNAPSHOT
After Visit Summary   6/20/2018    Paige Le    MRN: [id number]           Patient Information     Date Of Birth          1970        Visit Information        Provider Department      6/20/2018 12:30 PM Suleiman Kaiser MD [facility]        Today's Diagnoses     Suspected sleep apnea    -  1    Snoring        Excessive daytime sleepiness          Care Instructions    1. Possible sleep apnea    - Home sleep apnea test to assess for sleep apnea     Your BMI is Body mass index is 43.1 kg/(m^2).  Weight management is a personal decision.  If you are interested in exploring weight loss strategies, the following discussion covers the approaches that may be successful. Body mass index (BMI) is one way to tell whether you are at a healthy weight, overweight, or obese. It measures your weight in relation to your height.  A BMI of 18.5 to 24.9 is in the healthy range. A person with a BMI of 25 to 29.9 is considered overweight, and someone with a BMI of 30 or greater is considered obese. More than two-thirds of American adults are considered overweight or obese.  Being overweight or obese increases the risk for further weight gain. Excess weight may lead to heart disease and diabetes.  Creating and following plans for healthy eating and physical activity may help you improve your health.  Weight control is part of healthy lifestyle and includes exercise, emotional health, and healthy eating habits. Careful eating habits lifelong are the mainstay of weight control. Though there are significant health benefits from weight loss, long-term weight loss with diet alone may be very difficult to achieve- studies show long-term success with dietary management in less than 10% of people. Attaining a healthy weight may be especially difficult to achieve in those with severe obesity. In some cases, medications, devices and surgical management might be considered.  What can you do?  If you are  overweight or obese and are interested in methods for weight loss, you should discuss this with your provider.     Consider reducing daily calorie intake by 500 calories.     Keep a food journal.     Avoiding skipping meals, consider cutting portions instead.    Diet combined with exercise helps maintain muscle while optimizing fat loss. Strength training is particularly important for building and maintaining muscle mass. Exercise helps reduce stress, increase energy, and improves fitness. Increasing exercise without diet control, however, may not burn enough calories to loose weight.       Start walking three days a week 10-20 minutes at a time    Work towards walking thirty minutes five days a week     Eventually, increase the speed of your walking for 1-2 minutes at time    In addition, we recommend that you review healthy lifestyles and methods for weight loss available through the National Institutes of Health patient information sites:  http://win.niddk.nih.gov/publications/index.htm    And look into health and wellness programs that may be available through your health insurance provider, employer, local community center, or dorothy club.    Weight management plan: Patient was referred to their PCP to discuss a diet and exercise plan.            Follow-ups after your visit        Your next 10 appointments already scheduled     Jun 28, 2018  1:00 PM CDT   HST  with BED 7 SH SLEEP   [facility] ([facility])    [address]  [place] [address]   [phone]            Jun 29, 2018 10:30 AM CDT   HST Drop Off with  SLEEP CENTER DME   [facility] ([facility])    [address]  [place] [address]   [phone]            Jul 06, 2018  9:30 AM CDT   Return Sleep Patient with Suleiman Kaiser MD   [facility] ([facility])    [address]  [address]  [place] [address]   [phone]              Future tests that were ordered for you today     Open Future Orders        Priority Expected Expires Ordered    HST-Home Sleep Apnea Test Routine  2018            Who to contact     If you have questions or need follow up information about today's clinic visit or your schedule please contact [facility] directly at [phone].  Normal or non-critical lab and imaging results will be communicated to you by AppPowerGrouphart, letter or phone within 4 business days after the clinic has received the results. If you do not hear from us within 7 days, please contact the clinic through Viro[employer] or phone. If you have a critical or abnormal lab result, we will notify you by phone as soon as possible.  Submit refill requests through RecruitLoop or call your pharmacy and they will forward the refill request to us. Please allow 3 business days for your refill to be completed.          Additional Information About Your Visit        RecruitLoop Information     Rec[employer]Loop lets you send messages to your doctor, view your test results, renew your prescriptions, schedule appointments and more. To sign up, go to [email or web address] . Click on \"Log in\" on the left side of the screen, which will take you to the Welcome page. Then click on \"Sign up Now\" on the right side of the page.     You will be asked to enter the access code listed below, as well as some personal information. Please follow the directions to create your username and password.     Your access code is: [email or web address]  Expires: 2018  1:16 PM     Your access code will  in 90 days. If you need help or a new code, please call your [place] clinic or [phone].        Care EveryWhere ID     This is your Care EveryWhere ID. This could be used by other organizations to access your [place] medical records  [id number]        Your Vitals Were     Pulse Respirations Height Pulse Oximetry BMI (Body " "Mass Index)       59 16 1.651 m (5' 5\") 95% 43.1 kg/m2        Blood Pressure from Last 3 Encounters:   06/20/18 120/77   02/23/18 110/70   02/23/18 129/76    Weight from Last 3 Encounters:   06/20/18 117.5 kg (259 lb)   02/23/18 117 kg (258 lb)   02/23/18 113.4 kg (250 lb)                 Today's Medication Changes          These changes are accurate as of 6/20/18  1:16 PM.  If you have any questions, ask your nurse or doctor.               These medicines have changed or have updated prescriptions.        Dose/Directions    gabapentin 300 MG capsule   Commonly known as:  NEURONTIN   This may have changed:    - how much to take  - when to take this   Used for:  Fibromyalgia, Menopausal syndrome (hot flashes)        Dose:  600 mg   Take 2 capsules (600 mg) by mouth 3 times daily   Quantity:  270 capsule   Refills:  3                Primary Care Provider Office Phone # Fax #    Christine Farnsworth PA-C [phone] [address] [address] [address]        Equal Access to Services     [facility]: Hadii michelle nix Sokyle, waaxda luariel, qaybta kaaljose a porras, marisol stephens. So [facility] [phone].    ATENCIÓN: Si habla español, tiene a greene disposición servicios gratuitos de asistencia lingüística. [facility] [phone].    We comply with applicable federal civil rights laws and [place] laws. We do not discriminate on the basis of race, color, national origin, age, disability, sex, sexual orientation, or gender identity.            Thank you!     Thank you for choosing [facility]  for your care. Our goal is always to provide you with excellent care. Hearing back from our patients is one way we can continue to improve our services. Please take a few minutes to complete the written survey that you may receive in the mail after your visit with us. Thank you!             Your Updated Medication List - Protect others around you: Learn how to " safely use, store and throw away your medicines at www.disposemymeds.org.          This list is accurate as of 6/20/18  1:16 PM.  Always use your most recent med list.                   Brand Name Dispense Instructions for use Diagnosis    * albuterol (2.5 MG/3ML) 0.083% neb solution     30 vial    Take 1 vial (2.5 mg) by nebulization every 6 hours as needed for shortness of breath / dyspnea or wheezing    Cough, Moderate persistent asthma with acute exacerbation       * albuterol 108 (90 Base) MCG/ACT Inhaler    PROAIR HFA/PROVENTIL HFA/VENTOLIN HFA    1 Inhaler    Inhale 2 puffs into the lungs every 6 hours as needed for shortness of breath / dyspnea or wheezing    Cough, Moderate persistent asthma with acute exacerbation       ALEVE PO      Take 220 mg by mouth        beclomethasone 80 MCG/ACT Inhaler    QVAR    1 Inhaler    Inhale 2 puffs into the lungs 2 times daily    Cough       CALCIUM CARBONATE PO      Take by mouth daily        cetirizine-pseudoePHEDrine 5-120 MG per 12 hr tablet    ZYRTEC-D    90 tablet    Take 1 tablet by mouth 2 times daily    Allergic rhinitis, cause unspecified       cholecalciferol 1000 UNIT tablet    vitamin D3     Take 1 tablet by mouth daily.        COMPOUNDED NON-CONTROLLED SUBSTANCE - PHARMACY TO MIX COMPOUNDED MEDICATION    CMPD RX    30 capsule    Low dose Naltrexone:  6mg capsules/tablets one PO qhs    Fibromyalgia       * doxycycline 100 MG capsule    VIBRAMYCIN    20 capsule    Take 1 capsule (100 mg) by mouth 2 times daily    Acute maxillary sinusitis, recurrence not specified       * doxycycline 100 MG capsule    VIBRAMYCIN    40 capsule    Take 1 capsule (100 mg) by mouth daily    Need for malaria prophylaxis       fluconazole 150 MG tablet    DIFLUCAN    4 tablet    Take 1 tablet (150 mg) by mouth every 3 days    Acute bronchitis, unspecified organism       * FLUoxetine 40 MG capsule    PROzac    180 capsule    TAKE TWO CAPSULES BY MOUTH EVERY DAY    Major depressive  disorder, recurrent episode, mild (H)       * FLUoxetine 40 MG capsule    PROzac    180 capsule    TAKE TWO CAPSULES BY MOUTH EVERY DAY    Major depressive disorder, recurrent episode, mild (H)       gabapentin 300 MG capsule    NEURONTIN    270 capsule    Take 2 capsules (600 mg) by mouth 3 times daily    Fibromyalgia, Menopausal syndrome (hot flashes)       ipratropium - albuterol 0.5 mg/2.5 mg/3 mL 0.5-2.5 (3) MG/3ML neb solution    DUONEB    1 Box    Take 1 vial (3 mLs) by nebulization once for 1 dose    Cough, Moderate persistent asthma with acute exacerbation       IRON SUPPLEMENT PO           MAGNESIUM CITRATE PO      Take by mouth daily        montelukast 10 MG tablet    SINGULAIR    30 tablet    Take 1 tablet (10 mg) by mouth At Bedtime    Acute bronchitis, unspecified organism       order for DME     1 Device    Equipment being ordered: Nebulizer    CAP (community acquired pneumonia)       predniSONE 20 MG tablet    DELTASONE    5 tablet    Take 1 tablet (20 mg) by mouth daily Take daily x 5 days prn asthma exacerbation.    Exacerbation of asthma, unspecified asthma severity, unspecified whether persistent       rizatriptan 10 MG ODT tab    MAXALT-MLT    18 tablet    Take 1 tablet (10 mg) by mouth at onset of headache for migraine May repeat in 2 hours. Max 3 tablets/24 hours.    Migraine with aura and without status migrainosus, not intractable       topiramate 25 MG tablet    TOPAMAX    180 tablet    Take 1 tablet (25 mg) by mouth 2 times daily    TMJ (temporomandibular joint syndrome), Cervicalgia       traZODone 50 MG tablet    DESYREL    180 tablet    Take 2 tablets by mouth. 1 -2 TABs  PO at bedtime prn insomnia    Insomnia, unspecified       Turmeric 450 MG Caps           VITAMIN B COMPLEX PO           * Notice:  This list has 6 medication(s) that are the same as other medications prescribed for you. Read the directions carefully, and ask your doctor or other care provider to review them with you.

## 2018-06-20 NOTE — NURSING NOTE
"Chief Complaint   Patient presents with     Sleep Problem     \"Snoring, tired, funny breathing.\"       Initial /77  Pulse 59  Resp 16  Ht 1.651 m (5' 5\")  Wt 117.5 kg (259 lb)  SpO2 95%  BMI 43.1 kg/m2 Estimated body mass index is 43.1 kg/(m^2) as calculated from the following:    Height as of this encounter: 1.651 m (5' 5\").    Weight as of this encounter: 117.5 kg (259 lb).    Medication Reconciliation: complete     ESS 15  Neck 39cm  Sarah Wayne      "

## 2018-06-20 NOTE — PATIENT INSTRUCTIONS
1. Possible sleep apnea    - Home sleep apnea test to assess for sleep apnea     Your BMI is Body mass index is 43.1 kg/(m^2).  Weight management is a personal decision.  If you are interested in exploring weight loss strategies, the following discussion covers the approaches that may be successful. Body mass index (BMI) is one way to tell whether you are at a healthy weight, overweight, or obese. It measures your weight in relation to your height.  A BMI of 18.5 to 24.9 is in the healthy range. A person with a BMI of 25 to 29.9 is considered overweight, and someone with a BMI of 30 or greater is considered obese. More than two-thirds of American adults are considered overweight or obese.  Being overweight or obese increases the risk for further weight gain. Excess weight may lead to heart disease and diabetes.  Creating and following plans for healthy eating and physical activity may help you improve your health.  Weight control is part of healthy lifestyle and includes exercise, emotional health, and healthy eating habits. Careful eating habits lifelong are the mainstay of weight control. Though there are significant health benefits from weight loss, long-term weight loss with diet alone may be very difficult to achieve- studies show long-term success with dietary management in less than 10% of people. Attaining a healthy weight may be especially difficult to achieve in those with severe obesity. In some cases, medications, devices and surgical management might be considered.  What can you do?  If you are overweight or obese and are interested in methods for weight loss, you should discuss this with your provider.     Consider reducing daily calorie intake by 500 calories.     Keep a food journal.     Avoiding skipping meals, consider cutting portions instead.    Diet combined with exercise helps maintain muscle while optimizing fat loss. Strength training is particularly important for building and maintaining  muscle mass. Exercise helps reduce stress, increase energy, and improves fitness. Increasing exercise without diet control, however, may not burn enough calories to loose weight.       Start walking three days a week 10-20 minutes at a time    Work towards walking thirty minutes five days a week     Eventually, increase the speed of your walking for 1-2 minutes at time    In addition, we recommend that you review healthy lifestyles and methods for weight loss available through the National Institutes of Health patient information sites:  http://win.niddk.nih.gov/publications/index.htm    And look into health and wellness programs that may be available through your health insurance provider, employer, local community center, or dorothy club.    Weight management plan: Patient was referred to their PCP to discuss a diet and exercise plan.

## 2018-06-20 NOTE — PROGRESS NOTES
Sleep Consultation:    Date on this visit: 6/20/2018    Paige Le is sent by Sarah Devries for a sleep consultation regarding fatigue and possible sleep apnea.    Primary Physician: Christine Farnsworth     Chief complaint: snoring, non- restorative sleep, daytime sleepiness     Presenting History:     Piage Le reports nightly snoring, snorting and poor quality of sleep and excessive daytime sleepiness for more than 5 years.     Patient's medical history is significant for chronic pain, migraines, asthma, depression and obesity.     Paige does snore every night. Patient does have a regular bed partner. There is report of snoring, gasping and poor quality of sleep.  She does not have witnessed apneas. They never sleep separately.  Patient sleeps on her back, side and stomach. She has occasional morning headaches and frequent morning dry mouth, denies no restless legs.     Paige has frequent bruxism and denies any sleep walking, sleep talking, dream enactment, sleep paralysis, cataplexy and hypnogogic/hypnopompic hallucinations.    Paige goes to sleep at 10:30 PM during the week. She wakes up at 7:30 AM with an alarm. She falls asleep in 5 minutes.  Paige denies difficulty falling asleep.  She wakes up 2-4 times a night for 5 minutes before falling back to sleep.  Paige wakes up to go to the bathroom, uncertain reasons and pain.  On weekends, Paige goes to sleep at 12:00 AM.  She wakes up at 9:00 AM without an alarm. She falls asleep in 5 minutes.  Patient gets an average of 8-9 hours of sleep per night.     Patient does not use electronics in bed and watch TV in bed.     Paige does not do shift work.  She works day shifts.      She denies sleep walking as a child.  Paige denies difficulty breathing through her nose.       Patient's [place] Sleepiness score 15/24 consistent with moderate daytime sleepiness.      Paige naps 3-4 times per week for  minutes, feels refreshed after naps. She takes some  inadvertant naps.  She denies closing eyes, dozing and falling asleep while driving. Patient was counseled on the importance of driving while alert, to pull over if drowsy, or nap before getting into the vehicle if sleepy.      She uses 2 cups/day of coffee. Last caffeine intake is usually before noon.    Allergies:    Allergies   Allergen Reactions     Augmentin      reacted to Augmentin--  stomach upset -- can take penicillin     Codeine Swelling     Milk Protein Extract Difficulty breathing     Products with milk cause congestion, sinus mucous, difficulty breathing       Medications:    Current Outpatient Prescriptions   Medication Sig Dispense Refill     albuterol (2.5 MG/3ML) 0.083% neb solution Take 1 vial (2.5 mg) by nebulization every 6 hours as needed for shortness of breath / dyspnea or wheezing 30 vial 1     albuterol (PROAIR HFA/PROVENTIL HFA/VENTOLIN HFA) 108 (90 BASE) MCG/ACT Inhaler Inhale 2 puffs into the lungs every 6 hours as needed for shortness of breath / dyspnea or wheezing 1 Inhaler 6     B Complex Vitamins (VITAMIN B COMPLEX PO)        beclomethasone (QVAR) 80 MCG/ACT Inhaler Inhale 2 puffs into the lungs 2 times daily 1 Inhaler 11     CALCIUM CARBONATE PO Take by mouth daily       cetirizine-psuedoePHEDrine (ZYRTEC-D) 5-120 MG per tablet Take 1 tablet by mouth 2 times daily 90 tablet 3     cholecalciferol (VITAMIN D) 1000 UNIT tablet Take 1 tablet by mouth daily.       COMPOUNDED NON-CONTROLLED SUBSTANCE (CMPD RX) - PHARMACY TO MIX COMPOUNDED MEDICATION Low dose Naltrexone:  6mg capsules/tablets one PO qhs 30 capsule 6     doxycycline (VIBRAMYCIN) 100 MG capsule Take 1 capsule (100 mg) by mouth daily 40 capsule 0     doxycycline (VIBRAMYCIN) 100 MG capsule Take 1 capsule (100 mg) by mouth 2 times daily 20 capsule 0     Ferrous Sulfate (IRON SUPPLEMENT PO)        fluconazole (DIFLUCAN) 150 MG tablet Take 1 tablet (150 mg) by mouth every 3 days 4 tablet 0     FLUoxetine (PROZAC) 40 MG capsule  TAKE TWO CAPSULES BY MOUTH EVERY  capsule 0     FLUoxetine (PROZAC) 40 MG capsule TAKE TWO CAPSULES BY MOUTH EVERY  capsule 0     gabapentin (NEURONTIN) 300 MG capsule Take 2 capsules (600 mg) by mouth 3 times daily (Patient taking differently: Take 300 mg by mouth 2 times daily ) 270 capsule 3     MAGNESIUM CITRATE PO Take by mouth daily       montelukast (SINGULAIR) 10 MG tablet Take 1 tablet (10 mg) by mouth At Bedtime 30 tablet 1     Naproxen Sodium (ALEVE PO) Take 220 mg by mouth       order for DME Equipment being ordered: Nebulizer 1 Device 0     predniSONE (DELTASONE) 20 MG tablet Take 1 tablet (20 mg) by mouth daily Take daily x 5 days prn asthma exacerbation. 5 tablet 0     rizatriptan (MAXALT-MLT) 10 MG ODT tab Take 1 tablet (10 mg) by mouth at onset of headache for migraine May repeat in 2 hours. Max 3 tablets/24 hours. 18 tablet 3     topiramate (TOPAMAX) 25 MG tablet Take 1 tablet (25 mg) by mouth 2 times daily 180 tablet 0     traZODone (DESYREL) 50 MG tablet Take 2 tablets by mouth. 1 -2 TABs  PO at bedtime prn insomnia 180 tablet 6     Turmeric 450 MG CAPS        ipratropium - albuterol 0.5 mg/2.5 mg/3 mL (DUONEB) 0.5-2.5 (3) MG/3ML neb solution Take 1 vial (3 mLs) by nebulization once for 1 dose 1 Box 3       Problem List:  Patient Active Problem List    Diagnosis Date Noted     Morbid obesity (H) 05/17/2017     Priority: High     Moderate persistent asthma 02/14/2018     Priority: Medium     History of alcoholism (H) 07/28/2016     Priority: Medium     In remission for 16 years       Grieving 11/24/2014     Priority: Medium     Insomnia 11/24/2014     Priority: Medium     Obesity 11/01/2013     Priority: Medium     Migraine 04/22/2012     Priority: Medium     Family history of aneurysm 04/28/2011     Priority: Medium     Patellofemoral disorder      Priority: Medium     Fibromyalgia 04/15/2010     Priority: Medium     Mild major depression (H) 04/06/2010     Priority: Medium      Cystic Fibrosis Screening negative 05/01/2009     Priority: Medium     [facility]       Dysmenorrhea 10/12/2008     Priority: Medium     Anxiety state 03/08/2005     Priority: Medium     Problem list name updated by automated process. Provider to review       Insomnia 03/08/2005     Priority: Medium     Problem list name updated by automated process. Provider to review       Allergic rhinitis 03/08/2005     Priority: Medium     Problem list name updated by automated process. Provider to review          Past Medical/Surgical History:  Past Medical History:   Diagnosis Date     Allergic rhinitis      ALLERGIC RHINITIS NOS 3/8/2005     ANXIETY STATE NOS 3/8/2005     Cystic Fibrosis Screening negative 5/1/2009    [facility]     Dysmenorrhea 10/12/2008     Family history of aneurysm 4/28/2011     Fibromyalgia     Dx by head/neck/pain & Rheumatology     Fibromyalgia 4/15/2010    [facility]     Hyperlipidemia LDL goal <160 11/1/2013     INSOMNIA NEC 3/8/2005     Migraine      Migraine 4/22/2012    [facility]      Mild major depression (H)      Obesity 11/1/2013     Other and unspecified alcohol dependence, unspecified drinking behavior     Sober since 7/00     Other anxiety states      Other chronic pain     from fibromyalgia     Patellofemoral disorder     bilateral     Uncomplicated asthma     Not considered Asthma but I have breathing problems     Past Surgical History:   Procedure Laterality Date     ARTHROSCOPY KNEE RT/LT  1997    Left      DILATION AND CURETTAGE N/A 6/14/2017    Procedure: DILATION AND CURETTAGE;;  Surgeon: Livia Barnett DO;  Location:  OR     EXAM UNDER ANESTHESIA, ULTRASOUND N/A 6/14/2017    Procedure: EXAM UNDER ANESTHESIA, ULTRASOUND;  Ultrasound guided cervical dilation, IUD placement, Endometrial biopsy, repair of unavoidable cervical laceration;  Surgeon: Livia Barnett DO;  Location:  OR      TOOTH EXTRACTION W/FORCEP  1998    wisdom teeth       INSERT INTRAUTERINE DEVICE N/A 2017    Procedure: INSERT INTRAUTERINE DEVICE;;  Surgeon: Livia Barnett DO;  Location: [facility] OR       Social History:  Social History     Social History     Marital status: Single     Spouse name: N/A     Number of children: 0     Years of education: N/A     Occupational History     [employer]      Social History Main Topics     Smoking status: Never Smoker     Smokeless tobacco: Never Used     Alcohol use No      Comment: recovering     Drug use: No     Sexual activity: Yes     Partners: Male     Birth control/ protection: IUD     Other Topics Concern     Blood Transfusions No     Exercise Yes     walk three days/week     Seat Belt Yes     Self-Exams Yes     Parent/Sibling W/ Cabg, Mi Or Angioplasty Before 65f 55m? No     Social History Narrative    Living arrangements - the patient lives alone.       Family History:    Father has sleep apnea. Mother snores. Paternal grandfather had sleep apnea.     Family History   Problem Relation Age of Onset     Neurologic Disorder Mother      ruptured cerebral aneurysm age 60      Hypertension Mother      Depression/Anxiety Mother      Cerebrovascular Disease Mother      Obesity Mother      Macular Degeneration Mother      Musculoskeletal Disorder Father      OA      GASTROINTESTINAL DISEASE Father      colon polyps age 60     Other Cancer Father      Brain Cancer-neuroblastoma     Depression/Anxiety Father      Obesity Father      Gynecology Sister      Rachel. irregular menses.  pre-eclampsia     Aneurysm Sister      cerebral     HEART DISEASE Paternal Grandfather      Multiple MI's (first MI age 60)     Diabetes Paternal Grandfather      IDDM dx age 60  Severe/brittle/complications     Coronary Artery Disease Paternal Grandfather      Depression/Anxiety Paternal Grandfather      Obesity Paternal Grandfather      Gynecology Paternal Grandmother       of uterine CA in her 40's     Ovarian  "Cancer Paternal Grandmother       at age 40 from cancer     Eye Disorder Maternal Grandmother      glacucoma     Diabetes Maternal Grandmother      IDDM-onset age 70     Obesity Maternal Grandmother      Macular Degeneration Maternal Grandmother      Gynecology Paternal Aunt      uterine CA diagnosed @ 40yrs       Review of Systems:  A complete review of systems reviewed by me is negative with the exeption of what has been mentioned in the history of present illness.  CONSTITUTIONAL: NEGATIVE for weight gain/loss, fever, chills, sweats or night sweats, drug allergies.  EYES:  POSITIVE for changes in vision  ENT:  POSITIVE for  sinus pain  CARDIAC: NEGATIVE for fast heartbeats or fluttering in chest, chest pain or pressure, breathlessness when lying flat, swollen legs or swollen feet.  NEUROLOGIC:  POSITIVE for  headaches and weakness or numbness in the arms or legs  DERMATOLOGIC: NEGATIVE for rashes, new moles or change in mole(s)  PULMONARY: NEGATIVE SOB at rest, SOB with activity, dry cough, productive cough, coughing up blood, wheezing or whistling when breathing.    GASTROINTESTINAL: NEGATIVE for nausea or vomitting, loose or watery stools, fat or grease in stools, constipation, abdominal pain, bowel movements black in color or blood noted.  GENITOURINARY: NEGATIVE for pain during urination, blood in urine, urinating more frequently than usual, irregular menstrual periods.  MUSCULOSKELETAL:  POSITIVE for  muscle pain, bone or joint pain and swollen joints  ENDOCRINE: NEGATIVE for increased thirst or urination, diabetes.  LYMPHATIC: NEGATIVE for swollen lymph nodes, lumps or bumps in the breasts or nipple discharge.    Physical Examination:  Vitals: /77  Pulse 59  Resp 16  Ht 1.651 m (5' 5\")  Wt 117.5 kg (259 lb)  SpO2 95%  BMI 43.1 kg/m2  BMI= Body mass index is 43.1 kg/(m^2).    Neck Cir (cm): 39 cm    [place] Total Score 2018   Total score - [place] 15       GENERAL APPEARANCE: healthy, " alert and no distress  EYES: Eyes grossly normal to inspection, PERRL and conjunctivae and sclerae normal  HENT: nose and mouth without ulcers or lesions, oropharynx crowded and tongue base enlarged  NECK: no adenopathy, no asymmetry, masses, or scars and thyroid normal to palpation  RESP: lungs clear to auscultation - no rales, rhonchi or wheezes  CV: regular rates and rhythm, normal S1 S2, no S3 or S4 and no murmur, click or rub  ABDOMEN: obese  MS: extremities normal- no gross deformities noted  NEURO: Normal strength and tone, mentation intact and speech normal  PSYCH: mentation appears normal and affect normal/bright  Mallampati Class: IV.  Tonsillar Stage: 1  hidden by pillars.    Impression/Plan:    1. Probable Obstructive sleep apnea  2. Excessive daytime sleepiness    - Patient is a 47 years old female, with BMI 43, neck circumference 39 cm, who presents with a history of loud snoirng, poor sleep sleep quality, non restorative sleep and daytime sleepiness. Oropharynx is crowded on examination., Obstructive sleep apnea is likely and an overnight sleep study is recommended.     Plan:     1. Home sleep apnea test    She will follow up with me in approximately two weeks after her sleep study has been competed to review the results and discuss plan of care.       Polysomnography & HST reviewed.  Obstructive sleep apnea reviewed.  Complications of untreated sleep apnea were reviewed.    I spent a total of 45 minutes with patient with more than 50% in counseling     Dr. Suleiman Kaiser     CC: Sarah Devries

## 2018-06-28 ENCOUNTER — OFFICE VISIT (OUTPATIENT)
Dept: SLEEP MEDICINE | Facility: CLINIC | Age: 48
End: 2018-06-28
Payer: COMMERCIAL

## 2018-06-28 DIAGNOSIS — G47.19 EXCESSIVE DAYTIME SLEEPINESS: ICD-10-CM

## 2018-06-28 DIAGNOSIS — G47.33 OSA (OBSTRUCTIVE SLEEP APNEA): ICD-10-CM

## 2018-06-28 DIAGNOSIS — R29.818 SUSPECTED SLEEP APNEA: ICD-10-CM

## 2018-06-28 DIAGNOSIS — R06.83 SNORING: ICD-10-CM

## 2018-06-28 PROCEDURE — G0399 HOME SLEEP TEST/TYPE 3 PORTA: HCPCS | Performed by: INTERNAL MEDICINE

## 2018-06-28 NOTE — PROGRESS NOTES
Pt is completing a home sleep test. Pt was instructed on how to put on the Noxturnal T3 device and associated equipment before going to bed and given the opportunity to practice putting it on before leaving the sleep center. Pt was reminded to bring the home sleep test kit back to the center tomorrow, at agreed upon time for download and reporting.

## 2018-06-28 NOTE — MR AVS SNAPSHOT
"              After Visit Summary   6/28/2018    Paige Le    MRN: [id number]           Patient Information     Date Of Birth          1970        Visit Information        Provider Department      6/28/2018 1:00 PM BED 7 SH SLEEP [facility]        Today's Diagnoses     Suspected sleep apnea        Excessive daytime sleepiness        Snoring           Follow-ups after your visit        Your next 10 appointments already scheduled     Jun 29, 2018 10:30 AM CDT   HST Drop Off with  SLEEP CENTER DME   [facility]    [address]  [facility] [id number] [phone]            Jul 06, 2018  9:30 AM CDT   Return Sleep Patient with Suleiman Kaiser MD   [facility]    [address]  [facility] [id number] [phone]              Who to contact     If you have questions or need follow up information about today's clinic visit or your schedule please contact [facility] directly at [phone].  Normal or non-critical lab and imaging results will be communicated to you by [employer], letter or phone within 4 business days after the clinic has received the results. If you do not hear from us within 7 days, please contact the clinic through [employer] or phone. If you have a critical or abnormal lab result, we will notify you by phone as soon as possible.  Submit refill requests through [employer] or call your pharmacy and they will forward the refill request to us. Please allow 3 business days for your refill to be completed.          Additional Information About Your Visit        [employer] Information     [employer] lets you send messages to your doctor, view your test results, renew your prescriptions, schedule appointments and more. To sign up, go to [email or web address] . Click on \"Log in\" on the left side of the screen, which will take you to the " "Welcome page. Then click on \"Sign up Now\" on the right side of the page.     You will be asked to enter the access code listed below, as well as some personal information. Please follow the directions to create your username and password.     Your access code is: [email or web address]  Expires: 2018  1:16 PM     Your access code will  in 90 days. If you need help or a new code, please call your [facility] clinic or [phone].        Care EveryWhere ID     This is your Care EveryWhere ID. This could be used by other organizations to access your [facility] medical records  [id number]         Blood Pressure from Last 3 Encounters:   18 120/77   18 110/70   18 129/76    Weight from Last 3 Encounters:   18 117.5 kg (259 lb)   18 117 kg (258 lb)   18 113.4 kg (250 lb)              We Performed the Following     HST-Home Sleep Apnea Test          Today's Medication Changes          These changes are accurate as of 18  2:01 PM.  If you have any questions, ask your nurse or doctor.               These medicines have changed or have updated prescriptions.        Dose/Directions    gabapentin 300 MG capsule   Commonly known as:  NEURONTIN   This may have changed:    - how much to take  - when to take this   Used for:  Fibromyalgia, Menopausal syndrome (hot flashes)        Dose:  600 mg   Take 2 capsules (600 mg) by mouth 3 times daily   Quantity:  270 capsule   Refills:  3                Primary Care Provider Office Phone # Fax #    Christine Farnsworth PA-C [phone] [address] [facility] [address]        Equal Access to Services     [facility] : Haddea Villalpando, vaughn muñiz, qamarisol chavez. So [facility] [phone].    ATENCIÓN: Si habla español, tiene a greene disposición servicios gratuitos de asistencia lingüística. Nila al [phone].    We comply with applicable federal civil rights " laws and [place] laws. We do not discriminate on the basis of race, color, national origin, age, disability, sex, sexual orientation, or gender identity.            Thank you!     Thank you for choosing [facility]  for your care. Our goal is always to provide you with excellent care. Hearing back from our patients is one way we can continue to improve our services. Please take a few minutes to complete the written survey that you may receive in the mail after your visit with us. Thank you!             Your Updated Medication List - Protect others around you: Learn how to safely use, store and throw away your medicines at www.disposemymeds.org.          This list is accurate as of 6/28/18  2:01 PM.  Always use your most recent med list.                   Brand Name Dispense Instructions for use Diagnosis    * albuterol (2.5 MG/3ML) 0.083% neb solution     30 vial    Take 1 vial (2.5 mg) by nebulization every 6 hours as needed for shortness of breath / dyspnea or wheezing    Cough, Moderate persistent asthma with acute exacerbation       * albuterol 108 (90 Base) MCG/ACT Inhaler    PROAIR HFA/PROVENTIL HFA/VENTOLIN HFA    1 Inhaler    Inhale 2 puffs into the lungs every 6 hours as needed for shortness of breath / dyspnea or wheezing    Cough, Moderate persistent asthma with acute exacerbation       ALEVE PO      Take 220 mg by mouth        beclomethasone 80 MCG/ACT Inhaler    QVAR    1 Inhaler    Inhale 2 puffs into the lungs 2 times daily    Cough       CALCIUM CARBONATE PO      Take by mouth daily        cetirizine-pseudoePHEDrine 5-120 MG per 12 hr tablet    ZYRTEC-D    90 tablet    Take 1 tablet by mouth 2 times daily    Allergic rhinitis, cause unspecified       cholecalciferol 1000 UNIT tablet    vitamin D3     Take 1 tablet by mouth daily.        COMPOUNDED NON-CONTROLLED SUBSTANCE - PHARMACY TO MIX COMPOUNDED MEDICATION    CMPD RX    30 capsule    Low dose Naltrexone:  6mg capsules/tablets  one PO qhs    Fibromyalgia       * doxycycline 100 MG capsule    VIBRAMYCIN    20 capsule    Take 1 capsule (100 mg) by mouth 2 times daily    Acute maxillary sinusitis, recurrence not specified       * doxycycline 100 MG capsule    VIBRAMYCIN    40 capsule    Take 1 capsule (100 mg) by mouth daily    Need for malaria prophylaxis       fluconazole 150 MG tablet    DIFLUCAN    4 tablet    Take 1 tablet (150 mg) by mouth every 3 days    Acute bronchitis, unspecified organism       * FLUoxetine 40 MG capsule    PROzac    180 capsule    TAKE TWO CAPSULES BY MOUTH EVERY DAY    Major depressive disorder, recurrent episode, mild (H)       * FLUoxetine 40 MG capsule    PROzac    180 capsule    TAKE TWO CAPSULES BY MOUTH EVERY DAY    Major depressive disorder, recurrent episode, mild (H)       gabapentin 300 MG capsule    NEURONTIN    270 capsule    Take 2 capsules (600 mg) by mouth 3 times daily    Fibromyalgia, Menopausal syndrome (hot flashes)       ipratropium - albuterol 0.5 mg/2.5 mg/3 mL 0.5-2.5 (3) MG/3ML neb solution    DUONEB    1 Box    Take 1 vial (3 mLs) by nebulization once for 1 dose    Cough, Moderate persistent asthma with acute exacerbation       IRON SUPPLEMENT PO           MAGNESIUM CITRATE PO      Take by mouth daily        montelukast 10 MG tablet    SINGULAIR    30 tablet    Take 1 tablet (10 mg) by mouth At Bedtime    Acute bronchitis, unspecified organism       order for DME     1 Device    Equipment being ordered: Nebulizer    CAP (community acquired pneumonia)       predniSONE 20 MG tablet    DELTASONE    5 tablet    Take 1 tablet (20 mg) by mouth daily Take daily x 5 days prn asthma exacerbation.    Exacerbation of asthma, unspecified asthma severity, unspecified whether persistent       rizatriptan 10 MG ODT tab    MAXALT-MLT    18 tablet    Take 1 tablet (10 mg) by mouth at onset of headache for migraine May repeat in 2 hours. Max 3 tablets/24 hours.    Migraine with aura and without status  migrainosus, not intractable       topiramate 25 MG tablet    TOPAMAX    180 tablet    Take 1 tablet (25 mg) by mouth 2 times daily    TMJ (temporomandibular joint syndrome), Cervicalgia       traZODone 50 MG tablet    DESYREL    180 tablet    Take 2 tablets by mouth. 1 -2 TABs  PO at bedtime prn insomnia    Insomnia, unspecified       Turmeric 450 MG Caps           VITAMIN B COMPLEX PO           * Notice:  This list has 6 medication(s) that are the same as other medications prescribed for you. Read the directions carefully, and ask your doctor or other care provider to review them with you.

## 2018-06-29 ENCOUNTER — DOCUMENTATION ONLY (OUTPATIENT)
Dept: SLEEP MEDICINE | Facility: CLINIC | Age: 48
End: 2018-06-29
Payer: COMMERCIAL

## 2018-06-29 PROBLEM — G47.33 OSA (OBSTRUCTIVE SLEEP APNEA): Status: ACTIVE | Noted: 2018-06-29

## 2018-06-29 NOTE — PROCEDURES
"HOME SLEEP STUDY INTERPRETATION    Patient: Paige Le  MRN: [id number]  YOB: 1970  Study Date: 6/28/2018  Referring Provider: Christine Farnsworth;   Ordering Provider: Suleiman Kaiser MD, MD     Indications for Home Study: Paige Le is a 47 year old female with a history of depression who presents with symptoms suggestive of obstructive sleep apnea.    Estimated body mass index is 43.1 kg/(m^2) as calculated from the following:    Height as of 6/20/18: 1.651 m (5' 5\").    Weight as of 6/20/18: 117.5 kg (259 lb).  Total score - Dailey: 15 (6/20/2018 12:24 PM)  STOP-BANG: 3/8    Data: A full night home sleep study was performed recording the standard physiologic parameters including body position, movement, sound, nasal pressure, thermal oral airflow, chest and abdominal movements with respiratory inductance plethysmography, and oxygen saturation by pulse oximetry. Pulse rate was estimated by oximetry recording. This study was considered adequate based on > 4 hours of quality oximetry and respiratory recording. As specified by the AASM Manual for the Scoring of Sleep and Associated events, version 2.3, Rule VIII.D 1B, 4% oxygen desaturation scoring for hypopneas is used as a standard of care on all home sleep apnea testing.    Analysis Time:  528.5 minutes    Respiration:   Sleep Associated Hypoxemia: sustained hypoxemia was not present. Baseline oxygen saturation was 95.4%.  Time with saturation less than or equal to 88% was 0 minutes. The lowest oxygen saturation was 88%.   Snoring: Snoring was present.  Respiratory events: The home study revealed a presence of 40 obstructive apneas and 1 mixed and 7 central apneas. There were 111 hypopneas resulting in a combined apnea/hypopnea index [AHI] of 18.1 events per hour.  AHI was 28.9 per hour supine, 0 per hour prone, 6.8 per hour on left side, and 1.8 per hour on right side.   Pattern: Excluding events noted above, respiratory rate and pattern was " Normal.    Position: Percent of time spent: supine - 55.7%, prone - 0.1%, on left - 23.4%, on right - 19%.    Heart Rate: By pulse oximetry normal rate was noted.     Assessment:   Moderate obstructive sleep apnea.  Sleep associated hypoxemia was not present.    Recommendations:  Consider auto-CPAP at 5-15 cmH2O.  Suggest optimizing sleep hygiene and avoiding sleep deprivation.  Weight management.    Diagnosis Code(s): Obstructive Sleep Apnea G47.33    Suleiman Kaiser MD, MD, June 29, 2018   Diplomate, American Board of Psychiatry and Neurology, Sleep Medicine

## 2018-06-29 NOTE — NURSING NOTE
Pt returned HST device. It was downloaded and forwarded data to the clinical specialist for scoring.

## 2018-06-29 NOTE — PROGRESS NOTES
This HSAT was performed using a Noxturnal T3 device which recorded snore, sound, movement activity, body position, nasal pressure, oronasal thermal airflow, pulse, oximetry and both chest and abdominal respiratory effort. HSAT data was restricted to the time patient states they were in bed.     HSAT was scored using 1B 4% hypopnea rule.     HST AHI (Non-PAT): 18.1  Snoring was reported as loud.  Time with SpO2 below 89% was 0 minutes.   Overall signal quality was good     Pt will follow up with sleep provider to determine appropriate therapy.

## 2018-07-06 ENCOUNTER — OFFICE VISIT (OUTPATIENT)
Dept: SLEEP MEDICINE | Facility: CLINIC | Age: 48
End: 2018-07-06
Payer: COMMERCIAL

## 2018-07-06 VITALS
HEIGHT: 64 IN | HEART RATE: 65 BPM | DIASTOLIC BLOOD PRESSURE: 74 MMHG | WEIGHT: 256 LBS | SYSTOLIC BLOOD PRESSURE: 109 MMHG | OXYGEN SATURATION: 98 % | BODY MASS INDEX: 43.71 KG/M2 | RESPIRATION RATE: 16 BRPM

## 2018-07-06 DIAGNOSIS — G47.33 OSA (OBSTRUCTIVE SLEEP APNEA): Primary | ICD-10-CM

## 2018-07-06 PROCEDURE — 99213 OFFICE O/P EST LOW 20 MIN: CPT | Performed by: INTERNAL MEDICINE

## 2018-07-06 NOTE — MR AVS SNAPSHOT
After Visit Summary   7/6/2018    Paige Le    MRN: [id number]           Patient Information     Date Of Birth          1970        Visit Information        Provider Department      7/6/2018 9:30 AM Suleiman Kaiser MD [facility]        Today's Diagnoses     DMITRIY (obstructive sleep apnea)    -  1      Care Instructions      Your BMI is Body mass index is 43.94 kg/(m^2).  Weight management is a personal decision.  If you are interested in exploring weight loss strategies, the following discussion covers the approaches that may be successful. Body mass index (BMI) is one way to tell whether you are at a healthy weight, overweight, or obese. It measures your weight in relation to your height.  A BMI of 18.5 to 24.9 is in the healthy range. A person with a BMI of 25 to 29.9 is considered overweight, and someone with a BMI of 30 or greater is considered obese. More than two-thirds of American adults are considered overweight or obese.  Being overweight or obese increases the risk for further weight gain. Excess weight may lead to heart disease and diabetes.  Creating and following plans for healthy eating and physical activity may help you improve your health.  Weight control is part of healthy lifestyle and includes exercise, emotional health, and healthy eating habits. Careful eating habits lifelong are the mainstay of weight control. Though there are significant health benefits from weight loss, long-term weight loss with diet alone may be very difficult to achieve- studies show long-term success with dietary management in less than 10% of people. Attaining a healthy weight may be especially difficult to achieve in those with severe obesity. In some cases, medications, devices and surgical management might be considered.  What can you do?  If you are overweight or obese and are interested in methods for weight loss, you should discuss this with your provider.     Consider reducing  daily calorie intake by 500 calories.     Keep a food journal.     Avoiding skipping meals, consider cutting portions instead.    Diet combined with exercise helps maintain muscle while optimizing fat loss. Strength training is particularly important for building and maintaining muscle mass. Exercise helps reduce stress, increase energy, and improves fitness. Increasing exercise without diet control, however, may not burn enough calories to loose weight.       Start walking three days a week 10-20 minutes at a time    Work towards walking thirty minutes five days a week     Eventually, increase the speed of your walking for 1-2 minutes at time    In addition, we recommend that you review healthy lifestyles and methods for weight loss available through the National Institutes of Health patient information sites:  http://win.niddk.nih.gov/publications/index.htm    And look into health and wellness programs that may be available through your health insurance provider, employer, local community center, or dorothy club.    Weight management plan: Patient was referred to their PCP to discuss a diet and exercise plan.            Follow-ups after your visit        Who to contact     If you have questions or need follow up information about today's clinic visit or your schedule please contact [facility] directly at [phone].  Normal or non-critical lab and imaging results will be communicated to you by MyChart, letter or phone within 4 business days after the clinic has received the results. If you do not hear from us within 7 days, please contact the clinic through [employer] or phone. If you have a critical or abnormal lab result, we will notify you by phone as soon as possible.  Submit refill requests through [employer] or call your pharmacy and they will forward the refill request to us. Please allow 3 business days for your refill to be completed.          Additional Information About Your Visit       "  [employer] Information     [employer] lets you send messages to your doctor, view your test results, renew your prescriptions, schedule appointments and more. To sign up, go to [email or web address] . Click on \"Log in\" on the left side of the screen, which will take you to the Welcome page. Then click on \"Sign up Now\" on the right side of the page.     You will be asked to enter the access code listed below, as well as some personal information. Please follow the directions to create your username and password.     Your access code is: [email or web address]  Expires: 2018  1:16 PM     Your access code will  in 90 days. If you need help or a new code, please call your [facility] clinic or [phone].        Care EveryWhere ID     This is your Care EveryWhere ID. This could be used by other organizations to access your [facility] medical records  [id number]        Your Vitals Were     Pulse Respirations Height Pulse Oximetry BMI (Body Mass Index)       65 16 1.626 m (5' 4\") 98% 43.94 kg/m2        Blood Pressure from Last 3 Encounters:   18 109/74   18 120/77   18 110/70    Weight from Last 3 Encounters:   18 116.1 kg (256 lb)   18 117.5 kg (259 lb)   18 117 kg (258 lb)              We Performed the Following     Comprehensive DME          Today's Medication Changes          These changes are accurate as of 18  9:50 AM.  If you have any questions, ask your nurse or doctor.               These medicines have changed or have updated prescriptions.        Dose/Directions    gabapentin 300 MG capsule   Commonly known as:  NEURONTIN   This may have changed:    - how much to take  - when to take this   Used for:  Fibromyalgia, Menopausal syndrome (hot flashes)        Dose:  600 mg   Take 2 capsules (600 mg) by mouth 3 times daily   Quantity:  270 capsule   Refills:  3                Primary Care Provider Office Phone # Fax #    Christine Farnsworth PA-C [phone]       " [id number] [facility] [address]        Equal Access to Services     CLARK KABA : Hadii aad ku hadalfsawyer Villalpando, wamarilynsong hartlorettaha, qaroneyrene leonmaryasong porras, marisol mottshaniquepaddy stephens. So [facility] [phone].    ATENCIÓN: Si habla español, tiene a greene disposición servicios gratuitos de asistencia lingüística. [facility] [phone].    We comply with applicable federal civil rights laws and [place] laws. We do not discriminate on the basis of race, color, national origin, age, disability, sex, sexual orientation, or gender identity.            Thank you!     Thank you for choosing [facility]  for your care. Our goal is always to provide you with excellent care. Hearing back from our patients is one way we can continue to improve our services. Please take a few minutes to complete the written survey that you may receive in the mail after your visit with us. Thank you!             Your Updated Medication List - Protect others around you: Learn how to safely use, store and throw away your medicines at www.disposemymeds.org.          This list is accurate as of 7/6/18  9:50 AM.  Always use your most recent med list.                   Brand Name Dispense Instructions for use Diagnosis    * albuterol (2.5 MG/3ML) 0.083% neb solution     30 vial    Take 1 vial (2.5 mg) by nebulization every 6 hours as needed for shortness of breath / dyspnea or wheezing    Cough, Moderate persistent asthma with acute exacerbation       * albuterol 108 (90 Base) MCG/ACT Inhaler    PROAIR HFA/PROVENTIL HFA/VENTOLIN HFA    1 Inhaler    Inhale 2 puffs into the lungs every 6 hours as needed for shortness of breath / dyspnea or wheezing    Cough, Moderate persistent asthma with acute exacerbation       ALEVE PO      Take 220 mg by mouth        beclomethasone 80 MCG/ACT Inhaler    QVAR    1 Inhaler    Inhale 2 puffs into the lungs 2 times daily    Cough       CALCIUM CARBONATE PO      Take by mouth daily         cetirizine-pseudoePHEDrine 5-120 MG per 12 hr tablet    ZYRTEC-D    90 tablet    Take 1 tablet by mouth 2 times daily    Allergic rhinitis, cause unspecified       cholecalciferol 1000 UNIT tablet    vitamin D3     Take 1 tablet by mouth daily.        COMPOUNDED NON-CONTROLLED SUBSTANCE - PHARMACY TO MIX COMPOUNDED MEDICATION    CMPD RX    30 capsule    Low dose Naltrexone:  6mg capsules/tablets one PO qhs    Fibromyalgia       fluconazole 150 MG tablet    DIFLUCAN    4 tablet    Take 1 tablet (150 mg) by mouth every 3 days    Acute bronchitis, unspecified organism       * FLUoxetine 40 MG capsule    PROzac    180 capsule    TAKE TWO CAPSULES BY MOUTH EVERY DAY    Major depressive disorder, recurrent episode, mild (H)       * FLUoxetine 40 MG capsule    PROzac    180 capsule    TAKE TWO CAPSULES BY MOUTH EVERY DAY    Major depressive disorder, recurrent episode, mild (H)       gabapentin 300 MG capsule    NEURONTIN    270 capsule    Take 2 capsules (600 mg) by mouth 3 times daily    Fibromyalgia, Menopausal syndrome (hot flashes)       ipratropium - albuterol 0.5 mg/2.5 mg/3 mL 0.5-2.5 (3) MG/3ML neb solution    DUONEB    1 Box    Take 1 vial (3 mLs) by nebulization once for 1 dose    Cough, Moderate persistent asthma with acute exacerbation       IRON SUPPLEMENT PO           MAGNESIUM CITRATE PO      Take by mouth daily        montelukast 10 MG tablet    SINGULAIR    30 tablet    Take 1 tablet (10 mg) by mouth At Bedtime    Acute bronchitis, unspecified organism       order for DME     1 Device    Equipment being ordered: Nebulizer    CAP (community acquired pneumonia)       predniSONE 20 MG tablet    DELTASONE    5 tablet    Take 1 tablet (20 mg) by mouth daily Take daily x 5 days prn asthma exacerbation.    Exacerbation of asthma, unspecified asthma severity, unspecified whether persistent       rizatriptan 10 MG ODT tab    MAXALT-MLT    18 tablet    Take 1 tablet (10 mg) by mouth at onset of headache for  migraine May repeat in 2 hours. Max 3 tablets/24 hours.    Migraine with aura and without status migrainosus, not intractable       topiramate 25 MG tablet    TOPAMAX    180 tablet    Take 1 tablet (25 mg) by mouth 2 times daily    TMJ (temporomandibular joint syndrome), Cervicalgia       traZODone 50 MG tablet    DESYREL    180 tablet    Take 2 tablets by mouth. 1 -2 TABs  PO at bedtime prn insomnia    Insomnia, unspecified       Turmeric 450 MG Caps           VITAMIN B COMPLEX PO           * Notice:  This list has 4 medication(s) that are the same as other medications prescribed for you. Read the directions carefully, and ask your doctor or other care provider to review them with you.

## 2018-07-06 NOTE — PROGRESS NOTES
Sleep Study Follow-Up Visit:    Date on this visit: 7/6/2018    Paige Le comes in today for follow-up of her home sleep study done on 6/28/2018 at the [facility] [place] for possible sleep apnea.      Respiratory events: The home study revealed a presence of 40 obstructive apneas and 1 mixed and 7 central apneas. There were 111 hypopneas resulting in a combined apnea/hypopnea index [AHI] of 18.1 events per hour.  AHI was 28.9 per hour supine, 0 per hour prone, 6.8 per hour on left side, and 1.8 per hour on right side.   Pattern: Excluding events noted above, respiratory rate and pattern was Normal.     Position: Percent of time spent: supine - 55.7%, prone - 0.1%, on left - 23.4%, on right - 19%.    Sleep Associated Hypoxemia: sustained hypoxemia was not present. Baseline oxygen saturation was 95.4%.  Time with saturation less than or equal to 88% was 0 minutes. The lowest oxygen saturation was 88%.   Snoring: Snoring was present.     Heart Rate: By pulse oximetry normal rate was noted.      Assessment:   Moderate obstructive sleep apnea.  Sleep associated hypoxemia was not present.    These findings were reviewed with patient.     Past medical/surgical history, family history, social history, medications and allergies were reviewed.      Problem List:  Patient Active Problem List    Diagnosis Date Noted     Morbid obesity (H) 05/17/2017     Priority: High     DMITRIY (obstructive sleep apnea) 06/29/2018     Priority: Medium     Moderate persistent asthma 02/14/2018     Priority: Medium     History of alcoholism (H) 07/28/2016     Priority: Medium     In remission for 16 years       Grieving 11/24/2014     Priority: Medium     Insomnia 11/24/2014     Priority: Medium     Obesity 11/01/2013     Priority: Medium     Migraine 04/22/2012     Priority: Medium     Family history of aneurysm 04/28/2011     Priority: Medium     Patellofemoral disorder      Priority: Medium     Fibromyalgia 04/15/2010      Priority: Medium     Mild major depression (H) 04/06/2010     Priority: Medium     Cystic Fibrosis Screening negative 05/01/2009     Priority: Medium     [facility]       Dysmenorrhea 10/12/2008     Priority: Medium     Anxiety state 03/08/2005     Priority: Medium     Problem list name updated by automated process. Provider to review       Insomnia 03/08/2005     Priority: Medium     Problem list name updated by automated process. Provider to review       Allergic rhinitis 03/08/2005     Priority: Medium     Problem list name updated by automated process. Provider to review          Impression/Plan:    1. Moderate Obstructive sleep apnea     - Patient was counseled regarding moderate sleep apnea, consequences of untreated disease and management options. She opts for CPAP.     Plan:     1. Start auto PAP therapy 5-15 cm H2O      She will follow up with me in about 7 week(s).     Fifteen minutes spent with patient, all of which were spent face-to-face counseling, consulting, coordinating plan of care.      Suleiman Kaiser     CC: Christine Farnsworth

## 2018-07-06 NOTE — PATIENT INSTRUCTIONS

## 2018-07-06 NOTE — NURSING NOTE
"Chief Complaint   Patient presents with     Study Results     HST f/u       Initial /74  Pulse 65  Resp 16  Ht 1.626 m (5' 4\")  Wt 116.1 kg (256 lb)  SpO2 98%  BMI 43.94 kg/m2 Estimated body mass index is 43.94 kg/(m^2) as calculated from the following:    Height as of this encounter: 1.626 m (5' 4\").    Weight as of this encounter: 116.1 kg (256 lb).    Medication Reconciliation: complete     ESS 14  Sarah Wayne      "

## 2018-07-12 DIAGNOSIS — F33.0 MAJOR DEPRESSIVE DISORDER, RECURRENT EPISODE, MILD (H): ICD-10-CM

## 2018-07-12 DIAGNOSIS — G47.00 INSOMNIA: Primary | ICD-10-CM

## 2018-07-12 RX ORDER — FLUOXETINE 40 MG/1
CAPSULE ORAL
Start: 2018-07-12

## 2018-07-12 NOTE — TELEPHONE ENCOUNTER
"Requested Prescriptions   Pending Prescriptions Disp Refills     traZODone (DESYREL) 50 MG tablet 180 tablet 6     Sig: Take 2 tablets by mouth. 1 -2 TABs  PO at bedtime prn insomnia    Serotonin Modulators Passed    7/12/2018 11:20 AM       Passed - Recent (12 mo) or future (30 days) visit within the authorizing provider's specialty    Patient had office visit in the last 12 months or has a visit in the next 30 days with authorizing provider or within the authorizing provider's specialty.  See \"Patient Info\" tab in inbasket, or \"Choose Columns\" in Meds & Orders section of the refill encounter.           Passed - Patient is age 18 or older       Passed - No active pregnancy on record       Passed - No positive pregnancy test in past 12 months        traZODone (DESYREL) 50 MG tablet  Last Written Prescription Date:  10/27/16  Last Fill Quantity: 180,  # refills: 6   Last office visit: 2/23/2018 with prescribing provider:  Dr. White   Future Office Visit:      "

## 2018-07-12 NOTE — TELEPHONE ENCOUNTER
"Requested Prescriptions   Pending Prescriptions Disp Refills     FLUoxetine (PROZAC) 40 MG capsule [Pharmacy Med Name: FLUOXETINE HCL 40MG CAPS] 180 capsule 0     Sig: TAKE TWO CAPSULES BY MOUTH EVERY DAY    SSRIs Protocol Passed    7/12/2018  9:43 AM       Passed - PHQ-9 score less than 5 in past 6 months    Please review last PHQ-9 score.          Passed - Patient is age 18 or older       Passed - No active pregnancy on record       Passed - No positive pregnancy test in last 12 months       Passed - Recent (6 mo) or future (30 days) visit within the authorizing provider's specialty    Patient had office visit in the last 6 months or has a visit in the next 30 days with authorizing provider or within the authorizing provider's specialty.  See \"Patient Info\" tab in inbasket, or \"Choose Columns\" in Meds & Orders section of the refill encounter.            Last Written Prescription Date:  5/22/18  Last Fill Quantity: 180,  # refills: 0   Last office visit: 2/23/2018 with Dr. PEÑA    Future Office Visit:       Pt should have pills through ~8/22/18 from Christine's last Rx.  We asked pharmacy to forward request to new PCP.  Paige established care with Dr. RAMSEY 2/14/18 .     "

## 2018-07-13 ENCOUNTER — DOCUMENTATION ONLY (OUTPATIENT)
Dept: SLEEP MEDICINE | Facility: CLINIC | Age: 48
End: 2018-07-13
Payer: COMMERCIAL

## 2018-07-13 NOTE — PROGRESS NOTES
Patient was offered choice of vendor and chose [facility].  Patient Paige Le was set up at [place] on July 13, 2018. Patient received a Resmed AirSense 10 Auto. Pressures were set at 5-15 cm H2O.   Patient s ramp is 5 cm H2O for Auto and FLEX/EPR is 2.  Patient received a Pao Respironics Mask name: dreamwear  Pillow mask Size Small, heated tubing and heated humidifier.  Patient is enrolled in the STM Program and does not need to meet compliance.     Diogenes Vanessa

## 2018-07-16 ENCOUNTER — DOCUMENTATION ONLY (OUTPATIENT)
Dept: SLEEP MEDICINE | Facility: CLINIC | Age: 48
End: 2018-07-16

## 2018-07-16 DIAGNOSIS — G47.33 OSA (OBSTRUCTIVE SLEEP APNEA): ICD-10-CM

## 2018-07-16 DIAGNOSIS — M79.7 FIBROMYALGIA: ICD-10-CM

## 2018-07-16 DIAGNOSIS — N95.1 MENOPAUSAL SYNDROME (HOT FLASHES): ICD-10-CM

## 2018-07-16 RX ORDER — TRAZODONE HYDROCHLORIDE 50 MG/1
TABLET, FILM COATED ORAL
Qty: 60 TABLET | Refills: 0 | Status: SHIPPED | OUTPATIENT
Start: 2018-07-16 | End: 2018-09-13

## 2018-07-16 NOTE — PROGRESS NOTES
Patient returned call.     Subjective measures:  Patient meeting subjective benchmarks.     Action plan:pt to have 14 day STM visit.

## 2018-07-16 NOTE — PROGRESS NOTES
3 DAY STM VISIT    Diagnostic AHI:   18.1     Patient contacted for 3 day STM visit  Message left for patient to return call     Device type: Auto-CPAP  PAP settings from order::  CPAP min 5 cm  H20       CPAP max 15 cm  H20       Mask type:    Nasal Mask     Device settings from machine      Min CPAP 5.0            Max CPAP 15.0      Assessment: Nightly usage over four hours.  Action plan: Pt to have f/u 14 day STM visit.  Patient has a follow up visit scheduled:   not required by insurance.

## 2018-07-16 NOTE — TELEPHONE ENCOUNTER
Prescription approved per [facility] Refill Protocol.  Stacey Boss RN

## 2018-07-16 NOTE — TELEPHONE ENCOUNTER
gabapentin (NEURONTIN) 300 MG capsule  Last Written Prescription Date:  10/6/17  Last Fill Quantity: 270,  # refills: 3   Last office visit: No previous visit found with prescribing provider:     Future Office Visit:      Requested Prescriptions   Pending Prescriptions Disp Refills     gabapentin (NEURONTIN) 300 MG capsule 270 capsule 3     Sig: Take 2 capsules (600 mg) by mouth 3 times daily    There is no refill protocol information for this order

## 2018-07-17 RX ORDER — GABAPENTIN 300 MG/1
600 CAPSULE ORAL 3 TIMES DAILY
Qty: 270 CAPSULE | Refills: 3 | Status: CANCELLED | OUTPATIENT
Start: 2018-07-17

## 2018-07-17 NOTE — TELEPHONE ENCOUNTER
Per last OV from 2/2018    1.  Medication Management:  -Continue Gabapentin at 300mg qam and 600mg qhs.     Called pharmacy to verify last  date as this was last prescribed 10/2017 with 3 refills:  last picked up on 5/3/18    Called patient to verify how she is taking medication. LVM for patient to call triage to discuss.     Tricia GARZAN-RN Care Coordinator  [facility]

## 2018-07-20 RX ORDER — GABAPENTIN 300 MG/1
600 CAPSULE ORAL 2 TIMES DAILY
Qty: 120 CAPSULE | Refills: 1 | Status: SHIPPED | OUTPATIENT
Start: 2018-07-20 | End: 2018-11-15

## 2018-07-20 NOTE — TELEPHONE ENCOUNTER
Called patient. She takes 2 (300mg capsules) in the Am and 2 capsules at HS. Has been taking this dosage since February. Is tolerating this well. Would like to stay at this dose.  She discussed having a CPAP to wear at HS and that she is sleeping more at night which is very helpful in helping her pain. She states that she is not sure of it is the Naltrexone vs more sleep that is helping with her not having to take the midday dose of Gabapentin.     Advised that I would route refill of Gabapentin to her provider to review. She is scheduled for follow up to discuss Naltrexone.     Gabapentin 300mg, #120, Refill:1  Sig:Take 2 capsules  (600mg) in the morning and 2 capsules (600mg) at bedtime  Due:Now    Ana M GARZAN, RN Care Coordinator  [facility]

## 2018-07-24 DIAGNOSIS — M79.7 FIBROMYALGIA: ICD-10-CM

## 2018-07-30 ENCOUNTER — DOCUMENTATION ONLY (OUTPATIENT)
Dept: SLEEP MEDICINE | Facility: CLINIC | Age: 48
End: 2018-07-30
Payer: COMMERCIAL

## 2018-08-08 NOTE — PROGRESS NOTES
"                          [facility]    Date of visit: 8/09/2018    Chief complaint:   Chief Complaint   Patient presents with     Pain       Interval history:  Paige Le is a 47 year old female last seen by me on 2/23/2018.      Since her last visit, Paige Le reports:  - Doing REALLY well, feeling great, her pain is well controlled  - Started low dose naltrexone in February.  She has had a tough time getting it filled and to her house.  The compounding pharmacy is difficult to use.  She is thinking about going off it.   - Had a sleep study done and got a machine and is sleeping better.  Feels refreshed when she wakes up in the morning.   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 They have met all kids of people in recovery and it is similar to mindfullness.  Her  is a CD counselor for the [employer] and they have reopened the [facility] hospitals #8 of them for people who are committed and he helps get them directed to treatment.    - Recently moved to [place], [place] because it is closer to her job.  [employer] [facility] is amazing.  Made sure she had a home group prior to moving there.   - Has not seen Michelle since October 2017.  Saw her 8 times prior and continues to do a HEP.   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  She put some boundaries around her job.  Her job was taking over her life. She was working 60 hours/week.  She plans to work 40 hours/week now. 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  She was doing events 11 days out of the month.    - She is not taking naps during the day anymore.   - She is trying to get as much exercise as possible.  Walking her dog and walking while at work.    - Migraines are controlled       Pain scores:  Pain intensity on average is 1 on a scale of 0-10.     Current pain treatments:   Gabapentin 600mg qam and 600mg qhs  Topamax 25mg BID - Preventative for headaches  Low Dose Naltrexone " 6mg qhs  Trazodone 50mg -100mg qhs  Maxalt 10mg PRN headache  Tumeric 450mg daily  Ibuprofen 800mg BID PRN  Prozac 80mg daily    Past pain treatments:      Side Effects: no side effect    Medications:  Current Outpatient Prescriptions   Medication Sig Dispense Refill     albuterol (2.5 MG/3ML) 0.083% neb solution Take 1 vial (2.5 mg) by nebulization every 6 hours as needed for shortness of breath / dyspnea or wheezing 30 vial 1     albuterol (PROAIR HFA/PROVENTIL HFA/VENTOLIN HFA) 108 (90 BASE) MCG/ACT Inhaler Inhale 2 puffs into the lungs every 6 hours as needed for shortness of breath / dyspnea or wheezing 1 Inhaler 6     B Complex Vitamins (VITAMIN B COMPLEX PO)        beclomethasone (QVAR) 80 MCG/ACT Inhaler Inhale 2 puffs into the lungs 2 times daily 1 Inhaler 11     CALCIUM CARBONATE PO Take by mouth daily       cetirizine-psuedoePHEDrine (ZYRTEC-D) 5-120 MG per tablet Take 1 tablet by mouth 2 times daily 90 tablet 3     cholecalciferol (VITAMIN D) 1000 UNIT tablet Take 1 tablet by mouth daily.       COMPOUNDED NON-CONTROLLED SUBSTANCE (CMPD RX) - PHARMACY TO MIX COMPOUNDED MEDICATION Low dose Naltrexone:  6mg capsules/tablets one PO qhs (Patient not taking: Reported on 8/9/2018) 30 capsule 6     Ferrous Sulfate (IRON SUPPLEMENT PO)        fluconazole (DIFLUCAN) 150 MG tablet Take 1 tablet (150 mg) by mouth every 3 days 4 tablet 0     FLUoxetine (PROZAC) 40 MG capsule TAKE TWO CAPSULES BY MOUTH EVERY  capsule 0     FLUoxetine (PROZAC) 40 MG capsule TAKE TWO CAPSULES BY MOUTH EVERY  capsule 0     gabapentin (NEURONTIN) 300 MG capsule Take 2 capsules (600 mg) by mouth 2 times daily Take 2 capsules  (600mg) in the morning and 2 capsules (600mg) at bedtime 120 capsule 1     ipratropium - albuterol 0.5 mg/2.5 mg/3 mL (DUONEB) 0.5-2.5 (3) MG/3ML neb solution Take 1 vial (3 mLs) by nebulization once for 1 dose 1 Box 3     MAGNESIUM CITRATE PO Take by mouth daily       montelukast (SINGULAIR) 10 MG  "tablet Take 1 tablet (10 mg) by mouth At Bedtime 30 tablet 1     Naproxen Sodium (ALEVE PO) Take 220 mg by mouth       order for DME Equipment being ordered: Nebulizer 1 Device 0     predniSONE (DELTASONE) 20 MG tablet Take 1 tablet (20 mg) by mouth daily Take daily x 5 days prn asthma exacerbation. 5 tablet 0     rizatriptan (MAXALT-MLT) 10 MG ODT tab Take 1 tablet (10 mg) by mouth at onset of headache for migraine May repeat in 2 hours. Max 3 tablets/24 hours. 18 tablet 3     topiramate (TOPAMAX) 25 MG tablet TAKE ONE TABLET BY MOUTH TWICE A  tablet 0     traZODone (DESYREL) 50 MG tablet Take 2 tablets by mouth. 1 -2 TABs  PO at bedtime prn insomnia 60 tablet 0     Turmeric 450 MG CAPS          Medical History: any changes in medical history since they were last seen? No    Review of Systems:  The 14 system ROS was reviewed from the intake questionnaire, and is positive for: headaches and nosebleeds.   Any bowel or bladder problems: none  Mood: \"really good\"    Physical Exam:  Blood pressure 106/73, pulse 67, weight 118.8 kg (262 lb), SpO2 98 %, not currently breastfeeding.  General: NAD, awake and alert, energetic  Gait: Normal  MSK exam: tight muscle bands bilateral cervical paraspinals    Assessment:  Paige Le is a 47 year old female who presents with the complaints of chronic generalized myofascial pain and fatigue.     1. Fibromyalgia  2. Migraine headaches    3. Depression   4. Alcohol use disorder - in sustained remission and active recovery  5. Obstructive Sleep Apnea - using CPAP     Plan:  Diagnosis reviewed, treatment option addressed, and risk/benefits discussed.  Self-care instructions given.  I am recommending a multidisciplinary treatment plan to help this patient better manage her pain.       1. Physical Therapy: She continues to do a HEP, completed 8 sessions of pain PT with Michelle  and got a lot of benefit from this.   2. Pain Psychologist to address issues of relaxation, behavioral " change, coping style, and other factors important to improvement: Continues to see Bebe YUN  3. Diagnostic Studies: None  4. Medication Management:  -Continue Gabapentin at 300mg qam and 600mg qhs.                       -Naltrexone 6mg qhs - compound pharmacy  -Black seed oil 2000mg daily  -Tumeric 450mg daily  5. Further procedures recommended: None at this time  6. Acupuncture:  Could consider in the future.    7. Follow up with Dr. Garrido in 6 months      I spent 30 minutes of time face to face with the patient.  Greater than 50% of this time was spent in patient counseling and/or coordination of care.      Yvonne GarridoMD Pain Management

## 2018-08-09 ENCOUNTER — OFFICE VISIT (OUTPATIENT)
Dept: PALLIATIVE MEDICINE | Facility: CLINIC | Age: 48
End: 2018-08-09
Payer: COMMERCIAL

## 2018-08-09 VITALS
WEIGHT: 262 LBS | SYSTOLIC BLOOD PRESSURE: 106 MMHG | BODY MASS INDEX: 44.97 KG/M2 | HEART RATE: 67 BPM | OXYGEN SATURATION: 98 % | DIASTOLIC BLOOD PRESSURE: 73 MMHG

## 2018-08-09 DIAGNOSIS — G43.109 MIGRAINE WITH AURA AND WITHOUT STATUS MIGRAINOSUS, NOT INTRACTABLE: ICD-10-CM

## 2018-08-09 DIAGNOSIS — M79.7 FIBROMYALGIA: Primary | ICD-10-CM

## 2018-08-09 DIAGNOSIS — G47.33 OSA (OBSTRUCTIVE SLEEP APNEA): ICD-10-CM

## 2018-08-09 PROCEDURE — 99214 OFFICE O/P EST MOD 30 MIN: CPT | Performed by: ANESTHESIOLOGY

## 2018-08-09 ASSESSMENT — PAIN SCALES - GENERAL: PAINLEVEL: NO PAIN (1)

## 2018-08-09 NOTE — MR AVS SNAPSHOT
After Visit Summary   8/9/2018    Paige Le    MRN: [id number]           Patient Information     Date Of Birth          1970        Visit Information        Provider Department      8/9/2018 10:30 AM Yvonne Garrido MD [facility]        Care Instructions    1. Continue with your low dose naltrexone through the winter  2. Follow up with me in 6 months.  Call if you need anything or have any problems between now and then    Yvonne Garrido MD     ----------------------------------------------------------------  Nurse Triage line:  [phone]   Call this number with any questions or concerns. You may leave a detailed message anytime. Calls are typically returned Monday through Friday between 8 AM and 4:30 PM. We usually get back to you within 2 business days depending on the issue/request.       Medication refills:    For non-narcotic medications, call your pharmacy directly to request a refill. The pharmacy will contact the [facility] Management Center for authorization. Please allow 3-4 days for these refills to be processed.     For narcotic refills, call the nurse triage line or send a [employer] message. Please contact us 7-10 days before your refill is due. The message MUST include the name of the specific medication(s) requested and how you would like to receive the prescription(s). The options are as follows:    Pain Clinic staff can mail the prescription to your pharmacy. Please tell us the name of the pharmacy.    You may pick the prescription up at the Pain Clinic (tell us the location) or during a clinic visit with your pain provider    Pain Clinic staff can deliver the prescription to the [facility] pharmacy in the clinic building. Please tell us the location.      Scheduling number: [phone].  Call this number to schedule or change appointments.    We believe regular attendance is key to your success in our program.    Any time you are unable to keep your appointment we  ask that you call us at least 24 hours in advance to let us know. This will allow us to offer the appointment time to another patient.               Follow-ups after your visit        Who to contact     If you have questions or need follow up information about today's clinic visit or your schedule please contact [facility] directly at [phone].  Normal or non-critical lab and imaging results will be communicated to you by MyChart, letter or phone within 4 business days after the clinic has received the results. If you do not hear from us within 7 days, please contact the clinic through MyChart or phone. If you have a critical or abnormal lab result, we will notify you by phone as soon as possible.  Submit refill requests through [employer] or call your pharmacy and they will forward the refill request to us. Please allow 3 business days for your refill to be completed.          Additional Information About Your Visit        Care EveryWhere ID     This is your Care EveryWhere ID. This could be used by other organizations to access your [facility] medical records  [id number]        Your Vitals Were     Pulse Pulse Oximetry BMI (Body Mass Index)             67 98% 44.97 kg/m2          Blood Pressure from Last 3 Encounters:   08/09/18 106/73   07/06/18 109/74   06/20/18 120/77    Weight from Last 3 Encounters:   08/09/18 118.8 kg (262 lb)   07/06/18 116.1 kg (256 lb)   06/20/18 117.5 kg (259 lb)              Today, you had the following     No orders found for display       Primary Care Provider Office Phone # Fax #    Emmy White [phone] [address]  [place] [address]        Equal Access to Services     [facility]: Haddea Villalpando, vaughn muñiz, mariosl chanel. So [facility] [phone].    ATENCIÓN: Si habla español, tiene a greene disposición servicios gratuitos de asistencia  lingüísticaBrandy Dotson al [phone].    We comply with applicable federal civil rights laws and [place] laws. We do not discriminate on the basis of race, color, national origin, age, disability, sex, sexual orientation, or gender identity.            Thank you!     Thank you for choosing [facility]  for your care. Our goal is always to provide you with excellent care. Hearing back from our patients is one way we can continue to improve our services. Please take a few minutes to complete the written survey that you may receive in the mail after your visit with us. Thank you!             Your Updated Medication List - Protect others around you: Learn how to safely use, store and throw away your medicines at www.disposemymeds.org.          This list is accurate as of 8/9/18 11:14 AM.  Always use your most recent med list.                   Brand Name Dispense Instructions for use Diagnosis    * albuterol (2.5 MG/3ML) 0.083% neb solution     30 vial    Take 1 vial (2.5 mg) by nebulization every 6 hours as needed for shortness of breath / dyspnea or wheezing    Cough, Moderate persistent asthma with acute exacerbation       * albuterol 108 (90 Base) MCG/ACT Inhaler    PROAIR HFA/PROVENTIL HFA/VENTOLIN HFA    1 Inhaler    Inhale 2 puffs into the lungs every 6 hours as needed for shortness of breath / dyspnea or wheezing    Cough, Moderate persistent asthma with acute exacerbation       ALEVE PO      Take 220 mg by mouth        beclomethasone 80 MCG/ACT Inhaler    QVAR    1 Inhaler    Inhale 2 puffs into the lungs 2 times daily    Cough       CALCIUM CARBONATE PO      Take by mouth daily        cetirizine-pseudoePHEDrine 5-120 MG per 12 hr tablet    ZYRTEC-D    90 tablet    Take 1 tablet by mouth 2 times daily    Allergic rhinitis, cause unspecified       cholecalciferol 1000 UNIT tablet    vitamin D3     Take 1 tablet by mouth daily.        COMPOUNDED NON-CONTROLLED SUBSTANCE - PHARMACY TO MIX COMPOUNDED  MEDICATION    CMPD RX    30 capsule    Low dose Naltrexone:  6mg capsules/tablets one PO qhs    Fibromyalgia       fluconazole 150 MG tablet    DIFLUCAN    4 tablet    Take 1 tablet (150 mg) by mouth every 3 days    Acute bronchitis, unspecified organism       * FLUoxetine 40 MG capsule    PROzac    180 capsule    TAKE TWO CAPSULES BY MOUTH EVERY DAY    Major depressive disorder, recurrent episode, mild (H)       * FLUoxetine 40 MG capsule    PROzac    180 capsule    TAKE TWO CAPSULES BY MOUTH EVERY DAY    Major depressive disorder, recurrent episode, mild (H)       gabapentin 300 MG capsule    NEURONTIN    120 capsule    Take 2 capsules (600 mg) by mouth 2 times daily Take 2 capsules  (600mg) in the morning and 2 capsules (600mg) at bedtime    Fibromyalgia, Menopausal syndrome (hot flashes)       ipratropium - albuterol 0.5 mg/2.5 mg/3 mL 0.5-2.5 (3) MG/3ML neb solution    DUONEB    1 Box    Take 1 vial (3 mLs) by nebulization once for 1 dose    Cough, Moderate persistent asthma with acute exacerbation       IRON SUPPLEMENT PO           MAGNESIUM CITRATE PO      Take by mouth daily        montelukast 10 MG tablet    SINGULAIR    30 tablet    Take 1 tablet (10 mg) by mouth At Bedtime    Acute bronchitis, unspecified organism       order for DME     1 Device    Equipment being ordered: Nebulizer    CAP (community acquired pneumonia)       predniSONE 20 MG tablet    DELTASONE    5 tablet    Take 1 tablet (20 mg) by mouth daily Take daily x 5 days prn asthma exacerbation.    Exacerbation of asthma, unspecified asthma severity, unspecified whether persistent       rizatriptan 10 MG ODT tab    MAXALT-MLT    18 tablet    Take 1 tablet (10 mg) by mouth at onset of headache for migraine May repeat in 2 hours. Max 3 tablets/24 hours.    Migraine with aura and without status migrainosus, not intractable       topiramate 25 MG tablet    TOPAMAX    180 tablet    TAKE ONE TABLET BY MOUTH TWICE A DAY    TMJ (temporomandibular  joint syndrome), Cervicalgia       traZODone 50 MG tablet    DESYREL    60 tablet    Take 2 tablets by mouth. 1 -2 TABs  PO at bedtime prn insomnia    Insomnia       Turmeric 450 MG Caps           VITAMIN B COMPLEX PO           * Notice:  This list has 4 medication(s) that are the same as other medications prescribed for you. Read the directions carefully, and ask your doctor or other care provider to review them with you.

## 2018-08-09 NOTE — PATIENT INSTRUCTIONS
1. Continue with your low dose naltrexone through the winter  2. Follow up with me in 6 months.  Call if you need anything or have any problems between now and then    Yvonne Garrido MD     ----------------------------------------------------------------  Nurse Triage line:  [phone]   Call this number with any questions or concerns. You may leave a detailed message anytime. Calls are typically returned Monday through Friday between 8 AM and 4:30 PM. We usually get back to you within 2 business days depending on the issue/request.       Medication refills:    For non-narcotic medications, call your pharmacy directly to request a refill. The pharmacy will contact the [facility] for authorization. Please allow 3-4 days for these refills to be processed.     For narcotic refills, call the nurse triage line or send a [employer] message. Please contact us 7-10 days before your refill is due. The message MUST include the name of the specific medication(s) requested and how you would like to receive the prescription(s). The options are as follows:    Pain Clinic staff can mail the prescription to your pharmacy. Please tell us the name of the pharmacy.    You may pick the prescription up at the Pain Clinic (tell us the location) or during a clinic visit with your pain provider    Pain Clinic staff can deliver the prescription to the [facility] pharmacy in the clinic building. Please tell us the location.      Scheduling number: [phone].  Call this number to schedule or change appointments.    We believe regular attendance is key to your success in our program.    Any time you are unable to keep your appointment we ask that you call us at least 24 hours in advance to let us know. This will allow us to offer the appointment time to another patient.

## 2018-08-09 NOTE — NURSING NOTE
"Chief Complaint   Patient presents with     Pain       Initial /73  Pulse 67  Wt 118.8 kg (262 lb)  SpO2 98%  BMI 44.97 kg/m2 Estimated body mass index is 44.97 kg/(m^2) as calculated from the following:    Height as of 7/6/18: 1.626 m (5' 4\").    Weight as of this encounter: 118.8 kg (262 lb).        Deborah RODRÍGUEZ LPN  [facility]     "

## 2018-08-13 ENCOUNTER — DOCUMENTATION ONLY (OUTPATIENT)
Dept: SLEEP MEDICINE | Facility: CLINIC | Age: 48
End: 2018-08-13

## 2018-08-13 DIAGNOSIS — G47.33 OSA (OBSTRUCTIVE SLEEP APNEA): ICD-10-CM

## 2018-08-13 NOTE — PROGRESS NOTES
30 DAY [facility] VISIT    Diagnostic AHI:   18.1 HST    Data only recheck -mail box is full unable to receive any new messages.     Assessment: Pt not meeting objective benchmarks for AHI, only slightly elevated.   Action plan:   Patient has no follow up visit scheduled.   Device type: Auto-CPAP  PAP settings: CPAP min 5.0 cm  H20     CPAP max 15.0 cm  H20    95th% pressure 12.5 cm  H20   Mask type:  Nasal Mask  Objective measures: 14 day rolling measures      Compliance  92 %      Leak  11.44 lpm  last  upload      AHI 5.23   last  upload      Average number of minutes 479      Objective measure goal  Compliance   Goal >70%  Leak   Goal < 24 lpm  AHI  Goal < 5  Usage  Goal >240

## 2018-08-25 ENCOUNTER — TELEPHONE (OUTPATIENT)
Dept: FAMILY MEDICINE | Facility: CLINIC | Age: 48
End: 2018-08-25

## 2018-08-25 DIAGNOSIS — F33.0 MAJOR DEPRESSIVE DISORDER, RECURRENT EPISODE, MILD (H): ICD-10-CM

## 2018-08-25 NOTE — LETTER
[address]  [address] [phone]      August 31, 2018      Paige Le  [address]  [place] [address]            Dear Paige,    We were unable to reach you by telephone.  We recently received a refill request for your medication : FLUoxetine (PROZAC) 40 MG capsule    We have filled this medication for a one time radha refill.  At this time you are due for an office visit with your provider to follow up on this medication.    Please call to schedule an appointment so there is no delay in your next refill.      We look forward to seeing you soon.      Sincerely,    [facility]  Care Team

## 2018-08-27 NOTE — TELEPHONE ENCOUNTER
"Requested Prescriptions   Pending Prescriptions Disp Refills     FLUoxetine (PROZAC) 40 MG capsule [Pharmacy Med Name: FLUOXETINE HCL 40MG CAPS] 180 capsule 0    Last Written Prescription Date:  5/22/18  Last Fill Quantity: 180,  # refills: 0   Last Office Visit: 2/23/2018   Future Office Visit:      Sig: TAKE TWO CAPSULES BY MOUTH EVERY DAY    SSRIs Protocol Failed    8/25/2018 11:07 AM       Failed - PHQ-9 score less than 5 in past 6 months    PHQ-9 SCORE 7/14/2016 5/17/2017 1/31/2018   Total Score - - -   Total Score 3 5 2     KACEY-7 SCORE 7/14/2016 5/17/2017 1/31/2018   Total Score - - -   Total Score 3 1 0            Failed - Recent (6 mo) or future (30 days) visit within the authorizing provider's specialty    Patient had office visit in the last 6 months or has a visit in the next 30 days with authorizing provider or within the authorizing provider's specialty.  See \"Patient Info\" tab in inbasket, or \"Choose Columns\" in Meds & Orders section of the refill encounter.           Passed - Patient is age 18 or older       Passed - No active pregnancy on record       Passed - No positive pregnancy test in last 12 months          "

## 2018-08-27 NOTE — TELEPHONE ENCOUNTER
Christine, I wonder if we should ask pharmacy to forward to PCP as of Feb 2018, Dr. White?   Jayden Sparks RN

## 2018-08-28 RX ORDER — FLUOXETINE 40 MG/1
80 CAPSULE ORAL DAILY
Qty: 60 CAPSULE | Refills: 0 | Status: SHIPPED | OUTPATIENT
Start: 2018-08-28 | End: 2018-09-13

## 2018-08-28 NOTE — TELEPHONE ENCOUNTER
Given 30 days of prescription.  Please assist with scheduling follow up with PCP . No further refills without follow up appointment

## 2018-08-28 NOTE — TELEPHONE ENCOUNTER
This writer attempted to contact pt on 08/28/18      Reason for call appt and left message.      If patient calls back:   Schedule Office Visit appointment  with PCP, let pt know 30 day of prozac was sent  document that pt called and close encounter         Christine Cage CMA

## 2018-08-30 NOTE — TELEPHONE ENCOUNTER
This writer attempted to contact pt on 08/30/18      Reason for call schedule OV and left message.      If patient calls back:   Schedule Office Visit appointment within 1 month with PCP, document that pt called and close encounter         Elida Henriquez MA

## 2018-09-13 ENCOUNTER — OFFICE VISIT (OUTPATIENT)
Dept: FAMILY MEDICINE | Facility: CLINIC | Age: 48
End: 2018-09-13
Payer: COMMERCIAL

## 2018-09-13 ENCOUNTER — TELEPHONE (OUTPATIENT)
Dept: FAMILY MEDICINE | Facility: CLINIC | Age: 48
End: 2018-09-13

## 2018-09-13 VITALS
TEMPERATURE: 97.8 F | OXYGEN SATURATION: 99 % | WEIGHT: 265.9 LBS | BODY MASS INDEX: 45.4 KG/M2 | SYSTOLIC BLOOD PRESSURE: 136 MMHG | HEIGHT: 64 IN | HEART RATE: 58 BPM | DIASTOLIC BLOOD PRESSURE: 78 MMHG | RESPIRATION RATE: 18 BRPM

## 2018-09-13 DIAGNOSIS — J45.41 MODERATE PERSISTENT ASTHMA WITH ACUTE EXACERBATION: ICD-10-CM

## 2018-09-13 DIAGNOSIS — H53.9 VISION CHANGES: ICD-10-CM

## 2018-09-13 DIAGNOSIS — F33.0 MAJOR DEPRESSIVE DISORDER, RECURRENT EPISODE, MILD (H): Primary | ICD-10-CM

## 2018-09-13 DIAGNOSIS — G47.00 INSOMNIA, UNSPECIFIED TYPE: ICD-10-CM

## 2018-09-13 DIAGNOSIS — Z11.4 SCREENING FOR HIV (HUMAN IMMUNODEFICIENCY VIRUS): ICD-10-CM

## 2018-09-13 DIAGNOSIS — Z23 VACCINE FOR VIRAL HEPATITIS: ICD-10-CM

## 2018-09-13 PROCEDURE — 99214 OFFICE O/P EST MOD 30 MIN: CPT | Mod: 25 | Performed by: FAMILY MEDICINE

## 2018-09-13 PROCEDURE — 90471 IMMUNIZATION ADMIN: CPT | Performed by: FAMILY MEDICINE

## 2018-09-13 PROCEDURE — 90472 IMMUNIZATION ADMIN EACH ADD: CPT | Performed by: FAMILY MEDICINE

## 2018-09-13 PROCEDURE — 90746 HEPB VACCINE 3 DOSE ADULT IM: CPT | Performed by: FAMILY MEDICINE

## 2018-09-13 PROCEDURE — 90632 HEPA VACCINE ADULT IM: CPT | Performed by: FAMILY MEDICINE

## 2018-09-13 RX ORDER — TRAZODONE HYDROCHLORIDE 50 MG/1
TABLET, FILM COATED ORAL
Qty: 120 TABLET | Refills: 3 | Status: SHIPPED | OUTPATIENT
Start: 2018-09-13 | End: 2021-09-18

## 2018-09-13 RX ORDER — FLUOXETINE 40 MG/1
80 CAPSULE ORAL DAILY
Qty: 180 CAPSULE | Refills: 3 | Status: SHIPPED | OUTPATIENT
Start: 2018-09-13 | End: 2019-12-20

## 2018-09-13 RX ORDER — IPRATROPIUM BROMIDE AND ALBUTEROL SULFATE 2.5; .5 MG/3ML; MG/3ML
1 SOLUTION RESPIRATORY (INHALATION) ONCE
Qty: 1 BOX | Refills: 3 | Status: SHIPPED | OUTPATIENT
Start: 2018-09-13 | End: 2018-09-13

## 2018-09-13 RX ORDER — IPRATROPIUM BROMIDE AND ALBUTEROL SULFATE 2.5; .5 MG/3ML; MG/3ML
1 SOLUTION RESPIRATORY (INHALATION) EVERY 6 HOURS PRN
Qty: 50 VIAL | Refills: 3 | Status: SHIPPED | OUTPATIENT
Start: 2018-09-13 | End: 2019-11-27

## 2018-09-13 ASSESSMENT — ANXIETY QUESTIONNAIRES
IF YOU CHECKED OFF ANY PROBLEMS ON THIS QUESTIONNAIRE, HOW DIFFICULT HAVE THESE PROBLEMS MADE IT FOR YOU TO DO YOUR WORK, TAKE CARE OF THINGS AT HOME, OR GET ALONG WITH OTHER PEOPLE: NOT DIFFICULT AT ALL
3. WORRYING TOO MUCH ABOUT DIFFERENT THINGS: NOT AT ALL
2. NOT BEING ABLE TO STOP OR CONTROL WORRYING: NOT AT ALL
6. BECOMING EASILY ANNOYED OR IRRITABLE: NOT AT ALL
1. FEELING NERVOUS, ANXIOUS, OR ON EDGE: NOT AT ALL
7. FEELING AFRAID AS IF SOMETHING AWFUL MIGHT HAPPEN: NOT AT ALL
GAD7 TOTAL SCORE: 0
5. BEING SO RESTLESS THAT IT IS HARD TO SIT STILL: NOT AT ALL

## 2018-09-13 ASSESSMENT — PATIENT HEALTH QUESTIONNAIRE - PHQ9: 5. POOR APPETITE OR OVEREATING: NOT AT ALL

## 2018-09-13 ASSESSMENT — PAIN SCALES - GENERAL: PAINLEVEL: NO PAIN (0)

## 2018-09-13 NOTE — PROGRESS NOTES
SUBJECTIVE:   Paige Le is a 47 year old female who presents to clinic today for the following health issues:    Depression Followup    Status since last visit: Stable    See PHQ-9 for current symptoms.  Other associated symptoms: None    Complicating factors:   Significant life event:  No   Current substance abuse:  None  Anxiety or Panic symptoms:  No    PHQ-9 7/14/2016 5/17/2017 1/31/2018   Total Score 3 5 2   Q9: Suicide Ideation Not at all Not at all Not at all       PHQ-9  English  PHQ-9   Any Language  Suicide Assessment Five-step Evaluation and Treatment (SAFE-T)      Amount of exercise or physical activity: None    Problems taking medications regularly: No    Medication side effects: none    Diet: regular (no restrictions)      Fatigue:  -Significantly improved after using CPAP machine. Patient is now able to sleep through the night  -Patient is taking trazodone 1-2 nights weekly     Allergies:  -Sinuses feel improved since she began using CPAP machine     Asthma:  -Feels it has been well controlled  -No longer using a daily steroid inhaler for many months, due to yeast infections and not feeling like it was needed.  -Has not had any symptom flare recently   -Stopped taking Singulair because she felt it was not making a difference     Mood:  -Feels fluoxetine is controlling symptoms well. Patient has been taking 40 mg for a while  -States she was previously diagnosed with adhd, but never formally treated  -Is able to manage without medication and knows how to adapt to job   - continues to be abstinent from etoh for many years.     Pain:  -Reports pain is very well managed. Follows with pain clinic Q 6 mo  -Improved since using CPAP  -Has been scuba diving and kayaking. Refuses to let pain prevent her from trying new activities     Migraines:  -Managed by pain clinic. Patient feels migraines are well managed   -Worse when chronic pain flares   -Denies any new neurologic symptoms except slight reduced  vision in the evening.       Weight:  -Patient has not been walking recently because her dog is injured and cannot walk and she doesn't want to walk without her dog. She is planning on joining a gym and would like to find a gym with a pool so that she can swim         Problem list and histories reviewed & adjusted, as indicated.  Additional history: as documented    Patient Active Problem List   Diagnosis     Anxiety state     Insomnia     Allergic rhinitis     Dysmenorrhea     Cystic Fibrosis Screening negative     Mild major depression (H)     Fibromyalgia     Patellofemoral disorder     Family history of aneurysm     Migraine     Obesity     Grieving     Insomnia     History of alcoholism (H)     Morbid obesity (H)     Moderate persistent asthma     DMITRIY (obstructive sleep apnea)     Past Surgical History:   Procedure Laterality Date     ARTHROSCOPY KNEE RT/LT  1997    Left      DILATION AND CURETTAGE N/A 6/14/2017    Procedure: DILATION AND CURETTAGE;;  Surgeon: Livia Barnett DO;  Location: [facility] OR     EXAM UNDER ANESTHESIA, ULTRASOUND N/A 6/14/2017    Procedure: EXAM UNDER ANESTHESIA, ULTRASOUND;  Ultrasound guided cervical dilation, IUD placement, Endometrial biopsy, repair of unavoidable cervical laceration;  Surgeon: Livia Barnett DO;  Location: [facility] OR     HC TOOTH EXTRACTION W/FORCEP  1998    wisdom teeth      INSERT INTRAUTERINE DEVICE N/A 6/14/2017    Procedure: INSERT INTRAUTERINE DEVICE;;  Surgeon: Livia Barnett DO;  Location: [facility] OR       Social History   Substance Use Topics     Smoking status: Never Smoker     Smokeless tobacco: Never Used     Alcohol use No      Comment: recovering     Family History   Problem Relation Age of Onset     Neurologic Disorder Mother      ruptured cerebral aneurysm age 60      Hypertension Mother      Depression/Anxiety Mother      Cerebrovascular Disease Mother      Obesity Mother      Macular Degeneration Mother      Musculoskeletal Disorder Father   "    OA      GASTROINTESTINAL DISEASE Father      colon polyps age 60     Other Cancer Father      Brain Cancer-neuroblastoma     Depression/Anxiety Father      Obesity Father      Gynecology Sister      Rachel. irregular menses.  pre-eclampsia     Aneurysm Sister      cerebral     HEART DISEASE Paternal Grandfather      Multiple MI's (first MI age 60)     Diabetes Paternal Grandfather      IDDM dx age 60  Severe/brittle/complications     Coronary Artery Disease Paternal Grandfather      Depression/Anxiety Paternal Grandfather      Obesity Paternal Grandfather      Gynecology Paternal Grandmother       of uterine CA in her 40's     Ovarian Cancer Paternal Grandmother       at age 40 from cancer     Eye Disorder Maternal Grandmother      glacucoma     Diabetes Maternal Grandmother      IDDM-onset age 70     Obesity Maternal Grandmother      Macular Degeneration Maternal Grandmother      Gynecology Paternal Aunt      uterine CA diagnosed @ 40yrs           Reviewed and updated as needed this visit by clinical staff  Tobacco  Allergies  Meds  Problems  Med Hx  Surg Hx  Fam Hx  Soc Hx        Reviewed and updated as needed this visit by Provider         ROS:  Constitutional, HEENT, cardiovascular, pulmonary, gi and gu systems are negative, except as otherwise noted.    This document serves as a record of the services and decisions personally performed by EDILSON PEÑA. It was created on his/her behalf by Johnson Jenkins, a trained medical scribe. The creation of this document is based on the provider's statements to the medical scribe. Johnson Jenkins, 2018 1:32 PM  OBJECTIVE:     /78 (BP Location: Left arm, Patient Position: Chair, Cuff Size: Adult Large)  Pulse 58  Temp 97.8  F (36.6  C) (Oral)  Resp 18  Ht 1.626 m (5' 4\")  Wt 120.6 kg (265 lb 14.4 oz)  SpO2 99%  BMI 45.64 kg/m2  Body mass index is 45.64 kg/(m^2).  GENERAL: healthy, alert and no distress  HENT: ear canals " and TM's normal, nose and mouth without ulcers or lesions. Mild pharyngeal erythema   NECK: no adenopathy, no asymmetry, masses, or scars and thyroid normal to palpation  RESP: lungs clear to auscultation - no rales, rhonchi or wheezes  CV: regular rate and rhythm, normal S1 S2, no S3 or S4, no murmur, click or rub, no peripheral edema and peripheral pulses strong   PSYCH: mentation appears normal, affect normal/bright    ASSESSMENT/PLAN:     1. Major depressive disorder, recurrent episode, mild (H)  Stable. Well controlled with current medications   - FLUoxetine (PROZAC) 40 MG capsule; Take 2 capsules (80 mg) by mouth daily  Dispense: 180 capsule; Refill: 3  - **Basic metabolic panel FUTURE anytime; Future    2. Screening for HIV (human immunodeficiency virus)  Health maintenance. Patient thinks she is screened frequently as she donates platelets but would like to be screened today to make sure   - HIV Screening; Future    3. Moderate persistent asthma with acute exacerbation  Stable off ICS. Continue current medication  - ipratropium - albuterol 0.5 mg/2.5 mg/3 mL (DUONEB) 0.5-2.5 (3) MG/3ML neb solution; Take 1 vial (3 mLs) by nebulization once for 1 dose Profile Rx: patient will contact pharmacy when needed  Dispense: 1 Box; Refill: 3    4. Vaccine for viral hepatitis  Health maintenance   - HEPA VACCINE ADULT IM  - HEPATITIS B VACCINE, ADULT, IM    5. Insomnia, unspecified type  Significantly improved with CPAP machine. Continue current medications as needed   - traZODone (DESYREL) 50 MG tablet; Take 2 tablets by mouth. 1 -2 TABs  PO at bedtime prn insomnia  Dispense: 120 tablet; Refill: 3    6. Vision changes  Patient strongly encouraged to schedule optometry appointment due to vision changes and family history of glaucoma  - OPTOMETRY REFERRAL    Patient offered influenza vaccination and declined, she is planning on getting the vaccine at work.    Ok for med check anually if schedules alternate appts with  pain clinic/gyn for check ins.     Patient Instructions   Dr. Zuñiga has standing orders for labs in for you. You can schedule fasting labs at any [facility].    Make sure you get an for an eye exam. If you have family history of glaucoma, you should have an eye exam annually.     Follow up in one year or six months if you feel you need to check in sooner.       Length of visit was 33 minutes with more than 50 percent of that time used for discussing medical concerns and education    The information in this document, created by the medical scribe for me, accurately reflects the services I personally performed and the decisions made by me. I have reviewed and approved this document for accuracy.   Emmy White MD  [facility]

## 2018-09-13 NOTE — PATIENT INSTRUCTIONS
Dr. Zuñiga has standing orders for labs in for you. You can schedule fasting labs at any [facility].    Make sure you get an for an eye exam. If you have family history of glaucoma, you should have an eye exam annually.     Follow up in one year or six months if you feel you need to check in sooner.

## 2018-09-13 NOTE — TELEPHONE ENCOUNTER
Reason for Call:  Other prescription    Detailed comments: Nebulizer questions please call back.    Phone Number can be reached at: [facility] [phone]    Best Time: any    Can we leave a detailed message on this number? YES    Call taken on 9/13/2018 at 2:44 PM by Shanita Grey

## 2018-09-13 NOTE — MR AVS SNAPSHOT
After Visit Summary   9/13/2018    Paige Le    MRN: [id number]           Patient Information     Date Of Birth          1970        Visit Information        Provider Department      9/13/2018 1:20 PM Emmy White MD [facility]        Today's Diagnoses     Major depressive disorder, recurrent episode, mild (H)    -  1    Screening for HIV (human immunodeficiency virus)        Moderate persistent asthma with acute exacerbation        Vaccine for viral hepatitis        Insomnia, unspecified type        Vision changes          Care Instructions    Dr. Zuñiga has standing orders for labs in for you. You can schedule fasting labs at any [facility].    Make sure you get an for an eye exam. If you have family history of glaucoma, you should have an eye exam annually.     Follow up in one year or six months if you feel you need to check in sooner.               Follow-ups after your visit        Additional Services     OPTOMETRY REFERRAL       Your provider has referred you to: [facility]: [facility] [place] [phone]    [email or web address]    Please be aware that coverage of these services is subject to the terms and limitations of your health insurance plan.  Call member services at your health plan with any benefit or coverage questions.      Please bring the following with you to your appointment:    (1) Any X-Rays, CTs or MRIs which have been performed.  Contact the facility where they were done to arrange for  prior to your scheduled appointment.    (2) List of current medications  (3) This referral request   (4) Any documents/labs given to you for this referral                  Future tests that were ordered for you today     Open Future Orders        Priority Expected Expires Ordered    **Basic metabolic panel FUTURE anytime Routine 9/13/2018 9/13/2019 9/13/2018    HIV Screening Routine  9/13/2019 9/13/2018        "     Who to contact     If you have questions or need follow up information about today's clinic visit or your schedule please contact [facility] directly at [phone].  Normal or non-critical lab and imaging results will be communicated to you by MyChart, letter or phone within 4 business days after the clinic has received the results. If you do not hear from us within 7 days, please contact the clinic through MyChart or phone. If you have a critical or abnormal lab result, we will notify you by phone as soon as possible.  Submit refill requests through [employer] or call your pharmacy and they will forward the refill request to us. Please allow 3 business days for your refill to be completed.          Additional Information About Your Visit        Care EveryWhere ID     This is your Care EveryWhere ID. This could be used by other organizations to access your [place] medical records  [id number]        Your Vitals Were     Pulse Temperature Respirations Height Pulse Oximetry BMI (Body Mass Index)    58 97.8  F (36.6  C) (Oral) 18 1.626 m (5' 4\") 99% 45.64 kg/m2       Blood Pressure from Last 3 Encounters:   09/13/18 136/78   08/09/18 106/73   07/06/18 109/74    Weight from Last 3 Encounters:   09/13/18 120.6 kg (265 lb 14.4 oz)   08/09/18 118.8 kg (262 lb)   07/06/18 116.1 kg (256 lb)              We Performed the Following     Asthma Action Plan (AAP)     HEPA VACCINE ADULT IM     HEPATITIS B VACCINE, ADULT, IM     OPTOMETRY REFERRAL          Today's Medication Changes          These changes are accurate as of 9/13/18 11:59 PM.  If you have any questions, ask your nurse or doctor.               Start taking these medicines.        Dose/Directions    ipratropium - albuterol 0.5 mg/2.5 mg/3 mL 0.5-2.5 (3) MG/3ML neb solution   Commonly known as:  DUONEB   Used for:  Moderate persistent asthma with acute exacerbation   Started by:  Emmy White MD        Dose:  1 vial   Take 1 vial (3 " mLs) by nebulization every 6 hours as needed for shortness of breath / dyspnea or wheezing Profile Rx: patient will contact pharmacy when needed   Quantity:  50 vial   Refills:  3         These medicines have changed or have updated prescriptions.        Dose/Directions    FLUoxetine 40 MG capsule   Commonly known as:  PROzac   This may have changed:    - additional instructions  - Another medication with the same name was removed. Continue taking this medication, and follow the directions you see here.   Used for:  Major depressive disorder, recurrent episode, mild (H)   Changed by:  Emmy White MD        Dose:  80 mg   Take 2 capsules (80 mg) by mouth daily   Quantity:  180 capsule   Refills:  3         Stop taking these medicines if you haven't already. Please contact your care team if you have questions.     IRON SUPPLEMENT PO   Stopped by:  Emmy White MD           montelukast 10 MG tablet   Commonly known as:  SINGULAIR   Stopped by:  Emmy White MD                Where to get your medicines      These medications were sent to [facility] [place], [place] - [address]  [address], [facility] [address]     Phone:  [phone]     FLUoxetine 40 MG capsule    traZODone 50 MG tablet         Some of these will need a paper prescription and others can be bought over the counter.  Ask your nurse if you have questions.     Bring a paper prescription for each of these medications     ipratropium - albuterol 0.5 mg/2.5 mg/3 mL 0.5-2.5 (3) MG/3ML neb solution                Primary Care Provider Office Phone # Fax #    Emmy White [phone] [address]  [facility] [address]        Equal Access to Services     CLARK KABA : Anastasiia Villalpando, vaughn muñiz, marisol chanel. So [facility] [phone].    ATENCIÓN:  Si habla danna, tiene a greene disposición servicios gratuitos de asistencia lingüística. Nila johnston [phone].    We comply with applicable federal civil rights laws and [place] laws. We do not discriminate on the basis of race, color, national origin, age, disability, sex, sexual orientation, or gender identity.            Thank you!     Thank you for choosing [facility]  for your care. Our goal is always to provide you with excellent care. Hearing back from our patients is one way we can continue to improve our services. Please take a few minutes to complete the written survey that you may receive in the mail after your visit with us. Thank you!             Your Updated Medication List - Protect others around you: Learn how to safely use, store and throw away your medicines at www.disposemymeds.org.          This list is accurate as of 9/13/18 11:59 PM.  Always use your most recent med list.                   Brand Name Dispense Instructions for use Diagnosis    * albuterol (2.5 MG/3ML) 0.083% neb solution     30 vial    Take 1 vial (2.5 mg) by nebulization every 6 hours as needed for shortness of breath / dyspnea or wheezing    Cough, Moderate persistent asthma with acute exacerbation       * albuterol 108 (90 Base) MCG/ACT inhaler    PROAIR HFA/PROVENTIL HFA/VENTOLIN HFA    1 Inhaler    Inhale 2 puffs into the lungs every 6 hours as needed for shortness of breath / dyspnea or wheezing    Cough, Moderate persistent asthma with acute exacerbation       ALEVE PO      Take 220 mg by mouth        beclomethasone 80 MCG/ACT Inhaler    QVAR    1 Inhaler    Inhale 2 puffs into the lungs 2 times daily    Cough       CALCIUM CARBONATE PO      Take by mouth daily        cetirizine-pseudoePHEDrine 5-120 MG per 12 hr tablet    ZYRTEC-D    90 tablet    Take 1 tablet by mouth 2 times daily    Allergic rhinitis, cause unspecified       cholecalciferol 1000 UNIT tablet    vitamin D3     Take 1 tablet by mouth  daily.        COMPOUNDED NON-CONTROLLED SUBSTANCE - PHARMACY TO MIX COMPOUNDED MEDICATION    CMPD RX    30 capsule    Low dose Naltrexone:  6mg capsules/tablets one PO qhs    Fibromyalgia       fluconazole 150 MG tablet    DIFLUCAN    4 tablet    Take 1 tablet (150 mg) by mouth every 3 days    Acute bronchitis, unspecified organism       FLUoxetine 40 MG capsule    PROzac    180 capsule    Take 2 capsules (80 mg) by mouth daily    Major depressive disorder, recurrent episode, mild (H)       gabapentin 300 MG capsule    NEURONTIN    120 capsule    Take 2 capsules (600 mg) by mouth 2 times daily Take 2 capsules  (600mg) in the morning and 2 capsules (600mg) at bedtime    Fibromyalgia, Menopausal syndrome (hot flashes)       ipratropium - albuterol 0.5 mg/2.5 mg/3 mL 0.5-2.5 (3) MG/3ML neb solution    DUONEB    50 vial    Take 1 vial (3 mLs) by nebulization every 6 hours as needed for shortness of breath / dyspnea or wheezing Profile Rx: patient will contact pharmacy when needed    Moderate persistent asthma with acute exacerbation       MAGNESIUM CITRATE PO      Take by mouth daily        order for DME     1 Device    Equipment being ordered: Nebulizer    CAP (community acquired pneumonia)       predniSONE 20 MG tablet    DELTASONE    5 tablet    Take 1 tablet (20 mg) by mouth daily Take daily x 5 days prn asthma exacerbation.    Exacerbation of asthma, unspecified asthma severity, unspecified whether persistent       rizatriptan 10 MG ODT tab    MAXALT-MLT    18 tablet    Take 1 tablet (10 mg) by mouth at onset of headache for migraine May repeat in 2 hours. Max 3 tablets/24 hours.    Migraine with aura and without status migrainosus, not intractable       topiramate 25 MG tablet    TOPAMAX    180 tablet    TAKE ONE TABLET BY MOUTH TWICE A DAY    TMJ (temporomandibular joint syndrome), Cervicalgia       traZODone 50 MG tablet    DESYREL    120 tablet    Take 2 tablets by mouth. 1 -2 TABs  PO at bedtime prn insomnia     Insomnia, unspecified type       Turmeric 450 MG Caps           VITAMIN B COMPLEX PO           * Notice:  This list has 2 medication(s) that are the same as other medications prescribed for you. Read the directions carefully, and ask your doctor or other care provider to review them with you.

## 2018-09-14 ASSESSMENT — ANXIETY QUESTIONNAIRES: GAD7 TOTAL SCORE: 0

## 2018-09-14 ASSESSMENT — ASTHMA QUESTIONNAIRES: ACT_TOTALSCORE: 24

## 2018-09-14 ASSESSMENT — PATIENT HEALTH QUESTIONNAIRE - PHQ9: SUM OF ALL RESPONSES TO PHQ QUESTIONS 1-9: 3

## 2018-11-12 ENCOUNTER — TELEPHONE (OUTPATIENT)
Dept: PALLIATIVE MEDICINE | Facility: CLINIC | Age: 48
End: 2018-11-12

## 2018-11-12 DIAGNOSIS — N95.1 MENOPAUSAL SYNDROME (HOT FLASHES): ICD-10-CM

## 2018-11-12 DIAGNOSIS — M79.7 FIBROMYALGIA: ICD-10-CM

## 2018-11-12 RX ORDER — GABAPENTIN 300 MG/1
600 CAPSULE ORAL 2 TIMES DAILY
Qty: 120 CAPSULE | Refills: 1 | Status: CANCELLED | OUTPATIENT
Start: 2018-11-12

## 2018-11-12 NOTE — TELEPHONE ENCOUNTER
Received 11-    Reason for fax:  Medication  If this is a refill request, has the caller requested the refill from the pharmacy already? Yes  Will the patient be using a [facility]? Yes  Name of the pharmacy and phone number for the current request: [facility] [place] [address]    Name of the medication requested: Gabapentin (Neurontin) 300 mg capsule    Last signed 7-  Quyen Canton  Patient Representative  [facility]

## 2018-11-12 NOTE — TELEPHONE ENCOUNTER
Called patient to clarify her dosing. LM for her to call back to discuss, Nursing to prepare script accordingly.     Script written 07/16/18 with ONE refill.     Per Sig:Take 2 capsules (600 mg) by mouth 2 times daily Take 2 capsules  (600mg) in the morning and 2 capsules (600mg) at bedtime - Oral    Per last OV note:  1. Medication Management:  -Continue Gabapentin at 300mg qam and 600mg qhs.     Ana M GARZAN, RN Care Coordinator  [facility]

## 2018-11-12 NOTE — TELEPHONE ENCOUNTER
Received fax request from [facility] requesting refill(s) for Gabapentin (Neurontin) 300 mg capsule    Last refilled on 9/20/2018    Pt last seen on 8/9/2018  Next appt scheduled for none scheduled    Will facilitate refill.

## 2018-11-15 RX ORDER — GABAPENTIN 300 MG/1
600 CAPSULE ORAL 2 TIMES DAILY
Qty: 360 CAPSULE | Refills: 0 | Status: SHIPPED | OUTPATIENT
Start: 2018-11-15 | End: 2019-03-25

## 2018-11-15 NOTE — TELEPHONE ENCOUNTER
Called patient and confirmed dosing. Takes 2 capsules in AM and 2 at HS. Would like 3 month supply if possible.    Routing to provider to review script prepped per below.     Gabapentin 300 capsules, #360, refill:0  Sig:Take 2 capsules  (600mg) in the morning and 2 capsules (600mg) at bedtime  Due:Now    Ana M GARZAN, RN Care Coordinator  [facility]

## 2018-11-16 NOTE — TELEPHONE ENCOUNTER
Pt returning a call to someone regarding her rx. Please call back if you have a question.      Johnathon LLANOS    [facility] [place]

## 2018-11-20 ENCOUNTER — TELEPHONE (OUTPATIENT)
Dept: FAMILY MEDICINE | Facility: CLINIC | Age: 48
End: 2018-11-20

## 2018-11-20 DIAGNOSIS — M26.609 TMJ (TEMPOROMANDIBULAR JOINT SYNDROME): ICD-10-CM

## 2018-11-20 DIAGNOSIS — M54.2 CERVICALGIA: ICD-10-CM

## 2018-11-20 RX ORDER — TOPIRAMATE 25 MG/1
25 TABLET, FILM COATED ORAL 2 TIMES DAILY
Qty: 180 TABLET | Refills: 0 | Status: SHIPPED | OUTPATIENT
Start: 2018-11-20 | End: 2019-02-26

## 2018-11-20 NOTE — TELEPHONE ENCOUNTER
Pt put in request for topiramate and it still has not been adressed.     Pt sitting at the pharmacy and needing a 90 day supply. States she was just in to see provider.      Christine Cage MA

## 2018-11-20 NOTE — TELEPHONE ENCOUNTER
Routing refill request to provider for review/approval because:  Break in medication      Team Please contact patient and let her know refill request was submitted high priority. It looks like the person on 11/8/18 that took her refill request routed it to wrong pool, so we never had the refill. You could tell her to submit refills through pharmacy in future.    Routing to team to call patient and to provider to review refill request.      Stacey Boss RN

## 2018-11-20 NOTE — TELEPHONE ENCOUNTER
I believe this med should normally be refilled by pain clinic going forward. Patient told me they manage her migraines and this is medication for migraine. Will fill today so she can get supply but see if we can reroute next refill. Please let patient know it was refilled, apologize for delay (looks like routing issue- see below), and see if pharmacy can request from pain clinic going forward. Thanks!

## 2018-12-28 ENCOUNTER — TELEPHONE (OUTPATIENT)
Dept: FAMILY MEDICINE | Facility: CLINIC | Age: 48
End: 2018-12-28

## 2018-12-28 ENCOUNTER — NURSE TRIAGE (OUTPATIENT)
Dept: NURSING | Facility: CLINIC | Age: 48
End: 2018-12-28

## 2018-12-28 NOTE — TELEPHONE ENCOUNTER
Since I don't think we have talked about this topic, would suggest an office or phone visit to review if letter is appropriate. Thanks!

## 2018-12-28 NOTE — TELEPHONE ENCOUNTER
Paige would like to know if Dr White will write a letter stating her dog is and Emotional Support Dog?     The diagnosis is depression and anxiety.    The letter is needed because Paige's rent is increasing forcing her to move.     Call Paige [phone]. If she doesn't answer she stated it is okay to leave a detailed voicemail message.     Routed:P [id number]  Laura MARROQUIN RN [facility]

## 2018-12-28 NOTE — TELEPHONE ENCOUNTER
Paige would like to know if Dr White will write a letter stating her dog is and Emotional Support Dog?     The diagnosis is depression and anxiety.    The letter is needed because Paige's rent is increasing forcing her to move.     Call Paige [phone]. If she doesn't answer she stated it is okay to leave a detailed voicemail message.     Routed:P 99319  Laura MARROQUIN RN [facility]     Close encounter once addressed

## 2019-01-03 ENCOUNTER — OFFICE VISIT (OUTPATIENT)
Dept: FAMILY MEDICINE | Facility: CLINIC | Age: 49
End: 2019-01-03
Payer: COMMERCIAL

## 2019-01-03 ENCOUNTER — ANCILLARY PROCEDURE (OUTPATIENT)
Dept: GENERAL RADIOLOGY | Facility: CLINIC | Age: 49
End: 2019-01-03
Payer: COMMERCIAL

## 2019-01-03 VITALS
RESPIRATION RATE: 20 BRPM | DIASTOLIC BLOOD PRESSURE: 68 MMHG | OXYGEN SATURATION: 99 % | HEIGHT: 65 IN | BODY MASS INDEX: 43.82 KG/M2 | SYSTOLIC BLOOD PRESSURE: 100 MMHG | WEIGHT: 263 LBS | TEMPERATURE: 99.3 F | HEART RATE: 71 BPM

## 2019-01-03 DIAGNOSIS — M54.6 ACUTE RIGHT-SIDED THORACIC BACK PAIN: ICD-10-CM

## 2019-01-03 DIAGNOSIS — R06.00 DYSPNEA, UNSPECIFIED TYPE: ICD-10-CM

## 2019-01-03 DIAGNOSIS — Z11.4 SCREENING FOR HIV (HUMAN IMMUNODEFICIENCY VIRUS): ICD-10-CM

## 2019-01-03 DIAGNOSIS — F33.0 MAJOR DEPRESSIVE DISORDER, RECURRENT EPISODE, MILD (H): ICD-10-CM

## 2019-01-03 DIAGNOSIS — R05.9 COUGH: ICD-10-CM

## 2019-01-03 DIAGNOSIS — F10.21 HISTORY OF ALCOHOLISM (H): ICD-10-CM

## 2019-01-03 DIAGNOSIS — E66.01 MORBID OBESITY (H): ICD-10-CM

## 2019-01-03 DIAGNOSIS — J20.9 ACUTE BRONCHITIS, UNSPECIFIED ORGANISM: ICD-10-CM

## 2019-01-03 DIAGNOSIS — M79.7 FIBROMYALGIA: ICD-10-CM

## 2019-01-03 DIAGNOSIS — Z13.220 LIPID SCREENING: ICD-10-CM

## 2019-01-03 DIAGNOSIS — Z13.29 SCREENING FOR THYROID DISORDER: ICD-10-CM

## 2019-01-03 DIAGNOSIS — Z13.1 SCREENING FOR DIABETES MELLITUS: ICD-10-CM

## 2019-01-03 LAB
ANION GAP SERPL CALCULATED.3IONS-SCNC: 7 MMOL/L (ref 3–14)
BUN SERPL-MCNC: 9 MG/DL (ref 7–30)
CALCIUM SERPL-MCNC: 8.7 MG/DL (ref 8.5–10.1)
CHLORIDE SERPL-SCNC: 109 MMOL/L (ref 94–109)
CHOLEST SERPL-MCNC: 156 MG/DL
CO2 SERPL-SCNC: 25 MMOL/L (ref 20–32)
CREAT SERPL-MCNC: 0.83 MG/DL (ref 0.52–1.04)
D DIMER PPP FEU-MCNC: <0.3 UG/ML FEU (ref 0–0.5)
GFR SERPL CREATININE-BSD FRML MDRD: 84 ML/MIN/{1.73_M2}
GLUCOSE SERPL-MCNC: 87 MG/DL (ref 70–99)
HDLC SERPL-MCNC: 63 MG/DL
LDLC SERPL CALC-MCNC: 75 MG/DL
NONHDLC SERPL-MCNC: 93 MG/DL
POTASSIUM SERPL-SCNC: 3.9 MMOL/L (ref 3.4–5.3)
SODIUM SERPL-SCNC: 141 MMOL/L (ref 133–144)
TRIGL SERPL-MCNC: 89 MG/DL
TSH SERPL DL<=0.005 MIU/L-ACNC: 1.57 MU/L (ref 0.4–4)

## 2019-01-03 PROCEDURE — 71046 X-RAY EXAM CHEST 2 VIEWS: CPT | Mod: FY

## 2019-01-03 PROCEDURE — 36415 COLL VENOUS BLD VENIPUNCTURE: CPT | Performed by: FAMILY MEDICINE

## 2019-01-03 PROCEDURE — 84443 ASSAY THYROID STIM HORMONE: CPT | Performed by: OBSTETRICS & GYNECOLOGY

## 2019-01-03 PROCEDURE — 87389 HIV-1 AG W/HIV-1&-2 AB AG IA: CPT | Performed by: FAMILY MEDICINE

## 2019-01-03 PROCEDURE — 99214 OFFICE O/P EST MOD 30 MIN: CPT | Performed by: FAMILY MEDICINE

## 2019-01-03 PROCEDURE — 85379 FIBRIN DEGRADATION QUANT: CPT | Performed by: FAMILY MEDICINE

## 2019-01-03 PROCEDURE — 80061 LIPID PANEL: CPT | Performed by: OBSTETRICS & GYNECOLOGY

## 2019-01-03 PROCEDURE — 80048 BASIC METABOLIC PNL TOTAL CA: CPT | Performed by: FAMILY MEDICINE

## 2019-01-03 RX ORDER — FLUCONAZOLE 150 MG/1
150 TABLET ORAL
Qty: 4 TABLET | Refills: 0 | Status: SHIPPED | OUTPATIENT
Start: 2019-01-03 | End: 2021-09-18

## 2019-01-03 RX ORDER — PREDNISONE 20 MG/1
TABLET ORAL
Qty: 20 TABLET | Refills: 0 | Status: SHIPPED | OUTPATIENT
Start: 2019-01-03 | End: 2019-01-18

## 2019-01-03 RX ORDER — AZITHROMYCIN 250 MG/1
TABLET, FILM COATED ORAL
Qty: 6 TABLET | Refills: 0 | Status: SHIPPED | OUTPATIENT
Start: 2019-01-03 | End: 2019-03-12

## 2019-01-03 ASSESSMENT — MIFFLIN-ST. JEOR: SCORE: 1828.84

## 2019-01-03 ASSESSMENT — ANXIETY QUESTIONNAIRES
1. FEELING NERVOUS, ANXIOUS, OR ON EDGE: SEVERAL DAYS
3. WORRYING TOO MUCH ABOUT DIFFERENT THINGS: NOT AT ALL
5. BEING SO RESTLESS THAT IT IS HARD TO SIT STILL: NOT AT ALL
2. NOT BEING ABLE TO STOP OR CONTROL WORRYING: NOT AT ALL
6. BECOMING EASILY ANNOYED OR IRRITABLE: SEVERAL DAYS
IF YOU CHECKED OFF ANY PROBLEMS ON THIS QUESTIONNAIRE, HOW DIFFICULT HAVE THESE PROBLEMS MADE IT FOR YOU TO DO YOUR WORK, TAKE CARE OF THINGS AT HOME, OR GET ALONG WITH OTHER PEOPLE: NOT DIFFICULT AT ALL

## 2019-01-03 ASSESSMENT — PATIENT HEALTH QUESTIONNAIRE - PHQ9
5. POOR APPETITE OR OVEREATING: NOT AT ALL
SUM OF ALL RESPONSES TO PHQ QUESTIONS 1-9: 7

## 2019-01-03 NOTE — PROGRESS NOTES
SUBJECTIVE:   Paige Le is a 48 year old female who presents to clinic today for the following health issues:    Depression and Anxiety Follow-Up    Status since last visit: No change    Other associated symptoms:    Complicating factors:     Significant life event: Yes-  Moving      Current substance abuse: None    PHQ 5/17/2017 1/31/2018 9/13/2018   PHQ-9 Total Score 5 2 3   Q9: Suicide Ideation Not at all Not at all Not at all     KACEY-7 SCORE 5/17/2017 1/31/2018 9/13/2018   Total Score - - -   Total Score 1 0 0       PHQ-9  English  PHQ-9   Any Language  KACEY-7  Suicide Assessment Five-step Evaluation and Treatment (SAFE-T)  Asthma Follow-Up    Was ACT completed today?  No      Respiratory symptoms:   Cough: Yes, coughing for 3-4 weeks now    Wheezing: Yes   Shortness of breath: Yes    Use of short- acting(rescue) inhaler: Using neb every four hours     Taking controlled (daily) meds as prescribed: Yes    ER/UC visits or hospital admissions since last visit: none     Recent asthma triggers that patient is dealing with: cold air     -Patient had a recent asthma flare two weeks ago for which she took a 40 mg prednisone taper which helped for a few days, but then symptoms returned and have not improved. Symptoms started with reduced peak flow and cough after patient returned to cold air in [place] from a trip to [place]. Currently using albuterol every 4-6 hours with very little relief.She now has congestion and may have had a small amt of hemoptysis this morning (unsure if from nose or lungs), thinks she may have intermittent fever.   -Alternating tylenol and aleve   -No history of blood clots        Amount of exercise or physical activity: 2-3 days/week for an average of 15-30 minutes    Problems taking medications regularly: No    Medication side effects: none    Diet: regular (no restrictions)      Concern - Letter     Description:   Patient wants to discuss her dog being a service support. Dog currently works as  a therapy dog.     Patient thinks she is going to have to move and is having a hard time finding a place to live that will allow her dog and that she can afford. 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 She would like a letter stating that she needs her dog as an emotional support dog so that an apartment will allow her to keep her dog.     Back Pain       Duration: ongoing 3-4 weeks         Specific cause: coughing     Description:   Location of pain: middle of back right  Character of pain: sharp and stabbing  Pain radiation:none  New numbness or weakness in legs, not attributed to pain:  no     Intensity: Currently 1/10      Accompanying Signs & Symptoms:  Risk of Fracture:  None  Risk of Cauda Equina:  None  Risk of Infection:  None  Risk of Cancer:  None  Risk of Ankylosing Spondylitis:  Onset at age <35, male, AND morning back stiffness. no     -Complains of right lower back pain onset about a week ago, patient believes it may be from coughing. Pain is shooting and worse at night  -A heating pad and Elijah's vapor rub help relieve pain         Problem list and histories reviewed & adjusted, as indicated.  Additional history: as documented    Patient Active Problem List   Diagnosis     Anxiety state     Insomnia     Allergic rhinitis     Dysmenorrhea     Cystic Fibrosis Screening negative     Mild major depression (H)     Fibromyalgia     Patellofemoral disorder     Family history of aneurysm     Migraine     Obesity     Grieving     Insomnia     History of alcoholism (H)     Morbid obesity (H)     Moderate persistent asthma     DMITRIY (obstructive sleep apnea)     Past Surgical History:   Procedure Laterality Date     ARTHROSCOPY KNEE RT/LT  1997    Left      DILATION AND CURETTAGE N/A 6/14/2017    Procedure: DILATION AND CURETTAGE;;  Surgeon: Livia Barnett DO;  Location: [facility] OR     EXAM UNDER  ANESTHESIA, ULTRASOUND N/A 2017    Procedure: EXAM UNDER ANESTHESIA, ULTRASOUND;  Ultrasound guided cervical dilation, IUD placement, Endometrial biopsy, repair of unavoidable cervical laceration;  Surgeon: Livia Barnett DO;  Location: [facility] OR     HC TOOTH EXTRACTION W/FORCEP      wisdom teeth      INSERT INTRAUTERINE DEVICE N/A 2017    Procedure: INSERT INTRAUTERINE DEVICE;;  Surgeon: Livia Barnett DO;  Location: [facility] OR       Social History     Tobacco Use     Smoking status: Never Smoker     Smokeless tobacco: Never Used   Substance Use Topics     Alcohol use: No     Alcohol/week: 0.0 oz     Comment: recovering     Family History   Problem Relation Age of Onset     Neurologic Disorder Mother         ruptured cerebral aneurysm age 60      Hypertension Mother      Depression/Anxiety Mother      Cerebrovascular Disease Mother      Obesity Mother      Macular Degeneration Mother      Musculoskeletal Disorder Father         OA      Gastrointestinal Disease Father         colon polyps age 60     Other Cancer Father         Brain Cancer-neuroblastoma     Depression/Anxiety Father      Obesity Father      Gynecology Sister         Rachel. irregular menses.  pre-eclampsia     Aneurysm Sister         cerebral     Heart Disease Paternal Grandfather         Multiple MI's (first MI age 60)     Diabetes Paternal Grandfather         IDDM dx age 60  Severe/brittle/complications     Coronary Artery Disease Paternal Grandfather      Depression/Anxiety Paternal Grandfather      Obesity Paternal Grandfather      Gynecology Paternal Grandmother          of uterine CA in her 40's     Ovarian Cancer Paternal Grandmother          at age 40 from cancer     Eye Disorder Maternal Grandmother         glacucoma     Diabetes Maternal Grandmother         IDDM-onset age 70     Obesity Maternal Grandmother      Macular Degeneration Maternal Grandmother      Gynecology Paternal Aunt         uterine CA diagnosed @  "40yrs           Reviewed and updated as needed this visit by clinical staff  Tobacco  Allergies  Meds  Med Hx  Surg Hx  Fam Hx  Soc Hx      Reviewed and updated as needed this visit by Provider  Allergies         ROS:  Constitutional, HEENT, cardiovascular, pulmonary, gi and gu systems are negative, except as otherwise noted.    This document serves as a record of the services and decisions personally performed by EDILSON PEÑA. It was created on his/her behalf by Johnson Jenkins, a trained medical scribe. The creation of this document is based on the provider's statements to the medical scribe. Johnson Jenkins, January 3, 2019 9:29 AM  OBJECTIVE:     /68 (BP Location: Right arm, Patient Position: Sitting, Cuff Size: Adult Large)   Pulse 71   Temp 99.3  F (37.4  C) (Oral)   Resp 20   Ht 1.651 m (5' 5\")   Wt 119.3 kg (263 lb)   SpO2 99%   BMI 43.77 kg/m    Body mass index is 43.77 kg/m .  GENERAL: healthy, alert and no distress  HENT: air-fluid level behind bilateral TMs. Pharyngeal erythema. Nasal mucosal edema. nose and mouth without ulcers or lesions  NECK: no adenopathy, no asymmetry, masses, or scars and thyroid normal to palpation  RESP: lungs clear to auscultation - no rales, rhonchi or wheezes  CV: regular rate and rhythm, normal S1 S2, no S3 or S4, no murmur, click or rub, no peripheral edema and peripheral pulses strong  ABDOMEN: soft, nontender, no hepatosplenomegaly, no masses and bowel sounds normal  MS: right thoracic paraspinal muscle tenderness reproducing her back pain. Otherwise, no gross musculoskeletal defects noted, no edema  PSYCH: mentation appears normal, affect normal/bright      CXR: hyper expanded. No acute infiltrate. Slight haziness by border of heart on right but likely normal. Xray personally reviewed and evaluated by me  ASSESSMENT/PLAN:     1. Acute bronchitis, unspecified organism  2. Cough  3. Dyspnea, unspecified type  4. Acute right-sided thoracic back " pain  Due to persistence of symptoms and presence of hemoptysis, start azithromycin/prednisone and check D dimer. Reviewed symptomatic management of symptoms. Continue albuterol prn. Diflucan given for yeast issues on abx. suspect back pain may be due to pulled muscle from coughing but will need close f/u if not improving. Patient education provided, including expected course of illness and symptoms that may occur which would require urgent evalution. All questions answered.   Patient understands and agrees with plan.  - fluconazole (DIFLUCAN) 150 MG tablet; Take 1 tablet (150 mg) by mouth every 3 days  Dispense: 4 tablet; Refill: 0  - azithromycin (ZITHROMAX) 250 MG tablet; Take 2 tablets (500 mg) by mouth daily for 1 day, THEN 1 tablet (250 mg) daily for 4 days.  Dispense: 6 tablet; Refill: 0  - predniSONE (DELTASONE) 20 MG tablet; Take 60 mg by mouth daily for 3 days, THEN 40 mg daily for 3 days, THEN 20 mg daily for 3 days, THEN 10 mg daily for 3 days.  Dispense: 20 tablet; Refill: 0  - XR Chest 2 Views; Future  - D dimer, quantitative    5. Major depressive disorder, recurrent episode, mild (H)  Stable. Letter provided stating patient needs dog as emotional support animal in apartment     6. Fibromyalgia  Stable    7. History of alcoholism (H)  No relapse    8. Morbid obesity (H)  Body mass index is 43.77 kg/m .      Addenedum: d-dimer was normal range.   F/u if not improving in next few days or f/u acutely if worsening.     The information in this document, created by the medical scribe for me, accurately reflects the services I personally performed and the decisions made by me. I have reviewed and approved this document for accuracy.   Emmy White MD  [facility]

## 2019-01-04 LAB — HIV 1+2 AB+HIV1 P24 AG SERPL QL IA: NONREACTIVE

## 2019-01-04 ASSESSMENT — ASTHMA QUESTIONNAIRES: ACT_TOTALSCORE: 10

## 2019-01-10 ENCOUNTER — DOCUMENTATION ONLY (OUTPATIENT)
Dept: SLEEP MEDICINE | Facility: CLINIC | Age: 49
End: 2019-01-10

## 2019-01-10 DIAGNOSIS — G47.33 OSA (OBSTRUCTIVE SLEEP APNEA): ICD-10-CM

## 2019-01-10 NOTE — PROGRESS NOTES
6 month [facility]    STM Recheck Visit     Diagnostic AHI:   18.1  HST    Data only recheck     Assessment: Pt meeting objective benchmarks.     Action plan:   pt to follow up per provider request       Device type: Auto-CPAP  PAP settings: CPAP min 5.0 cm  H20     CPAP max 15.0 cm  H20    95th% pressure 13.9 cm  H20   Objective measures: 14 day rolling measures      Compliance  78 %      Leak  9.23 lpm  last  upload      AHI 9.62   last  Upload-one night during last two weeks that had a very elevated AHI.  Historically this has been less than 5 AHI      Average number of minutes 469      Objective measure goal  Compliance   Goal >70%  Leak   Goal < 24 lpm  AHI  Goal < 5  Usage  Goal >240

## 2019-01-11 NOTE — PROGRESS NOTES
SUBJECTIVE:   CC: Paige Le is an 48 year old woman who presents for preventive health visit.  She is new to me but saw Dr. Zuñiga for her annual exam last year.  Please review her note. She had an endometrial biopsy at that time.      Physical   Annual:     Getting at least 3 servings of Calcium per day:  Yes    Bi-annual eye exam:  Yes    Dental care twice a year:  Yes    Sleep apnea or symptoms of sleep apnea:  Sleep apnea    Diet:  Regular (no restrictions)    Frequency of exercise:  2-3 days/week (3-4)    Duration of exercise:  30-45 minutes    Taking medications regularly:  Yes    Medication side effects:  None    Additional concerns today:  No      No Concerns    Due for Pap in May, so we will do that today   Recent labs normal  She has a history of recurrent yeast infection.  This happens more when she goes on prednisone for asthma    Today's PHQ-2 Score:   PHQ-2 ( 1999 Pfizer) 1/18/2019   Q1: Little interest or pleasure in doing things 0   Q2: Feeling down, depressed or hopeless 0   PHQ-2 Score 0       Abuse: Current or Past(Physical, Sexual or Emotional)- No  Do you feel safe in your environment? Yes    Social History     Tobacco Use     Smoking status: Never Smoker     Smokeless tobacco: Never Used   Substance Use Topics     Alcohol use: No     Alcohol/week: 0.0 oz     Comment: recovering     No flowsheet data found.No flowsheet data found.    BP Readings from Last 3 Encounters:   01/18/19 109/74   01/03/19 100/68   09/13/18 136/78    Wt Readings from Last 3 Encounters:   01/18/19 120.2 kg (264 lb 14.4 oz)   01/03/19 119.3 kg (263 lb)   09/13/18 120.6 kg (265 lb 14.4 oz)                  Patient Active Problem List   Diagnosis     Anxiety state     Insomnia     Allergic rhinitis     Dysmenorrhea     Cystic Fibrosis Screening negative     Mild major depression (H)     Fibromyalgia     Patellofemoral disorder     Family history of aneurysm     Migraine     Obesity     Grieving     Insomnia      History of alcoholism (H)     Morbid obesity (H)     Moderate persistent asthma     DMITRIY (obstructive sleep apnea)     IUD (intrauterine device) in place     Past Surgical History:   Procedure Laterality Date     ARTHROSCOPY KNEE RT/LT  1997    Left      DILATION AND CURETTAGE N/A 6/14/2017    Procedure: DILATION AND CURETTAGE;;  Surgeon: Livia Barnett DO;  Location: [facility] OR     EXAM UNDER ANESTHESIA, ULTRASOUND N/A 6/14/2017    Procedure: EXAM UNDER ANESTHESIA, ULTRASOUND;  Ultrasound guided cervical dilation, IUD placement, Endometrial biopsy, repair of unavoidable cervical laceration;  Surgeon: Livia Barnett DO;  Location: [facility] OR     HC TOOTH EXTRACTION W/FORCEP  1998    wisdom teeth      INSERT INTRAUTERINE DEVICE N/A 6/14/2017    Procedure: INSERT INTRAUTERINE DEVICE;;  Surgeon: Livia Barnett DO;  Location: [facility] OR       Social History     Tobacco Use     Smoking status: Never Smoker     Smokeless tobacco: Never Used   Substance Use Topics     Alcohol use: No     Alcohol/week: 0.0 oz     Comment: recovering     Family History   Problem Relation Age of Onset     Neurologic Disorder Mother         ruptured cerebral aneurysm age 60      Hypertension Mother      Depression/Anxiety Mother      Cerebrovascular Disease Mother      Obesity Mother      Macular Degeneration Mother      Musculoskeletal Disorder Father         OA      Gastrointestinal Disease Father         colon polyps age 60     Other Cancer Father         Brain Cancer-neuroblastoma     Depression/Anxiety Father      Obesity Father      Gynecology Sister         Rachel. irregular menses.  pre-eclampsia     Aneurysm Sister         cerebral     Heart Disease Paternal Grandfather         Multiple MI's (first MI age 60)     Diabetes Paternal Grandfather         IDDM dx age 60  Severe/brittle/complications     Coronary Artery Disease Paternal Grandfather      Depression/Anxiety Paternal Grandfather      Obesity Paternal Grandfather       Gynecology Paternal Grandmother          of uterine CA in her 40's     Ovarian Cancer Paternal Grandmother          at age 40 from cancer     Eye Disorder Maternal Grandmother         glacucoma     Diabetes Maternal Grandmother         IDDM-onset age 70     Obesity Maternal Grandmother      Macular Degeneration Maternal Grandmother      Gynecology Paternal Aunt         uterine CA diagnosed @ 40yrs         Current Outpatient Medications   Medication Sig Dispense Refill     albuterol (2.5 MG/3ML) 0.083% neb solution Take 1 vial (2.5 mg) by nebulization every 6 hours as needed for shortness of breath / dyspnea or wheezing 30 vial 1     albuterol (PROAIR HFA/PROVENTIL HFA/VENTOLIN HFA) 108 (90 BASE) MCG/ACT Inhaler Inhale 2 puffs into the lungs every 6 hours as needed for shortness of breath / dyspnea or wheezing 1 Inhaler 6     B Complex Vitamins (VITAMIN B COMPLEX PO)        CALCIUM CARBONATE PO Take by mouth daily       cetirizine-psuedoePHEDrine (ZYRTEC-D) 5-120 MG per tablet Take 1 tablet by mouth 2 times daily 90 tablet 3     cholecalciferol (VITAMIN D) 1000 UNIT tablet Take 1 tablet by mouth daily.       COMPOUNDED NON-CONTROLLED SUBSTANCE (CMPD RX) - PHARMACY TO MIX COMPOUNDED MEDICATION Low dose Naltrexone:  6mg capsules/tablets one PO qhs 30 capsule 6     fluconazole (DIFLUCAN) 150 MG tablet Take 1 tablet (150 mg) by mouth every 3 days 4 tablet 0     FLUoxetine (PROZAC) 40 MG capsule Take 2 capsules (80 mg) by mouth daily 180 capsule 3     gabapentin (NEURONTIN) 300 MG capsule Take 2 capsules (600 mg) by mouth 2 times daily Take 2 capsules  (600mg) in the morning and 2 capsules (600mg) at bedtime. 90day supply 360 capsule 0     ipratropium - albuterol 0.5 mg/2.5 mg/3 mL (DUONEB) 0.5-2.5 (3) MG/3ML neb solution Take 1 vial (3 mLs) by nebulization every 6 hours as needed for shortness of breath / dyspnea or wheezing Profile Rx: patient will contact pharmacy when needed 50 vial 3     levonorgestrel  (MIRENA) 20 MCG/24HR IUD 1 each by Intrauterine route once       MAGNESIUM CITRATE PO Take by mouth daily       Naproxen Sodium (ALEVE PO) Take 220 mg by mouth       order for DME Equipment being ordered: Nebulizer 1 Device 0     rizatriptan (MAXALT-MLT) 10 MG ODT tab Take 1 tablet (10 mg) by mouth at onset of headache for migraine May repeat in 2 hours. Max 3 tablets/24 hours. 18 tablet 3     topiramate (TOPAMAX) 25 MG tablet Take 1 tablet (25 mg) by mouth 2 times daily 180 tablet 0     traZODone (DESYREL) 50 MG tablet Take 2 tablets by mouth. 1 -2 TABs  PO at bedtime prn insomnia 120 tablet 3     Turmeric 450 MG CAPS        albuterol (2.5 MG/3ML) 0.083% nebulizer solution Take 1 vial (2.5 mg) by nebulization once for 1 dose 3 mL 0     beclomethasone (QVAR) 80 MCG/ACT Inhaler Inhale 2 puffs into the lungs 2 times daily (Patient not taking: Reported on 1/18/2019) 1 Inhaler 11     Allergies   Allergen Reactions     Augmentin      reacted to Augmentin--  stomach upset -- can take penicillin     Codeine Swelling     Milk Protein Extract Difficulty breathing     Products with milk cause congestion, sinus mucous, difficulty breathing     Recent Labs   Lab Test 01/03/19  1034 01/03/19  1031 05/17/17  1216 05/04/16  1349 03/02/15  1143   A1C  --   --  5.1  --   --    LDL 75  --   --  98 98   HDL 63  --   --  69 64   TRIG 89  --   --  70 84   ALT  --   --  20 24 63*   CR  --  0.83 0.83 0.76 0.77   GFRESTIMATED  --  84 75 82 82   GFRESTBLACK  --  >90 >90   GFR Calc   >90   GFR Calc   >90   GFR Calc     POTASSIUM  --  3.9 4.1 3.9 3.8   TSH 1.57  --   --  2.50 1.78        Mammogram Screening: Patient under age 50, mutual decision reflected in health maintenance.      History of abnormal Pap smear:   NO - age 30-65 PAP every 3-5 years with negative HPV co-testing recommended  PAP / HPV Latest Ref Rng & Units 5/4/2016 11/1/2013 8/10/2012   PAP - NIL NIL NIL   HPV 16 DNA NEG  "Negative - -   HPV 18 DNA NEG Negative - -   OTHER HR HPV NEG Negative - -       Review of Systems  CONSTITUTIONAL: NEGATIVE for fever, chills, change in weight  INTEGUMENTARU/SKIN: NEGATIVE for worrisome rashes, moles or lesions  EYES: NEGATIVE for vision changes or irritation  ENT: NEGATIVE for ear, mouth and throat problems  RESP: NEGATIVE for significant cough or SOB  BREAST: NEGATIVE for masses, tenderness or discharge  CV: NEGATIVE for chest pain, palpitations or peripheral edema  GI: NEGATIVE for nausea, abdominal pain, heartburn, or change in bowel habits  : NEGATIVE for unusual urinary or vaginal symptoms.  Tolerating the IUD well.    MUSCULOSKELETAL: NEGATIVE for significant arthralgias or myalgia  NEURO: NEGATIVE for weakness, dizziness or paresthesias  PSYCHIATRIC: NEGATIVE for changes in mood or affect     OBJECTIVE:   /74 (BP Location: Right arm, Patient Position: Chair, Cuff Size: Adult Large)   Pulse 76   Ht 1.665 m (5' 5.55\")   Wt 120.2 kg (264 lb 14.4 oz)   LMP  (LMP Unknown)   BMI 43.34 kg/m    Physical Exam  Gen: Alert and oriented times 3, no acute distress.  Well developed, well nourished, pleasant.    Neck: Supple, no masses.  No thyromegaly.  Breast: Symmetrical without lesions.  No dimpling, nipple discharge, or discrete masses.  No lymphadenopathy.  Chest:  Non labored.  Clear to auscultation bilaterally.    Heart: Regular, normal S1, S2.  No murmurs.   Abdomen: Soft, nontender, nondistended.  No hepatosplenomegaly.    :  Normal female external genitalia.  No lesions.  Urethral meatus normal.  Speculum exam reveals a normal vaginal vault, normal cervix.  No abnormal discharge.  Bimanual exam reveals a normal, mobile, nontender uterus.  No cervical motion tenderness.  Adnexa nontender with no palpable masses.    Extremities:  Nontender, no edema.    Pap obtained:  Yes     ASSESSMENT/PLAN:       ICD-10-CM    1. Well female exam with routine gynecological exam Z01.419    2. IUD " "(intrauterine device) in place Z97.5    3. Encounter for screening for cervical cancer  Z12.4 Pap imaged thin layer screen with HPV - recommended age 30 - 65 years (select HPV order below)     HPV High Risk Types DNA Cervical   4. Morbid obesity (H) E66.01    5. Visit for screening mammogram Z12.31 *MA Screening Digital Bilateral       BC: IUD (Mirena) due for removal/replacement in 6/2022  Encouraged to continue attendance at her meetings for history of alcoholism  Consider repeat lipids and glucose due to obesity next year      COUNSELING:  Reviewed preventive health counseling, as reflected in patient instructions    BP Readings from Last 1 Encounters:   01/18/19 109/74     Estimated body mass index is 43.34 kg/m  as calculated from the following:    Height as of this encounter: 1.665 m (5' 5.55\").    Weight as of this encounter: 120.2 kg (264 lb 14.4 oz).      Weight management plan: Portion control, diet and exercise     reports that  has never smoked. she has never used smokeless tobacco.      Counseling Resources:  ATP IV Guidelines  Pooled Cohorts Equation Calculator  Breast Cancer Risk Calculator  FRAX Risk Assessment  ICSI Preventive Guidelines  Dietary Guidelines for Americans, 2010  USDA's MyPlate  ASA Prophylaxis  Lung CA Screening    Capri Morales MD  [facility]  "

## 2019-01-15 PROBLEM — Z97.5 IUD (INTRAUTERINE DEVICE) IN PLACE: Status: ACTIVE | Noted: 2019-01-15

## 2019-01-18 ENCOUNTER — OFFICE VISIT (OUTPATIENT)
Dept: OBGYN | Facility: CLINIC | Age: 49
End: 2019-01-18
Payer: COMMERCIAL

## 2019-01-18 VITALS
SYSTOLIC BLOOD PRESSURE: 109 MMHG | WEIGHT: 264.9 LBS | DIASTOLIC BLOOD PRESSURE: 74 MMHG | HEIGHT: 66 IN | BODY MASS INDEX: 42.57 KG/M2 | HEART RATE: 76 BPM

## 2019-01-18 DIAGNOSIS — Z97.5 IUD (INTRAUTERINE DEVICE) IN PLACE: ICD-10-CM

## 2019-01-18 DIAGNOSIS — E66.01 MORBID OBESITY (H): ICD-10-CM

## 2019-01-18 DIAGNOSIS — Z01.419 WELL FEMALE EXAM WITH ROUTINE GYNECOLOGICAL EXAM: Primary | ICD-10-CM

## 2019-01-18 DIAGNOSIS — Z12.31 VISIT FOR SCREENING MAMMOGRAM: ICD-10-CM

## 2019-01-18 DIAGNOSIS — Z12.4 ENCOUNTER FOR SCREENING FOR CERVICAL CANCER: ICD-10-CM

## 2019-01-18 PROCEDURE — 99396 PREV VISIT EST AGE 40-64: CPT | Performed by: OBSTETRICS & GYNECOLOGY

## 2019-01-18 PROCEDURE — G0145 SCR C/V CYTO,THINLAYER,RESCR: HCPCS | Performed by: OBSTETRICS & GYNECOLOGY

## 2019-01-18 PROCEDURE — 87624 HPV HI-RISK TYP POOLED RSLT: CPT | Performed by: OBSTETRICS & GYNECOLOGY

## 2019-01-18 ASSESSMENT — MIFFLIN-ST. JEOR: SCORE: 1846.2

## 2019-01-18 NOTE — NURSING NOTE
"Chief Complaint   Patient presents with     Physical       Initial /74 (BP Location: Right arm, Patient Position: Chair, Cuff Size: Adult Large)   Pulse 76   Ht 1.665 m (5' 5.55\")   Wt 120.2 kg (264 lb 14.4 oz)   LMP  (LMP Unknown)   BMI 43.34 kg/m   Estimated body mass index is 43.34 kg/m  as calculated from the following:    Height as of this encounter: 1.665 m (5' 5.55\").    Weight as of this encounter: 120.2 kg (264 lb 14.4 oz).  BP completed using cuff size: regular        The following HM Due: NONE      The following patient reported/Care Every where data was sent to:  P ABSTRACT QUALITY INITIATIVES [20014]       N/a    Evelyn Rogel CMA  2019           "

## 2019-01-22 LAB
COPATH REPORT: NORMAL
PAP: NORMAL

## 2019-01-24 LAB
FINAL DIAGNOSIS: NORMAL
HPV HR 12 DNA CVX QL NAA+PROBE: NEGATIVE
HPV16 DNA SPEC QL NAA+PROBE: NEGATIVE
HPV18 DNA SPEC QL NAA+PROBE: NEGATIVE
SPECIMEN DESCRIPTION: NORMAL
SPECIMEN SOURCE CVX/VAG CYTO: NORMAL

## 2019-02-22 ENCOUNTER — ANCILLARY PROCEDURE (OUTPATIENT)
Dept: MAMMOGRAPHY | Facility: CLINIC | Age: 49
End: 2019-02-22
Attending: OBSTETRICS & GYNECOLOGY
Payer: COMMERCIAL

## 2019-02-22 DIAGNOSIS — Z12.31 VISIT FOR SCREENING MAMMOGRAM: ICD-10-CM

## 2019-02-22 PROCEDURE — 77067 SCR MAMMO BI INCL CAD: CPT | Performed by: RADIOLOGY

## 2019-02-22 PROCEDURE — 77063 BREAST TOMOSYNTHESIS BI: CPT | Performed by: RADIOLOGY

## 2019-02-26 DIAGNOSIS — M54.2 CERVICALGIA: ICD-10-CM

## 2019-02-26 DIAGNOSIS — M26.609 TMJ (TEMPOROMANDIBULAR JOINT SYNDROME): ICD-10-CM

## 2019-02-26 NOTE — TELEPHONE ENCOUNTER
"Requested Prescriptions   Pending Prescriptions Disp Refills     topiramate (TOPAMAX) 25 MG tablet [Pharmacy Med Name: TOPIRAMATE 25MG TABS]  Last Written Prescription Date:  11/20/18  Last Fill Quantity: 180 tablet,  # refills: 0   Last office visit: 1/3/2019 with prescribing provider:  Dr. White   Future Office Visit:     180 tablet 0     Sig: TAKE ONE TABLET BY MOUTH TWICE A DAY    Anti-Seizure Meds Protocol  Failed - 2/26/2019 10:24 AM       Failed - Review Authorizing provider's last note.     Refer to last progress notes: confirm request is for original authorizing provider (cannot be through other providers).         Failed - Normal CBC on file in past 26 months    Recent Labs   Lab Test 07/24/16  1402   WBC 9.9   RBC 4.32   HGB 12.3   HCT 38.9                   Failed - Normal platelet count on file in past 26 months    Recent Labs   Lab Test 07/24/16  1402                 Passed - Recent (12 mo) or future (30 days) visit within the authorizing provider's specialty    Patient had office visit in the last 12 months or has a visit in the next 30 days with authorizing provider or within the authorizing provider's specialty.  See \"Patient Info\" tab in inbasket, or \"Choose Columns\" in Meds & Orders section of the refill encounter.             Passed - Normal ALT or AST on file in past 26 months    Recent Labs   Lab Test 05/17/17  1216   ALT 20     Recent Labs   Lab Test 05/17/17  1216   AST 12            Passed - Medication is active on med list       Passed - No active pregnancy on record       Passed - No positive pregnancy test in last 12 months          "

## 2019-02-28 RX ORDER — TOPIRAMATE 25 MG/1
TABLET, FILM COATED ORAL
Qty: 180 TABLET | Refills: 0 | Status: SHIPPED | OUTPATIENT
Start: 2019-02-28 | End: 2019-06-24

## 2019-02-28 NOTE — TELEPHONE ENCOUNTER
Routing refill request to provider for review/approval because:  Associated diagnosis not on protocol for this class of medication.  Per provider documentation with last refill from 11/20/18:  Emmy White MD   11/20/18 4:10 PM   Note      I believe this med should normally be refilled by pain clinic going forward. Patient told me they manage her migraines and this is medication for migraine. Will fill today so she can get supply but see if we can reroute next refill. Please let patient know it was refilled, apologize for delay (looks like routing issue- see below), and see if pharmacy can request from pain clinic going forward. Thanks!            Sharita Godinez RN  [facility] Triage

## 2019-03-12 ENCOUNTER — OFFICE VISIT (OUTPATIENT)
Dept: FAMILY MEDICINE | Facility: CLINIC | Age: 49
End: 2019-03-12
Payer: COMMERCIAL

## 2019-03-12 ENCOUNTER — THERAPY VISIT (OUTPATIENT)
Dept: PHYSICAL THERAPY | Facility: CLINIC | Age: 49
End: 2019-03-12
Attending: NURSE PRACTITIONER
Payer: COMMERCIAL

## 2019-03-12 VITALS
OXYGEN SATURATION: 98 % | RESPIRATION RATE: 20 BRPM | WEIGHT: 269 LBS | HEART RATE: 80 BPM | HEIGHT: 66 IN | BODY MASS INDEX: 43.23 KG/M2 | DIASTOLIC BLOOD PRESSURE: 64 MMHG | SYSTOLIC BLOOD PRESSURE: 98 MMHG | TEMPERATURE: 98.2 F

## 2019-03-12 DIAGNOSIS — M54.6 ACUTE RIGHT-SIDED THORACIC BACK PAIN: Primary | ICD-10-CM

## 2019-03-12 DIAGNOSIS — J45.40 MODERATE PERSISTENT ASTHMA WITHOUT COMPLICATION: ICD-10-CM

## 2019-03-12 DIAGNOSIS — M54.6 ACUTE RIGHT-SIDED THORACIC BACK PAIN: ICD-10-CM

## 2019-03-12 PROCEDURE — 97110 THERAPEUTIC EXERCISES: CPT | Mod: GP | Performed by: PHYSICAL THERAPIST

## 2019-03-12 PROCEDURE — 99213 OFFICE O/P EST LOW 20 MIN: CPT | Performed by: NURSE PRACTITIONER

## 2019-03-12 PROCEDURE — 97161 PT EVAL LOW COMPLEX 20 MIN: CPT | Mod: GP | Performed by: PHYSICAL THERAPIST

## 2019-03-12 RX ORDER — CYCLOBENZAPRINE HCL 5 MG
5 TABLET ORAL 2 TIMES DAILY PRN
Qty: 30 TABLET | Refills: 0 | Status: SHIPPED | OUTPATIENT
Start: 2019-03-12 | End: 2019-12-17

## 2019-03-12 ASSESSMENT — PATIENT HEALTH QUESTIONNAIRE - PHQ9
SUM OF ALL RESPONSES TO PHQ QUESTIONS 1-9: 4
5. POOR APPETITE OR OVEREATING: NOT AT ALL

## 2019-03-12 ASSESSMENT — ANXIETY QUESTIONNAIRES
2. NOT BEING ABLE TO STOP OR CONTROL WORRYING: NOT AT ALL
6. BECOMING EASILY ANNOYED OR IRRITABLE: NOT AT ALL
IF YOU CHECKED OFF ANY PROBLEMS ON THIS QUESTIONNAIRE, HOW DIFFICULT HAVE THESE PROBLEMS MADE IT FOR YOU TO DO YOUR WORK, TAKE CARE OF THINGS AT HOME, OR GET ALONG WITH OTHER PEOPLE: NOT DIFFICULT AT ALL
3. WORRYING TOO MUCH ABOUT DIFFERENT THINGS: NOT AT ALL
1. FEELING NERVOUS, ANXIOUS, OR ON EDGE: SEVERAL DAYS
7. FEELING AFRAID AS IF SOMETHING AWFUL MIGHT HAPPEN: NOT AT ALL
5. BEING SO RESTLESS THAT IT IS HARD TO SIT STILL: NOT AT ALL
GAD7 TOTAL SCORE: 1

## 2019-03-12 ASSESSMENT — MIFFLIN-ST. JEOR: SCORE: 1863.99

## 2019-03-12 ASSESSMENT — PAIN SCALES - GENERAL: PAINLEVEL: MODERATE PAIN (5)

## 2019-03-12 NOTE — PROGRESS NOTES
[facility] Initial Evaluation -- Thoracic    Evaluation Date: March 12, 2019  Paige Le is a 48 year old female with a thoracic condition.   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 Symptoms (improving/unchanging/worsening):  worsening.  Symptoms commenced as a result of: was coughing a lot from bronchitis   Condition occurred in the following environment: home     Symptoms at onset: R thoracic back pain  Constant symptoms: none  Intermittent symptoms: R thoracic pain     Symptoms are made worse with the following: Always Sitting, Time of day - Always PM and lifting   Symptoms are made better with the following: NSAIds    Disturbed sleep (yes/no):  Mild loss of sleep    Pillows:  one  Sleeping postures(prone/sup/side R/L): moves around, often prone    Previous episodes (0/1-5/6-10/11+):  none  Year of first episode: n/a    Previous history: none  Previous treatments: none    Specific Questions:   Cough/Sneeze/Deep Breath (pos/neg):  pos  Gait (normal/abnormal):  Feels stiff  Medications (nil/NSAIDS/analg/steroids/anticoag/other):  NSAIDS and Muscle relaxants  Medical allergies:  codeine  General health (excellent/good/fair/poor):  fair  Pertinent medical history:  Asthma, Chemical dependency, Depression, Fibromyalgia, Migraines/Headaches, Overweight and Sleep disorder/apnea  Imaging (NA/Xray/MRI):  none  Recent or major surgery (yes/no):  knee  Night pain (yes/no):  no  Accidents (yes/no):  no  Unexplained  weight loss (yes/no):  no  Barriers at home:  none  Other red flags:  none    EXAMINATION    Posture:   Sitting (good/fair/poor): fair   Standing (good/fair/poor):   Protruded head (yes/no): no  Kyphosis (red/acc/normal): acc    Correction of posture (better/worse/no effect): NE  Other observations:  none    Neurological:    Motor deficit:  none    Reflexes:    Sensory deficit:  none    Dural signs:      Movement Loss:   Irwin Mod Min Nil Pain   Flexion   x  P R thoracic back   Extension   x     Rotation R    x P R thoracic back   Rotation L  x      Other     P R thoracic back     Cervical Differential Testing:  Rep Pro NE in all directions   Rep Ret    Rep Ret Ext    Rep SB - R    Rep SB - L    Rep Rot - R    Rep Rot - L    Rep Flex      Test Movements:   During: produces, abolishes, increases, decreases, no effect, centralizing, peripheralizing  After: better, worse, no better, no worse, no effect, centralized, peripheralized    Pretest symptoms sitting: none   Symptoms During Symptoms After ROM increased ROM decreased No Effect   FLEX        Rep FLEX        EXT Produces No Worse      Rep EXT Abolishes Better x     Pretest symptoms lying:     Symptoms During Symptoms After ROM increased ROM decreased No Effect   EIL (prone)        Rep EIL (prone)        EIL (supine)        Rep EIL (supine)        Pretest symptoms sitting:     Symptoms During Symptoms After ROM increased ROM decreased No Effect   ROT - R        Rep ROT - R        ROT - L        Rep ROT - L        Other          Static Tests:  Flexion:       Rotation R:    Extension (prone/supine):    Rotation L:       Other Tests:     Provisional Classification: R thoracic derangement    Principle of Management:  Education:  posture    Equipment provided:  none  Mechanical therapy (Y/N):  Y    Extension principle:  Extension in sitting with self overpressure over chair 10-15 reps every 2-3 hrs   Flexion principle:    Lateral principle:       Other:     ASSESSMENT/PLAN:    Patient is a 48 year old female with thoracic complaints.    Patient has the following significant findings with corresponding treatment plan.                Diagnosis 1:  R thoracic derangement  Pain -  manual therapy, self management, education, directional preference exercise and home program  Decreased ROM/flexibility - manual therapy, therapeutic exercise, therapeutic activity and home program  Inflammation - self management/home program  Decreased function - therapeutic activities and home program  Impaired posture - neuro re-education, therapeutic activities and home program        Previous and current functional limitations:  (See Goal Flow Sheet for this information)    Short term and Long term goals: (See Goal Flow Sheet for this information)     Communication ability:  Patient appears to be able to clearly communicate and understand verbal and written communication and follow directions correctly.  Treatment Explanation - The following has been discussed with the patient:   RX ordered/plan of care  Anticipated outcomes  Possible risks and side effects  This patient would benefit from PT intervention to resume normal activities.   Rehab potential is excellent.    Frequency:  2 X week, once daily  Duration:  for 2 weeks  Discharge Plan:  Achieve all LTG.  Independent in home treatment program.  Reach maximal therapeutic benefit.    Please refer to the daily flowsheet for treatment today, total treatment time and time spent performing 1:1 timed codes.

## 2019-03-12 NOTE — PROGRESS NOTES
"    SUBJECTIVE:   Paige Le is a 48 year old female who presents to clinic today for the following health issues:      Concern - Pain in RT side Rib/back area  Onset: Off/on since December    Description:   Was seen in clinic for asthma in December and the pain was present she thought from the cough but it is getting worse and it is keeping her from doing the lifting that she needs to     Intensity: moderate    Progression of Symptoms:  worsening    Accompanying Signs & Symptoms:  no    Previous history of similar problem:   yes    Precipitating factors:   Worsened by: unsure    Alleviating factors:  Improved by: nothing    Therapies Tried and outcome: naproxen but that doesn't help too much, vics temporarily helped.    \"taking my breath away\" when talking about the pain. Denies rash. Cough is gone, better when she was last seen in January by MD. Her sister is a PT, says she pulled a rib and to give it time. She is getting muscle spasms now getting in/out of bed. Anything too long: sitting/standing causes the pain to spasm.  Denies fever/chills. When she has increased pain episodes she gets warm, also at work she gets very warm but it is lifting and moving type work also. This pain is affecting her work now too.  She is needing more help at work with the lifting.     She feels her gabapentin is helping her fibromyalgia. She has increased it to TID per her provider, but didn't see much change in her right back pain.  She does remember the steroid helped her back in January, but she does not want to take it if she does not have to.        Problem list and histories reviewed & adjusted, as indicated.  Additional history: as documented    Patient Active Problem List   Diagnosis     Anxiety state     Insomnia     Allergic rhinitis     Dysmenorrhea     Cystic Fibrosis Screening negative     Mild major depression (H)     Fibromyalgia     Patellofemoral disorder     Family history of aneurysm     Migraine     Obesity "     Grieving     Insomnia     History of alcoholism (H)     Morbid obesity (H)     Moderate persistent asthma     DMITRIY (obstructive sleep apnea)     IUD (intrauterine device) in place     Past Surgical History:   Procedure Laterality Date     ARTHROSCOPY KNEE RT/LT  1997    Left      DILATION AND CURETTAGE N/A 6/14/2017    Procedure: DILATION AND CURETTAGE;;  Surgeon: Livia Barnett DO;  Location: [facility] OR     EXAM UNDER ANESTHESIA, ULTRASOUND N/A 6/14/2017    Procedure: EXAM UNDER ANESTHESIA, ULTRASOUND;  Ultrasound guided cervical dilation, IUD placement, Endometrial biopsy, repair of unavoidable cervical laceration;  Surgeon: Livia Barnett DO;  Location: [facility] OR      TOOTH EXTRACTION W/FORCEP  1998    wisdom teeth      INSERT INTRAUTERINE DEVICE N/A 6/14/2017    Procedure: INSERT INTRAUTERINE DEVICE;;  Surgeon: Livia Barnett DO;  Location: [facility] OR       Social History     Tobacco Use     Smoking status: Never Smoker     Smokeless tobacco: Never Used   Substance Use Topics     Alcohol use: No     Alcohol/week: 0.0 oz     Comment: recovering     Family History   Problem Relation Age of Onset     Neurologic Disorder Mother         ruptured cerebral aneurysm age 60      Hypertension Mother      Depression/Anxiety Mother      Cerebrovascular Disease Mother      Obesity Mother      Macular Degeneration Mother      Musculoskeletal Disorder Father         OA      Gastrointestinal Disease Father         colon polyps age 60     Other Cancer Father         Brain Cancer-neuroblastoma     Depression/Anxiety Father      Obesity Father      Gynecology Sister         Rachel. irregular menses.  pre-eclampsia     Aneurysm Sister         cerebral     Heart Disease Paternal Grandfather         Multiple MI's (first MI age 60)     Diabetes Paternal Grandfather         IDDM dx age 60  Severe/brittle/complications     Coronary Artery Disease Paternal Grandfather      Depression/Anxiety Paternal Grandfather      Obesity  Paternal Grandfather      Gynecology Paternal Grandmother          of uterine CA in her 40's     Ovarian Cancer Paternal Grandmother          at age 40 from cancer     Eye Disorder Maternal Grandmother         glacucoma     Diabetes Maternal Grandmother         IDDM-onset age 70     Obesity Maternal Grandmother      Macular Degeneration Maternal Grandmother      Gynecology Paternal Aunt         uterine CA diagnosed @ 40yrs         Current Outpatient Medications   Medication Sig Dispense Refill     albuterol (2.5 MG/3ML) 0.083% neb solution Take 1 vial (2.5 mg) by nebulization every 6 hours as needed for shortness of breath / dyspnea or wheezing 30 vial 1     albuterol (PROAIR HFA/PROVENTIL HFA/VENTOLIN HFA) 108 (90 BASE) MCG/ACT Inhaler Inhale 2 puffs into the lungs every 6 hours as needed for shortness of breath / dyspnea or wheezing 1 Inhaler 6     B Complex Vitamins (VITAMIN B COMPLEX PO)        beclomethasone (QVAR) 80 MCG/ACT Inhaler Inhale 2 puffs into the lungs 2 times daily 1 Inhaler 11     CALCIUM CARBONATE PO Take by mouth daily       cetirizine-psuedoePHEDrine (ZYRTEC-D) 5-120 MG per tablet Take 1 tablet by mouth 2 times daily 90 tablet 3     cholecalciferol (VITAMIN D) 1000 UNIT tablet Take 1 tablet by mouth daily.       COMPOUNDED NON-CONTROLLED SUBSTANCE (CMPD RX) - PHARMACY TO MIX COMPOUNDED MEDICATION Low dose Naltrexone:  6mg capsules/tablets one PO qhs 30 capsule 6     cyclobenzaprine (FLEXERIL) 5 MG tablet Take 1 tablet (5 mg) by mouth 2 times daily as needed for muscle spasms 30 tablet 0     fluconazole (DIFLUCAN) 150 MG tablet Take 1 tablet (150 mg) by mouth every 3 days 4 tablet 0     FLUoxetine (PROZAC) 40 MG capsule Take 2 capsules (80 mg) by mouth daily 180 capsule 3     gabapentin (NEURONTIN) 300 MG capsule Take 2 capsules (600 mg) by mouth 2 times daily Take 2 capsules  (600mg) in the morning and 2 capsules (600mg) at bedtime. 90day supply 360 capsule 0     ipratropium - albuterol  "0.5 mg/2.5 mg/3 mL (DUONEB) 0.5-2.5 (3) MG/3ML neb solution Take 1 vial (3 mLs) by nebulization every 6 hours as needed for shortness of breath / dyspnea or wheezing Profile Rx: patient will contact pharmacy when needed 50 vial 3     levonorgestrel (MIRENA) 20 MCG/24HR IUD 1 each by Intrauterine route once       MAGNESIUM CITRATE PO Take by mouth daily       Naproxen Sodium (ALEVE PO) Take 220 mg by mouth       order for DME Equipment being ordered: Nebulizer 1 Device 0     rizatriptan (MAXALT-MLT) 10 MG ODT tab Take 1 tablet (10 mg) by mouth at onset of headache for migraine May repeat in 2 hours. Max 3 tablets/24 hours. 18 tablet 3     topiramate (TOPAMAX) 25 MG tablet TAKE ONE TABLET BY MOUTH TWICE A  tablet 0     traZODone (DESYREL) 50 MG tablet Take 2 tablets by mouth. 1 -2 TABs  PO at bedtime prn insomnia 120 tablet 3     Turmeric 450 MG CAPS        Allergies   Allergen Reactions     Augmentin      reacted to Augmentin--  stomach upset -- can take penicillin     Codeine Swelling     Milk Protein Extract Difficulty breathing     Products with milk cause congestion, sinus mucous, difficulty breathing       Reviewed and updated as needed this visit by clinical staff  Tobacco  Allergies  Meds  Problems  Med Hx  Surg Hx  Fam Hx  Soc Hx        Reviewed and updated as needed this visit by Provider  Tobacco  Allergies  Meds  Problems  Med Hx  Surg Hx  Fam Hx         ROS:  Constitutional, cardiovascular, pulmonary, gi and gu, MS as above systems are negative, except as otherwise noted.    OBJECTIVE:     BP 98/64 (BP Location: Right arm, Patient Position: Sitting, Cuff Size: Adult Large)   Pulse 80   Temp 98.2  F (36.8  C) (Oral)   Resp 20   Ht 1.664 m (5' 5.5\")   Wt 122 kg (269 lb)   SpO2 98%   BMI 44.08 kg/m    Body mass index is 44.08 kg/m .  GENERAL: healthy, alert and no distress  RESP: lungs clear to auscultation - no rales, rhonchi or wheezes  CV: regular rate and rhythm, normal S1 S2, " no S3 or S4, no murmur, click or rub  ABDOMEN: soft, nontender, no hepatosplenomegaly, no masses and bowel sounds normal  MS: no gross musculoskeletal defects noted, significant tenderness to light palpation mid thoracic region, more right sided  BACK: no CVA tenderness, no paralumbar tenderness  Skin:  no rash noted    Diagnostic Test Results:  No results found for this or any previous visit (from the past 24 hour(s)).    ASSESSMENT/PLAN:     Asthma: Mild persistent--controlled   Plan:  No changes in the patient's current treatment plan      1. Acute right-sided thoracic back pain  Trial muscle relaxer, follow-up with physical therapy.  Monitor for sedation with muscle relaxer, trial first in the evening.  If it does not cause sedation, okay to increase to 3 times a day as needed  - cyclobenzaprine (FLEXERIL) 5 MG tablet; Take 1 tablet (5 mg) by mouth 2 times daily as needed for muscle spasms  Dispense: 30 tablet; Refill: 0  - [facility]; Future    2. Moderate persistent asthma without complication  Stable      FUTURE APPOINTMENTS:       - Follow-up visit in 4 weeks as needed if not improving    This chart was documented by provider using a voice activated software called Dragon in addition to manual typing. There may be vocabulary errors or other grammatical errors due to this.       SWETHA Meehan, NP-C  [facility]

## 2019-03-13 ASSESSMENT — ASTHMA QUESTIONNAIRES: ACT_TOTALSCORE: 23

## 2019-03-13 ASSESSMENT — ANXIETY QUESTIONNAIRES: GAD7 TOTAL SCORE: 1

## 2019-03-18 ENCOUNTER — THERAPY VISIT (OUTPATIENT)
Dept: PHYSICAL THERAPY | Facility: CLINIC | Age: 49
End: 2019-03-18
Payer: COMMERCIAL

## 2019-03-18 DIAGNOSIS — M54.6 ACUTE RIGHT-SIDED THORACIC BACK PAIN: ICD-10-CM

## 2019-03-18 PROCEDURE — 97530 THERAPEUTIC ACTIVITIES: CPT | Mod: GP | Performed by: PHYSICAL THERAPIST

## 2019-03-18 PROCEDURE — 97110 THERAPEUTIC EXERCISES: CPT | Mod: GP | Performed by: PHYSICAL THERAPIST

## 2019-03-22 ENCOUNTER — OFFICE VISIT (OUTPATIENT)
Dept: URGENT CARE | Facility: URGENT CARE | Age: 49
End: 2019-03-22
Payer: COMMERCIAL

## 2019-03-22 VITALS
DIASTOLIC BLOOD PRESSURE: 70 MMHG | RESPIRATION RATE: 18 BRPM | SYSTOLIC BLOOD PRESSURE: 106 MMHG | WEIGHT: 272.6 LBS | HEART RATE: 90 BPM | BODY MASS INDEX: 44.67 KG/M2 | TEMPERATURE: 98.2 F | OXYGEN SATURATION: 99 %

## 2019-03-22 DIAGNOSIS — J45.21 MILD INTERMITTENT REACTIVE AIRWAY DISEASE WITH WHEEZING WITH ACUTE EXACERBATION: Primary | ICD-10-CM

## 2019-03-22 DIAGNOSIS — R06.2 WHEEZE: ICD-10-CM

## 2019-03-22 PROCEDURE — 94640 AIRWAY INHALATION TREATMENT: CPT | Performed by: NURSE PRACTITIONER

## 2019-03-22 PROCEDURE — 99214 OFFICE O/P EST MOD 30 MIN: CPT | Mod: 25 | Performed by: NURSE PRACTITIONER

## 2019-03-22 RX ORDER — IPRATROPIUM BROMIDE AND ALBUTEROL SULFATE 2.5; .5 MG/3ML; MG/3ML
3 SOLUTION RESPIRATORY (INHALATION) ONCE
Status: COMPLETED | OUTPATIENT
Start: 2019-03-22 | End: 2019-03-22

## 2019-03-22 RX ORDER — IPRATROPIUM BROMIDE AND ALBUTEROL SULFATE 2.5; .5 MG/3ML; MG/3ML
1 SOLUTION RESPIRATORY (INHALATION) EVERY 6 HOURS PRN
Qty: 1 BOX | Refills: 0 | Status: SHIPPED | OUTPATIENT
Start: 2019-03-22 | End: 2022-03-08

## 2019-03-22 RX ORDER — PREDNISONE 20 MG/1
20 TABLET ORAL 2 TIMES DAILY
Qty: 10 TABLET | Refills: 0 | Status: SHIPPED | OUTPATIENT
Start: 2019-03-22 | End: 2019-05-23

## 2019-03-22 RX ADMIN — IPRATROPIUM BROMIDE AND ALBUTEROL SULFATE 3 ML: 2.5; .5 SOLUTION RESPIRATORY (INHALATION) at 18:51

## 2019-03-22 ASSESSMENT — ENCOUNTER SYMPTOMS
FEVER: 0
DYSURIA: 0
ACTIVITY CHANGE: 1
CHEST TIGHTNESS: 1
APPETITE CHANGE: 1
HEADACHES: 1
MYALGIAS: 0
DIARRHEA: 0
COUGH: 1
CHILLS: 1
PALPITATIONS: 0
SHORTNESS OF BREATH: 1
VOMITING: 0
FATIGUE: 1
WHEEZING: 1

## 2019-03-22 NOTE — PROGRESS NOTES
SUBJECTIVE:   Paige Le is a 48 year old female presenting with a chief complaint of   Chief Complaint   Patient presents with     Cough     started since last Sat. and got worst and worst, asthma attack        She is an established patient of [facility].    URI Adult    Onset of symptoms was 6 days ago.  Course of illness is worsening with cough and wheeze  Current and Associated symptoms: chills, nasal congestion, dry cough, wheezing, shortness of breath, hoarse voice and headache  Treatment measures tried include Nebulizer (name: DuoNeb last around 1500).  Predisposing factors include recent illness with viral URI.  LMP: IUD, amenorrhea  Reports increased fatigue, decreased appetite, and disruption in sleep due to cough; however no changes in bowel or bladder habits.       Review of Systems   Constitutional: Positive for activity change, appetite change, chills and fatigue. Negative for fever.   HENT: Positive for congestion.    Respiratory: Positive for cough, chest tightness, shortness of breath and wheezing.    Cardiovascular: Negative for chest pain and palpitations.   Gastrointestinal: Negative for diarrhea and vomiting.   Genitourinary: Negative for dysuria.   Musculoskeletal: Negative for myalgias.   Skin: Negative.    Neurological: Positive for headaches.   All other systems reviewed and are negative.      Past Medical History:   Diagnosis Date     Allergic rhinitis      ALLERGIC RHINITIS NOS 3/8/2005     ANXIETY STATE NOS 3/8/2005     Cystic Fibrosis Screening negative 5/1/2009    [facility]     Dysmenorrhea 10/12/2008     Family history of aneurysm 4/28/2011     Fibromyalgia     Dx by head/neck/pain & Rheumatology     Fibromyalgia 4/15/2010    [facility]     Hyperlipidemia LDL goal <160 11/1/2013     INSOMNIA NEC 3/8/2005     Migraine      Migraine 4/22/2012    [facility]      Mild major depression (H)      Obesity 11/1/2013     Other and unspecified alcohol dependence, unspecified  drinking behavior     Sober since      Other anxiety states      Other chronic pain     from fibromyalgia     Patellofemoral disorder     bilateral     Uncomplicated asthma     Not considered Asthma but I have breathing problems     Family History   Problem Relation Age of Onset     Neurologic Disorder Mother         ruptured cerebral aneurysm age 60      Hypertension Mother      Depression/Anxiety Mother      Cerebrovascular Disease Mother      Obesity Mother      Macular Degeneration Mother      Musculoskeletal Disorder Father         OA      Gastrointestinal Disease Father         colon polyps age 60     Other Cancer Father         Brain Cancer-neuroblastoma     Depression/Anxiety Father      Obesity Father      Gynecology Sister         Rachel. irregular menses.  pre-eclampsia     Aneurysm Sister         cerebral     Heart Disease Paternal Grandfather         Multiple MI's (first MI age 60)     Diabetes Paternal Grandfather         IDDM dx age 60  Severe/brittle/complications     Coronary Artery Disease Paternal Grandfather      Depression/Anxiety Paternal Grandfather      Obesity Paternal Grandfather      Gynecology Paternal Grandmother          of uterine CA in her 40's     Ovarian Cancer Paternal Grandmother          at age 40 from cancer     Eye Disorder Maternal Grandmother         glacucoma     Diabetes Maternal Grandmother         IDDM-onset age 70     Obesity Maternal Grandmother      Macular Degeneration Maternal Grandmother      Gynecology Paternal Aunt         uterine CA diagnosed @ 40yrs     Current Outpatient Medications   Medication Sig Dispense Refill     albuterol (2.5 MG/3ML) 0.083% neb solution Take 1 vial (2.5 mg) by nebulization every 6 hours as needed for shortness of breath / dyspnea or wheezing 30 vial 1     albuterol (PROAIR HFA/PROVENTIL HFA/VENTOLIN HFA) 108 (90 BASE) MCG/ACT Inhaler Inhale 2 puffs into the lungs every 6 hours as needed for shortness of breath / dyspnea or  wheezing 1 Inhaler 6     B Complex Vitamins (VITAMIN B COMPLEX PO)        beclomethasone (QVAR) 80 MCG/ACT Inhaler Inhale 2 puffs into the lungs 2 times daily 1 Inhaler 11     CALCIUM CARBONATE PO Take by mouth daily       cetirizine-psuedoePHEDrine (ZYRTEC-D) 5-120 MG per tablet Take 1 tablet by mouth 2 times daily 90 tablet 3     cholecalciferol (VITAMIN D) 1000 UNIT tablet Take 1 tablet by mouth daily.       COMPOUNDED NON-CONTROLLED SUBSTANCE (CMPD RX) - PHARMACY TO MIX COMPOUNDED MEDICATION Low dose Naltrexone:  6mg capsules/tablets one PO qhs 30 capsule 6     cyclobenzaprine (FLEXERIL) 5 MG tablet Take 1 tablet (5 mg) by mouth 2 times daily as needed for muscle spasms 30 tablet 0     fluconazole (DIFLUCAN) 150 MG tablet Take 1 tablet (150 mg) by mouth every 3 days 4 tablet 0     FLUoxetine (PROZAC) 40 MG capsule Take 2 capsules (80 mg) by mouth daily 180 capsule 3     gabapentin (NEURONTIN) 300 MG capsule Take 2 capsules (600 mg) by mouth 2 times daily Take 2 capsules  (600mg) in the morning and 2 capsules (600mg) at bedtime. 90day supply 360 capsule 0     ipratropium - albuterol 0.5 mg/2.5 mg/3 mL (DUONEB) 0.5-2.5 (3) MG/3ML neb solution Take 1 vial (3 mLs) by nebulization every 6 hours as needed for shortness of breath / dyspnea or wheezing 1 Box 0     ipratropium - albuterol 0.5 mg/2.5 mg/3 mL (DUONEB) 0.5-2.5 (3) MG/3ML neb solution Take 1 vial (3 mLs) by nebulization every 6 hours as needed for shortness of breath / dyspnea or wheezing Profile Rx: patient will contact pharmacy when needed 50 vial 3     levonorgestrel (MIRENA) 20 MCG/24HR IUD 1 each by Intrauterine route once       MAGNESIUM CITRATE PO Take by mouth daily       Naproxen Sodium (ALEVE PO) Take 220 mg by mouth       order for DME Equipment being ordered: Nebulizer 1 Device 0     predniSONE (DELTASONE) 20 MG tablet Take 20 mg by mouth 2 times daily for 5 days. 10 tablet 0     rizatriptan (MAXALT-MLT) 10 MG ODT tab Take 1 tablet (10 mg) by  mouth at onset of headache for migraine May repeat in 2 hours. Max 3 tablets/24 hours. 18 tablet 3     topiramate (TOPAMAX) 25 MG tablet TAKE ONE TABLET BY MOUTH TWICE A  tablet 0     traZODone (DESYREL) 50 MG tablet Take 2 tablets by mouth. 1 -2 TABs  PO at bedtime prn insomnia 120 tablet 3     Turmeric 450 MG CAPS        Social History     Tobacco Use     Smoking status: Never Smoker     Smokeless tobacco: Never Used   Substance Use Topics     Alcohol use: No     Alcohol/week: 0.0 oz     Comment: recovering       OBJECTIVE  /70 (BP Location: Right arm, Patient Position: Sitting, Cuff Size: Adult Large)   Pulse 76   Temp 98.2  F (36.8  C) (Oral)   Resp 18   Wt 123.7 kg (272 lb 9.6 oz)   SpO2 98%   BMI 44.67 kg/m      Physical Exam   Constitutional: She is oriented to person, place, and time. No distress.   Cardiovascular: Normal rate, regular rhythm, normal heart sounds and intact distal pulses. Exam reveals no friction rub.   No murmur heard.  Pulmonary/Chest: Effort normal. She has wheezes.   Intermittent scattered wheeze noted throughout  POST NEB: Bilateral scattered wheeze mildly improved   Neurological: She is alert and oriented to person, place, and time.   Skin: Skin is warm. Capillary refill takes less than 2 seconds. No rash noted.   Psychiatric: She has a normal mood and affect.       Labs:  No results found for this or any previous visit (from the past 24 hour(s)).      ASSESSMENT:    ICD-10-CM    1. Mild intermittent reactive airway disease with wheezing with acute exacerbation J45.21 predniSONE (DELTASONE) 20 MG tablet     ipratropium - albuterol 0.5 mg/2.5 mg/3 mL (DUONEB) 0.5-2.5 (3) MG/3ML neb solution   2. Wheeze R06.2 ipratropium - albuterol 0.5 mg/2.5 mg/3 mL (DUONEB) neb solution 3 mL     INHALATION/NEBULIZER TREATMENT, INITIAL        Medical Decision Making:    Differential Diagnosis:  URI Adult/Peds:  Asthma, Asthma exacerbation, Bronchitis-viral, Bronchospasm, Viral  syndrome and Viral upper respiratory illness    Serious Comorbid Conditions:  Adult:  Asthma    PLAN: Discussed with patient the need for close monitoring of respiratory symptoms with reactive airway disease. Prescribed steroid burst for 5 days and encouraged regular DuoNeb use along with initiation of a daily antihistamine. Reviewed red flag symptoms and when to present to the ED for re-evaluation. Education provided. Patient agreed to the plan of care with no further questions or concerns.     Chris Mary, APRN, CNP      Patient Instructions       Patient Education     Bronchospasm (Adult)    Bronchospasm occurs when the airways (bronchial tubes) go into spasm and contract. This makes it hard to breathe and causes wheezing (a high-pitched whistling sound). Bronchospasm can also cause frequent coughing without wheezing.  Bronchospasm is due to irritation, inflammation, or allergic reaction of the airways. People with asthma get bronchospasm. However, not everyone with bronchospasm has asthma.  Being exposed to harmful fumes, a recent case of bronchitis, exercise, or a flare-up of chronic obstructive pulmonary disease (COPD) may cause the airways to spasm. An episode of bronchospasm may last 7 to 14 days. Medicine may be prescribed to relax the airways and prevent wheezing. Antibiotics will be prescribed only if your healthcare provider thinks there is a bacterial infection. Antibiotics do not help a viral infection.  Home care    Drink lots of water or other fluids (at least 10 glasses a day) during an attack. This will loosen lung secretions and make it easier to breathe. If you have heart or kidney disease, check with your doctor before you drink extra fluids.    Take prescribed medicine exactly at the times advised. If you take an inhaled medicine to help with breathing, don't use it more than once every 4 hours, unless told to do so. If prescribed an antibiotic or prednisone, take all of the medicine,  even if you are feeling better after a few days.    Don't smoke. Also avoid being exposed to secondhand smoke.    If you were given an inhaler, use it exactly as directed. If you need to use it more often than prescribed, your condition may be getting worse. Contact your healthcare provider.  Follow-up care  Follow up with your healthcare provider, or as advised.  If you are age 65 or older, have a chronic lung disease or condition that affects your immune system, or you smoke, ask your healthcare provider about getting a pneumococcal vaccine, as well as a yearly flu shot (influenza vaccine).  When to seek medical advice  Call your healthcare provider right away if any of these occur:    You need to use your inhalers more often than usual    Fever of 100.4 F (38 C) or higher, or as directed by your healthcare provider    Cough that brings up lots of dark-colored sputum (mucus)    You don't get better within 24 hours  Call 911  Call 911 if any of these occur:    Coughing up bloody sputum (mucus)    Chest pain with each breath    Increased wheezing or shortness of breath   Date Last Reviewed: 6/1/2018 2000-2018 [employer]. [address] [place] [address]. All rights reserved. This information is not intended as a substitute for professional medical care. Always follow your healthcare professional's instructions.           Patient Education     Asthma (Adult)  Asthma is a disease where the medium and  small air passages within the lung go into spasm and restrict the flow of air. Inflammation and swelling of the airways cause further blockage. During an acute asthma attack, these factors cause trouble breathing, wheezing, cough and chest tightness.    An asthma attack can be triggered by many things. Common triggers include infections such as the common cold, bronchitis, and pneumonia. Irritants such as smoke or pollutants in the air, very cold air, emotional upset, and exercise can also  "trigger an attack. In many adults with asthma, allergies to dust, mold, pollen and animal dander can cause an asthma attack. Skipping doses of daily asthma medicine can also bring on an asthma attack.  Asthma can be controlled using the proper medicines prescribed by your healthcare provider and avoiding exposure to known triggers including allergens and irritants.  Home care    Take prescribed medicine exactly at the times advised. If you need medicine such as from a hand held inhaler or aerosol breathing machine more than every 4 hours, contact your healthcare provider or seek immediate medical attention. If prescribed an antibiotic or prednisone, take all of the medicine as prescribed, even if you are feeling better after a few days.    Don't smoke. Avoid being exposed to the smoke of others.    Some people with asthma have worsening of their symptoms when they take aspirin and non-steroidal or fever-reducing medicines like ibuprofen and naproxen. Talk to your healthcare provider if you think this may apply to you.  Follow-up care  Follow up with your healthcare provider, or as advised. Always bring all of your current medicines to any appointments with your healthcare provider. Also bring a complete list of medicines even those not taken for asthma. If you don't already have one, talk to your healthcare provider about developing your own \"Asthma Action Plan.\"  A pneumococcal (pneumonia) vaccine and yearly flu shot (every fall) are recommended. Ask your doctor about this.  When to seek medical advice  Call your healthcare provider right away if any of these occur:     Increased wheezing or shortness of breath    Need to use your inhalers more often than usual without relief    Fever of 100.4 F (38 C) or higher, or as directed by your healthcare provider    Coughing up lots of dark-colored or bloody sputum (mucus)    Chest pain with each breath    If you use a peak flow meter as part of an Asthma Action Plan, and " you are still in the yellow zone (50% to 80%) 15 minutes after using inhaler medicine.  Call 911  Call 911 if any of the following occur    Trouble walking or talking because of shortness of breath    If you use a peak flow meter as part of an Asthma Action Plan and you are still in the red zone (less than 50%) 15 minutes after using inhaler medicine    Lips or fingernails turning gray or blue  Date Last Reviewed: 5/1/2017 2000-2018 [employer]. [address] [place] [address]. All rights reserved. This information is not intended as a substitute for professional medical care. Always follow your healthcare professional's instructions.

## 2019-03-22 NOTE — NURSING NOTE
The following medication was given:     MEDICATION: DouNeb  ROUTE: Mouth  DOSE: 3 ml  LOT #: [id number]  :  Adalberto  EXPIRATION DATE:  09/2020  NDC#: 0236-8053-69    Robin Blake

## 2019-03-22 NOTE — PATIENT INSTRUCTIONS
Patient Education     Bronchospasm (Adult)    Bronchospasm occurs when the airways (bronchial tubes) go into spasm and contract. This makes it hard to breathe and causes wheezing (a high-pitched whistling sound). Bronchospasm can also cause frequent coughing without wheezing.  Bronchospasm is due to irritation, inflammation, or allergic reaction of the airways. People with asthma get bronchospasm. However, not everyone with bronchospasm has asthma.  Being exposed to harmful fumes, a recent case of bronchitis, exercise, or a flare-up of chronic obstructive pulmonary disease (COPD) may cause the airways to spasm. An episode of bronchospasm may last 7 to 14 days. Medicine may be prescribed to relax the airways and prevent wheezing. Antibiotics will be prescribed only if your healthcare provider thinks there is a bacterial infection. Antibiotics do not help a viral infection.  Home care    Drink lots of water or other fluids (at least 10 glasses a day) during an attack. This will loosen lung secretions and make it easier to breathe. If you have heart or kidney disease, check with your doctor before you drink extra fluids.    Take prescribed medicine exactly at the times advised. If you take an inhaled medicine to help with breathing, don't use it more than once every 4 hours, unless told to do so. If prescribed an antibiotic or prednisone, take all of the medicine, even if you are feeling better after a few days.    Don't smoke. Also avoid being exposed to secondhand smoke.    If you were given an inhaler, use it exactly as directed. If you need to use it more often than prescribed, your condition may be getting worse. Contact your healthcare provider.  Follow-up care  Follow up with your healthcare provider, or as advised.  If you are age 65 or older, have a chronic lung disease or condition that affects your immune system, or you smoke, ask your healthcare provider about getting a pneumococcal vaccine, as well as a  yearly flu shot (influenza vaccine).  When to seek medical advice  Call your healthcare provider right away if any of these occur:    You need to use your inhalers more often than usual    Fever of 100.4 F (38 C) or higher, or as directed by your healthcare provider    Cough that brings up lots of dark-colored sputum (mucus)    You don't get better within 24 hours  Call 911  Call 911 if any of these occur:    Coughing up bloody sputum (mucus)    Chest pain with each breath    Increased wheezing or shortness of breath   Date Last Reviewed: 6/1/2018 2000-2018 [employer]. [address] [place] [id number]. All rights reserved. This information is not intended as a substitute for professional medical care. Always follow your healthcare professional's instructions.           Patient Education     Asthma (Adult)  Asthma is a disease where the medium and  small air passages within the lung go into spasm and restrict the flow of air. Inflammation and swelling of the airways cause further blockage. During an acute asthma attack, these factors cause trouble breathing, wheezing, cough and chest tightness.    An asthma attack can be triggered by many things. Common triggers include infections such as the common cold, bronchitis, and pneumonia. Irritants such as smoke or pollutants in the air, very cold air, emotional upset, and exercise can also trigger an attack. In many adults with asthma, allergies to dust, mold, pollen and animal dander can cause an asthma attack. Skipping doses of daily asthma medicine can also bring on an asthma attack.  Asthma can be controlled using the proper medicines prescribed by your healthcare provider and avoiding exposure to known triggers including allergens and irritants.  Home care    Take prescribed medicine exactly at the times advised. If you need medicine such as from a hand held inhaler or aerosol breathing machine more than every 4 hours, contact your  "healthcare provider or seek immediate medical attention. If prescribed an antibiotic or prednisone, take all of the medicine as prescribed, even if you are feeling better after a few days.    Don't smoke. Avoid being exposed to the smoke of others.    Some people with asthma have worsening of their symptoms when they take aspirin and non-steroidal or fever-reducing medicines like ibuprofen and naproxen. Talk to your healthcare provider if you think this may apply to you.  Follow-up care  Follow up with your healthcare provider, or as advised. Always bring all of your current medicines to any appointments with your healthcare provider. Also bring a complete list of medicines even those not taken for asthma. If you don't already have one, talk to your healthcare provider about developing your own \"Asthma Action Plan.\"  A pneumococcal (pneumonia) vaccine and yearly flu shot (every fall) are recommended. Ask your doctor about this.  When to seek medical advice  Call your healthcare provider right away if any of these occur:     Increased wheezing or shortness of breath    Need to use your inhalers more often than usual without relief    Fever of 100.4 F (38 C) or higher, or as directed by your healthcare provider    Coughing up lots of dark-colored or bloody sputum (mucus)    Chest pain with each breath    If you use a peak flow meter as part of an Asthma Action Plan, and you are still in the yellow zone (50% to 80%) 15 minutes after using inhaler medicine.  Call 911  Call 911 if any of the following occur    Trouble walking or talking because of shortness of breath    If you use a peak flow meter as part of an Asthma Action Plan and you are still in the red zone (less than 50%) 15 minutes after using inhaler medicine    Lips or fingernails turning gray or blue  Date Last Reviewed: 5/1/2017 2000-2018 [employer]. [address] [place] [address], [place] [address]. All rights reserved. This information is not " intended as a substitute for professional medical care. Always follow your healthcare professional's instructions.

## 2019-03-23 NOTE — NURSING NOTE
The following nebulizer treatment was given:     MEDICATION: Duoneb  : [employer]  LOT #: [id number]  EXPIRATION DATE:  09/2020  NDC # 5959-3342-27     Nebulizer Start Time:  6:52pm  Nebulizer Stop Time:  07:00pm  See Vital Signs Flowsheet    Kiarra Carrero MA

## 2019-03-25 DIAGNOSIS — N95.1 MENOPAUSAL SYNDROME (HOT FLASHES): ICD-10-CM

## 2019-03-25 DIAGNOSIS — M79.7 FIBROMYALGIA: ICD-10-CM

## 2019-03-25 NOTE — TELEPHONE ENCOUNTER
gabapentin (NEURONTIN) 300 MG capsule  Last Written Prescription Date:  11/15/18  Last Fill Quantity: 360,  # refills: 0   Last office visit: 8/9/2018  Future Office Visit:      Requested Prescriptions   Pending Prescriptions Disp Refills     gabapentin (NEURONTIN) 300 MG capsule 360 capsule 0     Sig: Take 2 capsules (600 mg) by mouth 2 times daily Take 2 capsules  (600mg) in the morning and 2 capsules (600mg) at bedtime. 90day supply    There is no refill protocol information for this order        Ruth Meyers RN  03/25/19  10:04 AM

## 2019-03-25 NOTE — TELEPHONE ENCOUNTER
Refill for: gabapentin    Last Appointment: 8/9/2018    Next Appointment: none    No Shows/Cancellations since last appointment:  none    Last Refill in Epic (date and amount/how many days):    Disp Refills Start End FAITH   gabapentin (NEURONTIN) 300 MG capsule 360 capsule 0 11/15/2018  No   Sig - Route: Take 2 capsules (600 mg) by mouth 2 times daily Take 2 capsules  (600mg) in the morning and 2 capsules (600mg) at bedtime. 90day supply - Oral     Most Recent UDS results: N/A     reviewed and summarized below:   Fill Date Written   Drug   Qty Days Prescriber  11/19/2018 11/15/2018 Gabapentin 300 Mg Capsule 360 90 An Bur     Writer called patient and left a message for her to call back ([phone]) regarding this request as she should have run out of the medication in February if she is taking it as ordered.    Routing to pain nurse pool as this patient appears to be a pain patient based on most recent refill request from 11/2018.

## 2019-03-25 NOTE — TELEPHONE ENCOUNTER
Prescription prepped for review.     EDWARDO Aragon RN called pt and left a message for her to call back ([phone]) regarding this request as she should have run out of the medication in February if she is taking it as ordered.   ---------------  Will await call back.     RESHMA Sadler, RN-BC  Patient Care Supervisor/Care Coordinator  [facility]

## 2019-03-26 NOTE — TELEPHONE ENCOUNTER
Received call from patient who is returning a call. She does state she has been taking it as directed by taking 2 in the AM and 2 in the PM. Patient can be reached at her work number until 4:30PM today - Work #[phone] --  ok to leave voicemail  Otherwise call cell phone - [phone]      Mirela Hendrix    [facility]

## 2019-03-27 RX ORDER — GABAPENTIN 300 MG/1
CAPSULE ORAL
Qty: 360 CAPSULE | Refills: 0 | Status: SHIPPED | OUTPATIENT
Start: 2019-03-27 | End: 2019-07-10

## 2019-03-27 NOTE — TELEPHONE ENCOUNTER
Patient had a surplus of this medication prior to filling her last 90 day supply which accounts for the delay in the request for refill. She reports she uses the medication consistently. Will route to provider pool to review refill request.    RESHMA Reyes, RN  Care Coordinator  [facility]

## 2019-04-22 PROBLEM — M54.6 RIGHT-SIDED THORACIC BACK PAIN: Status: RESOLVED | Noted: 2019-03-12 | Resolved: 2019-04-22

## 2019-05-22 DIAGNOSIS — M79.7 FIBROMYALGIA: ICD-10-CM

## 2019-05-22 NOTE — PROGRESS NOTES
"                          [facility]    Date of visit: 5/23/2019    Chief complaint:   Chief Complaint   Patient presents with     Pain       Interval history:  Paige Le is a 48 year old female last seen by me on 8/09/2018.      Since her last visit, Paige Le reports:  - Doing REALLY well, feeling great, her pain is well controlled  - New onset of left hip pain worse with walking long distances and lying on her side to sleep.    - Started low dose naltrexone in February 2018.  She is getting [facility].  They mail it out to her.   - She is moving from [place] to [place], [place] with her boyfriend.  They are buying a home.  They are also considering buying a home in [place].     - Had a sleep study done and got a machine and is sleeping better.  Feels refreshed when she wakes up in the morning.   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 They have met all kids of people in recovery and it is similar to mindfullness.  Her boyfriend is a CD counselor for the [employer].    - Completed PT with Michelle in Oct 2017.  Found this very helpful.   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  She is working 40 hours/week now. She is using her vacation now.   - She is not taking naps during the day anymore.   - She is trying to get as much exercise as possible.  Walking her dog and walking while at work.    - Migraines are controlled, she is taking topamax as a preventative and this is helpful.       Pain scores:  Pain intensity on average is 1 on a scale of 0-10.     Current pain treatments:   Gabapentin 600mg qam and 600mg qhs  Topamax 25mg BID - Preventative for headaches  Low Dose Naltrexone 6mg qhs  Trazodone 50mg -100mg qhs  Maxalt 10mg PRN headache  Tumeric 450mg daily  Ibuprofen 800mg BID PRN  Prozac 80mg daily      Side Effects: no side effect    Medications:  Current Outpatient Medications   Medication " Sig Dispense Refill     albuterol (2.5 MG/3ML) 0.083% neb solution Take 1 vial (2.5 mg) by nebulization every 6 hours as needed for shortness of breath / dyspnea or wheezing 30 vial 1     albuterol (PROAIR HFA/PROVENTIL HFA/VENTOLIN HFA) 108 (90 BASE) MCG/ACT Inhaler Inhale 2 puffs into the lungs every 6 hours as needed for shortness of breath / dyspnea or wheezing 1 Inhaler 6     B Complex Vitamins (VITAMIN B COMPLEX PO)        beclomethasone (QVAR) 80 MCG/ACT Inhaler Inhale 2 puffs into the lungs 2 times daily 1 Inhaler 11     CALCIUM CARBONATE PO Take by mouth daily       cetirizine-psuedoePHEDrine (ZYRTEC-D) 5-120 MG per tablet Take 1 tablet by mouth 2 times daily 90 tablet 3     cholecalciferol (VITAMIN D) 1000 UNIT tablet Take 1 tablet by mouth daily.       COMPOUNDED NON-CONTROLLED SUBSTANCE (CMPD RX) - PHARMACY TO MIX COMPOUNDED MEDICATION Low dose Naltrexone:  6mg capsules/tablets one PO qhs 30 capsule 6     cyclobenzaprine (FLEXERIL) 5 MG tablet Take 1 tablet (5 mg) by mouth 2 times daily as needed for muscle spasms 30 tablet 0     fluconazole (DIFLUCAN) 150 MG tablet Take 1 tablet (150 mg) by mouth every 3 days 4 tablet 0     FLUoxetine (PROZAC) 40 MG capsule Take 2 capsules (80 mg) by mouth daily 180 capsule 3     gabapentin (NEURONTIN) 300 MG capsule Take 2 capsules  (600mg) in the morning and 2 capsules (600mg) at bedtime. 90day supply 360 capsule 0     ipratropium - albuterol 0.5 mg/2.5 mg/3 mL (DUONEB) 0.5-2.5 (3) MG/3ML neb solution Take 1 vial (3 mLs) by nebulization every 6 hours as needed for shortness of breath / dyspnea or wheezing 1 Box 0     ipratropium - albuterol 0.5 mg/2.5 mg/3 mL (DUONEB) 0.5-2.5 (3) MG/3ML neb solution Take 1 vial (3 mLs) by nebulization every 6 hours as needed for shortness of breath / dyspnea or wheezing Profile Rx: patient will contact pharmacy when needed 50 vial 3     levonorgestrel (MIRENA) 20 MCG/24HR IUD 1 each by Intrauterine route once       MAGNESIUM CITRATE  "PO Take by mouth daily       Naproxen Sodium (ALEVE PO) Take 220 mg by mouth       order for DME Equipment being ordered: Nebulizer 1 Device 0     rizatriptan (MAXALT-MLT) 10 MG ODT tab Take 1 tablet (10 mg) by mouth at onset of headache for migraine May repeat in 2 hours. Max 3 tablets/24 hours. 18 tablet 3     topiramate (TOPAMAX) 25 MG tablet TAKE ONE TABLET BY MOUTH TWICE A  tablet 0     traZODone (DESYREL) 50 MG tablet Take 2 tablets by mouth. 1 -2 TABs  PO at bedtime prn insomnia 120 tablet 3     Turmeric 450 MG CAPS          Medical History: any changes in medical history since they were last seen? No    Review of Systems:  The 14 system ROS was reviewed from the intake questionnaire, and is positive for: fatigue, weight gain, allergies and steroid use.    Any bowel or bladder problems: none  Mood: \"really good\"    Physical Exam:  Weight 125.2 kg (276 lb), not currently breastfeeding.  General: NAD, awake and alert, energetic  Gait: Normal  MSK exam: TTP over the left trochanteric bursa    Assessment:  Paige Le is a 48 year old female who presents with the complaints of chronic generalized myofascial pain and fatigue.     1. Fibromyalgia  2. Migraine headaches    3. Depression   4. Alcohol use disorder - in sustained remission and active recovery  5. Obstructive Sleep Apnea - using CPAP   6. Left sided Trochanteric bursitis    Plan:  Diagnosis reviewed, treatment option addressed, and risk/benefits discussed.  Self-care instructions given.  I am recommending a multidisciplinary treatment plan to help this patient better manage her pain.       1. Physical Therapy: She continues to do a HEP  2. Pain Psychologist to address issues of relaxation, behavioral change, coping style, and other factors important to improvement:  Finished with eBbe Benjamin PRN  3. Diagnostic Studies: None  4. Medication Management:  -Decrase Gabapentin to 300mg qam and 300mg qhs.                       -Continue Naltrexone 6mg " qhs - compound pharmacy - discussed the [place] [facility] pharmacy.   5. Further procedures recommended:   1. Left Trochanteric Bursa injection PRN - this can be scheduled in clinic.   6. Acupuncture:  Could consider in the future.    7. Follow up with your PCP.  I will send her a note to make sure she is comfortable taking over your gabapentin and LDN.       I spent 30 minutes of time face to face with the patient.  Greater than 50% of this time was spent in patient counseling and/or coordination of care.      Yvonne GarridoMD Pain Management

## 2019-05-22 NOTE — TELEPHONE ENCOUNTER
Reason for call:  Medication   If this is a refill request, has the caller requested the refill from the pharmacy already? N/A  Will the patient be using a [facility]? N/A  Name of the pharmacy and phone number for the current request: N/A    Name of the medication requested: Naltrexone    Other request: Patient scheduled follow up with Dr. Garrido on 5/23, but wanted us to start working on the refill for this prior to her appointment in case it was forgotten    Phone number to reach patient:  Home number on file [phone] (home)          Mirela Hendrix    [facility]

## 2019-05-23 ENCOUNTER — OFFICE VISIT (OUTPATIENT)
Dept: PALLIATIVE MEDICINE | Facility: CLINIC | Age: 49
End: 2019-05-23
Payer: COMMERCIAL

## 2019-05-23 VITALS — BODY MASS INDEX: 45.23 KG/M2 | WEIGHT: 276 LBS

## 2019-05-23 DIAGNOSIS — G43.109 MIGRAINE WITH AURA AND WITHOUT STATUS MIGRAINOSUS, NOT INTRACTABLE: ICD-10-CM

## 2019-05-23 DIAGNOSIS — M79.7 FIBROMYALGIA: Primary | ICD-10-CM

## 2019-05-23 DIAGNOSIS — M70.62 TROCHANTERIC BURSITIS OF LEFT HIP: ICD-10-CM

## 2019-05-23 DIAGNOSIS — F32.0 MILD MAJOR DEPRESSION (H): ICD-10-CM

## 2019-05-23 DIAGNOSIS — G47.33 OSA (OBSTRUCTIVE SLEEP APNEA): ICD-10-CM

## 2019-05-23 PROCEDURE — 99214 OFFICE O/P EST MOD 30 MIN: CPT | Performed by: ANESTHESIOLOGY

## 2019-05-23 ASSESSMENT — PAIN SCALES - GENERAL: PAINLEVEL: MODERATE PAIN (4)

## 2019-05-23 NOTE — PATIENT INSTRUCTIONS
1. Decrease Gabapentin to 300mg in the morning and 600mg at night for 2 weeks and then cut out the 300mg at night and take 300mg in the morning and 300mg at night     2. Call [facility] and see if you can get your Low Dose Naltrexone there cheaper.     3.  I will send a note to your PCP about the LDN. Hopefully, she can take over so you dont have as many dr. Appointments.     Yvonne Garrido MD     ----------------------------------------------------------------  Clinic Number:  [phone]   Call this number with any questions about your care and for scheduling assistance. Calls are returned Monday through Friday between 8 AM and 4:30 PM. We usually get back to you within 2 business days depending on the issue/request.       Medication refills:    For non-opioid medications, call your pharmacy directly to request a refill. The pharmacy will contact the [facility] for authorization. Please allow 3-4 days for these refills to be processed.     For opioid refills, call the clinic number or send a [employer] message. Please contact us 7-10 days before your refill is due. The message MUST include the name of the specific medication(s) requested and how you would like to receive the prescription(s). The options are as follows:    Pain Clinic staff can mail the prescription to your pharmacy. Please tell us the name of the pharmacy.    You may pick the prescription up at the Pain Clinic (tell us the location) or during a clinic visit with your pain provider    Pain Clinic staff can deliver the prescription to the [place] pharmacy in the clinic building. Please tell us the location.      We believe regular attendance is key to your success in our program.    Any time you are unable to keep your appointment we ask that you call us at least 24 hours in advance to let us know. This will allow us to offer the appointment time to another patient.

## 2019-05-23 NOTE — Clinical Note
Would you mind taking this patient back?  She has been very stable with good pain control for over 6 months now and is on stable doses of low dose naltrexone and gabapentin.  I would like for her to have less MD visits if possible and she is only following up with me for refills on these meds.  Let me know if you have any concerns.  Thanks!  Yvonne

## 2019-05-24 NOTE — PROGRESS NOTES
Hi Dr. Garrido,  Yes, I can take over rx'ing since she has been stable. I don't have experience of using naltrexone for FMS but happy to continue it.   Thanks for the heads up.  Shannan

## 2019-06-24 DIAGNOSIS — M26.609 TMJ (TEMPOROMANDIBULAR JOINT SYNDROME): ICD-10-CM

## 2019-06-24 DIAGNOSIS — M54.2 CERVICALGIA: ICD-10-CM

## 2019-06-24 NOTE — TELEPHONE ENCOUNTER
"Requested Prescriptions   Pending Prescriptions Disp Refills     topiramate (TOPAMAX) 25 MG tablet [Pharmacy Med Name: TOPIRAMATE 25MG TABS]  Last Written Prescription Date:  2/28/19  Last Fill Quantity: 180 tablet,  # refills: 0   Last office visit: 3/12/2019 with prescribing provider:  Dr. White   Future Office Visit:     180 tablet 0     Sig: TAKE ONE TABLET BY MOUTH TWICE A DAY       Anti-Seizure Meds Protocol  Failed - 6/24/2019  9:57 AM        Failed - Review Authorizing provider's last note.      Refer to last progress notes: confirm request is for original authorizing provider (cannot be through other providers).          Failed - Normal CBC on file in past 26 months     Recent Labs   Lab Test 07/24/16  1402   WBC 9.9   RBC 4.32   HGB 12.3   HCT 38.9                    Failed - Normal platelet count on file in past 26 months     Recent Labs   Lab Test 07/24/16  1402                  Passed - Recent (12 mo) or future (30 days) visit within the authorizing provider's specialty     Patient had office visit in the last 12 months or has a visit in the next 30 days with authorizing provider or within the authorizing provider's specialty.  See \"Patient Info\" tab in inbasket, or \"Choose Columns\" in Meds & Orders section of the refill encounter.              Passed - Normal ALT or AST on file in past 26 months     Recent Labs   Lab Test 05/17/17  1216   ALT 20     Recent Labs   Lab Test 05/17/17  1216   AST 12             Passed - Medication is active on med list        Passed - No active pregnancy on record        Passed - No positive pregnancy test in last 12 months          "

## 2019-06-26 RX ORDER — TOPIRAMATE 25 MG/1
TABLET, FILM COATED ORAL
Qty: 180 TABLET | Refills: 1 | Status: SHIPPED | OUTPATIENT
Start: 2019-06-26 | End: 2021-03-10

## 2019-06-26 NOTE — TELEPHONE ENCOUNTER
Routing refill request to provider for review/approval because:  A break in medication and due to not having the following:   Normal CBC on file in past 26 months    Normal platelet count on file in past 26 months       Stacey Boss RN

## 2019-07-09 DIAGNOSIS — N95.1 MENOPAUSAL SYNDROME (HOT FLASHES): ICD-10-CM

## 2019-07-09 DIAGNOSIS — M79.7 FIBROMYALGIA: ICD-10-CM

## 2019-07-09 NOTE — TELEPHONE ENCOUNTER
Requested Prescriptions   Pending Prescriptions Disp Refills     gabapentin (NEURONTIN) 300 MG capsule  Last Written Prescription Date:  3/27/19Diana Hernandez APRN CNP  Last Fill Quantity: 360 capsule,  # refills: 0   Last office visit: 3/12/2019 with Sarah Devries NP    Future Office Visit:      Routing refill request to provider for review/approval because:  Drug not on the [facility] or [facility] refill protocol or controlled substance   360 capsule 0     Sig: Take 2 capsules  (600mg) in the morning and 2 capsules (600mg) at bedtime. 90day supply       There is no refill protocol information for this order

## 2019-07-10 RX ORDER — GABAPENTIN 300 MG/1
CAPSULE ORAL
Qty: 360 CAPSULE | Refills: 1 | Status: SHIPPED | OUTPATIENT
Start: 2019-07-10

## 2019-07-10 NOTE — TELEPHONE ENCOUNTER
Routing refill request to provider for review/approval because:  Drug not on the [facility] refill protocol       Min Pa RN, BSN, PHN

## 2019-09-03 ENCOUNTER — ANCILLARY PROCEDURE (OUTPATIENT)
Dept: GENERAL RADIOLOGY | Facility: CLINIC | Age: 49
End: 2019-09-03
Attending: NURSE PRACTITIONER
Payer: COMMERCIAL

## 2019-09-03 ENCOUNTER — TELEPHONE (OUTPATIENT)
Dept: PALLIATIVE MEDICINE | Facility: CLINIC | Age: 49
End: 2019-09-03

## 2019-09-03 ENCOUNTER — OFFICE VISIT (OUTPATIENT)
Dept: URGENT CARE | Facility: URGENT CARE | Age: 49
End: 2019-09-03
Payer: COMMERCIAL

## 2019-09-03 VITALS
HEART RATE: 66 BPM | SYSTOLIC BLOOD PRESSURE: 115 MMHG | BODY MASS INDEX: 44.57 KG/M2 | OXYGEN SATURATION: 98 % | DIASTOLIC BLOOD PRESSURE: 77 MMHG | WEIGHT: 272 LBS | TEMPERATURE: 97.8 F | RESPIRATION RATE: 18 BRPM

## 2019-09-03 DIAGNOSIS — M70.62 TROCHANTERIC BURSITIS OF LEFT HIP: Primary | ICD-10-CM

## 2019-09-03 DIAGNOSIS — M25.571 ACUTE RIGHT ANKLE PAIN: Primary | ICD-10-CM

## 2019-09-03 PROCEDURE — 73610 X-RAY EXAM OF ANKLE: CPT | Mod: RT

## 2019-09-03 PROCEDURE — 99214 OFFICE O/P EST MOD 30 MIN: CPT | Performed by: NURSE PRACTITIONER

## 2019-09-03 ASSESSMENT — ENCOUNTER SYMPTOMS
DIARRHEA: 0
FEVER: 0
SHORTNESS OF BREATH: 0
CHILLS: 0
SORE THROAT: 0
VOMITING: 0
RHINORRHEA: 0
NAUSEA: 0
COUGH: 0
HEADACHES: 0

## 2019-09-03 ASSESSMENT — PAIN SCALES - GENERAL: PAINLEVEL: MODERATE PAIN (5)

## 2019-09-03 NOTE — TELEPHONE ENCOUNTER
Pt requesting a Hip Injection for Bursitis with Dr Garrido. She was last seen 5/2019. She does not want to make multiple trips in.      Johnathon LLANOS    [facility]

## 2019-09-03 NOTE — PATIENT INSTRUCTIONS
Patient Education     Arthralgia    Arthralgia is the term for pain in or around the joint. It is a symptom, not a disease. This pain may involve one or more joints. In some cases, the pain moves from joint to joint.  There are many causes for joint pain. These include:    Injury    Osteoarthritis (wearing out of the joint surface)    Gout (inflammation of the joint due to crystals in the joint fluid)    Infection inside the joint      Bursitis (inflammation of the fluid-filled sacs around the joint)    Autoimmune disorders such as rheumatoid arthritis or lupus    Tendonitis (inflammation of chords that attach muscle to bone)  Home care    Rest the involved joint(s) until your symptoms improve.     You may be prescribed pain medicine. If none is prescribed, you may use acetaminophen or ibuprofen to control pain and inflammation.  Follow-up care  Follow up with your healthcare provider or as advised.  When to seek medical advice  Contact your healthcare provider right away if any of the following occurs:    Pain, swelling, or redness of joint increases    Pain worsens or recurs after a period of improvement    Pain moves to other joints    You cannot bear weight on the affected joint     You cannot move the affected joint    Joint appears deformed    New rash appears    Fever of 100.4 F (38 C) or higher, or as directed by your healthcare provider  Date Last Reviewed: 3/1/2017    [id number] [employer]. [address] [place] [address], [place] [address]. All rights reserved. This information is not intended as a substitute for professional medical care. Always follow your healthcare professional's instructions.

## 2019-09-03 NOTE — PROGRESS NOTES
SUBJECTIVE:   Paige Le is a 48 year old female presenting with a chief complaint of   Chief Complaint   Patient presents with     Musculoskeletal Problem     RIGHT FOOT       She is an established patient of [facility].    RIGHT foot Injury/Pain    Onset of symptoms was 2 week(s) ago.  Location: right ankle  Context: cannot remember if she twisted right ankle but pain became worse 1 week ago and made pain worse.      Course of symptoms is worsening.    Severity moderate  Current and Associated symptoms: Pain  Denies  Swelling, Bruising, Warmth and Decreased range of motion  Aggravating Factors: walking, repetitive motion and flexion/extension  Therapies to improve symptoms include: none  This is not the first time this type of problem has occurred for this patient.     Review of Systems   Constitutional: Negative for chills and fever.   HENT: Negative for congestion, ear pain, rhinorrhea and sore throat.    Respiratory: Negative for cough and shortness of breath.    Gastrointestinal: Negative for diarrhea, nausea and vomiting.   Musculoskeletal:        Right foot pain   Neurological: Negative for headaches.       Past Medical History:   Diagnosis Date     Allergic rhinitis      ALLERGIC RHINITIS NOS 3/8/2005     ANXIETY STATE NOS 3/8/2005     Cystic Fibrosis Screening negative 5/1/2009    [facility]     Dysmenorrhea 10/12/2008     Family history of aneurysm 4/28/2011     Fibromyalgia     Dx by head/neck/pain & Rheumatology     Fibromyalgia 4/15/2010    [facility]     Hyperlipidemia LDL goal <160 11/1/2013     INSOMNIA NEC 3/8/2005     Migraine      Migraine 4/22/2012    Pain Clinic      Mild major depression (H)      Obesity 11/1/2013     Other and unspecified alcohol dependence, unspecified drinking behavior     Sober since 7/00     Other anxiety states      Other chronic pain     from fibromyalgia     Patellofemoral disorder     bilateral     Uncomplicated asthma     Not considered Asthma but I  have breathing problems     Family History   Problem Relation Age of Onset     Neurologic Disorder Mother         ruptured cerebral aneurysm age 60      Hypertension Mother      Depression/Anxiety Mother      Cerebrovascular Disease Mother      Obesity Mother      Macular Degeneration Mother      Musculoskeletal Disorder Father         OA      Gastrointestinal Disease Father         colon polyps age 60     Other Cancer Father         Brain Cancer-neuroblastoma     Depression/Anxiety Father      Obesity Father      Gynecology Sister         Rachel. irregular menses.  pre-eclampsia     Aneurysm Sister         cerebral     Heart Disease Paternal Grandfather         Multiple MI's (first MI age 60)     Diabetes Paternal Grandfather         IDDM dx age 60  Severe/brittle/complications     Coronary Artery Disease Paternal Grandfather      Depression/Anxiety Paternal Grandfather      Obesity Paternal Grandfather      Gynecology Paternal Grandmother          of uterine CA in her 40's     Ovarian Cancer Paternal Grandmother          at age 40 from cancer     Eye Disorder Maternal Grandmother         glacucoma     Diabetes Maternal Grandmother         IDDM-onset age 70     Obesity Maternal Grandmother      Macular Degeneration Maternal Grandmother      Gynecology Paternal Aunt         uterine CA diagnosed @ 40yrs     Current Outpatient Medications   Medication Sig Dispense Refill     albuterol (2.5 MG/3ML) 0.083% neb solution Take 1 vial (2.5 mg) by nebulization every 6 hours as needed for shortness of breath / dyspnea or wheezing 30 vial 1     albuterol (PROAIR HFA/PROVENTIL HFA/VENTOLIN HFA) 108 (90 BASE) MCG/ACT Inhaler Inhale 2 puffs into the lungs every 6 hours as needed for shortness of breath / dyspnea or wheezing 1 Inhaler 6     B Complex Vitamins (VITAMIN B COMPLEX PO)        beclomethasone (QVAR) 80 MCG/ACT Inhaler Inhale 2 puffs into the lungs 2 times daily 1 Inhaler 11     CALCIUM CARBONATE PO Take by mouth  daily       cetirizine-psuedoePHEDrine (ZYRTEC-D) 5-120 MG per tablet Take 1 tablet by mouth 2 times daily 90 tablet 3     cholecalciferol (VITAMIN D) 1000 UNIT tablet Take 1 tablet by mouth daily.       COMPOUNDED NON-CONTROLLED SUBSTANCE (CMPD RX) - PHARMACY TO MIX COMPOUNDED MEDICATION Low dose Naltrexone:  6mg capsules/tablets one PO qhs 30 capsule 6     fluconazole (DIFLUCAN) 150 MG tablet Take 1 tablet (150 mg) by mouth every 3 days 4 tablet 0     FLUoxetine (PROZAC) 40 MG capsule Take 2 capsules (80 mg) by mouth daily 180 capsule 3     gabapentin (NEURONTIN) 300 MG capsule Take 2 capsules  (600mg) in the morning and 2 capsules (600mg) at bedtime. 90day supply (Patient taking differently: 300 mg 2 times daily Take 2 capsules  (600mg) in the morning and 2 capsules (600mg) at bedtime. 90day supply) 360 capsule 1     ipratropium - albuterol 0.5 mg/2.5 mg/3 mL (DUONEB) 0.5-2.5 (3) MG/3ML neb solution Take 1 vial (3 mLs) by nebulization every 6 hours as needed for shortness of breath / dyspnea or wheezing 1 Box 0     ipratropium - albuterol 0.5 mg/2.5 mg/3 mL (DUONEB) 0.5-2.5 (3) MG/3ML neb solution Take 1 vial (3 mLs) by nebulization every 6 hours as needed for shortness of breath / dyspnea or wheezing Profile Rx: patient will contact pharmacy when needed 50 vial 3     levonorgestrel (MIRENA) 20 MCG/24HR IUD 1 each by Intrauterine route once       MAGNESIUM CITRATE PO Take by mouth daily       Naproxen Sodium (ALEVE PO) Take 220 mg by mouth       order for DME Equipment being ordered: Nebulizer 1 Device 0     rizatriptan (MAXALT-MLT) 10 MG ODT tab Take 1 tablet (10 mg) by mouth at onset of headache for migraine May repeat in 2 hours. Max 3 tablets/24 hours. 18 tablet 3     topiramate (TOPAMAX) 25 MG tablet TAKE ONE TABLET BY MOUTH TWICE A  tablet 1     traZODone (DESYREL) 50 MG tablet Take 2 tablets by mouth. 1 -2 TABs  PO at bedtime prn insomnia 120 tablet 3     Turmeric 450 MG CAPS        cyclobenzaprine  (FLEXERIL) 5 MG tablet Take 1 tablet (5 mg) by mouth 2 times daily as needed for muscle spasms (Patient not taking: Reported on 5/23/2019) 30 tablet 0     Social History     Tobacco Use     Smoking status: Never Smoker     Smokeless tobacco: Never Used   Substance Use Topics     Alcohol use: No     Alcohol/week: 0.0 oz     Comment: recovering       OBJECTIVE  /77 (BP Location: Left arm, Patient Position: Chair, Cuff Size: Adult Large)   Pulse 66   Temp 97.8  F (36.6  C) (Oral)   Resp 18   Wt 123.4 kg (272 lb)   SpO2 98%   BMI 44.57 kg/m      Physical Exam   Constitutional: She appears well-developed and well-nourished. No distress.   HENT:   Head: Normocephalic and atraumatic.   Right Ear: Tympanic membrane and external ear normal.   Left Ear: Tympanic membrane and external ear normal.   Mouth/Throat: Oropharynx is clear and moist.   Eyes: Pupils are equal, round, and reactive to light. EOM are normal.   Neck: Normal range of motion. Neck supple.   Pulmonary/Chest: Effort normal and breath sounds normal. No respiratory distress.   Musculoskeletal:   Tenderness of right ankle on the medial malleolus, worse with flexion and extension. No swelling noted. Neurovascular exam is intact.    Lymphadenopathy:     She has no cervical adenopathy.   Neurological: She is alert. No cranial nerve deficit.   Skin: Skin is warm and dry. She is not diaphoretic.   Psychiatric: She has a normal mood and affect.   Nursing note and vitals reviewed.      Labs:  Results for orders placed or performed in visit on 09/03/19 (from the past 24 hour(s))   XR Ankle Right G/E 3 Views    Narrative    RIGHT ANKLE THREE OR MORE VIEWS   9/3/2019 4:18 PM     HISTORY: Cannot remember if she twisted right ankle or not, but pain  became worse one week ago. Acute right ankle pain.    COMPARISON: None.      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 No evidence of acute fracture or subluxation.  "Mortise is  intact.           ASSESSMENT:      ICD-10-CM    1. Acute right ankle pain M25.571 XR Ankle Right G/E 3 Views        Medical Decision Making:    Differential Diagnosis:  MS Injury Pain: sprain, tendonitis, muscle strain, contusion and dislocation    Serious Comorbid Conditions:  Adult:  None    PLAN:  A referral to orthopedic made for further evaluation.  Advised to rest foot, ice, Tylenol or NSAID pain medication.  Further determination on care will be made by the orthopedic doctor.  Questions are addressed and no further concerns.n worsens, follow up with your Primary Care Provider\"}    There are no Patient Instructions on file for this visit.      "

## 2019-09-04 NOTE — TELEPHONE ENCOUNTER
Reviewed chart. At the May appointment the plan related to further injections was as follows:    5. Further procedures recommended:   1. Left Trochanteric Bursa injection PRN - this can be scheduled in clinic.     ----------------------  Prepped order and will have provider review and sign if appropriate.     KAYLA SadlerN, RN-BC  Patient Care Supervisor/Care Coordinator  [facility]

## 2019-09-05 NOTE — TELEPHONE ENCOUNTER
Injection Only - Type of Injection: left trochanteric bursa injection Radiology? No    Chart reviewed- this is scheduled 09/12/19    Ana M GARZAN, RN Care Coordinator  [facility]

## 2019-09-10 ENCOUNTER — OFFICE VISIT (OUTPATIENT)
Dept: ORTHOPEDICS | Facility: CLINIC | Age: 49
End: 2019-09-10
Payer: COMMERCIAL

## 2019-09-10 VITALS
BODY MASS INDEX: 46.44 KG/M2 | SYSTOLIC BLOOD PRESSURE: 99 MMHG | RESPIRATION RATE: 16 BRPM | DIASTOLIC BLOOD PRESSURE: 61 MMHG | WEIGHT: 272 LBS | HEIGHT: 64 IN

## 2019-09-10 DIAGNOSIS — M25.871 IMPINGEMENT SYNDROME OF RIGHT ANKLE: Primary | ICD-10-CM

## 2019-09-10 PROCEDURE — 99203 OFFICE O/P NEW LOW 30 MIN: CPT | Performed by: ORTHOPAEDIC SURGERY

## 2019-09-10 ASSESSMENT — MIFFLIN-ST. JEOR: SCORE: 1853.78

## 2019-09-10 NOTE — LETTER
9/10/2019         RE: Paige Le  [address]  [place] [address]        Dear Colleague,    Thank you for referring your patient, Paige Le, to the [facility]. Please see a copy of my visit note below.    HPI: Patient reports she has been dealing with foot issues most of her life.  She states however the right foot/ankle most recently has really been bothering her.  She reports a little pain around a 6.  She describes it as aching and shooting.  She has utilized rest of the ankle and Aleve.  She recently went to urgent care because the pain got significant.  She describes activities such as standing, walking, driving and working on concrete as aggravating factors.  She states she does reposition her foot while driving to alleviate the pain.  Her pain is primarily at the anterior ankle and can extend laterally to the foot.    Patient's past medical, surgical, social and family histories reviewed.       Past Medical History:   Diagnosis Date     Allergic rhinitis      ALLERGIC RHINITIS NOS 3/8/2005     ANXIETY STATE NOS 3/8/2005     Cystic Fibrosis Screening negative 5/1/2009    [facility]     Dysmenorrhea 10/12/2008     Family history of aneurysm 4/28/2011     Fibromyalgia     Dx by head/neck/pain & Rheumatology     Fibromyalgia 4/15/2010    [facility]     Hyperlipidemia LDL goal <160 11/1/2013     INSOMNIA NEC 3/8/2005     Migraine      Migraine 4/22/2012    [facility]      Mild major depression (H)      Obesity 11/1/2013     Other and unspecified alcohol dependence, unspecified drinking behavior     Sober since 7/00     Other anxiety states      Other chronic pain     from fibromyalgia     Patellofemoral disorder     bilateral     Uncomplicated asthma     Not considered Asthma but I have breathing problems       Past Surgical History:   Procedure Laterality Date     ARTHROSCOPY KNEE RT/LT  1997    Left      DILATION AND CURETTAGE N/A 6/14/2017    Procedure:  DILATION AND CURETTAGE;;  Surgeon: Livia Barnett DO;  Location: [facility] OR     EXAM UNDER ANESTHESIA, ULTRASOUND N/A 6/14/2017    Procedure: EXAM UNDER ANESTHESIA, ULTRASOUND;  Ultrasound guided cervical dilation, IUD placement, Endometrial biopsy, repair of unavoidable cervical laceration;  Surgeon: Livia Barnett DO;  Location: [facility] OR      TOOTH EXTRACTION W/FORCEP  1998    wisdom teeth      INSERT INTRAUTERINE DEVICE N/A 6/14/2017    Procedure: INSERT INTRAUTERINE DEVICE;;  Surgeon: Livia Barnett DO;  Location:  OR       Home Medications:  Prior to Admission medications    Medication Sig Start Date End Date Taking? Authorizing Provider   albuterol (2.5 MG/3ML) 0.083% neb solution Take 1 vial (2.5 mg) by nebulization every 6 hours as needed for shortness of breath / dyspnea or wheezing 12/7/16  Yes Raheem Vega MD   albuterol (PROAIR HFA/PROVENTIL HFA/VENTOLIN HFA) 108 (90 BASE) MCG/ACT Inhaler Inhale 2 puffs into the lungs every 6 hours as needed for shortness of breath / dyspnea or wheezing 12/7/16  Yes Raheem Vega MD   B Complex Vitamins (VITAMIN B COMPLEX PO)    Yes Reported, Patient   beclomethasone (QVAR) 80 MCG/ACT Inhaler Inhale 2 puffs into the lungs 2 times daily 2/14/18  Yes Emmy White MD   CALCIUM CARBONATE PO Take by mouth daily   Yes Reported, Patient   cetirizine-psuedoePHEDrine (ZYRTEC-D) 5-120 MG per tablet Take 1 tablet by mouth 2 times daily 3/2/15  Yes Emmy Galarza MD   cholecalciferol (VITAMIN D) 1000 UNIT tablet Take 1 tablet by mouth daily. 7/28/11  Yes Pino Quintana MD   COMPOUNDED NON-CONTROLLED SUBSTANCE (CMPD RX) - PHARMACY TO MIX COMPOUNDED MEDICATION Low dose Naltrexone:  6mg capsules/tablets one PO qhs 5/22/19  Yes Yvonne Garrido MD   cyclobenzaprine (FLEXERIL) 5 MG tablet Take 1 tablet (5 mg) by mouth 2 times daily as needed for muscle spasms 3/12/19  Yes Sarah Devries NP   fluconazole (DIFLUCAN) 150 MG  tablet Take 1 tablet (150 mg) by mouth every 3 days 1/3/19  Yes Emmy White MD   FLUoxetine (PROZAC) 40 MG capsule Take 2 capsules (80 mg) by mouth daily 9/13/18  Yes Emmy White MD   gabapentin (NEURONTIN) 300 MG capsule Take 2 capsules  (600mg) in the morning and 2 capsules (600mg) at bedtime. 90day supply  Patient taking differently: 300 mg 2 times daily Take 2 capsules  (600mg) in the morning and 2 capsules (600mg) at bedtime. 90day supply 7/10/19  Yes Emmy White MD   ipratropium - albuterol 0.5 mg/2.5 mg/3 mL (DUONEB) 0.5-2.5 (3) MG/3ML neb solution Take 1 vial (3 mLs) by nebulization every 6 hours as needed for shortness of breath / dyspnea or wheezing 3/22/19  Yes Iliana Mary APRN CNP   ipratropium - albuterol 0.5 mg/2.5 mg/3 mL (DUONEB) 0.5-2.5 (3) MG/3ML neb solution Take 1 vial (3 mLs) by nebulization every 6 hours as needed for shortness of breath / dyspnea or wheezing Profile Rx: patient will contact pharmacy when needed 9/13/18  Yes Emmy White MD   levonorgestrel (MIRENA) 20 MCG/24HR IUD 1 each by Intrauterine route once   Yes Reported, Patient   MAGNESIUM CITRATE PO Take by mouth daily   Yes Reported, Patient   Naproxen Sodium (ALEVE PO) Take 220 mg by mouth   Yes Reported, Patient   order for DME Equipment being ordered: Nebulizer 6/30/16  Yes Charis Buck DO   rizatriptan (MAXALT-MLT) 10 MG ODT tab Take 1 tablet (10 mg) by mouth at onset of headache for migraine May repeat in 2 hours. Max 3 tablets/24 hours. 1/11/17  Yes Charis Buck DO   topiramate (TOPAMAX) 25 MG tablet TAKE ONE TABLET BY MOUTH TWICE A DAY 6/26/19  Yes Emmy White MD   traZODone (DESYREL) 50 MG tablet Take 2 tablets by mouth. 1 -2 TABs  PO at bedtime prn insomnia 9/13/18  Yes Emmy White MD   Turmeric 450 MG CAPS    Yes Reported, Patient       Allergies   Allergen Reactions     Augmentin      reacted to  Augmentin--  stomach upset -- can take penicillin     Codeine Swelling     Milk Protein Extract Difficulty breathing     Products with milk cause congestion, sinus mucous, difficulty breathing       Social History     Occupational History     Occupation: [employer]     Employer: [employer]     Comment: [employer]    Tobacco Use     Smoking status: Never Smoker     Smokeless tobacco: Never Used   Substance and Sexual Activity     Alcohol use: No     Alcohol/week: 0.0 oz     Comment: recovering     Drug use: No     Sexual activity: Yes     Partners: Male     Birth control/protection: IUD       Family History   Problem Relation Age of Onset     Neurologic Disorder Mother         ruptured cerebral aneurysm age 60      Hypertension Mother      Depression/Anxiety Mother      Cerebrovascular Disease Mother      Obesity Mother      Macular Degeneration Mother      Musculoskeletal Disorder Father         OA      Gastrointestinal Disease Father         colon polyps age 60     Other Cancer Father         Brain Cancer-neuroblastoma     Depression/Anxiety Father      Obesity Father      Gynecology Sister         Rachel. irregular menses.  pre-eclampsia     Aneurysm Sister         cerebral     Heart Disease Paternal Grandfather         Multiple MI's (first MI age 60)     Diabetes Paternal Grandfather         IDDM dx age 60  Severe/brittle/complications     Coronary Artery Disease Paternal Grandfather      Depression/Anxiety Paternal Grandfather      Obesity Paternal Grandfather      Gynecology Paternal Grandmother          of uterine CA in her 40's     Ovarian Cancer Paternal Grandmother          at age 40 from cancer     Eye Disorder Maternal Grandmother         glacucoma     Diabetes Maternal Grandmother         IDDM-onset age 70     Obesity Maternal Grandmother      Macular Degeneration Maternal Grandmother      Gynecology Paternal Aunt         uterine CA diagnosed @ 40yrs       REVIEW OF  "SYSTEMS    General: Negative for fever or fatigue    Psych:  positive history of depression     Ophthalmic:  Corrective lenses?  No    ENT:  Hearing difficulty? No    CV: negative for chest pain, venous insufficiency, no history of MI or stroke    Endocrine:  negative diabetes     Urology:  negative kidney disease    Resp:  Normal respiratory effort, patient does have a positive history of asthma    Skin: negative for cuts/sores or redness    Musculoskeletal: as above    Neurologic:negative for numbness/tingling    Hematologic: negative for bleeding disorder, does not use of prescription anticoagulants         Physical Exam:    Vitals: BP 99/61   Resp 16   Ht 1.626 m (5' 4\")   Wt 123.4 kg (272 lb)   BMI 46.69 kg/m     BMI= Body mass index is 46.69 kg/m .    GENERAL APPEARANCE:   Healthy, alert, no distress    SKIN:  negative for suspicious lesions or rashes    NEURO: Normal strength and tone, mentation intact and speech normal    PSYCH:   Mentation appears Normal and affect normal/bright    RESPIRATORY: negative for respiratory distress.    EYES: negative for Conjunctivitis    Cardiovascular: No vascular discoloration.  Patient does have positive pedal pulses and appropriate capillary refill.      GAIT: Mildly antalgic    JOINT/EXTREMITIES:  Bilateral foot exam is performed for comparison.  Focus however is on the right foot/ankle  Patient with no visible swelling or redness.  When asked to stand patient does have flatfoot  or pes planus.  Patient stands with toes out laterally.  Patient has no definable arch.  Manipulation of the right foot, patient is able to obtain a neutral position but has a tendency to hyper pronate when relaxed.  Palpation of the foot and ankle reveals no tenderness along the peroneal nerve.  Patient tenderness is primarily anterior lateral to the malleolus or between the talonavicular joint.    Radiographs: X-ray images of the right ankle/foot.  Posterior navicular bone appears to have a " segmented accessory bone.  There appears to be approximately 3 segments.  This is not consistent with patient's current pain.  Independent visualization of the films was made.     Impression:      ICD-10-CM    1. Impingement syndrome of right ankle M25.871        Plan:  The process of the examination and the findings are explained to the patient.  Patient's diagnosis is explained.  Patient is advised to obtain footwear and offers a supportive arch.  She is advised that her feet are still pliable.  Patient states she has obtained orthotics in the past but they are painful as it makes her walk on the outside of her feet.  Patient is advised that this may be an overcorrection and may be she just needs a milder arch support.  Patient was given ideas of shoe brands that offer a right arch support and these were written down for her.  Patient will follow-up with the recommendations.  She is advised to seek out a note if she needs to wear certain footwear at work.  Patient follow-up as needed      BP Readings from Last 1 Encounters:   09/10/19 99/61       BP noted to be well controlled today in office.     Patient is evaluated with and plan developed in conjunction with Dr. Rogel    Scribed by  Romelia Wood PA-C   9/10/2019  5:37 PM        I attest I have seen and evaluated the patient.  I agree with above impression and plan.    Kee Rogel MD    Again, thank you for allowing me to participate in the care of your patient.        Sincerely,        Kee Rogel MD

## 2019-09-10 NOTE — PATIENT INSTRUCTIONS
Diagnosis:  Pes planus (flatfoot)  This is pinching the outside of your foot.  Try a shoe with a good arch support like a Haflinger shoe or James, Vargas, etc

## 2019-09-10 NOTE — PROGRESS NOTES
HPI: Patient reports she has been dealing with foot issues most of her life.  She states however the right foot/ankle most recently has really been bothering her.  She reports a little pain around a 6.  She describes it as aching and shooting.  She has utilized rest of the ankle and Aleve.  She recently went to urgent care because the pain got significant.  She describes activities such as standing, walking, driving and working on concrete as aggravating factors.  She states she does reposition her foot while driving to alleviate the pain.  Her pain is primarily at the anterior ankle and can extend laterally to the foot.    Patient's past medical, surgical, social and family histories reviewed.       Past Medical History:   Diagnosis Date     Allergic rhinitis      ALLERGIC RHINITIS NOS 3/8/2005     ANXIETY STATE NOS 3/8/2005     Cystic Fibrosis Screening negative 5/1/2009    [facility]     Dysmenorrhea 10/12/2008     Family history of aneurysm 4/28/2011     Fibromyalgia     Dx by head/neck/pain & Rheumatology     Fibromyalgia 4/15/2010    [facility]     Hyperlipidemia LDL goal <160 11/1/2013     INSOMNIA NEC 3/8/2005     Migraine      Migraine 4/22/2012    [facility]      Mild major depression (H)      Obesity 11/1/2013     Other and unspecified alcohol dependence, unspecified drinking behavior     Sober since 7/00     Other anxiety states      Other chronic pain     from fibromyalgia     Patellofemoral disorder     bilateral     Uncomplicated asthma     Not considered Asthma but I have breathing problems       Past Surgical History:   Procedure Laterality Date     ARTHROSCOPY KNEE RT/LT  1997    Left      DILATION AND CURETTAGE N/A 6/14/2017    Procedure: DILATION AND CURETTAGE;;  Surgeon: Livia Barnett DO;  Location:  OR     EXAM UNDER ANESTHESIA, ULTRASOUND N/A 6/14/2017    Procedure: EXAM UNDER ANESTHESIA, ULTRASOUND;  Ultrasound guided cervical dilation, IUD placement, Endometrial  biopsy, repair of unavoidable cervical laceration;  Surgeon: Livia Barnett DO;  Location: [facility] OR     HC TOOTH EXTRACTION W/FORCEP  1998    wisdom teeth      INSERT INTRAUTERINE DEVICE N/A 6/14/2017    Procedure: INSERT INTRAUTERINE DEVICE;;  Surgeon: Livia Barnett DO;  Location:  OR       Home Medications:  Prior to Admission medications    Medication Sig Start Date End Date Taking? Authorizing Provider   albuterol (2.5 MG/3ML) 0.083% neb solution Take 1 vial (2.5 mg) by nebulization every 6 hours as needed for shortness of breath / dyspnea or wheezing 12/7/16  Yes Raheem Vega MD   albuterol (PROAIR HFA/PROVENTIL HFA/VENTOLIN HFA) 108 (90 BASE) MCG/ACT Inhaler Inhale 2 puffs into the lungs every 6 hours as needed for shortness of breath / dyspnea or wheezing 12/7/16  Yes Raheem Vega MD   B Complex Vitamins (VITAMIN B COMPLEX PO)    Yes Reported, Patient   beclomethasone (QVAR) 80 MCG/ACT Inhaler Inhale 2 puffs into the lungs 2 times daily 2/14/18  Yes Emmy White MD   CALCIUM CARBONATE PO Take by mouth daily   Yes Reported, Patient   cetirizine-psuedoePHEDrine (ZYRTEC-D) 5-120 MG per tablet Take 1 tablet by mouth 2 times daily 3/2/15  Yes Emmy Galarza MD   cholecalciferol (VITAMIN D) 1000 UNIT tablet Take 1 tablet by mouth daily. 7/28/11  Yes Pino Quintana MD   COMPOUNDED NON-CONTROLLED SUBSTANCE (CMPD RX) - PHARMACY TO MIX COMPOUNDED MEDICATION Low dose Naltrexone:  6mg capsules/tablets one PO qhs 5/22/19  Yes Yvonne Garrido MD   cyclobenzaprine (FLEXERIL) 5 MG tablet Take 1 tablet (5 mg) by mouth 2 times daily as needed for muscle spasms 3/12/19  Yes Sarah Devries NP   fluconazole (DIFLUCAN) 150 MG tablet Take 1 tablet (150 mg) by mouth every 3 days 1/3/19  Yes Emmy White MD   FLUoxetine (PROZAC) 40 MG capsule Take 2 capsules (80 mg) by mouth daily 9/13/18  Yes Emmy White MD   gabapentin (NEURONTIN) 300 MG  capsule Take 2 capsules  (600mg) in the morning and 2 capsules (600mg) at bedtime. 90day supply  Patient taking differently: 300 mg 2 times daily Take 2 capsules  (600mg) in the morning and 2 capsules (600mg) at bedtime. 90day supply 7/10/19  Yes Emmy White MD   ipratropium - albuterol 0.5 mg/2.5 mg/3 mL (DUONEB) 0.5-2.5 (3) MG/3ML neb solution Take 1 vial (3 mLs) by nebulization every 6 hours as needed for shortness of breath / dyspnea or wheezing 3/22/19  Yes Iliana Mary APRN CNP   ipratropium - albuterol 0.5 mg/2.5 mg/3 mL (DUONEB) 0.5-2.5 (3) MG/3ML neb solution Take 1 vial (3 mLs) by nebulization every 6 hours as needed for shortness of breath / dyspnea or wheezing Profile Rx: patient will contact pharmacy when needed 9/13/18  Yes Emmy White MD   levonorgestrel (MIRENA) 20 MCG/24HR IUD 1 each by Intrauterine route once   Yes Reported, Patient   MAGNESIUM CITRATE PO Take by mouth daily   Yes Reported, Patient   Naproxen Sodium (ALEVE PO) Take 220 mg by mouth   Yes Reported, Patient   order for DME Equipment being ordered: Nebulizer 6/30/16  Yes Charis Buck DO   rizatriptan (MAXALT-MLT) 10 MG ODT tab Take 1 tablet (10 mg) by mouth at onset of headache for migraine May repeat in 2 hours. Max 3 tablets/24 hours. 1/11/17  Yes Charis Buck DO   topiramate (TOPAMAX) 25 MG tablet TAKE ONE TABLET BY MOUTH TWICE A DAY 6/26/19  Yes Emmy White MD   traZODone (DESYREL) 50 MG tablet Take 2 tablets by mouth. 1 -2 TABs  PO at bedtime prn insomnia 9/13/18  Yes Emmy White MD   Turmeric 450 MG CAPS    Yes Reported, Patient       Allergies   Allergen Reactions     Augmentin      reacted to Augmentin--  stomach upset -- can take penicillin     Codeine Swelling     Milk Protein Extract Difficulty breathing     Products with milk cause congestion, sinus mucous, difficulty breathing       Social History     Occupational History      Occupation: [employer]     Employer: [employer]     Comment: [employer]    Tobacco Use     Smoking status: Never Smoker     Smokeless tobacco: Never Used   Substance and Sexual Activity     Alcohol use: No     Alcohol/week: 0.0 oz     Comment: recovering     Drug use: No     Sexual activity: Yes     Partners: Male     Birth control/protection: IUD       Family History   Problem Relation Age of Onset     Neurologic Disorder Mother         ruptured cerebral aneurysm age 60      Hypertension Mother      Depression/Anxiety Mother      Cerebrovascular Disease Mother      Obesity Mother      Macular Degeneration Mother      Musculoskeletal Disorder Father         OA      Gastrointestinal Disease Father         colon polyps age 60     Other Cancer Father         Brain Cancer-neuroblastoma     Depression/Anxiety Father      Obesity Father      Gynecology Sister         Rachel. irregular menses.  pre-eclampsia     Aneurysm Sister         cerebral     Heart Disease Paternal Grandfather         Multiple MI's (first MI age 60)     Diabetes Paternal Grandfather         IDDM dx age 60  Severe/brittle/complications     Coronary Artery Disease Paternal Grandfather      Depression/Anxiety Paternal Grandfather      Obesity Paternal Grandfather      Gynecology Paternal Grandmother          of uterine CA in her 40's     Ovarian Cancer Paternal Grandmother          at age 40 from cancer     Eye Disorder Maternal Grandmother         glacucoma     Diabetes Maternal Grandmother         IDDM-onset age 70     Obesity Maternal Grandmother      Macular Degeneration Maternal Grandmother      Gynecology Paternal Aunt         uterine CA diagnosed @ 40yrs       REVIEW OF SYSTEMS    General: Negative for fever or fatigue    Psych:  positive history of depression     Ophthalmic:  Corrective lenses?  No    ENT:  Hearing difficulty? No    CV: negative for chest pain, venous insufficiency, no history of MI or stroke    Endocrine:   "negative diabetes     Urology:  negative kidney disease    Resp:  Normal respiratory effort, patient does have a positive history of asthma    Skin: negative for cuts/sores or redness    Musculoskeletal: as above    Neurologic:negative for numbness/tingling    Hematologic: negative for bleeding disorder, does not use of prescription anticoagulants         Physical Exam:    Vitals: BP 99/61   Resp 16   Ht 1.626 m (5' 4\")   Wt 123.4 kg (272 lb)   BMI 46.69 kg/m    BMI= Body mass index is 46.69 kg/m .    GENERAL APPEARANCE:   Healthy, alert, no distress    SKIN:  negative for suspicious lesions or rashes    NEURO: Normal strength and tone, mentation intact and speech normal    PSYCH:   Mentation appears Normal and affect normal/bright    RESPIRATORY: negative for respiratory distress.    EYES: negative for Conjunctivitis    Cardiovascular: No vascular discoloration.  Patient does have positive pedal pulses and appropriate capillary refill.      GAIT: Mildly antalgic    JOINT/EXTREMITIES:  Bilateral foot exam is performed for comparison.  Focus however is on the right foot/ankle  Patient with no visible swelling or redness.  When asked to stand patient does have flatfoot  or pes planus.  Patient stands with toes out laterally.  Patient has no definable arch.  Manipulation of the right foot, patient is able to obtain a neutral position but has a tendency to hyper pronate when relaxed.  Palpation of the foot and ankle reveals no tenderness along the peroneal nerve.  Patient tenderness is primarily anterior lateral to the malleolus or between the talonavicular joint.    Radiographs: X-ray images of the right ankle/foot.  Posterior navicular bone appears to have a segmented accessory bone.  There appears to be approximately 3 segments.  This is not consistent with patient's current pain.  Independent visualization of the films was made.     Impression:      ICD-10-CM    1. Impingement syndrome of right ankle M25.871  "       Plan:  The process of the examination and the findings are explained to the patient.  Patient's diagnosis is explained.  Patient is advised to obtain footwear and offers a supportive arch.  She is advised that her feet are still pliable.  Patient states she has obtained orthotics in the past but they are painful as it makes her walk on the outside of her feet.  Patient is advised that this may be an overcorrection and may be she just needs a milder arch support.  Patient was given ideas of shoe brands that offer a right arch support and these were written down for her.  Patient will follow-up with the recommendations.  She is advised to seek out a note if she needs to wear certain footwear at work.  Patient follow-up as needed      BP Readings from Last 1 Encounters:   09/10/19 99/61       BP noted to be well controlled today in office.     Patient is evaluated with and plan developed in conjunction with Dr. Rogel    Scribed by  Romelia Wood PA-C   9/10/2019  5:37 PM        I attest I have seen and evaluated the patient.  I agree with above impression and plan.    Kee Rogel MD

## 2019-09-11 NOTE — PROGRESS NOTES
[facility] - Procedure Note    Date of Service: 9/12/2019  Procedure performed: Left Greater Trochanteric Bursa Injection  Diagnosis: Left Trochanteric Bursitis  : Yvonne Garrido MD  Anesthesia: none      Indications:   Paige Le is a 48 year old female was sent by myself for a trochanteric bursa injection.  They have a history of left hip pain.  Exam shows TTP over the left greater trochanter and they have tried conservative treatment including PT and medications.    Options/alternatives, benefits and risks were discussed with the patient including bleeding and infection.  Questions were answered to her satisfaction and she agrees to proceed. Voluntary informed consent was obtained and signed.     Vitals were reviewed: Yes  Allergies were reviewed:  Yes   Medications were reviewed:  Yes       Procedure:  After getting informed consent, patient was brought into the procedure suite and was placed in a prone position on the procedure table.   A Pause for the Cause was performed.  Patient was prepped and draped in sterile fashion.     The area over the left trochanter was palpated, and the tender area was identified, corresponding to the area of the trochanteric bursa.  The area was cleaned with Chloroprep.  A 25-gauge, 1.5-inch needle was inserted, aiming towards the trochanter.  When bone was encountered, the needle was slightly drawn.  A solution containing local anesthesic and steroid was injected.  The needle was removed.  Hemostasis was achieved. Bandaids were placed as appropriate.      In total, 4 ml of 0.5% bupivacaine and 1 ml (40 mg) of Depo - medrol was injected.    Hemostasis was achieved, the area was cleaned, and bandaids were placed when appropriate.  The patient tolerated the procedure well, and was taken to the recovery room.    Images were saved to PACS.    Follow-up with the referring provider    Would recommend doing this under flouroscopy if she wants it repeated.        Yvonne Garrido MD  [facility]

## 2019-09-12 ENCOUNTER — OFFICE VISIT (OUTPATIENT)
Dept: PALLIATIVE MEDICINE | Facility: CLINIC | Age: 49
End: 2019-09-12
Payer: COMMERCIAL

## 2019-09-12 VITALS — SYSTOLIC BLOOD PRESSURE: 110 MMHG | HEART RATE: 66 BPM | OXYGEN SATURATION: 98 % | DIASTOLIC BLOOD PRESSURE: 74 MMHG

## 2019-09-12 DIAGNOSIS — M70.62 TROCHANTERIC BURSITIS OF LEFT HIP: Primary | ICD-10-CM

## 2019-09-12 PROCEDURE — 20610 DRAIN/INJ JOINT/BURSA W/O US: CPT | Mod: LT | Performed by: ANESTHESIOLOGY

## 2019-09-12 RX ORDER — BUPIVACAINE HYDROCHLORIDE 5 MG/ML
4 INJECTION, SOLUTION EPIDURAL; INTRACAUDAL ONCE
Status: COMPLETED | OUTPATIENT
Start: 2019-09-12 | End: 2019-09-12

## 2019-09-12 RX ORDER — METHYLPREDNISOLONE ACETATE 40 MG/ML
40 INJECTION, SUSPENSION INTRA-ARTICULAR; INTRALESIONAL; INTRAMUSCULAR; SOFT TISSUE ONCE
Status: COMPLETED | OUTPATIENT
Start: 2019-09-12 | End: 2019-09-12

## 2019-09-12 RX ADMIN — BUPIVACAINE HYDROCHLORIDE 20 MG: 5 INJECTION, SOLUTION EPIDURAL; INTRACAUDAL at 09:29

## 2019-09-12 RX ADMIN — METHYLPREDNISOLONE ACETATE 40 MG: 40 INJECTION, SUSPENSION INTRA-ARTICULAR; INTRALESIONAL; INTRAMUSCULAR; SOFT TISSUE at 09:30

## 2019-09-12 ASSESSMENT — PAIN SCALES - GENERAL: PAINLEVEL: MODERATE PAIN (4)

## 2019-09-12 NOTE — PATIENT INSTRUCTIONS
[facility]   Post Procedure Instructions    Today you had:     bursa injection      Medications used:   bupivicaine   DEPO-MEDROL        Go to the emergency room if you develop any shortness of breath    Monitor the injection sites for signs and symptoms of infection-fever, chills, redness, swelling, warmth, or drainage to areas.    You may have soreness at injection sites for up to 24 hours.    You may apply ice to the painful areas to help minimize the discomfort of the needle pokes.    Do not apply heat to sites for at least 12 hours.    You may use anti-inflammatory medications or Tylenol for pain control if necessary    Pain Clinic phone number during work hours Monday-Friday:  [phone]    After hours provider line: [phone]

## 2019-09-20 DIAGNOSIS — G47.33 OBSTRUCTIVE SLEEP APNEA (ADULT) (PEDIATRIC): Primary | ICD-10-CM

## 2019-10-03 ENCOUNTER — HEALTH MAINTENANCE LETTER (OUTPATIENT)
Age: 49
End: 2019-10-03

## 2019-10-31 ENCOUNTER — HEALTH MAINTENANCE LETTER (OUTPATIENT)
Age: 49
End: 2019-10-31

## 2019-11-27 ENCOUNTER — OFFICE VISIT (OUTPATIENT)
Dept: FAMILY MEDICINE | Facility: CLINIC | Age: 49
End: 2019-11-27
Payer: COMMERCIAL

## 2019-11-27 VITALS
HEIGHT: 64 IN | BODY MASS INDEX: 48.3 KG/M2 | OXYGEN SATURATION: 98 % | WEIGHT: 282.9 LBS | SYSTOLIC BLOOD PRESSURE: 115 MMHG | HEART RATE: 72 BPM | TEMPERATURE: 98.5 F | DIASTOLIC BLOOD PRESSURE: 66 MMHG | RESPIRATION RATE: 20 BRPM

## 2019-11-27 DIAGNOSIS — J45.41 MODERATE PERSISTENT ASTHMA WITH ACUTE EXACERBATION: ICD-10-CM

## 2019-11-27 DIAGNOSIS — R05.9 COUGH: Primary | ICD-10-CM

## 2019-11-27 DIAGNOSIS — B37.31 YEAST INFECTION OF THE VAGINA: ICD-10-CM

## 2019-11-27 DIAGNOSIS — J20.9 ACUTE BRONCHITIS, UNSPECIFIED ORGANISM: ICD-10-CM

## 2019-11-27 PROCEDURE — 99214 OFFICE O/P EST MOD 30 MIN: CPT | Performed by: NURSE PRACTITIONER

## 2019-11-27 RX ORDER — ALBUTEROL SULFATE 0.83 MG/ML
2.5 SOLUTION RESPIRATORY (INHALATION) EVERY 6 HOURS PRN
Qty: 30 VIAL | Refills: 1 | Status: SHIPPED | OUTPATIENT
Start: 2019-11-27 | End: 2022-09-01

## 2019-11-27 RX ORDER — PREDNISONE 20 MG/1
TABLET ORAL
Qty: 20 TABLET | Refills: 0 | Status: SHIPPED | OUTPATIENT
Start: 2019-11-27 | End: 2019-12-17

## 2019-11-27 RX ORDER — AZITHROMYCIN 250 MG/1
TABLET, FILM COATED ORAL
Qty: 6 TABLET | Refills: 0 | Status: SHIPPED | OUTPATIENT
Start: 2019-11-27 | End: 2019-12-17

## 2019-11-27 RX ORDER — FLUCONAZOLE 150 MG/1
150 TABLET ORAL ONCE
Qty: 1 TABLET | Refills: 0 | Status: SHIPPED | OUTPATIENT
Start: 2019-11-27 | End: 2019-12-17

## 2019-11-27 RX ORDER — ALBUTEROL SULFATE 90 UG/1
2 AEROSOL, METERED RESPIRATORY (INHALATION) EVERY 6 HOURS PRN
Qty: 1 INHALER | Refills: 6 | Status: SHIPPED | OUTPATIENT
Start: 2019-11-27 | End: 2022-03-08

## 2019-11-27 RX ORDER — IPRATROPIUM BROMIDE AND ALBUTEROL SULFATE 2.5; .5 MG/3ML; MG/3ML
1 SOLUTION RESPIRATORY (INHALATION) EVERY 6 HOURS PRN
Qty: 50 VIAL | Refills: 3 | Status: SHIPPED | OUTPATIENT
Start: 2019-11-27 | End: 2022-09-01

## 2019-11-27 ASSESSMENT — MIFFLIN-ST. JEOR: SCORE: 1898.23

## 2019-11-27 ASSESSMENT — PAIN SCALES - GENERAL: PAINLEVEL: MILD PAIN (3)

## 2019-11-27 NOTE — PROGRESS NOTES
Subjective     Paige Le is a 49 year old female who presents to clinic today for the following health issues:    HPI   Acute Illness   Acute illness concerns: URI  Onset: 10 days ago    Fever: no    Chills/Sweats: no    Headache (location?): YES    Sinus Pressure:no    Conjunctivitis:  no    Ear Pain: no    Rhinorrhea: YES    Congestion: YES    Sore Throat: no     Cough: YES-productive, green    Wheeze: YES    Decreased Appetite: YES    Nausea: no    Vomiting: no    Diarrhea:  no    Dysuria/Freq.: no    Fatigue/Achiness: YES    Sick/Strep Exposure: no     Therapies Tried and outcome: nebulizer, inhalers helped in the beginning    Just got in from flight today. Sinus symptoms started before vacation, took nebs with her. Worsened over vacation, was somewhere warmer, in [place]. Feels more congestion. Some fever, feeling warm.  Feel this is more related to her asthma exacerbation.      Patient Active Problem List   Diagnosis     Anxiety state     Insomnia     Allergic rhinitis     Dysmenorrhea     Cystic Fibrosis Screening negative     Mild major depression (H)     Fibromyalgia     Patellofemoral disorder     Family history of aneurysm     Migraine     Obesity     Grieving     Insomnia     History of alcoholism (H)     Morbid obesity (H)     Moderate persistent asthma     DMITRIY (obstructive sleep apnea)     IUD (intrauterine device) in place     Trochanteric bursitis of left hip     Past Surgical History:   Procedure Laterality Date     ARTHROSCOPY KNEE RT/LT  1997    Left      DILATION AND CURETTAGE N/A 6/14/2017    Procedure: DILATION AND CURETTAGE;;  Surgeon: Livia Barnett DO;  Location:  OR     EXAM UNDER ANESTHESIA, ULTRASOUND N/A 6/14/2017    Procedure: EXAM UNDER ANESTHESIA, ULTRASOUND;  Ultrasound guided cervical dilation, IUD placement, Endometrial biopsy, repair of unavoidable cervical laceration;  Surgeon: Livia Barnett DO;  Location:  OR      TOOTH EXTRACTION W/FORCEP  1998    lexx  teeth      INSERT INTRAUTERINE DEVICE N/A 2017    Procedure: INSERT INTRAUTERINE DEVICE;;  Surgeon: Livia Barnett DO;  Location: [facility] OR       Social History     Tobacco Use     Smoking status: Never Smoker     Smokeless tobacco: Never Used   Substance Use Topics     Alcohol use: No     Alcohol/week: 0.0 standard drinks     Comment: recovering     Family History   Problem Relation Age of Onset     Neurologic Disorder Mother         ruptured cerebral aneurysm age 60      Hypertension Mother      Depression/Anxiety Mother      Cerebrovascular Disease Mother      Obesity Mother      Macular Degeneration Mother      Musculoskeletal Disorder Father         OA      Gastrointestinal Disease Father         colon polyps age 60     Other Cancer Father         Brain Cancer-neuroblastoma     Depression/Anxiety Father      Obesity Father      Gynecology Sister         Rachel. irregular menses.  pre-eclampsia     Aneurysm Sister         cerebral     Heart Disease Paternal Grandfather         Multiple MI's (first MI age 60)     Diabetes Paternal Grandfather         IDDM dx age 60  Severe/brittle/complications     Coronary Artery Disease Paternal Grandfather      Depression/Anxiety Paternal Grandfather      Obesity Paternal Grandfather      Gynecology Paternal Grandmother          of uterine CA in her 40's     Ovarian Cancer Paternal Grandmother          at age 40 from cancer     Eye Disorder Maternal Grandmother         glacucoma     Diabetes Maternal Grandmother         IDDM-onset age 70     Obesity Maternal Grandmother      Macular Degeneration Maternal Grandmother      Gynecology Paternal Aunt         uterine CA diagnosed @ 40yrs         Current Outpatient Medications   Medication Sig Dispense Refill     albuterol (PROAIR HFA/PROVENTIL HFA/VENTOLIN HFA) 108 (90 Base) MCG/ACT inhaler Inhale 2 puffs into the lungs every 6 hours as needed for shortness of breath / dyspnea or wheezing 1 Inhaler 6     albuterol  (PROVENTIL) (2.5 MG/3ML) 0.083% neb solution Take 1 vial (2.5 mg) by nebulization every 6 hours as needed for shortness of breath / dyspnea or wheezing 30 vial 1     azithromycin (ZITHROMAX) 250 MG tablet Take 2 tablets (500 mg) by mouth daily for 1 day, THEN 1 tablet (250 mg) daily for 4 days. 6 tablet 0     B Complex Vitamins (VITAMIN B COMPLEX PO)        beclomethasone (QVAR) 80 MCG/ACT Inhaler Inhale 2 puffs into the lungs 2 times daily 1 Inhaler 11     CALCIUM CARBONATE PO Take by mouth daily       cetirizine-psuedoePHEDrine (ZYRTEC-D) 5-120 MG per tablet Take 1 tablet by mouth 2 times daily 90 tablet 3     cholecalciferol (VITAMIN D) 1000 UNIT tablet Take 1 tablet by mouth daily.       COMPOUNDED NON-CONTROLLED SUBSTANCE (CMPD RX) - PHARMACY TO MIX COMPOUNDED MEDICATION Low dose Naltrexone:  6mg capsules/tablets one PO qhs 30 capsule 6     cyclobenzaprine (FLEXERIL) 5 MG tablet Take 1 tablet (5 mg) by mouth 2 times daily as needed for muscle spasms 30 tablet 0     fluconazole (DIFLUCAN) 150 MG tablet Take 1 tablet (150 mg) by mouth once for 1 dose 1 tablet 0     fluconazole (DIFLUCAN) 150 MG tablet Take 1 tablet (150 mg) by mouth every 3 days 4 tablet 0     FLUoxetine (PROZAC) 40 MG capsule Take 2 capsules (80 mg) by mouth daily 180 capsule 3     gabapentin (NEURONTIN) 300 MG capsule Take 2 capsules  (600mg) in the morning and 2 capsules (600mg) at bedtime. 90day supply (Patient taking differently: 300 mg 2 times daily Take 2 capsules  (600mg) in the morning and 2 capsules (600mg) at bedtime. 90day supply) 360 capsule 1     ipratropium - albuterol 0.5 mg/2.5 mg/3 mL (DUONEB) 0.5-2.5 (3) MG/3ML neb solution Take 1 vial (3 mLs) by nebulization every 6 hours as needed for shortness of breath / dyspnea or wheezing Profile Rx: patient will contact pharmacy when needed 50 vial 3     ipratropium - albuterol 0.5 mg/2.5 mg/3 mL (DUONEB) 0.5-2.5 (3) MG/3ML neb solution Take 1 vial (3 mLs) by nebulization every 6 hours  "as needed for shortness of breath / dyspnea or wheezing 1 Box 0     levonorgestrel (MIRENA) 20 MCG/24HR IUD 1 each by Intrauterine route once       MAGNESIUM CITRATE PO Take by mouth daily       Naproxen Sodium (ALEVE PO) Take 220 mg by mouth       order for DME Equipment being ordered: Nebulizer 1 Device 0     predniSONE (DELTASONE) 20 MG tablet Take 3 tabs by mouth daily x 3 days, then 2 tabs daily x 3 days, then 1 tab daily x 3 days, then 1/2 tab daily x 3 days. 20 tablet 0     rizatriptan (MAXALT-MLT) 10 MG ODT tab Take 1 tablet (10 mg) by mouth at onset of headache for migraine May repeat in 2 hours. Max 3 tablets/24 hours. 18 tablet 3     topiramate (TOPAMAX) 25 MG tablet TAKE ONE TABLET BY MOUTH TWICE A  tablet 1     traZODone (DESYREL) 50 MG tablet Take 2 tablets by mouth. 1 -2 TABs  PO at bedtime prn insomnia 120 tablet 3     Turmeric 450 MG CAPS        Allergies   Allergen Reactions     Augmentin      reacted to Augmentin--  stomach upset -- can take penicillin     Codeine Swelling     Milk Protein Extract Difficulty breathing     Products with milk cause congestion, sinus mucous, difficulty breathing       Reviewed and updated as needed this visit by Provider  Tobacco  Allergies  Meds  Problems  Med Hx  Surg Hx  Fam Hx         Review of Systems   ROS COMP: Constitutional, HEENT-as above, cardiovascular, pulmonary-as above, systems are negative, except as otherwise noted.      Objective    /66 (BP Location: Right arm, Patient Position: Sitting, Cuff Size: Adult Large)   Pulse 72   Temp 98.5  F (36.9  C) (Oral)   Resp 20   Ht 1.626 m (5' 4\")   Wt 128.3 kg (282 lb 14.4 oz)   SpO2 98%   BMI 48.56 kg/m    Body mass index is 48.56 kg/m .  Physical Exam   GENERAL: ill appearing, alert and no acute distress  EYES: Eyes grossly normal to inspection, PERRL and conjunctivae and sclerae normal  HENT: ear canals and TM's normal, nose and mouth without ulcers or lesions  NECK: no adenopathy, " no asymmetry, masses, or scars and thyroid normal to palpation  RESP: lungs clear but tight and poor airflow to auscultation - no rales, rhonchi or wheezes but harsh cough noted during exam  CV: regular rate and rhythm, normal S1 S2, no S3 or S4, no murmur, click or rub  MS: no gross musculoskeletal defects noted    Diagnostic Test Results:  Labs reviewed in Epic        Assessment & Plan     1. Cough  We will give refills of her albuterol inhaler, nebulizer, and DuoNeb.  - albuterol (PROVENTIL) (2.5 MG/3ML) 0.083% neb solution; Take 1 vial (2.5 mg) by nebulization every 6 hours as needed for shortness of breath / dyspnea or wheezing  Dispense: 30 vial; Refill: 1  - albuterol (PROAIR HFA/PROVENTIL HFA/VENTOLIN HFA) 108 (90 Base) MCG/ACT inhaler; Inhale 2 puffs into the lungs every 6 hours as needed for shortness of breath / dyspnea or wheezing  Dispense: 1 Inhaler; Refill: 6    2. Moderate persistent asthma with acute exacerbation  Starting antibiotic and steroid burst for bronchitis and asthma exacerbation. return in 7 days if not improving  - albuterol (PROVENTIL) (2.5 MG/3ML) 0.083% neb solution; Take 1 vial (2.5 mg) by nebulization every 6 hours as needed for shortness of breath / dyspnea or wheezing  Dispense: 30 vial; Refill: 1  - albuterol (PROAIR HFA/PROVENTIL HFA/VENTOLIN HFA) 108 (90 Base) MCG/ACT inhaler; Inhale 2 puffs into the lungs every 6 hours as needed for shortness of breath / dyspnea or wheezing  Dispense: 1 Inhaler; Refill: 6  - ipratropium - albuterol 0.5 mg/2.5 mg/3 mL (DUONEB) 0.5-2.5 (3) MG/3ML neb solution; Take 1 vial (3 mLs) by nebulization every 6 hours as needed for shortness of breath / dyspnea or wheezing Profile Rx: patient will contact pharmacy when needed  Dispense: 50 vial; Refill: 3  - azithromycin (ZITHROMAX) 250 MG tablet; Take 2 tablets (500 mg) by mouth daily for 1 day, THEN 1 tablet (250 mg) daily for 4 days.  Dispense: 6 tablet; Refill: 0    3. Yeast infection of the  "vagina  Patient sometimes gets yeast infections with antibiotics, she should start the Diflucan only if she gets symptoms  - fluconazole (DIFLUCAN) 150 MG tablet; Take 1 tablet (150 mg) by mouth once for 1 dose  Dispense: 1 tablet; Refill: 0    4. Acute bronchitis, unspecified organism  As above  - predniSONE (DELTASONE) 20 MG tablet; Take 3 tabs by mouth daily x 3 days, then 2 tabs daily x 3 days, then 1 tab daily x 3 days, then 1/2 tab daily x 3 days.  Dispense: 20 tablet; Refill: 0  - azithromycin (ZITHROMAX) 250 MG tablet; Take 2 tablets (500 mg) by mouth daily for 1 day, THEN 1 tablet (250 mg) daily for 4 days.  Dispense: 6 tablet; Refill: 0     BMI:   Estimated body mass index is 48.56 kg/m  as calculated from the following:    Height as of this encounter: 1.626 m (5' 4\").    Weight as of this encounter: 128.3 kg (282 lb 14.4 oz).   Return in about 1 week (around 12/4/2019), or if symptoms worsen or fail to improve.    SWETHA Meehan, NP-C  [facility]    This chart was documented by provider using a voice activated software called Dragon in addition to manual typing. There may be vocabulary errors or other grammatical errors due to this.       "

## 2019-11-28 ASSESSMENT — ASTHMA QUESTIONNAIRES: ACT_TOTALSCORE: 16

## 2019-12-03 ENCOUNTER — OFFICE VISIT (OUTPATIENT)
Dept: ORTHOPEDICS | Facility: CLINIC | Age: 49
End: 2019-12-03
Payer: COMMERCIAL

## 2019-12-03 VITALS
HEIGHT: 64 IN | HEART RATE: 64 BPM | DIASTOLIC BLOOD PRESSURE: 66 MMHG | OXYGEN SATURATION: 98 % | SYSTOLIC BLOOD PRESSURE: 100 MMHG | BODY MASS INDEX: 46.1 KG/M2 | WEIGHT: 270 LBS

## 2019-12-03 DIAGNOSIS — M76.821 POSTERIOR TIBIAL TENDINITIS OF RIGHT LOWER EXTREMITY: ICD-10-CM

## 2019-12-03 PROCEDURE — 99213 OFFICE O/P EST LOW 20 MIN: CPT | Performed by: ORTHOPAEDIC SURGERY

## 2019-12-03 ASSESSMENT — MIFFLIN-ST. JEOR: SCORE: 1839.71

## 2019-12-03 NOTE — PROGRESS NOTES
Paige Le is a 49 year old female who is seen as self referral for right calf pain.  We have previously seen her for foot pain that they felt was hyperpronation and pes planus.  We are advised shoes with a good arch support such as half liner.  She has obtained these and has worked marvelously for her feet.  She has now developed pain along the medial border and lateral border of the calf.  She has been able to be on her feet much more now that she has her foot pain under control, and is wondering if she is overdoing things on her calf    Past Medical History:   Diagnosis Date     Allergic rhinitis      ALLERGIC RHINITIS NOS 3/8/2005     ANXIETY STATE NOS 3/8/2005     Cystic Fibrosis Screening negative 5/1/2009    [facility]     Dysmenorrhea 10/12/2008     Family history of aneurysm 4/28/2011     Fibromyalgia     Dx by head/neck/pain & Rheumatology     Fibromyalgia 4/15/2010    [facility]     Hyperlipidemia LDL goal <160 11/1/2013     INSOMNIA NEC 3/8/2005     Migraine      Migraine 4/22/2012    [facility]      Mild major depression (H)      Obesity 11/1/2013     Other and unspecified alcohol dependence, unspecified drinking behavior     Sober since 7/00     Other anxiety states      Other chronic pain     from fibromyalgia     Patellofemoral disorder     bilateral     Uncomplicated asthma     Not considered Asthma but I have breathing problems       Past Surgical History:   Procedure Laterality Date     ARTHROSCOPY KNEE RT/LT  1997    Left      DILATION AND CURETTAGE N/A 6/14/2017    Procedure: DILATION AND CURETTAGE;;  Surgeon: Livia Barnett DO;  Location: [facility] OR     EXAM UNDER ANESTHESIA, ULTRASOUND N/A 6/14/2017    Procedure: EXAM UNDER ANESTHESIA, ULTRASOUND;  Ultrasound guided cervical dilation, IUD placement, Endometrial biopsy, repair of unavoidable cervical laceration;  Surgeon: Livia Barnett DO;  Location: [facility] OR      TOOTH EXTRACTION W/FORCEP  1998    wisdom teeth       INSERT INTRAUTERINE DEVICE N/A 2017    Procedure: INSERT INTRAUTERINE DEVICE;;  Surgeon: Livia Barnett DO;  Location: [facility] OR       Family History   Problem Relation Age of Onset     Neurologic Disorder Mother         ruptured cerebral aneurysm age 60      Hypertension Mother      Depression/Anxiety Mother      Cerebrovascular Disease Mother      Obesity Mother      Macular Degeneration Mother      Musculoskeletal Disorder Father         OA      Gastrointestinal Disease Father         colon polyps age 60     Other Cancer Father         Brain Cancer-neuroblastoma     Depression/Anxiety Father      Obesity Father      Gynecology Sister         Rachel. irregular menses.  pre-eclampsia     Aneurysm Sister         cerebral     Heart Disease Paternal Grandfather         Multiple MI's (first MI age 60)     Diabetes Paternal Grandfather         IDDM dx age 60  Severe/brittle/complications     Coronary Artery Disease Paternal Grandfather      Depression/Anxiety Paternal Grandfather      Obesity Paternal Grandfather      Gynecology Paternal Grandmother          of uterine CA in her 40's     Ovarian Cancer Paternal Grandmother          at age 40 from cancer     Eye Disorder Maternal Grandmother         glacucoma     Diabetes Maternal Grandmother         IDDM-onset age 70     Obesity Maternal Grandmother      Macular Degeneration Maternal Grandmother      Gynecology Paternal Aunt         uterine CA diagnosed @ 40yrs       Social History     Socioeconomic History     Marital status: Single     Spouse name: Not on file     Number of children: 0     Years of education: Not on file     Highest education level: Not on file   Occupational History     Occupation: [employer]     Employer: [employer]     Comment: [employer]    Social Needs     Financial resource strain: Not on file     Food insecurity:     Worry: Not on file     Inability: Not on file     Transportation needs:     Medical: Not on file      Non-medical: Not on file   Tobacco Use     Smoking status: Never Smoker     Smokeless tobacco: Never Used   Substance and Sexual Activity     Alcohol use: No     Alcohol/week: 0.0 standard drinks     Comment: recovering     Drug use: No     Sexual activity: Yes     Partners: Male     Birth control/protection: I.U.D.   Lifestyle     Physical activity:     Days per week: Not on file     Minutes per session: Not on file     Stress: Not on file   Relationships     Social connections:     Talks on phone: Not on file     Gets together: Not on file     Attends Buddhism service: Not on file     Active member of club or organization: Not on file     Attends meetings of clubs or organizations: Not on file     Relationship status: Not on file     Intimate partner violence:     Fear of current or ex partner: Not on file     Emotionally abused: Not on file     Physically abused: Not on file     Forced sexual activity: Not on file   Other Topics Concern      Service Not Asked     Blood Transfusions No     Caffeine Concern Not Asked     Occupational Exposure Not Asked     Hobby Hazards Not Asked     Sleep Concern Not Asked     Stress Concern Not Asked     Weight Concern Not Asked     Special Diet Not Asked     Back Care Not Asked     Exercise Yes     Comment: walk three days/week     Bike Helmet Not Asked     Seat Belt Yes     Self-Exams Yes     Parent/sibling w/ CABG, MI or angioplasty before 65F 55M? No   Social History Narrative    Living arrangements - the patient lives alone.       Current Outpatient Medications   Medication Sig Dispense Refill     albuterol (PROAIR HFA/PROVENTIL HFA/VENTOLIN HFA) 108 (90 Base) MCG/ACT inhaler Inhale 2 puffs into the lungs every 6 hours as needed for shortness of breath / dyspnea or wheezing 1 Inhaler 6     albuterol (PROVENTIL) (2.5 MG/3ML) 0.083% neb solution Take 1 vial (2.5 mg) by nebulization every 6 hours as needed for shortness of breath / dyspnea or wheezing 30 vial 1     B  Complex Vitamins (VITAMIN B COMPLEX PO)        beclomethasone (QVAR) 80 MCG/ACT Inhaler Inhale 2 puffs into the lungs 2 times daily 1 Inhaler 11     CALCIUM CARBONATE PO Take by mouth daily       cetirizine-psuedoePHEDrine (ZYRTEC-D) 5-120 MG per tablet Take 1 tablet by mouth 2 times daily 90 tablet 3     cholecalciferol (VITAMIN D) 1000 UNIT tablet Take 1 tablet by mouth daily.       COMPOUNDED NON-CONTROLLED SUBSTANCE (CMPD RX) - PHARMACY TO MIX COMPOUNDED MEDICATION Low dose Naltrexone:  6mg capsules/tablets one PO qhs 30 capsule 6     cyclobenzaprine (FLEXERIL) 5 MG tablet Take 1 tablet (5 mg) by mouth 2 times daily as needed for muscle spasms 30 tablet 0     fluconazole (DIFLUCAN) 150 MG tablet Take 1 tablet (150 mg) by mouth every 3 days 4 tablet 0     FLUoxetine (PROZAC) 40 MG capsule Take 2 capsules (80 mg) by mouth daily 180 capsule 3     gabapentin (NEURONTIN) 300 MG capsule Take 2 capsules  (600mg) in the morning and 2 capsules (600mg) at bedtime. 90day supply (Patient taking differently: 300 mg 2 times daily Take 2 capsules  (600mg) in the morning and 2 capsules (600mg) at bedtime. 90day supply) 360 capsule 1     ipratropium - albuterol 0.5 mg/2.5 mg/3 mL (DUONEB) 0.5-2.5 (3) MG/3ML neb solution Take 1 vial (3 mLs) by nebulization every 6 hours as needed for shortness of breath / dyspnea or wheezing Profile Rx: patient will contact pharmacy when needed 50 vial 3     ipratropium - albuterol 0.5 mg/2.5 mg/3 mL (DUONEB) 0.5-2.5 (3) MG/3ML neb solution Take 1 vial (3 mLs) by nebulization every 6 hours as needed for shortness of breath / dyspnea or wheezing 1 Box 0     levonorgestrel (MIRENA) 20 MCG/24HR IUD 1 each by Intrauterine route once       MAGNESIUM CITRATE PO Take by mouth daily       Naproxen Sodium (ALEVE PO) Take 220 mg by mouth       order for DME Equipment being ordered: Nebulizer 1 Device 0     predniSONE (DELTASONE) 20 MG tablet Take 3 tabs by mouth daily x 3 days, then 2 tabs daily x 3 days,  "then 1 tab daily x 3 days, then 1/2 tab daily x 3 days. 20 tablet 0     rizatriptan (MAXALT-MLT) 10 MG ODT tab Take 1 tablet (10 mg) by mouth at onset of headache for migraine May repeat in 2 hours. Max 3 tablets/24 hours. 18 tablet 3     topiramate (TOPAMAX) 25 MG tablet TAKE ONE TABLET BY MOUTH TWICE A  tablet 1     traZODone (DESYREL) 50 MG tablet Take 2 tablets by mouth. 1 -2 TABs  PO at bedtime prn insomnia 120 tablet 3     Turmeric 450 MG CAPS          Allergies   Allergen Reactions     Augmentin      reacted to Augmentin--  stomach upset -- can take penicillin     Codeine Swelling     Milk Protein Extract Difficulty breathing     Products with milk cause congestion, sinus mucous, difficulty breathing       REVIEW OF SYSTEMS:  CONSTITUTIONAL:  NEGATIVE for fever, chills, change in weight, not feeling tired  SKIN:  NEGATIVE for worrisome rashes, no skin lumps, no skin ulcers and no non-healing wounds  EYES:  NEGATIVE for vision changes or irritation.  ENT/MOUTH:  NEGATIVE.  No hearing loss, no hoarseness, no difficulty swallowing.  RESP:  NEGATIVE. No cough or shortness of breath.  CV:  NEGATIVE for chest pain, palpitations or peripheral edema  GI:  NEGATIVE for nausea, abdominal pain, heartburn, or change in bowel habits  :  Negative. No dysuria, no hematuria  MUSCULOSKELETAL:  See HPI above  NEURO:  NEGATIVE . No headaches, no dizziness,  no numbness  ENDOCRINE:  NEGATIVE for temperature intolerance, skin/hair changes  HEME/ALLERGY/IMMUNE:  NEGATIVE for bleeding problems  PSYCHIATRIC:  NEGATIVE. no anxiety, no depression.     Exam:  Vitals: /66   Pulse 64   Ht 1.626 m (5' 4\")   Wt 122.5 kg (270 lb)   SpO2 98%   BMI 46.35 kg/m    BMI= Body mass index is 46.35 kg/m .  Constitutional:  healthy, alert and no distress  Neuro: Alert and Oriented x 3, Sensation grossly WNL.  Psych: Affect normal   Respiratory: Breathing not labored.  Cardiovascular: normal peripheral pulses  Lymph: no " adenopathy  Skin: No rashes,worrisome lesions or skin problems  Right calf shows tenderness along the posterior medial border of the tibia along the posterior tibial muscle.  She has mild tenderness along the tibia crest.  She has no significant tenderness over the anterior or lateral compartment tendons or muscles.  She has good mobility of the ankle.  No tenderness at the Achilles.    Assessment: Posterior tibial tendinitis.  This is essentially posterior shinsplints.  I have instructed her to work on strengthening of the posterior tibial muscle.  Ice may be helpful after activity.  Continue shoes with good arch supports.

## 2019-12-17 ENCOUNTER — OFFICE VISIT (OUTPATIENT)
Dept: OPTOMETRY | Facility: CLINIC | Age: 49
End: 2019-12-17
Payer: COMMERCIAL

## 2019-12-17 DIAGNOSIS — H52.4 PRESBYOPIA: ICD-10-CM

## 2019-12-17 DIAGNOSIS — Z01.00 EXAMINATION OF EYES AND VISION: Primary | ICD-10-CM

## 2019-12-17 DIAGNOSIS — H10.13 ALLERGIC CONJUNCTIVITIS OF BOTH EYES: ICD-10-CM

## 2019-12-17 DIAGNOSIS — H52.13 MYOPIA OF BOTH EYES: ICD-10-CM

## 2019-12-17 PROCEDURE — 92015 DETERMINE REFRACTIVE STATE: CPT | Performed by: OPTOMETRIST

## 2019-12-17 PROCEDURE — 92014 COMPRE OPH EXAM EST PT 1/>: CPT | Performed by: OPTOMETRIST

## 2019-12-17 RX ORDER — OLOPATADINE HYDROCHLORIDE 2 MG/ML
1 SOLUTION/ DROPS OPHTHALMIC DAILY
Qty: 1 BOTTLE | Refills: 11 | Status: SHIPPED | OUTPATIENT
Start: 2019-12-17 | End: 2020-12-16

## 2019-12-17 ASSESSMENT — REFRACTION_MANIFEST
OD_CYLINDER: SPHERE
OS_SPHERE: -0.50
OD_SPHERE: -2.25
OS_CYLINDER: SPHERE
OD_ADD: +1.75
OS_ADD: +1.75

## 2019-12-17 ASSESSMENT — VISUAL ACUITY
OD_SC: 20/20
OS_SC: 20/25
OD_SC: 20/125
METHOD: SNELLEN - LINEAR
OS_SC+: -2
OS_SC: 20/60

## 2019-12-17 ASSESSMENT — EXTERNAL EXAM - LEFT EYE: OS_EXAM: BROW PTOSIS

## 2019-12-17 ASSESSMENT — REFRACTION_WEARINGRX
OS_AXIS: 180
OD_CYLINDER: SPHERE
OD_ADD: +1.25
OS_CYLINDER: +0.50
OS_SPHERE: -0.75
OS_ADD: +1.25
OD_SPHERE: -2.50

## 2019-12-17 ASSESSMENT — SLIT LAMP EXAM - LIDS
COMMENTS: NORMAL
COMMENTS: NORMAL

## 2019-12-17 ASSESSMENT — TONOMETRY
IOP_METHOD: TONOPEN
OD_IOP_MMHG: 17
OS_IOP_MMHG: 19

## 2019-12-17 ASSESSMENT — CONF VISUAL FIELD
OS_NORMAL: 1
OD_NORMAL: 1

## 2019-12-17 ASSESSMENT — EXTERNAL EXAM - RIGHT EYE: OD_EXAM: BROW PTOSIS

## 2019-12-17 ASSESSMENT — CUP TO DISC RATIO
OD_RATIO: 0.25
OS_RATIO: 0.3

## 2019-12-17 NOTE — LETTER
12/17/2019         RE: Paige Le  [address]  [place] [address]        Dear Colleague,    Thank you for referring your patient, Paige Le, to the [facility]. Please see a copy of my visit note below.    Chief Complaint   Patient presents with     Annual Eye Exam      Accompanied by self  Last Eye Exam: 4-  Dilated Previously: Yes    What are you currently using to see?  Glasses lost x 1 week       Distance Vision Acuity: Satisfied with vision    Near Vision Acuity: Not satisfied     Eye Comfort: good  Do you use eye drops? : Yes: red eyes or allergy drops otc  Occupation or Hobbies: reach out and read person    Family history of macular degeneration- mom's side-mom, gm, great aunts have it and need shots.    Manjula Ramirez Optometric Assistant, A.B.O.C.          Medical, surgical and family histories reviewed and updated 12/17/2019.       OBJECTIVE: See Ophthalmology exam    ASSESSMENT:    ICD-10-CM    1. Examination of eyes and vision Z01.00 EYE EXAM (SIMPLE-NONBILLABLE)   2. Myopia of both eyes H52.13 REFRACTION   3. Presbyopia H52.4 REFRACTION   4. Allergic conjunctivitis of both eyes H10.13       PLAN:     Patient Instructions   Eyeglass prescription given.    Prescription for Pataday to be used once daily for itchy eyes.  Use as needed.    Return in 1 year for a complete eye exam or sooner if needed.    Richard Huff, GEOVANNI           Again, thank you for allowing me to participate in the care of your patient.        Sincerely,        Richard Huff, OD

## 2019-12-17 NOTE — PATIENT INSTRUCTIONS
Eyeglass prescription given.    Prescription for Pataday to be used once daily for itchy eyes.  Use as needed.    Return in 1 year for a complete eye exam or sooner if needed.    Richard Huff, GEOVANNI    The affects of the dilating drops last for 4- 6 hours.  You will be more sensitive to light and vision will be blurry up close.  Mydriatic sunglasses were given if needed.    [email or web address]-[email or web address]    Ways to prevent macular degeneration- printed out and given to patient.      Optometry Providers       Clinic Locations                                 Telephone Number   Dr. Laura Lee and [place]   [place] [phone]     [facility] Hours:                [facility] Hours:       [facility] Hours:   [address] [facility] [address]   [address] [facility]     [address]  [place] [address]   [place] [address]    [place] [address]  Phone: [phone]                    Phone: [phone]     Phone: [phone]                      Monday 8:00-7:00                          Monday 8:00-7:00                          Monday 8:00-7:00              Tuesday 8:00-6:00                          Tuesday 8:00-7:00                          Tuesday 8:00-7:00              Wednesday 8:00-6:00                  Wednesday 8:00-7:00                   Wednesday 8:00-7:00      Thursday 8:00-6:00                        Thursday 8:00-7:00                         Thursday 8:00-7:00            Friday 8:00-5:00                              Friday 8:00-5:00                              Friday 8:00-5:00    [facility] Hours:   [address] [facility] [place] [id number] [phone]    Monday 8:00-7:00  Tuesday 8:00-7:00  Wednesday 8:00-7:00  Thursday 8:00-7:00  Friday 8:00-5:00  Please log on to [email or web address] to order your contact lenses.  The link is found on the [facility]  page.  As always, Thank you for trusting us with your health care needs!

## 2019-12-17 NOTE — PROGRESS NOTES
Chief Complaint   Patient presents with     Annual Eye Exam      Accompanied by self  Last Eye Exam: 4-  Dilated Previously: Yes    What are you currently using to see?  Glasses lost x 1 week       Distance Vision Acuity: Satisfied with vision    Near Vision Acuity: Not satisfied     Eye Comfort: good  Do you use eye drops? : Yes: red eyes or allergy drops otc  Occupation or Hobbies: reach out and read person    Family history of macular degeneration- mom's side-mom, gm, great aunts have it and need shots.    Manjula Ramirez Optometric Assistant, A.B.O.C.          Medical, surgical and family histories reviewed and updated 12/17/2019.       OBJECTIVE: See Ophthalmology exam    ASSESSMENT:    ICD-10-CM    1. Examination of eyes and vision Z01.00 EYE EXAM (SIMPLE-NONBILLABLE)   2. Myopia of both eyes H52.13 REFRACTION   3. Presbyopia H52.4 REFRACTION   4. Allergic conjunctivitis of both eyes H10.13       PLAN:     Patient Instructions   Eyeglass prescription given.    Prescription for Pataday to be used once daily for itchy eyes.  Use as needed.    Return in 1 year for a complete eye exam or sooner if needed.    Richard Huff, OD

## 2019-12-18 DIAGNOSIS — F33.0 MAJOR DEPRESSIVE DISORDER, RECURRENT EPISODE, MILD (H): ICD-10-CM

## 2019-12-18 NOTE — TELEPHONE ENCOUNTER
"Requested Prescriptions   Pending Prescriptions Disp Refills     FLUoxetine (PROZAC) 40 MG capsule [Pharmacy Med Name: FLUOXETINE HCL 40MG CAPS] 180 capsule 3     Sig: TAKE TWO CAPSULES BY MOUTH EVERY DAY       SSRIs Protocol Failed - 12/18/2019 10:28 AM        Failed - PHQ-9 score less than 5 in past 6 months     Please review last PHQ-9 score.           Passed - Medication is active on med list        Passed - Patient is age 18 or older        Passed - No active pregnancy on record        Passed - No positive pregnancy test in last 12 months        Passed - Recent (6 mo) or future (30 days) visit within the authorizing provider's specialty     Patient had office visit in the last 6 months or has a visit in the next 30 days with authorizing provider or within the authorizing provider's specialty.  See \"Patient Info\" tab in inbasket, or \"Choose Columns\" in Meds & Orders section of the refill encounter.             FLUoxetine (PROZAC) 40 MG capsule     Last Written Prescription Date:  9/13/18  Last Fill Quantity: 180,  # refills: 3   Last office visit: 11/27/2019 with prescribing provider:  Dr. White   Future Office Visit:        PHQ-9 SCORE 9/13/2018 1/3/2019 3/12/2019   PHQ-9 Total Score - - -   PHQ-9 Total Score 3 7 4       KACEY-7 SCORE 1/31/2018 9/13/2018 3/12/2019   Total Score - - -   Total Score 0 0 1         "

## 2019-12-20 RX ORDER — FLUOXETINE 40 MG/1
80 CAPSULE ORAL DAILY
Qty: 60 CAPSULE | Refills: 0 | Status: SHIPPED | OUTPATIENT
Start: 2019-12-20 | End: 2021-03-10

## 2019-12-20 NOTE — TELEPHONE ENCOUNTER
Routing refill request to provider for review/approval because:  Not current:  PHQ-9  Sully San RN  [facility]

## 2020-02-10 DIAGNOSIS — M54.2 CERVICALGIA: ICD-10-CM

## 2020-02-10 DIAGNOSIS — M26.609 TMJ (TEMPOROMANDIBULAR JOINT SYNDROME): ICD-10-CM

## 2020-02-10 NOTE — TELEPHONE ENCOUNTER
"Requested Prescriptions   Pending Prescriptions Disp Refills     topiramate (TOPAMAX) 25 MG tablet [Pharmacy Med Name: TOPIRAMATE 25MG TABS] 180 tablet 1     Sig: TAKE ONE TABLET BY MOUTH TWICE A DAY       Anti-Seizure Meds Protocol  Failed - 2/10/2020  3:35 PM        Failed - Review Authorizing provider's last note.      Refer to last progress notes: confirm request is for original authorizing provider (cannot be through other providers).          Failed - Normal CBC on file in past 26 months     Recent Labs   Lab Test 07/24/16  1402   WBC 9.9   RBC 4.32   HGB 12.3   HCT 38.9                    Failed - Normal ALT or AST on file in past 26 months     Recent Labs   Lab Test 05/17/17  1216   ALT 20     Recent Labs   Lab Test 05/17/17  1216   AST 12             Failed - Normal platelet count on file in past 26 months     Recent Labs   Lab Test 07/24/16  1402                  Passed - Recent (12 mo) or future (30 days) visit within the authorizing provider's specialty     Patient has had an office visit with the authorizing provider or a provider within the authorizing providers department within the previous 12 mos or has a future within next 30 days. See \"Patient Info\" tab in inbasket, or \"Choose Columns\" in Meds & Orders section of the refill encounter.              Passed - Medication is active on med list        Passed - No active pregnancy on record        Passed - No positive pregnancy test in last 12 months        topiramate (TOPAMAX) 25 MG tablet  Last Written Prescription Date:  6/26/19  Last Fill Quantity: 180,  # refills: 1   Last office visit: 11/27/2019 with prescribing provider:  Dr. White   Future Office Visit:      "

## 2020-02-12 RX ORDER — TOPIRAMATE 25 MG/1
TABLET, FILM COATED ORAL
Qty: 180 TABLET | Refills: 1 | OUTPATIENT
Start: 2020-02-12

## 2020-02-12 NOTE — TELEPHONE ENCOUNTER
Routing refill request to provider for review/approval because:  Labs not current:  CBC, ALT/AST, Plts    Ana M Lynch RN, [facility] Triage

## 2020-02-12 NOTE — TELEPHONE ENCOUNTER
Per care everywhere, patient has changed clinics. Has not been seen for med check in some time  Will decline request

## 2020-03-03 NOTE — TELEPHONE ENCOUNTER
Received fax request from [facility]  pharmacy requesting refill(s) for Low dose Naltrexone:  6mg capsules/tablets     Last refilled on 6/21/18    Pt last seen on 2/23/18  Next appt scheduled for 8/9/18    Logan Vizcarra MA  [facility]      Will facilitate refill.     complains of pain/discomfort/left third digit

## 2020-04-30 ENCOUNTER — DOCUMENTATION ONLY (OUTPATIENT)
Dept: FAMILY MEDICINE | Facility: CLINIC | Age: 50
End: 2020-04-30

## 2020-04-30 NOTE — PROGRESS NOTES
Please send or have patient fill out ACT. Last one in 11/2019 was low at 16.     Thank you,  SWETHA Meehan, NP-C  [facility]

## 2020-08-18 DIAGNOSIS — N95.1 MENOPAUSAL SYNDROME (HOT FLASHES): ICD-10-CM

## 2020-08-18 DIAGNOSIS — M79.7 FIBROMYALGIA: ICD-10-CM

## 2020-08-19 RX ORDER — GABAPENTIN 300 MG/1
CAPSULE ORAL
Qty: 360 CAPSULE | Refills: 1 | OUTPATIENT
Start: 2020-08-19

## 2020-08-19 NOTE — TELEPHONE ENCOUNTER
Routing refill request to provider for review/approval because:  Drug not on the [facility] refill protocol   A break in medication      Min Pa RN, BSN, PHN

## 2020-11-07 ENCOUNTER — HEALTH MAINTENANCE LETTER (OUTPATIENT)
Age: 50
End: 2020-11-07

## 2020-12-01 ENCOUNTER — TELEPHONE (OUTPATIENT)
Dept: PALLIATIVE MEDICINE | Facility: CLINIC | Age: 50
End: 2020-12-01

## 2020-12-01 NOTE — TELEPHONE ENCOUNTER
Spoke with pt regarding a an order coming from an outside provider. Pt is wanting a repeat Left Greater Trochanteric Bursa Injection. Informed pt we are able to take outside injection orders, but if pt wants to establish care with Dr. Garrido an outside order was okayed by Rose Mary George, . Pt will start with have an injection order faxed to us for a Left Greater Trochanteric Bursa Injection.    Mirela TYSON    [facility]

## 2020-12-03 ENCOUNTER — TRANSCRIBE ORDERS (OUTPATIENT)
Dept: OTHER | Age: 50
End: 2020-12-03

## 2020-12-03 DIAGNOSIS — M70.72 BURSITIS OF LEFT HIP: Primary | ICD-10-CM

## 2020-12-17 ENCOUNTER — OFFICE VISIT (OUTPATIENT)
Dept: PALLIATIVE MEDICINE | Facility: CLINIC | Age: 50
End: 2020-12-17
Payer: COMMERCIAL

## 2020-12-17 VITALS — OXYGEN SATURATION: 98 % | HEART RATE: 69 BPM | DIASTOLIC BLOOD PRESSURE: 83 MMHG | SYSTOLIC BLOOD PRESSURE: 137 MMHG

## 2020-12-17 DIAGNOSIS — M70.62 TROCHANTERIC BURSITIS OF LEFT HIP: Primary | ICD-10-CM

## 2020-12-17 PROCEDURE — 20610 DRAIN/INJ JOINT/BURSA W/O US: CPT | Mod: LT | Performed by: ANESTHESIOLOGY

## 2020-12-17 RX ORDER — BUPIVACAINE HYDROCHLORIDE 5 MG/ML
10 INJECTION, SOLUTION EPIDURAL; INTRACAUDAL ONCE
Status: COMPLETED | OUTPATIENT
Start: 2020-12-17 | End: 2020-12-17

## 2020-12-17 RX ORDER — TRIAMCINOLONE ACETONIDE 40 MG/ML
40 INJECTION, SUSPENSION INTRA-ARTICULAR; INTRAMUSCULAR ONCE
Status: COMPLETED | OUTPATIENT
Start: 2020-12-17 | End: 2020-12-17

## 2020-12-17 RX ADMIN — BUPIVACAINE HYDROCHLORIDE 50 MG: 5 INJECTION, SOLUTION EPIDURAL; INTRACAUDAL at 11:17

## 2020-12-17 RX ADMIN — TRIAMCINOLONE ACETONIDE 40 MG: 40 INJECTION, SUSPENSION INTRA-ARTICULAR; INTRAMUSCULAR at 11:18

## 2020-12-17 ASSESSMENT — PAIN SCALES - GENERAL: PAINLEVEL: SEVERE PAIN (6)

## 2020-12-17 NOTE — PROGRESS NOTES
[facility] - Procedure Note    Date of Service: 12/17/2020  Procedure performed: Left Greater Trochanteric Bursa Injection  Diagnosis: Left Trochanteric Bursitis  : Yvonne Garrido MD  Anesthesia: none      Indications:   Paige Le is a 50 year old female was sent by myself for a trochanteric bursa injection.  They have a history of left hip pain.  Exam shows TTP over the left greater trochanter and they have tried conservative treatment including PT and medications.    This is a repeat injection.  Previous Left trochanteric bursa injection done by myself on 9/12/2019 provided good pain relief for a period of 6 months.     Options/alternatives, benefits and risks were discussed with the patient including bleeding and infection.  Questions were answered to her satisfaction and she agrees to proceed. Voluntary informed consent was obtained and signed.     Vitals were reviewed: Yes  Allergies were reviewed:  Yes   Medications were reviewed:  Yes       Procedure:  After getting informed consent, patient was brought into the procedure suite and was placed in a prone position on the procedure table.   A Pause for the Cause was performed.  Patient was prepped and draped in sterile fashion.     The area over the left trochanter was palpated, and the tender area was identified, corresponding to the area of the trochanteric bursa.  The area was cleaned with Chloroprep.  A 25-gauge, 1.5-inch needle was inserted, aiming towards the trochanter.  When bone was encountered, the needle was slightly drawn.  A solution containing local anesthesic and steroid was injected.  The needle was removed.  Hemostasis was achieved. Bandaids were placed as appropriate.    In total, 4 ml of 0.5% bupivacaine and 1 ml (40 mg) of kenalog was injected.    Hemostasis was achieved, the area was cleaned, and bandaids were placed when appropriate.  The patient tolerated the procedure well, and was taken to the  recovery room.    Images were saved to PACS.    Follow-up with the referring provider    Would recommend doing this under flouroscopy if she wants it repeated.       NYDIA HAUSER MD   Pain Management & Addiction Medicine

## 2020-12-17 NOTE — PATIENT INSTRUCTIONS
[facility]   Post Procedure Instructions    Today you had:  Left Hip bursa injection      Medications used:  lidocaine   bupivicaine   kenolog   dexamethasone          Go to the emergency room if you develop any shortness of breath    Monitor the injection sites for signs and symptoms of infection-fever, chills, redness, swelling, warmth, or drainage to areas.    You may have soreness at injection sites for up to 24 hours.    You may apply ice to the painful areas to help minimize the discomfort of the needle pokes.    Do not apply heat to sites for at least 12 hours.    You may use anti-inflammatory medications or Tylenol for pain control if necessary    Pain Clinic phone number during work hours Monday-Friday:  [phone]    After hours provider line: [phone]

## 2021-01-24 ENCOUNTER — HEALTH MAINTENANCE LETTER (OUTPATIENT)
Age: 51
End: 2021-01-24

## 2021-01-30 ENCOUNTER — MYC MEDICAL ADVICE (OUTPATIENT)
Dept: PALLIATIVE MEDICINE | Facility: CLINIC | Age: 51
End: 2021-01-30

## 2021-02-01 NOTE — TELEPHONE ENCOUNTER
Will leave encounter open for patient response/chart review by nursing.     ----------------Mychart Below from pt----------------  I'm not sure if the pain I am having is from my fibro or other.  First my bursitis got much better with the shot.  However I am left with a hitch in my step.  My left hip is like it gets caught and stops me.  It is most felt going up steps and if I stand or sit too long.  It is right in my hip bone.  My hip was x-rayed and nothing found.     I also have another continuing problem that is getting worst with my back. My lower midback on right side is spasmming daily.  It takes my breath away and can make me cry in pain.  I appear worst after 7:00 pm.      Do you think this is fibro related?Should I come see you, live or video?   Should I go to primary [facility] for back and xray?     I am currently off off naltrexzone and take one gabapentin twice a day.  I have become limited to only Yin  and gentle yoga for exercise.  I had to skip sledding today :(.     Thanks for your guidance.     Paige (Mimi) Scotty        --------------Mychart below response to pt----------------    Iglesia Gibson,     So sorry to hear what is going on. Considering the situation, I think it would be best to make an appointment to discuss things with Dr Garrido in detail. Our appointments are done mainly virtual at this time due to Covid. Our scheduling number is [phone].     Ana M JUSTICE, RN Care Coordinator  [facility]  Pain Management

## 2021-02-10 ENCOUNTER — TRANSFERRED RECORDS (OUTPATIENT)
Dept: HEALTH INFORMATION MANAGEMENT | Facility: CLINIC | Age: 51
End: 2021-02-10

## 2021-02-17 NOTE — TELEPHONE ENCOUNTER
Review of chart shows that pt followed up with PCP on 2/1. MRI completed and reviewed with PCP on 2/10. Pt given a course of Prednisone for fibro flare.     KAYLA SadlerN, RN-BC  Patient Care Supervisor  [facility] Management [place]

## 2021-02-23 DIAGNOSIS — Z12.31 VISIT FOR SCREENING MAMMOGRAM: ICD-10-CM

## 2021-02-23 PROCEDURE — 77067 SCR MAMMO BI INCL CAD: CPT | Mod: GC | Performed by: RADIOLOGY

## 2021-02-23 PROCEDURE — 77063 BREAST TOMOSYNTHESIS BI: CPT | Mod: GC | Performed by: RADIOLOGY

## 2021-02-26 ENCOUNTER — TELEPHONE (OUTPATIENT)
Dept: PALLIATIVE MEDICINE | Facility: CLINIC | Age: 51
End: 2021-02-26

## 2021-02-26 NOTE — TELEPHONE ENCOUNTER
Pt called me back. On 03/10/2021 scheduled followup with Dr Garrido.  Pt is in major pain. Went to PCP. They did an X-ray and MRI.  Negative results.  PCP thinks this is a fibromyalgia flare. PCP wants her to follow up with Dr Garrido.  Pt was given steroids and it helped a little with the muscle spasms to the mid back and the left hip.  Not sure what to do now.  When she has a fibromyalgia pain she does her self cares. Hot tub, yoga, PT exercises.  She is not able to do any of these due to the spasms right now.  Can easily get stuck on the toilet when the spasms occur.  Wondering what she can do in the mean time till her appointment.  Advised her to continue to do her self cares as she is able.  If we get a cancel we can reach out to her and she will call next weeks to see if there are any openings as well. Since it has chava so long since she has been seen, Dr Garrido is not able to prescribe anything in the meantime. We need to be safe.  Medications are address at appointments.  . Did advise if her symptoms worsen before she is seen, she should seek medical attention at an ER or Urgent Care.  Pt understands    Dara Heart RN  Care Coordinator  [facility]

## 2021-02-26 NOTE — TELEPHONE ENCOUNTER
Called Joseph Teran asking for a return call.      Dara Heart RN  Care Coordinator  [facility]

## 2021-02-26 NOTE — TELEPHONE ENCOUNTER
Patient is scheduled for a follow up, she is wondering what she can do to help her pain until then          Elif Mast    [facility]

## 2021-03-10 ENCOUNTER — VIRTUAL VISIT (OUTPATIENT)
Dept: PALLIATIVE MEDICINE | Facility: CLINIC | Age: 51
End: 2021-03-10
Payer: COMMERCIAL

## 2021-03-10 ENCOUNTER — MYC MEDICAL ADVICE (OUTPATIENT)
Dept: PALLIATIVE MEDICINE | Facility: CLINIC | Age: 51
End: 2021-03-10

## 2021-03-10 DIAGNOSIS — M79.7 FIBROMYALGIA: ICD-10-CM

## 2021-03-10 DIAGNOSIS — M47.819 FACET ARTHROPATHY: Primary | ICD-10-CM

## 2021-03-10 DIAGNOSIS — G47.33 OSA (OBSTRUCTIVE SLEEP APNEA): ICD-10-CM

## 2021-03-10 DIAGNOSIS — F10.21 ALCOHOL USE DISORDER, SEVERE, IN SUSTAINED REMISSION (H): ICD-10-CM

## 2021-03-10 DIAGNOSIS — M70.62 TROCHANTERIC BURSITIS OF LEFT HIP: ICD-10-CM

## 2021-03-10 DIAGNOSIS — M46.1 SACROILIITIS (H): ICD-10-CM

## 2021-03-10 PROCEDURE — 99215 OFFICE O/P EST HI 40 MIN: CPT | Mod: 95 | Performed by: ANESTHESIOLOGY

## 2021-03-10 ASSESSMENT — PAIN SCALES - GENERAL: PAINLEVEL: EXTREME PAIN (8)

## 2021-03-10 NOTE — PROGRESS NOTES
"Paige is a 50 year old who is being evaluated via a billable video visit.      How would you like to obtain your AVS? Nicollehart  If the video visit is dropped, the invitation should be resent by: Other e-mail: Juan   Will anyone else be joining your video visit? Wilda Arcos MA  [facility]    Video Start Time: 2:35 PM  Video-Visit Details  Type of service:  Video Visit  Video End Time:3:00 PM  Originating Location (pt. Location): Home  Distant Location (provider location):  [facility] [place]   Platform used for Video Visit: Well                               [facility]    Date of visit: 3/10/2021    Chief complaint:   Chief Complaint   Patient presents with     Pain     Video visit due to covid-19       Interval history:  Paige Le is a 50 year old female last seen by me for follow up on 5/23/2019.      Since her last visit, Paige Le reports:  - She has been having severe right sided low back pain that radiates around her flank.   - Saw her PCP about this and had an MRI done at [facility] (that I cannot see) and her PCP said it was \"normal\".  She is attributing the pain to a fibromyalgia flare up and gave her a medrol dose pack which was not helpful.  She finished this 2/15/2021.   - They are going on vacation on March 20th to [place]. They are flying into [place] and renting a car.   - She has been doing really well aside from this and has been working to decrease all her medications with the goal of getting off them.  Since last seeing me she has discontinued her topamax, decreased her prozac from 80mg daily to 40mg daily, decreased her gabapentin from 1200mg daily to 600mg daily and discontinued her maxalt and low dose naltrexone.   - She is moved to a single family home in [place], [place] with her now .  - She has been exercising less and is less active since covid  - Had a sleep study done and got a machine " "and is sleeping better.  Feels refreshed when she wakes up in the morning.   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       Current pain treatments:   Gabapentin 300mg qam and 300mg qhs  Trazodone 50mg -100mg qhs  Maxalt 10mg PRN headache  Tumeric 450mg daily  Prozac 20mg BID      Side Effects: no side effect    Medications:  Current Outpatient Medications   Medication Sig Dispense Refill     albuterol (PROAIR HFA/PROVENTIL HFA/VENTOLIN HFA) 108 (90 Base) MCG/ACT inhaler Inhale 2 puffs into the lungs every 6 hours as needed for shortness of breath / dyspnea or wheezing 1 Inhaler 6     albuterol (PROVENTIL) (2.5 MG/3ML) 0.083% neb solution Take 1 vial (2.5 mg) by nebulization every 6 hours as needed for shortness of breath / dyspnea or wheezing 30 vial 1     B Complex Vitamins (VITAMIN B COMPLEX PO)        beclomethasone (QVAR) 80 MCG/ACT Inhaler Inhale 2 puffs into the lungs 2 times daily 1 Inhaler 11     CALCIUM CARBONATE PO Take by mouth daily       cetirizine-psuedoePHEDrine (ZYRTEC-D) 5-120 MG per tablet Take 1 tablet by mouth 2 times daily 90 tablet 3     cholecalciferol (VITAMIN D) 1000 UNIT tablet Take 1 tablet by mouth daily.       fluconazole (DIFLUCAN) 150 MG tablet Take 1 tablet (150 mg) by mouth every 3 days 4 tablet 0     FLUoxetine (PROZAC) 20 MG capsule Take 20 mg by mouth 2 times daily       gabapentin (NEURONTIN) 300 MG capsule Take 2 capsules  (600mg) in the morning and 2 capsules (600mg) at bedtime. 90day supply (Patient taking differently: 300 mg 2 times daily Take 2 capsules  (600mg) in the morning and 2 capsules (600mg) at bedtime. 90day supply) 360 capsule 1     ipratropium - albuterol 0.5 mg/2.5 mg/3 mL (DUONEB) 0.5-2.5 (3) MG/3ML neb solution Take 1 vial (3 mLs) by nebulization every 6 hours as needed for shortness of breath / dyspnea or wheezing Profile Rx: patient will contact pharmacy when needed 50 vial 3     ipratropium - albuterol 0.5 mg/2.5 mg/3 mL (DUONEB) 0.5-2.5 (3) " MG/3ML neb solution Take 1 vial (3 mLs) by nebulization every 6 hours as needed for shortness of breath / dyspnea or wheezing 1 Box 0     levonorgestrel (MIRENA) 20 MCG/24HR IUD 1 each by Intrauterine route once       MAGNESIUM CITRATE PO Take by mouth daily       Naproxen Sodium (ALEVE PO) Take 220 mg by mouth       order for DME Equipment being ordered: Nebulizer 1 Device 0     rizatriptan (MAXALT-MLT) 10 MG ODT tab Take 1 tablet (10 mg) by mouth at onset of headache for migraine May repeat in 2 hours. Max 3 tablets/24 hours. 18 tablet 3     topiramate (TOPAMAX) 25 MG tablet TAKE ONE TABLET BY MOUTH TWICE A  tablet 1     traZODone (DESYREL) 50 MG tablet Take 2 tablets by mouth. 1 -2 TABs  PO at bedtime prn insomnia 120 tablet 3     Turmeric 450 MG CAPS        COMPOUNDED NON-CONTROLLED SUBSTANCE (CMPD RX) - PHARMACY TO MIX COMPOUNDED MEDICATION Low dose Naltrexone:  6mg capsules/tablets one PO qhs 30 capsule 6     FLUoxetine (PROZAC) 40 MG capsule Take 2 capsules (80 mg) by mouth daily Due for office visit prior to further refill 60 capsule 0       Medical History: any changes in medical history since they were last seen? No    Review of Systems:  ROS:  Constitutional, neuro, ENT, endocrine, pulmonary, cardiac, gastrointestinal, genitourinary, musculoskeletal, integument and psychiatric systems are negative, except as otherwise noted.    Physical Exam:  not currently breastfeeding.    GENERAL: Healthy, alert and no distress  EYES: Eyes grossly normal to inspection.  No discharge or erythema, or obvious scleral/conjunctival abnormalities.  RESP: No audible wheeze, cough, or visible cyanosis.  No visible retractions or increased work of breathing.    SKIN: Visible skin clear. No significant rash, abnormal pigmentation or lesions.  NEURO: Cranial nerves grossly intact.  Mentation and speech appropriate for age.  PSYCH: Mentation appears normal, affect normal/bright, judgement and insight intact, normal speech  and appearance well-groomed.      ASSESSMENT/PLAN:     1. Facet arthropathy VS Sacroilitis - RIGHT side  I am unsure of the etiology of her severe low right sided back pain.  It is worse with prolonged sitting and she can actually feel the spot where the pain originates.  It does not radiate to her leg.  Etiology could be right L5-S1 facet arthropathy vs Right SI joint pain vs possible trigger point injection.   I ordered a non specific injection to be determined the day of and she plans to call her insurance to make sure she does not need a specific PA.     - PAIN INJECTION EVAL/TREAT/FOLLOW UP    2. Trochanteric bursitis of left hip  Continue trochanteric bursa injections PRN    4. Fibromyalgia  Well managed.  I do not think this is related to her fibromyalgia.   She does not want to take a muscle relaxant -  This was offered.   Recommend ongoing exercise regiment.     5. Alcohol use disorder   In sustained remission - doing great with an active recovery program.     6. Obstructive Sleep Apnea  Using CPAP         BILLING TIME DOCUMENTATION:   The total TIME spent on this patient on the date of the encounter/appointment was 42 minutes.      TOTAL TIME includes:   Time spent preparing to see the patient (reviewing records and tests) - 4 min  Time spent face to face (or over the phone) with the patient - 25 min  Time spent ordering tests, medications, procedures and referrals - 1 min  Time spent Referring and communicating with other healthcare professionals - 0 min  Time spent documenting clinical information in Epic - 12 min    NYDIA HAUSER MD   Pain Management & Addiction Medicine

## 2021-03-10 NOTE — PATIENT INSTRUCTIONS
Call insurance to make sure you do not need a PA for your injection.  If not you can call to schedule.     I am not sure of the etiology of your pain but I do not think it is related to your fibro.  My thoughts are Right L5-S1 facet joint arthritis VS Right SI joint pain VS myofascial trigger point.  I will know more when I can see the read on your MRI and when I can do an exam when you come for the injection.     Yvonne Garrido MD     ----------------------------------------------------------------  Clinic Number:  [phone]     Call with any questions about your care and for scheduling assistance.     Calls are returned Monday through Friday between 8 AM and 4:30 PM. We usually get back to you within 2 business days depending on the issue/request.    If we are prescribing your medications:    For opioid medication refills, call the clinic or send a [employer] message 7 days in advance.  Please include:    Name of requested medication    Name of the pharmacy.    For non-opioid medications, call your pharmacy directly to request a refill. Please allow 3-4 days to be processed.     Per [place] State Law:    All controlled substance prescriptions must be filled within 30 days of being written.      For those controlled substances allowing refills, pickup must occur within 30 days of last fill.      We believe regular attendance is key to your success in our program!      Any time you are unable to keep your appointment we ask that you call us at least 24 hours in advance to cancel.This will allow us to offer the appointment time to another patient.     Multiple missed appointments may lead to dismissal from the clinic.

## 2021-03-11 NOTE — TELEPHONE ENCOUNTER
No PA required for any of the injections on order. She is okay to schedule and determine the day of          Paulina FORDE    [facility]

## 2021-03-11 NOTE — TELEPHONE ENCOUNTER
Mychart message from patient on 03/10/2021 at 1539:  Dr. Garrido,     Ins states they need to know the name of injection either name of serum or CPT Code.     Thanks.     Shira  [phone]  __________________________________________    Routing to provider to review and advise    Dara Heart RN  Care Coordinator  [facility]

## 2021-03-11 NOTE — TELEPHONE ENCOUNTER
Paulina - can you check her insurance and see if she needs a PA?  I do not know what I am going to do for her until I see her in person and can do an exam.  Was hoping to just schedule her for the injection and choose day of as she lives far away.  The order has been placed.     Thanks, Yvonne

## 2021-03-12 NOTE — TELEPHONE ENCOUNTER
LVM to schedule RIGHT L5-S1 facet joint injection vs RIGHT SI joint injection vs TPI. No PA required.    Mirela TYSON    [facility]

## 2021-03-12 NOTE — TELEPHONE ENCOUNTER
Screening Questions for Radiology Injections:    Injection to be done at which interventional clinic site? [facility]    If [facility] location, tell patient that this procedure requires a COVID-19 lab test be done within 4 days of the procedure. Would you still like to move forward with scheduling the procedure?  Not Applicable   If YES, let patient know that someone will call them to schedule the COVID-19 test and that they will only receive a call back if the result is positive. Route to nursing to enter order.     Instruct patient to arrive as directed prior to the scheduled appointment time:    [place] minutes before      [place]: 30 minutes before; if IV needed 1 hour before     Procedure ordered by Hema     Procedure ordered? RIGHT L5-S1 facet joint injection vs RIGHT SI joint injection vs TPI       Transforaminal Cervical DEA - no pain provider currently performing    As a reminder, receiving steroids can decrease your body's ability to fight infection.   Would you still like to move forward with scheduling the injection?  Yes    What insurance would patient like us to bill for this procedure? Pref. One       Worker's comp or MVA (motor vehicle accident) -Any injection DO NOT SCHEDULE and route to Paulina Samson.      [employer] insurance - For SI joint injections, DO NOT SCHEDULE and route Paulina Samson.       ALL BCBS, [employer] and [employer]-Route to Paulina for review DO NOT SCHEDULE      IF SCHEDULING IN [place] AND NEEDS A PA, IT IS OKAY TO SCHEDULE. [place] HANDLES THEIR OWN PA'S AFTER THE PATIENT IS SCHEDULED. PLEASE SCHEDULE AT LEAST 1 WEEK OUT SO A PA CAN BE OBTAINED.    Any chance of pregnancy? Not Applicable   If YES, do NOT schedule and route to RN pool    Is an  needed? No     Patient has a drive home? (mandatory) YES: Informed     Is patient taking any blood thinners (i.e. plavix, coumadin, jantoven, warfarin, heparin, pradaxa or dabigatran, etc)? No    If hold needed, do NOT schedule, route to RN pool     Is patient taking any aspirin products (includes Excedrin and Fiorinal)? No     If more than 325mg/day, OK to schedule; Instruct pt to decrease to less than 325 mg for 7 days AND route to RN pool    For CERVICAL procedures, hold all aspirin products for 6 days.     Tell pt that if aspirin product is not held for 6 days, the procedure WILL BE cancelled.      Does the patient have a bleeding or clotting disorder? No     If YES, okay to schedule AND route to RN nurse pool    For any patients with platelet count <100, must be forwarded to provider    Is patient diabetic?  No  If YES, instruct them to bring their glucometer.    Does patient have an active infection or treated for one within the past week? No     Is patient currently taking any antibiotics?  No     For patients on chronic, preventative, or prophylactic antibiotics, procedures may be scheduled.     For patients on antibiotics for active or recent infection:antibiotic course must have been completed for 4 days    Is patient currently taking any steroid medications? (i.e. Prednisone, Medrol)  No     For patients on steroid medications, course must have been completed for 4 days    Is patient actively being treated for cancer or immunocompromised? No  If YES, do NOT schedule and route to RN pool     Are you able to get on and off an exam table with minimal or no assistance? Yes  If NO, do NOT schedule and route to RN pool    Are you able to roll over and lay on your stomach with minimal or no assistance? Yes  If NO, do NOT schedule and route to RN pool     Any allergies to contrast dye, iodine, shellfish, or numbing and steroid medications? No  If YES, route to RN pool AND add allergy information to appointment notes    Allergies: Augmentin, Codeine, and Milk protein extract      Has the patient had a flu shot or any other vaccinations within 7 days before or after the procedure.  No     Have you  recently had a COVID vaccine or have plans to get it in the near future?   If yes, explain that for the vaccine to work best they need to:       wait 1 week before and 1 week after getting Vaccine #1    wait 1 week before and 2 weeks after getting Vaccine #2    If patient has concerns about the timing, send to RN pool     Does patient have an MRI/CT?  Not Applicable  Check Procedure Scheduling Grid to see if required.      Was the MRI done within the last 3 years?  NA    If yes, where was the MRI done i.e.[facility], [facility], [place], [facility] etc?       If no, do not schedule and route to RN pool    If MRI was not done at [place], [facility] or [facility] do NOT schedule and route to RN pool.      If pt has an imaging disc, the injection MAY be scheduled but pt has to bring disc to appt.     If they show up without the disc the injection cannot be done    Procedure Specific Instructions:      If celiac plexus block, informed patient NPO for 6 hours and that it is okay to take medications with sips of water, especially blood pressure medications  Not Applicable         If this is for a cervical procedure, informed patient that aspirin needs to be held for 6 days.   Not Applicable      If IV needed:    Do not schedule procedures requiring IV placement in the first appointment of the day or first appointment after lunch. Do NOT schedule at 0745, 0815 or 1245.     Instructed pt to arrive 30 minutes early for IV start if required. (Check Procedure Scheduling Grid)  Not Applicable    Reminders:      If you are started on any steroids or antibiotics between now and your appointment, you must contact us because the procedure may need to be cancelled.  No      For all procedures except radiofrequency ablations (RFAs) and spinal cord stimulator (SCS) trials, informed patient:    IV sedation is not provided for this procedure.  If you feel that an oral anti-anxiety medication is needed, you can discuss this  further with your referring provider or primary care provider.  The Pain Clinic provider will discuss specifics of what the procedure includes at your appointment.  Most procedures last 10-20 minutes.  We use numbing medications to help with any discomfort during the procedure.  Not Applicable      For patients 85 or older we recommend having an adult stay w/ them for the remainder of the day.       Does the patient have any questions?  NO  Elif Mast  [facility]

## 2021-03-15 NOTE — TELEPHONE ENCOUNTER
Pt calling to state she had a typhoid injection on 3/13. Informed pt we do have to reschedule due to having a vaccination. Pt states she wants to speak to a nurse because this was not the live vaccine. Pt states if nursing says she can't have the injections, she wants to speak with Dr. Garrido, because Dr. Garrido knows she needs this done before her trip.    Mirela TYSON    [facility]

## 2021-03-15 NOTE — TELEPHONE ENCOUNTER
Spoke with Dr Garrido. VO: okay for patient to still come in on 03/17/2021 for her injection even thought she received the typhoid injection on 03/13/2021.  VIET Heart RN.    Called Paige. Informed her it is okay with Dr Garrido to come in on 03/17/2021 for her injection even thought she had a vaccine.      Dara Heart RN  Care Coordinator  [facility]

## 2021-03-17 ENCOUNTER — RADIOLOGY INJECTION OFFICE VISIT (OUTPATIENT)
Dept: PALLIATIVE MEDICINE | Facility: CLINIC | Age: 51
End: 2021-03-17
Attending: ANESTHESIOLOGY
Payer: COMMERCIAL

## 2021-03-17 VITALS — SYSTOLIC BLOOD PRESSURE: 99 MMHG | OXYGEN SATURATION: 96 % | HEART RATE: 82 BPM | DIASTOLIC BLOOD PRESSURE: 60 MMHG

## 2021-03-17 DIAGNOSIS — M47.816 FACET ARTHROPATHY, LUMBAR: ICD-10-CM

## 2021-03-17 DIAGNOSIS — M47.817 FACET ARTHROPATHY, LUMBOSACRAL: Primary | ICD-10-CM

## 2021-03-17 DIAGNOSIS — M53.3 SACROILIAC JOINT PAIN: ICD-10-CM

## 2021-03-17 PROCEDURE — 64493 INJ PARAVERT F JNT L/S 1 LEV: CPT | Mod: RT | Performed by: ANESTHESIOLOGY

## 2021-03-17 NOTE — PROGRESS NOTES
[facility] - Procedure Note    Date of Visit: 3/17/2021    Pre procedure Diagnosis: Lumbar facet arthropathy   Post procedure Diagnosis: Same  Procedure performed: RIGHT L5-S1 facet joint injections  Anesthesia: none  Complications: none  Operators: Yvonne Garrido MD & James Anderson MD (pain fellow)     Indications:   Paige Fajardo is a 50 year old female was sent by myself for lumbar facet joint injection.  They have a history of right sided low back pain without radiation to the leg.  Exam shows TTP over the right L5-S1 facet joint and they have tried conservative treatment including previous injections, PT and medications.    Options/alternatives, benefits and risks were discussed with the patient including bleeding, infection, flared pain, tissue trauma, exposure to radiation, reaction to medications including seizure, spinal cord injury, paralysis, weakness, numbness and headache.    Questions were answered to her satisfaction and she agrees to proceed. Voluntary informed consent was obtained and signed.     Vitals were reviewed: Yes  Allergies were reviewed:  Yes   Medications were reviewed:  Yes   Pre-procedure pain score: 3/10    LUMBAR MRI:        Procedure:  After getting informed consent, patient was brought into the procedure suite and was placed in a prone position on the procedure table.   A Pause for the Cause was performed.  Patient was prepped and draped in sterile fashion.     Under AP fluoroscopic guidance the L5-S1 facet joints on the RIGHT side were identified, and the C-arm was rotated obliquely to the affected side to open the joint space. A total of 9 ml of 1% lidocaine was injected at the needle entry point and needle tract. Then a 22 gauge 9 inch quincke type spinal needle was inserted and advanced under fluoroscopic guidance targeting the superior articular pillar of each joint. Once the needle made a contact with SAP, it was rotated and was then advanced into  the joint.    AP fluoroscopic views were obtained to confirm the needle placement. Then,  Omnipaque 300 contrast dye was injected after negative aspiration for heme and CSF in each joint, confirming appropriate placement.  A total of 0.5mL of Omnipaque was used and 9.5mL was wasted.    The injection was then accomplished using a solution containing 1ml of 0.5% bupivacaine mixed with 1ml of 1% Lidocaine and 40mg of kenolog. The needles were removed..     Hemostasis was achieved, the area was cleaned, and bandaids were placed when appropriate.  The patient tolerated the procedure well, and was taken to the recovery room.    Images were saved to PACS.    Post-procedure pain score: 0/10  Follow-up includes:   -f/u phone call in one week  -f/u with the referring provider    NYDIA HAUSER MD   Pain Management & Addiction Medicine

## 2021-03-17 NOTE — NURSING NOTE
Discharge Information    IV Discontiued Time:  NA    Amount of Fluid Infused:  NA    Discharge Criteria = When patient returns to baseline or as per MD order    Consciousness:  Pt is fully awake    Circulation:  BP +/- 20% of pre-procedure level    Respiration:  Patient is able to breathe deeply    O2 Sat:  Patient is able to maintain O2 Sat >92% on room air    Activity:  Moves 4 extremities on command    Ambulation:  Patient is able to stand and walk or stand and pivot into wheelchair    Dressing:  Clean/dry or No Dressing    Notes:   Discharge instructions and AVS given to patient    Patient meets criteria for discharge?  YES    Admitted to PCU?  No    Responsible adult present to accompany patient home?  Yes    Signature/Title:    Dara Heart RN Care Coordinator  [facility] [place]

## 2021-03-17 NOTE — PATIENT INSTRUCTIONS
[facility] Procedure Discharge Instructions    Today you saw:   Dr. Yvonne Garrido      Your procedure:  Facet joint injection      Medications used:  Lidocaine (anesthetic)  Bupivacaine (anesthetic)   Triamcinolone (steroid)  Omnipaque (contrast)              Be cautious when walking as numbness and/or weakness in the legs may occur up to 6-8 hours after the procedure due to effect of the local anesthetic    Do not drive for 6 hours. The effect of the local anesthetic could slow your reflexes.     Avoid strenuous activity for the first 24 hours. You may resume your regular activities after that.     You may shower, however avoid swimming, tub baths or hot tubs for 24 hours following your procedure    You may have a mild to moderate increase in pain for several days following the injection.      You may use ice packs for 10-15 minutes, 3 to 4 times a day at the injection site for comfort    Do not use heat to painful areas for 6 to 8 hours. This will give the local anesthetic time to wear off and prevent you from accidentally burning your skin.    Unless you have been directed to avoid the use of anti-inflammatory medications (NSAIDS-ibuprofen, Aleve, Motrin), you may use these medications or Tylenol for pain control if needed.     With diabetes, check your blood sugar more frequently than usual as your blood sugar may be higher than normal for 10-14 days following a steroid injection. Contact your doctor who manages your diabetes if your blood sugar is higher than usual    Possible side effects of steroids that you may experience include flushing, elevated blood pressure, increased appetite, mild headaches and restlessness.  All of these symptoms will get better with time.    It may take up to 14 days for the steroid medication to start working although you may feel the effect as early as a few days after the procedure.     Follow up with your referring provider in 2-3 weeks      If you experience  any of the following, call the pain center line during work hours at [phone] or on-call physician after hours at [phone]:  -Fever over 100 degree F  -Swelling, bleeding, redness, drainage, warmth at the injection site  -Progressive weakness or numbness in your legs   -Loss of bowel or bladder function  -Unusual headache that is not relieved by Tylenol or your regular headache medication  -Unusual new onset of pain that is not improving

## 2021-05-07 NOTE — LETTER
My Asthma Action Plan  Name: Paige Le   YOB: 1970  Date: 2/14/2018   My doctor: Emmy White MD   My clinic: [facility]        My Control Medicine: none  My Rescue Medicine: Albuterol/ipratroprium  (Duoneb) nebulizer solution every 4 hours  My Oral Steroid Medicine: prednisone 40 mg x 5 days My Asthma Severity: moderate persistent  Avoid your asthma triggers: upper respiratory infections, allergies               GREEN ZONE   Good Control    I feel good    No cough or wheeze    Can work, sleep and play without asthma symptoms       Take your asthma control medicine every day.     1. If exercise triggers your asthma, take your rescue medication    15 minutes before exercise or sports, and    During exercise if you have asthma symptoms  2. Spacer to use with inhaler: If you have a spacer, make sure to use it with your inhaler             YELLOW ZONE Getting Worse  I have ANY of these:    I do not feel good    Cough or wheeze    Chest feels tight    Wake up at night   1. Keep taking your Green Zone medications  2. Start taking your rescue medicine:    every 20 minutes for up to 1 hour. Then every 4 hours for 24-48 hours.  3. If you stay in the Yellow Zone for more than 12-24 hours, contact your doctor.  4. If you do not return to the Green Zone in 12-24 hours or you get worse, start taking your oral steroid medicine if prescribed by your provider.           RED ZONE Medical Alert - Get Help  I have ANY of these:    I feel awful    Medicine is not helping    Breathing getting harder    Trouble walking or talking    Nose opens wide to breathe       1. Take your rescue medicine NOW  2. If your provider has prescribed an oral steroid medicine, start taking it NOW  3. Call your doctor NOW  4. If you are still in the Red Zone after 20 minutes and you have not reached your doctor:    Take your rescue medicine again and    Call 911 or go to the emergency room right away    See  [___ inches] : [unfilled] inches your regular doctor within 2 weeks of an Emergency Room or Urgent Care visit for follow-up treatment.        Electronically signed by: Emmy White, 9/13/2018     Annual Reminders:  Meet with Asthma Educator,  Flu Shot in the Fall, consider Pneumonia Vaccination for patients with asthma (aged 19 and older).    Pharmacy:       [facility] [place], [place] - [address] [place]                      Asthma Triggers  How To Control Things That Make Your Asthma Worse    Triggers are things that make your asthma worse.  Look at the list below to help you find your triggers and what you can do about them.  You can help prevent asthma flare-ups by staying away from your triggers.      Trigger                                                          What you can do   Cigarette Smoke  Tobacco smoke can make asthma worse. Do not allow smoking in your home, car or around you.  Be sure no one smokes at a child s day care or school.  If you smoke, ask your health care provider for ways to help you quit.  Ask family members to quit too.  Ask your health care provider for a referral to Quit Plan to help you quit smoking, or call 6-609-928PLAN.     Colds, Flu, Bronchitis  These are common triggers of asthma. Wash your hands often.  Don t touch your eyes, nose or mouth.  Get a flu shot every year.     Dust Mites  These are tiny bugs that live in cloth or carpet. They are too small to see. Wash sheets and blankets in hot water every week.   Encase pillows and mattress in dust mite proof covers.  Avoid having carpet if you can. If you have carpet, vacuum weekly.   Use a dust mask and HEPA vacuum.   Pollen and Outdoor Mold  Some people are allergic to trees, grass, or weed pollen, or molds. Try to keep your windows closed.  Limit time out doors when pollen count is high.   Ask you health care provider about taking medicine during allergy season.     Animal Dander  Some people are allergic to  [___ cm] : [unfilled] centimeters skin flakes, urine or saliva from pets with fur or feathers. Keep pets with fur or feathers out of your home.    If you can t keep the pet outdoors, then keep the pet out of your bedroom.  Keep the bedroom door closed.  Keep pets off cloth furniture and away from stuffed toys.     Mice, Rats, and Cockroaches  Some people are allergic to the waste from these pests.   Cover food and garbage.  Clean up spills and food crumbs.  Store grease in the refrigerator.   Keep food out of the bedroom.   Indoor Mold  This can be a trigger if your home has high moisture. Fix leaking faucets, pipes, or other sources of water.   Clean moldy surfaces.  Dehumidify basement if it is damp and smelly.   Smoke, Strong Odors, and Sprays  These can reduce air quality. Stay away from strong odors and sprays, such as perfume, powder, hair spray, paints, smoke incense, paint, cleaning products, candles and new carpet.   Exercise or Sports  Some people with asthma have this trigger. Be active!  Ask your doctor about taking medicine before sports or exercise to prevent symptoms.    Warm up for 5-10 minutes before and after sports or exercise.     Other Triggers of Asthma  Cold air:  Cover your nose and mouth with a scarf.  Sometimes laughing or crying can be a trigger.  Some medicines and food can trigger asthma.      [de-identified] : 33 years old, healthy [FreeTextEntry1] : 34 years old, healthy [FreeTextEntry5] : 14 [FreeTextEntry2] : 11 month old sister, Aj-heart murmur

## 2021-06-01 ENCOUNTER — TELEPHONE (OUTPATIENT)
Dept: PALLIATIVE MEDICINE | Facility: CLINIC | Age: 51
End: 2021-06-01

## 2021-06-01 DIAGNOSIS — M47.817 FACET ARTHRITIS OF LUMBOSACRAL REGION: Primary | ICD-10-CM

## 2021-06-01 NOTE — TELEPHONE ENCOUNTER
Called Paige.  On 03/17/2021 she had the RIGHT L5-S1 facet joint injections. Pt is wanting injections repeated again. She had pain relief for about 2 months.  Gissell has started to come back over the last month.  Pain is to the right lower back area. Spasms.  Pain does not go down the leg.  This is her usual pain, not new pain.  She is hoping to get in this Friday.    Routing to provider for orders    Dara Heart RN  Care Coordinator  [facility]

## 2021-06-01 NOTE — TELEPHONE ENCOUNTER
Reason for call:  Order   Order or referral being requested: Repeat RIGHT L5-S1 facet joint injections  Reason for request: N/A  Date needed: as soon as possible  Has the patient been seen by the PCP for this problem? YES    Additional comments: none    Phone number to reach patient:  Cell number on file:    Telephone Information:   Mobile [phone]       Best Time:  anytime    Can we leave a detailed message on this number?  YES    Travel screening: Not Applicable     Mirela TYSON    [facility]

## 2021-06-02 NOTE — TELEPHONE ENCOUNTER
Screening Questions for Radiology Injections:    Injection to be done at which interventional clinic site? [facility]    If [facility] location, tell patient that this procedure requires a COVID-19 lab test be done within 4 days of the procedure. Would you still like to move forward with scheduling the procedure?  Not Applicable   If YES, let patient know that someone will call them to schedule the COVID-19 test and that they will only receive a call back if the result is positive. Route to nursing to enter order.     Instruct patient to arrive as directed prior to the scheduled appointment time:    [place] minutes before      Irma: 30 minutes before; if IV needed 1 hour before     Procedure ordered by Hema    Procedure ordered? RIGHT L5-S1 facet joint injection      Transforaminal Cervical DEA - no pain provider currently performing    As a reminder, receiving steroids can decrease your body's ability to fight infection.   Would you still like to move forward with scheduling the injection?  Yes    What insurance would patient like us to bill for this procedure? [employer]      Worker's comp or MVA (motor vehicle accident) -Any injection DO NOT SCHEDULE and route to Paulina Samson.      [employer] insurance - For SI joint injections, DO NOT SCHEDULE and route Paulina Samson.       ALL BCBS, [employer] and [employer]-Route to Paulina for review DO NOT SCHEDULE      IF SCHEDULING IN [place] AND NEEDS A PA, IT IS OKAY TO SCHEDULE. [place] HANDLES THEIR OWN PA'S AFTER THE PATIENT IS SCHEDULED. PLEASE SCHEDULE AT LEAST 1 WEEK OUT SO A PA CAN BE OBTAINED.    Any chance of pregnancy? NO   If YES, do NOT schedule and route to RN pool    Is an  needed? No     Patient has a drive home? (mandatory) YES: INFORMED    Is patient taking any blood thinners (i.e. plavix, coumadin, jantoven, warfarin, heparin, pradaxa or dabigatran, etc)? No   If hold needed, do NOT schedule, route to RN  pool     Is patient taking any aspirin products (includes Excedrin and Fiorinal)? No     If more than 325mg/day, OK to schedule; Instruct pt to decrease to less than 325 mg for 7 days AND route to RN pool    For CERVICAL procedures, hold all aspirin products for 6 days.     Tell pt that if aspirin product is not held for 6 days, the procedure WILL BE cancelled.      Does the patient have a bleeding or clotting disorder? No     If YES, okay to schedule AND route to RN nurse pool    For any patients with platelet count <100, must be forwarded to provider    Any allergies to contrast dye, iodine, shellfish, or numbing and steroid medications? No    If YES, add allergy information to appointment notes AND route to the RN pool     If DEA and Contrast Dye / Iodine Allergy? DO NOT SCHEDULE, route to RN pool    Allergies: Augmentin, Codeine, and Dairy products [milk protein extract]     Is patient diabetic?  No  If YES, instruct them to bring their glucometer.    Does patient have an active infection or treated for one within the past week? No     Is patient currently taking any antibiotics?  No     For patients on chronic, preventative, or prophylactic antibiotics, procedures may be scheduled.     For patients on antibiotics for active or recent infection:antibiotic course must have been completed for 4 days    Is patient currently taking any steroid medications? (i.e. Prednisone, Medrol)  No     For patients on steroid medications, course must have been completed for 4 days    Is patient actively being treated for cancer or immunocompromised? No  If YES, do NOT schedule and route to RN pool     Are you able to get on and off an exam table with minimal or no assistance? Yes  If NO, do NOT schedule and route to RN pool    Are you able to roll over and lay on your stomach with minimal or no assistance? Yes  If NO, do NOT schedule and route to RN pool     Has the patient had a flu shot or any other vaccinations within 7 days  before or after the procedure.  No     Have you recently had a COVID vaccine or have plans to get it in the near future? No    If yes, explain that for the vaccine to work best they need to:       wait 1 week before and 1 week after getting Vaccine #1    wait 1 week before and 2 weeks after getting Vaccine #2    If patient has concerns about the timing, send to RN pool     Does patient have an MRI/CT?  YES: 2021  Check Procedure Scheduling Grid to see if required.      Was the MRI done within the last 3 years?  Yes    If yes, where was the MRI done i.e.[facility], [facility], [place], [facility] etc? [facility]      If no, do not schedule and route to RN pool    If MRI was not done at [place], [facility] or [facility] do NOT schedule and route to RN pool.      If pt has an imaging disc, the injection MAY be scheduled but pt has to bring disc to appt.     If they show up without the disc the injection cannot be done    Procedure Specific Instructions:      If celiac plexus block, informed patient NPO for 6 hours and that it is okay to take medications with sips of water, especially blood pressure medications  Not Applicable         If this is for a cervical procedure, informed patient that aspirin needs to be held for 6 days.   Not Applicable      If IV needed:    Do not schedule procedures requiring IV placement in the first appointment of the day or first appointment after lunch. Do NOT schedule at 0745, 0815 or 1245.     Instructed pt to arrive 30 minutes early for IV start if required. (Check Procedure Scheduling Grid)  Not Applicable    Reminders:      If you are started on any steroids or antibiotics between now and your appointment, you must contact us because the procedure may need to be cancelled.  Yes      For all procedures except radiofrequency ablations (RFAs) and spinal cord stimulator (SCS) trials, informed patient:    IV sedation is not provided for this procedure.  If you feel that an oral  anti-anxiety medication is needed, you can discuss this further with your referring provider or primary care provider.  The Pain Clinic provider will discuss specifics of what the procedure includes at your appointment.  Most procedures last 10-20 minutes.  We use numbing medications to help with any discomfort during the procedure.  Not Applicable      For patients 85 or older we recommend having an adult stay w/ them for the remainder of the day.       Does the patient have any questions?  NO  Mirela Howell  [facility]

## 2021-06-03 NOTE — PROGRESS NOTES
[facility] - Procedure Note    Date of Visit: 6/4/2021    Pre procedure Diagnosis: Lumbar facet arthropathy   Post procedure Diagnosis: Same  Procedure performed: RIGHT L5-S1 facet joint injections  Anesthesia: none  Complications: none  Operators: Yvonne Garrido MD     Indications:   Paige Fajardo is a 50 year old female was sent by myself for lumbar facet joint injection.  They have a history of right sided low back pain without radiation to the leg.  Exam shows TTP over the right L5-S1 facet joint and they have tried conservative treatment including previous injections, PT and medications.    This is a repeat injection.  Previous RIGHT L5-S1 facet joint injection done by myself on 3/17/2021 provided good pain relief for a period of 2 months.      Options/alternatives, benefits and risks were discussed with the patient including bleeding, infection, flared pain, tissue trauma, exposure to radiation, reaction to medications including seizure, spinal cord injury, paralysis, weakness, numbness and headache.    Questions were answered to her satisfaction and she agrees to proceed. Voluntary informed consent was obtained and signed.     Vitals were reviewed: Yes  Allergies were reviewed:  Yes   Medications were reviewed:  Yes   Pre-procedure pain score: 3/10    LUMBAR MRI:        Procedure:  After getting informed consent, patient was brought into the procedure suite and was placed in a prone position on the procedure table.   A Pause for the Cause was performed.  Patient was prepped and draped in sterile fashion.     Under AP fluoroscopic guidance the L5-S1 facet joints on the RIGHT side were identified, and the C-arm was rotated obliquely to the affected side to open the joint space. A total of 2 ml of 1% lidocaine was injected at the needle entry point and needle tract. Then a 22 gauge 9 inch quincke type spinal needle was inserted and advanced under fluoroscopic guidance targeting the  superior articular pillar. Once the needle made a contact with SAP, it was rotated and was then advanced into the joint.    AP fluoroscopic views were obtained to confirm the needle placement. Then,  Omnipaque 300 contrast dye was injected after negative aspiration for heme and CSF in each joint, confirming appropriate placement.  A total of 0.5mL of Omnipaque was used and 9.5mL was wasted.    The injection was then accomplished using a solution containing 1ml of 0.5% bupivacaine mixed with 1ml of 1% Lidocaine and 40mg of kenolog. The needles were removed..     Hemostasis was achieved, the area was cleaned, and bandaids were placed when appropriate.  The patient tolerated the procedure well, and was taken to the recovery room.    Images were saved to PACS.    Post-procedure pain score: 0/10  Follow-up includes:   -f/u phone call in one week  -f/u with the referring provider    NYDIA HAUSER MD   Pain Management & Addiction Medicine

## 2021-06-04 ENCOUNTER — RADIOLOGY INJECTION OFFICE VISIT (OUTPATIENT)
Dept: PALLIATIVE MEDICINE | Facility: CLINIC | Age: 51
End: 2021-06-04
Payer: COMMERCIAL

## 2021-06-04 VITALS — DIASTOLIC BLOOD PRESSURE: 82 MMHG | HEART RATE: 62 BPM | SYSTOLIC BLOOD PRESSURE: 131 MMHG | OXYGEN SATURATION: 98 %

## 2021-06-04 DIAGNOSIS — M47.816 FACET ARTHROPATHY, LUMBAR: Primary | ICD-10-CM

## 2021-06-04 PROCEDURE — 64483 NJX AA&/STRD TFRM EPI L/S 1: CPT | Mod: RT | Performed by: ANESTHESIOLOGY

## 2021-06-04 NOTE — NURSING NOTE
Pre-procedure Intake    Have you been fasting? No NA    If yes, for how long? NA    Are you taking a prescribed blood thinner such as coumadin, Plavix, Xarelto?    No    If yes, when did you take your last dose? NA    Do you take aspirin?  No    If cervical procedure, have you held aspirin for 6 days?   NA    Do you have any allergies to contrast dye, iodine, steroid and/or numbing medications?  NO    Are you currently taking antibiotics or have an active infection?  NO    Have you had a fever/elevated temperature within the past week? NO    Are you currently taking oral steroids? NO    Do you have a ? Yes       Are you pregnant or breastfeeding?  NO    Have you received the COVID-19 vaccine? Yes       If yes, was it your 1st, 2nd or only dose needed? Both    Date of most recent vaccine: Jan 2021    Notify provider and RNs if systolic BP >170, diastolic BP >100, P >100 or O2 sats < 90%

## 2021-06-04 NOTE — PATIENT INSTRUCTIONS
[facility] Procedure Discharge Instructions    Today you saw:   Dr. Yvonne Garrido      Your procedure:  Facet joint injection      Medications used:  Lidocaine (anesthetic)  Bupivacaine (anesthetic)   Triamcinolone  (steroid)  Omnipaque (contrast)                  Be cautious when walking as numbness and/or weakness in the legs may occur up to 6-8 hours after the procedure due to effect of the local anesthetic    Do not drive for 6 hours. The effect of the local anesthetic could slow your reflexes.     Avoid strenuous activity for the first 24 hours. You may resume your regular activities after that.     You may shower, however avoid swimming, tub baths or hot tubs for 24 hours following your procedure    You may have a mild to moderate increase in pain for several days following the injection.      You may use ice packs for 10-15 minutes, 3 to 4 times a day at the injection site for comfort    Do not use heat to painful areas for 6 to 8 hours. This will give the local anesthetic time to wear off and prevent you from accidentally burning your skin.    Unless you have been directed to avoid the use of anti-inflammatory medications (NSAIDS-ibuprofen, Aleve, Motrin), you may use these medications or Tylenol for pain control if needed.     With diabetes, check your blood sugar more frequently than usual as your blood sugar may be higher than normal for 10-14 days following a steroid injection. Contact your doctor who manages your diabetes if your blood sugar is higher than usual    Possible side effects of steroids that you may experience include flushing, elevated blood pressure, increased appetite, mild headaches and restlessness.  All of these symptoms will get better with time.    It may take up to 14 days for the steroid medication to start working although you may feel the effect as early as a few days after the procedure.     Follow up with your referring provider in 2-3 weeks      If you  experience any of the following, call the pain center line during work hours at [phone] or on-call physician after hours at [phone]:  -Fever over 100 degree F  -Swelling, bleeding, redness, drainage, warmth at the injection site  -Progressive weakness or numbness in your legs   -Loss of bowel or bladder function  -Unusual headache that is not relieved by Tylenol or your regular headache medication  -Unusual new onset of pain that is not improving

## 2021-06-04 NOTE — NURSING NOTE
Discharge Information    IV Discontiued Time:  NA    Amount of Fluid Infused:  NA    Discharge Criteria = When patient returns to baseline or as per MD order    Consciousness:  Pt is fully awake    Circulation:  BP +/- 20% of pre-procedure level    Respiration:  Patient is able to breathe deeply    O2 Sat:  Patient is able to maintain O2 Sat >92% on room air    Activity:  Moves 4 extremities on command    Ambulation:  Patient is able to stand and walk or stand and pivot into wheelchair    Dressing:  Clean/dry or No Dressing    Notes:   Discharge instructions and AVS given to patient    Patient meets criteria for discharge?  YES    Admitted to PCU?  No    Responsible adult present to accompany patient home?  Yes    Signature/Title:    Dara Heart RN Care Coordinator  [facility] [place]

## 2021-06-29 ENCOUNTER — TRANSCRIBE ORDERS (OUTPATIENT)
Dept: OTHER | Age: 51
End: 2021-06-29

## 2021-06-29 DIAGNOSIS — M25.552 HIP PAIN, LEFT: Primary | ICD-10-CM

## 2021-06-30 ENCOUNTER — TELEPHONE (OUTPATIENT)
Dept: PALLIATIVE MEDICINE | Facility: CLINIC | Age: 51
End: 2021-06-30

## 2021-06-30 NOTE — TELEPHONE ENCOUNTER
Called Paige.  She would like to know if Dr Leger can do Hip injection under fluoroscopy.  Advised yes she does do hip injection under fluoroscopy.  Her PCP placed the order for that to be done and they drive a long distance so they just wanted to make sure Dr Garrido did that type of injection so it would not be a wasted trip in.  Since the order was just placed yesterday our scheduling department should be calling her to get this set up. If she does not hear from them, she will call [phone] to get it scheduled.    Dara Heart RN  Care Coordinator  [facility]

## 2021-06-30 NOTE — TELEPHONE ENCOUNTER
Pt wants to have a conversation with Yvonne ARMENTA or nurse to see if she does a specific Hip injection.      Johnathon LLANOS    [facility] [place]

## 2021-07-01 NOTE — TELEPHONE ENCOUNTER
Screening Questions for Radiology Injections:    Injection to be done at which interventional clinic site? [facility]    If [facility] location, tell patient that this procedure requires a COVID-19 lab test be done within 4 days of the procedure. Would you still like to move forward with scheduling the procedure?  Not Applicable   If YES, let patient know that someone will call them to schedule the COVID-19 test and that they will only receive a call back if the result is positive. Route to nursing to enter order.     Instruct patient to arrive as directed prior to the scheduled appointment time:    [place] minutes before      Irma: 30 minutes before; if IV needed 1 hour before     Procedure ordered by [place]     Procedure ordered? Left hip joint injection       Transforaminal Cervical DEA - no pain provider currently performing    As a reminder, receiving steroids can decrease your body's ability to fight infection.   Would you still like to move forward with scheduling the injection?  Yes    What insurance would patient like us to bill for this procedure? [employer]       Worker's comp or MVA (motor vehicle accident) -Any injection DO NOT SCHEDULE and route to Paulina Samson.      [employer] insurance - For SI joint injections, DO NOT SCHEDULE and route Paulina Samson.       ALL BCBS, [employer] and [employer]-Route to Paulina for review DO NOT SCHEDULE      IF SCHEDULING IN [place] AND NEEDS A PA, IT IS OKAY TO SCHEDULE. [place] HANDLES THEIR OWN PA'S AFTER THE PATIENT IS SCHEDULED. PLEASE SCHEDULE AT LEAST 1 WEEK OUT SO A PA CAN BE OBTAINED.    Any chance of pregnancy? NO   If YES, do NOT schedule and route to RN [place]    Is an  needed? No     Patient has a drive home? (mandatory) Not Applicable    Is patient taking any blood thinners (i.e. plavix, coumadin, jantoven, warfarin, heparin, pradaxa or dabigatran, etc)? No   If hold needed, do NOT schedule, route to RN pool     Is  patient taking any aspirin products (includes Excedrin and Fiorinal)?NO     If more than 325mg/day, OK to schedule; Instruct pt to decrease to less than 325 mg for 7 days AND route to RN pool    For CERVICAL procedures, hold all aspirin products for 6 days.     Tell pt that if aspirin product is not held for 6 days, the procedure WILL BE cancelled.      Does the patient have a bleeding or clotting disorder? No     If YES, okay to schedule AND route to RN nurse pool    For any patients with platelet count <100, must be forwarded to provider    Any allergies to contrast dye, iodine, shellfish, or numbing and steroid medications? No    If YES, add allergy information to appointment notes AND route to the RN pool     If DEA and Contrast Dye / Iodine Allergy? DO NOT SCHEDULE, route to RN pool    Allergies: Augmentin, Codeine, and Dairy products [milk protein extract]     Is patient diabetic?  No  If YES, instruct them to bring their glucometer.    Does patient have an active infection or treated for one within the past week? No     Is patient currently taking any antibiotics?  No     For patients on chronic, preventative, or prophylactic antibiotics, procedures may be scheduled.     For patients on antibiotics for active or recent infection:antibiotic course must have been completed for 4 days    Is patient currently taking any steroid medications? (i.e. Prednisone, Medrol)  No     For patients on steroid medications, course must have been completed for 4 days    Is patient actively being treated for cancer or immunocompromised? No  If YES, do NOT schedule and route to RN pool     Are you able to get on and off an exam table with minimal or no assistance? Yes  If NO, do NOT schedule and route to RN pool    Are you able to roll over and lay on your stomach with minimal or no assistance? Yes  If NO, do NOT schedule and route to RN pool     Has the patient had a flu shot or any other vaccinations within 7 days before or  after the procedure.  No     Have you recently had a COVID vaccine or have plans to get it in the near future? No    If yes, explain that for the vaccine to work best they need to:       wait 1 week before and 1 week after getting Vaccine #1    wait 1 week before and 2 weeks after getting Vaccine #2    If patient has concerns about the timing, send to RN pool     Does patient have an MRI/CT?  Not Applicable  Check Procedure Scheduling Grid to see if required.      Was the MRI done within the last 3 years?  NA    If yes, where was the MRI done i.e.[facility], [facility], [place], [facility] etc?       If no, do not schedule and route to RN pool    If MRI was not done at [place], [facility] or [facility] do NOT schedule and route to RN pool.      If pt has an imaging disc, the injection MAY be scheduled but pt has to bring disc to appt.     If they show up without the disc the injection cannot be done    Procedure Specific Instructions:      If celiac plexus block, informed patient NPO for 6 hours and that it is okay to take medications with sips of water, especially blood pressure medications  Not Applicable         If this is for a cervical procedure, informed patient that aspirin needs to be held for 6 days.   Not Applicable      If IV needed:    Do not schedule procedures requiring IV placement in the first appointment of the day or first appointment after lunch. Do NOT schedule at 0745, 0815 or 1245.     Instructed pt to arrive 30 minutes early for IV start if required. (Check Procedure Scheduling Grid)  Not Applicable    Reminders:      If you are started on any steroids or antibiotics between now and your appointment, you must contact us because the procedure may need to be cancelled.  No      For all procedures except radiofrequency ablations (RFAs) and spinal cord stimulator (SCS) trials, informed patient:    IV sedation is not provided for this procedure.  If you feel that an oral anti-anxiety  medication is needed, you can discuss this further with your referring provider or primary care provider.  The Pain Clinic provider will discuss specifics of what the procedure includes at your appointment.  Most procedures last 10-20 minutes.  We use numbing medications to help with any discomfort during the procedure.  Not Applicable      For patients 85 or older we recommend having an adult stay w/ them for the remainder of the day.       Does the patient have any questions?  NO  Elif Mast  [facility]

## 2021-07-12 NOTE — PROGRESS NOTES
[facility] - Procedure Note    Date of Service: 7/14/2021    Pre procedure Diagnosis: hip arthropathy   Post procedure Diagnosis: Same  Procedure performed: LEFT hip injection  Anesthesia: none  Operators: Yvonne Garrido MD     Indications:   Paige Fajardo is a 50 year old female was sent by Dr. Francesca Samson for a LEFT hip joint injection.  They have a history of hip pain and arthropathy. The patient describes left sided hip pain and clicking noise when she moves it. They have tried conservative treatment including PT and medications.    S/P RIGHT L5-S1 facet joint injections 6/4/2021 & 3/17/2021    Allergies:      Allergies   Allergen Reactions     Augmentin      reacted to Augmentin--  stomach upset -- can take penicillin     Codeine Swelling     Dairy Products [Milk Protein Extract] Difficulty breathing     Products with milk cause congestion, sinus mucous, difficulty breathing        Vitals:  BP (!) 122/92 (BP Location: Left arm)   Pulse 68   SpO2 98%     Medications were reviewed.    Procedure:  Options/alternatives, benefits and risks were discussed with the patient. Risks include but are not limited to: infection, bleeding, increased pain, and damage to soft tissue, nerve, muscle, and vasculature structures. After getting informed consent, patient was brought into the procedure suite and was placed in a prone position on the procedure table.   A Pause for the Cause was performed.  Patient was prepped and draped in sterile fashion.   The hip joint was identified using AP fluoroscopy. Care was taken to locate the femoral pulse. A 22 gauge 5 inch spinal needle was advanced under intermittent fluoroscopy until it was felt to enter the hip joint   A total of 1 mL of Omnipaque-300 was injected, showing appropriate location, with spread into the intraarticular space and no intravascular uptake noted. 9 mL of contrast was wasted.  A mixture containing, 4 mL of 1% lidocaine with 40  mg of kenalog was injected. The needle was removed. Hemostasis was achieved, the area was cleaned, and bandaids were placed when appropriate. Images were saved to PACS.  The patient tolerated the procedure well, and was taken to the recovery room, and there was no evidence of procedural complications. No new sensory or motor deficits were noted following the procedure. The patient was stable and able to ambulate on discharge home. Post-procedure instructions were provided.     Pre-procedure pain score: 4/10  Post-procedure pain score: 0/10    Assessment/Plan: Paige Fajardo is a 50 year old female s/p LEFT hip joint injection today for hip pain and arthropathy.     1. Following today's procedure, the patient was advised to contact the Pain Management Center for any of the following:   Fever, chills, or night sweats   New onset of pain, numbness, or weakness   Any questions/concerns regarding the procedure  If unable to contact the Pain Center, the patient was instructed to go to a local Emergency Room for any complications.   2. The patient should follow-up with the referring provider for post-procedure evaluation.    Ordered a bilateral L3,4,5 medial branch block to RFA as last facet joint injections have already worn off.  She is no longer getting sustained pain relief from these injections.       NYDIA HAUSER MD   Pain Management & Addiction Medicine

## 2021-07-14 ENCOUNTER — RADIOLOGY INJECTION OFFICE VISIT (OUTPATIENT)
Dept: PALLIATIVE MEDICINE | Facility: CLINIC | Age: 51
End: 2021-07-14
Payer: COMMERCIAL

## 2021-07-14 VITALS — OXYGEN SATURATION: 98 % | HEART RATE: 68 BPM | DIASTOLIC BLOOD PRESSURE: 92 MMHG | SYSTOLIC BLOOD PRESSURE: 122 MMHG

## 2021-07-14 DIAGNOSIS — G89.29 CHRONIC LEFT HIP PAIN: Primary | ICD-10-CM

## 2021-07-14 DIAGNOSIS — M47.816 LUMBAR FACET ARTHROPATHY: ICD-10-CM

## 2021-07-14 DIAGNOSIS — M25.552 CHRONIC LEFT HIP PAIN: Primary | ICD-10-CM

## 2021-07-14 PROCEDURE — 20610 DRAIN/INJ JOINT/BURSA W/O US: CPT | Mod: LT | Performed by: ANESTHESIOLOGY

## 2021-07-14 PROCEDURE — 77002 NEEDLE LOCALIZATION BY XRAY: CPT | Mod: 26 | Performed by: ANESTHESIOLOGY

## 2021-07-14 RX ADMIN — TRIAMCINOLONE ACETONIDE 40 MG: 40 INJECTION, SUSPENSION INTRA-ARTICULAR; INTRAMUSCULAR at 10:00

## 2021-07-14 NOTE — NURSING NOTE
Pre-procedure Intake    Have you been fasting? NA    If yes, for how long?     Are you taking a prescribed blood thinner such as coumadin, Plavix, Xarelto?    No    If yes, when did you take your last dose?     Do you take aspirin?  No    If cervical procedure, have you held aspirin for 6 days?   NA    Do you have any allergies to contrast dye, iodine, steroid and/or numbing medications?  NO    Are you currently taking antibiotics or have an active infection?  NO    Have you had a fever/elevated temperature within the past week? NO    Are you currently taking oral steroids? NO    Do you have a ? No - okayed by provider       Are you pregnant or breastfeeding?  Not Applicable    Have you received the COVID-19 vaccine? Yes       If yes, was it your 1st, 2nd or only dose needed? 2nd    Date of most recent vaccine: 01/2021    Vitals: B/P 126/94    Notify provider and RNs if systolic BP >170, diastolic BP >100, P >100 or O2 sats < 90%      Documented by Cele Arcos MA

## 2021-07-14 NOTE — NURSING NOTE
Discharge Information    IV Discontiued Time:  NA    Amount of Fluid Infused:  NA    Discharge Criteria = When patient returns to baseline or as per MD order    Consciousness:  Pt is fully awake    Circulation:  BP +/- 20% of pre-procedure level    Respiration:  Patient is able to breathe deeply    O2 Sat:  Patient is able to maintain O2 Sat >92% on room air    Activity:  Moves 4 extremities on command    Ambulation:  Patient is able to stand and walk or stand and pivot into wheelchair    Dressing:  Clean/dry or No Dressing    Notes:   Discharge instructions and AVS given to patient    Patient meets criteria for discharge?  YES    Admitted to PCU?  No    Responsible adult present to accompany patient home?  Yes    Signature/Title:    Dara Heart RN Care Coordinator  [facility] [place]

## 2021-07-14 NOTE — PATIENT INSTRUCTIONS
[facility] Procedure Discharge Instructions    Today you saw:   Dr. Yvonne Garrido      Your procedure:   Hip injection      Medications used:  Lidocaine (anesthetic)  Bupivacaine (anesthetic)   Depo 80 (steroid)  Omnipaque (contrast)                 Be cautious when walking as numbness and/or weakness in the legs may occur up to 6-8 hours after the procedure due to effect of the local anesthetic    Do not drive for 6 hours. The effect of the local anesthetic could slow your reflexes.     Avoid strenuous activity for the first 24 hours. You may resume your regular activities after that.     You may shower, however avoid swimming, tub baths or hot tubs for 24 hours following your procedure    You may have a mild to moderate increase in pain for several days following the injection.      You may use ice packs for 10-15 minutes, 3 to 4 times a day at the injection site for comfort    Do not use heat to painful areas for 6 to 8 hours. This will give the local anesthetic time to wear off and prevent you from accidentally burning your skin.    Unless you have been directed to avoid the use of anti-inflammatory medications (NSAIDS-ibuprofen, Aleve, Motrin), you may use these medications or Tylenol for pain control if needed.     With diabetes, check your blood sugar more frequently than usual as your blood sugar may be higher than normal for 10-14 days following a steroid injection. Contact your doctor who manages your diabetes if your blood sugar is higher than usual    Possible side effects of steroids that you may experience include flushing, elevated blood pressure, increased appetite, mild headaches and restlessness.  All of these symptoms will get better with time.    It may take up to 14 days for the steroid medication to start working although you may feel the effect as early as a few days after the procedure.     Follow up with your referring provider in 2-3 weeks   Dr Samson      If you  experience any of the following, call the pain center line during work hours at [phone] or on-call physician after hours at [phone]:  -Fever over 100 degree F  -Swelling, bleeding, redness, drainage, warmth at the injection site  -Progressive weakness or numbness in your leg  -Loss of bowel or bladder function  -Unusual headache that is not relieved by Tylenol or your regular headache medication  -Unusual new onset of pain that is not improving

## 2021-07-15 ENCOUNTER — TELEPHONE (OUTPATIENT)
Dept: PALLIATIVE MEDICINE | Facility: CLINIC | Age: 51
End: 2021-07-15

## 2021-07-15 RX ORDER — TRIAMCINOLONE ACETONIDE 40 MG/ML
40 INJECTION, SUSPENSION INTRA-ARTICULAR; INTRAMUSCULAR ONCE
Status: COMPLETED | OUTPATIENT
Start: 2021-07-14 | End: 2021-07-14

## 2021-07-15 NOTE — TELEPHONE ENCOUNTER
Screening questions for MBB Injections:    Injection to be done at which interventional clinic site? [facility]       Instruct patient to arrive as directed prior to the scheduled appointment time:    [place] and [place]: 30 minutes before      Procedure ordered by Dr. Garrido    Procedure ordered? Lumbar Medial Branch Block    What insurance would patient like us to bill for this procedure? [employer]      [employer]: REQUIRES A PA FOR THE FIRST MBB     Worker's comp- Any injection DO NOT SCHEDULE and route to Paulina Chapin.      [employer] insurance - If scheduling an SI joint injection DO NOT SCHEDULE and route to Paulina Samson.       MBBs must be scheduled with elapsed time interval of at least 2 weeks and not more than 6 months between the First MBB and the Second MBB for insurance purposes       [employer] - Any injection besides hip/shoulder/knee joint DO NOT SCHEDULE and route to Paulina Chapin. She will obtain PA and call pt back to schedule procedure or notify pt of denial.       [employer]- PA required for all MBB's      **BCBS- ALL need to be routed to Paulina for review if a PA is needed**    IF SCHEDULING IN [place] AND NEEDS A PA, IT IS OKAY TO SCHEDULE. [place] HANDLES THEIR OWN PA'S AFTER THE PATIENT IS SCHEDULED. PLEASE SCHEDULE AT LEAST 1 WEEK OUT SO A PA CAN BE OBTAINED.    Any chance of pregnancy? NO   If YES, do NOT schedule and route to RN pool    Is an  needed? No     Patient has a drive home? (mandatory) Yes     Is patient taking any blood thinners (plavix, coumadin, jantoven, warfarin, heparin, pradaxa or dabigatran )? No    If hold needed, do NOT schedule, route to RN pool     Is patient taking any aspirin products? No     If more than 325mg/day, OK to schedule; Instruct pt to decrease to less than 325 mg for 7 days AND route to RN pool    For CERVICAL procedures, hold all aspirin products for 6 days.     Tell pt that if aspirin product is not held for 6 days, the procedure  WILL BE cancelled.      Does the patient have a bleeding or clotting disorder? No    If YES, okay to schedule AND route to RN nurse pool    **For any patients with platelet count <100, must be forwarded to provider**    Is patient diabetic? NO If YES, have them bring their glucometer.    Does patient have an active infection or treated for one within the past week? No    Is patient currently taking any antibiotics?  No    For patients on chronic, preventative, or prophylactic antibiotics, procedures may be scheduled.     For patients on antibiotics for active or recent infection:antibiotic course must have been completed for 4 days    Is patient currently taking any steroid medications? (i.e. Prednisone, Medrol)  No     For patients on steroid medications, course must have been completed for 4 days    Is patient actively being treated for cancer or immunocompromised? No   If YES, do NOT schedule and route to RN    Are you able to get on and off an exam table with minimal or no assistance? Yes   If NO, do NOT schedule and route to RN    Are you able to roll over and lay on your stomach with minimal or no assistance? Yes   If NO, do NOT schedule and route to RN    Any allergies to contrast dye, iodine, shellfish, or numbing and steroid medications? No  (If so, inform nursing and note in scheduling comments.)    Allergies: Augmentin, Codeine, and Dairy products [milk protein extract]     Does patient have an MRI/CT?  YES: 2021  Check Procedure Scheduling Grid to see if required.      Was the MRI done within the last 3 years?  Yes    If yes, where was the MRI done i.e.[facility], [facility], [facility], [facility] etc? [facility]      If no, do not schedule and route to nursing    If MRI was not done at [place], [facility] or [facility] do NOT schedule and route to nursing.      If pt has an imaging disc, the injection MAY be scheduled but pt has to bring disc to appt.     If they show up without the disc the injection  cannot be done      Medial Branch Block Pre-Procedure Instructions    It is okay to take long acting pain medications (if you are on them) the day of the procedure but try not to take any short acting medications unless absolutely necessary.    YES: INFORMED   Long acting meds would include: Gabapentin (Neurontin), MS Contin, Oxycontin        Short acting meds would include:  Percocet, Oxycodone, Vicodin, Ibuprofen     The day of the procedure, you should try to do things that provoke your pain, since the injection is being done to see if it will relieve your pain . YES: INFORMED     If your pain level is a 4 out of 10 or less on the day of the procedu re, please call [phone] to reschedule.  YES: INFORMED     Reminders:      If you are started on any steroids or antibiotics between now and your appointment, you must contact us because it may affect our ability to perform your procedure INFORMED      Instructed pt to arrive 30 minutes early for IV start if required. (Check Procedure Scheduling Grid) INot Applicable       If this is for a cervical MBB aspirin needs to be held for 6 days.  Not Applicable       Do not schedule procedures requiring IV placement in the first appointment of the day or first appointment after lunch. Do NOT schedule at 0745, 0815 or 1245.        For patients 85 or older we recommend having an adult stay w/ them for the remainder of the day.      Does the patient have any questions? VICK TYSON    [facility]

## 2021-09-05 ENCOUNTER — HEALTH MAINTENANCE LETTER (OUTPATIENT)
Age: 51
End: 2021-09-05

## 2021-09-18 ENCOUNTER — NURSE TRIAGE (OUTPATIENT)
Dept: NURSING | Facility: CLINIC | Age: 51
End: 2021-09-18

## 2021-09-18 ENCOUNTER — OFFICE VISIT (OUTPATIENT)
Dept: URGENT CARE | Facility: URGENT CARE | Age: 51
End: 2021-09-18
Payer: COMMERCIAL

## 2021-09-18 VITALS
DIASTOLIC BLOOD PRESSURE: 66 MMHG | TEMPERATURE: 98.2 F | HEART RATE: 75 BPM | OXYGEN SATURATION: 97 % | SYSTOLIC BLOOD PRESSURE: 88 MMHG

## 2021-09-18 DIAGNOSIS — M25.562 ACUTE PAIN OF BOTH KNEES: Primary | ICD-10-CM

## 2021-09-18 DIAGNOSIS — M25.561 ACUTE PAIN OF BOTH KNEES: Primary | ICD-10-CM

## 2021-09-18 LAB
BASOPHILS # BLD AUTO: 0 10E3/UL (ref 0–0.2)
BASOPHILS NFR BLD AUTO: 0 %
EOSINOPHIL # BLD AUTO: 0.1 10E3/UL (ref 0–0.7)
EOSINOPHIL NFR BLD AUTO: 3 %
ERYTHROCYTE [DISTWIDTH] IN BLOOD BY AUTOMATED COUNT: 13.2 % (ref 10–15)
HCT VFR BLD AUTO: 43.7 % (ref 35–47)
HGB BLD-MCNC: 14.4 G/DL (ref 11.7–15.7)
LYMPHOCYTES # BLD AUTO: 1 10E3/UL (ref 0.8–5.3)
LYMPHOCYTES NFR BLD AUTO: 19 %
MCH RBC QN AUTO: 30.1 PG (ref 26.5–33)
MCHC RBC AUTO-ENTMCNC: 33 G/DL (ref 31.5–36.5)
MCV RBC AUTO: 91 FL (ref 78–100)
MONOCYTES # BLD AUTO: 0.5 10E3/UL (ref 0–1.3)
MONOCYTES NFR BLD AUTO: 10 %
NEUTROPHILS # BLD AUTO: 3.5 10E3/UL (ref 1.6–8.3)
NEUTROPHILS NFR BLD AUTO: 68 %
PLATELET # BLD AUTO: 173 10E3/UL (ref 150–450)
RBC # BLD AUTO: 4.78 10E6/UL (ref 3.8–5.2)
WBC # BLD AUTO: 5.2 10E3/UL (ref 4–11)

## 2021-09-18 PROCEDURE — 84550 ASSAY OF BLOOD/URIC ACID: CPT | Performed by: NURSE PRACTITIONER

## 2021-09-18 PROCEDURE — 99213 OFFICE O/P EST LOW 20 MIN: CPT | Performed by: NURSE PRACTITIONER

## 2021-09-18 PROCEDURE — 85025 COMPLETE CBC W/AUTO DIFF WBC: CPT | Performed by: NURSE PRACTITIONER

## 2021-09-18 PROCEDURE — 36415 COLL VENOUS BLD VENIPUNCTURE: CPT | Performed by: NURSE PRACTITIONER

## 2021-09-18 RX ORDER — PREDNISONE 20 MG/1
40 TABLET ORAL DAILY
Qty: 10 TABLET | Refills: 0 | Status: SHIPPED | OUTPATIENT
Start: 2021-09-18 | End: 2021-09-23

## 2021-09-18 NOTE — TELEPHONE ENCOUNTER
Paige woke today with severe knee pain in both knee - Rt > Lt  - Swollen  - Hurts to walk  - Off balance - can't bend knees to walk normally  - Hurts to sit in a chair - Hurts to bend knee but also hurts to have knees fully extended    Pain & swelling are mostly on top of knee    Pain up to 8/10 - Little to no relief with Aleve     Advised to see HCP within 4 hours  PCP is will [facility]  She will go to [facility]    COVID 19 Nurse Triage Plan/Patient Instructions    Please be aware that novel coronavirus (COVID-19) may be circulating in the community. If you develop symptoms such as fever, cough, or SOB or if you have concerns about the presence of another infection including coronavirus (COVID-19), please contact your health care provider or visit [email or web address].     Disposition/Instructions    In-Person Visit with provider recommended. Reference Visit Selection Guide.    Thank you for taking steps to prevent the spread of this virus.  o Limit your contact with others.  o Wear a simple mask to cover your cough.  o Wash your hands well and often.    Resources    [facility]: About COVID-19: [email or web address]    CDC: What to Do If You're Sick: www.cdc.gov/coronavirus/2019-ncov/about/steps-when-sick.html    CDC: Ending Home Isolation: www.cdc.gov/coronavirus/2019-ncov/hcp/disposition-in-home-patients.html     CDC: Caring for Someone: www.cdc.gov/coronavirus/2019-ncov/if-you-are-sick/care-for-someone.html     [facility]: Interim Guidance for Hospital Discharge to Home: www.health.[facility].mn.us/diseases/coronavirus/hcp/hospdischarge.pdf    [facility] clinical trials (COVID-19 research studies): clinicalaffairs.[facility]-clinical-trials     Below are the COVID-19 hotlines at the [facility] ([facility]). Interpreters are available.   o For health questions: Call [phone] or [phone] (7 a.m. to 7 p.m.)  o For questions about schools and childcare: Call  [phone] or 1-751.816.3429 (7 a.m. to 7 p.m.)     Kiara Isabel RN  [facility] Nurse Advisors      Reason for Disposition    [1] SEVERE pain (e.g., excruciating, unable to walk) AND [2] not improved after 2 hours of pain medicine    Additional Information    Negative: Fever    Negative: Patient sounds very sick or weak to the triager    Protocols used: KNEE SWELLING-A-AH

## 2021-09-19 NOTE — PATIENT INSTRUCTIONS
Patient Education     Gout    Gout is an inflammation of a joint caused by an inflammatory response to gout crystals in the joint fluid. This occurs when there is excess uric acid. Uric acid is a normal waste product in the body. It builds up in the body when the kidneys can't filter enough of it from the blood. This may occur with age. It is also associated with kidney disease. Gout occurs more often in people with obesity, diabetes, high blood pressure, or high levels of fats in the blood. It may run in families. Gout tends to come and go. A flare up of gout is called an attack. Drinking alcohol or eating certain foods (such as shellfish or foods with additives such as high-fructose corn syrup) may increase uric acid levels in the blood and cause a gout attack.   During a gout attack, the affected joint may become hot, red, swollen, and painful. If you have had one attack of gout, you are likely to have another. An attack of gout can be treated with medicine. If these attacks become frequent, a daily medicine may be prescribed to help the kidneys remove uric acid from the body.   Home care  During a gout attack:    Contact your healthcare provider for advice and therapy options at the early stages of a gout flare. Treating the flare right away can prevent it from getting worse.    Rest painful joints. If gout affects the joints of your foot or leg, you may want to use crutches for the first few days to keep from bearing weight on the affected joint.    When sitting or lying down, raise the painful joint to a level higher than your heart.    Apply an ice pack (ice cubes in a plastic bag wrapped in a thin towel) over the injured area for 20 minutes every 1 to 2 hours the first day for pain relief. Continue this 3 to 4 times a day for swelling and pain.    Avoid alcohol and foods listed below (see Preventing attacks) during a gout attack. Drink extra fluid to help flush the uric acid through your kidneys.    If you  were prescribed a medicine to treat gout, take it as your healthcare provider has instructed. Don't skip doses.    Take anti-inflammatory medicine as directed by your healthcare provider.    If pain medicines have been prescribed, take them exactly as directed.    Preventing attacks    Limit or stop  alcohol use. Excess alcohol intake can cause a gout attack.    Limit these foods and beverages:  ? Organ meats, such as kidneys and liver  ? Certain seafoods (anchovies, sardines, shrimp, scallops, herring, mackerel)  ? Wild game, meat extracts and meat gravies  ? Foods and beverages sweetened with high-fructose corn syrup, such as sodas    Eat a healthy diet including low-fat and nonfat dairy, whole grains, and vegetables.    If you are overweight, talk to your healthcare provider about a weight reduction plan. Avoid fasting or extreme low calorie diets (less than 900 calories per day). This will increase uric acid levels in the body.    If you have diabetes or high blood pressure, work with your doctor to manage these conditions.    Protect the joint from injury. Wear good fitting socks and shoes. Injury can trigger a gout attack.    Follow-up care  Follow up with your healthcare provider, or as advised.   When to seek medical advice  Call your healthcare provider if you have any of the following:    Fever over 100.4 F (38. C) with worsening joint pain    Increasing redness around the joint    Pain developing in another joint    Repeated vomiting, abdominal pain, or blood in the vomit or stool (black or red color)  [employer] last reviewed this educational content on 6/1/2019 2000-2021 [employer]. All rights reserved. This information is not intended as a substitute for professional medical care. Always follow your healthcare professional's instructions.           Patient Education     Eating to Prevent Gout  Gout is a painful form of arthritis caused by an excess of uric acid. This is a waste product  made by the body. It builds up in the body and forms crystals that collect in the joints, causing a gout attack. Alcohol and certain foods can trigger a gout attack. Below are some guidelines for changing your diet to help you manage gout. Your healthcare provider can work with you to determine the best eating plan for you. You can learn which foods affect your gout more than others. Reactions to different foods can vary from person to person. Know that diet is only one part of managing gout. Take your medicines as prescribed and follow the other guidelines your healthcare provider has given you.   Foods to limit  Eating too many foods containing purines may increase the levels of uric acid in your body and increase your risk for a gout attack. It may be best to limit these high-purine foods:     Alcohol (beer, hard liquor and red wine). You may be told to give up alcohol completely.    Certain fish (anchovies, sardines, fish roes, herring, tuna, mussels, codfish, scallops, trout, and huber)    Certain meats (red meat, processed meat, oscar, turkey, wild game, and goose)    Sauces and gravies made with meat    Organ meats (such as liver, kidneys, sweetbreads, and tripe)    Legumes (such as dried beans and peas)    Mushrooms, spinach, asparagus, and cauliflower    Yeast and yeast extract supplements  Foods to try  Some foods may be helpful for people with gout. You may want to try adding some of the following foods to your diet:     Dark berries. These include blueberries, blackberries, and cherries. These berries contain chemicals that may lower uric acid.    Tofu. Tofu, which is made from soy, is a good source of protein. Studies have shown that it may be a better choice than meat for people with gout.    Omega fatty acids. These acids are found in fatty fish (such as salmon), certain oils (such as flax, olive, or nut oils), or nuts. They may help prevent inflammation due to gout.  Gout guidelines  The following  guidelines are recommended by the Academy of Nutrition and Dietetics for people with gout. Your diet should be:     High in fiber, whole grains, fruits, and vegetables    Low in protein. About 15% of calories should come from protein. Choose lean sources, such as soy, lean meats, and poultry without the skin.    Low in fat. No more than 30% of calories should come from fat, with only 10% coming from animal (saturated) fat.  [employer] last reviewed this educational content on 8/1/2020 2000-2021 [employer]. All rights reserved. This information is not intended as a substitute for professional medical care. Always follow your healthcare professional's instructions.

## 2021-09-19 NOTE — PROGRESS NOTES
"Assessment & Plan     Acute pain of both knees  - CBC with platelets and differential  - Uric acid  - predniSONE (DELTASONE) 20 MG tablet  Dispense: 10 tablet; Refill: 0      12 minutes spent on the date of the encounter doing chart review, history and exam, documentation and further activities per the note      See Patient Instructions  -CBC and uric acid, short course of oral prednisone.    Follow-up with PCP.  Discussed proceeding to the ED if she feels unstable/unsafet at home or any new or worsening symptoms  Return to clinic if no improvement or symptoms worsen.  Patient verbalized understanding & agreed with plan of care.    Mirela Russ, SWETHA, CNP  [facility] [place]    Subjective   Paige is a 50 year old who presents for the following health issues  accompanied by herself:    HPI   Patient her for bilateral knee pain - see called the triage line which notes -   \"Paige woke today with severe knee pain in both knee - Rt > Lt  - Swollen  - Hurts to walk  - Off balance - can't bend knees to walk normally  - Hurts to sit in a chair - Hurts to bend knee but also hurts to have knees fully extended     Pain & swelling are mostly on top of knee     Pain up to 8/10 - little to no relief with Aleve      Advised to see HCP within 4 hours  PCP is will [facility]  She will go to [facility]\"    Patient presents with concerns with the onset of bilateral knee pain, swelling and feels warm to patient.  Has never had gout.   8/10.   At rest it is only a 3.   Tried naproxen which was not helpful.   Did not try anything else.   Feet have been cold.  No injury or falls to knees.   Declines fever.     BP Readings from Last 6 Encounters:   09/18/21 (!) 88/66   07/14/21 (!) 122/92   06/04/21 131/82   03/17/21 99/60   12/17/20 137/83   12/03/19 100/66       Review of Systems   Constitutional, HEENT, cardiovascular, pulmonary, gi and gu systems are negative, except as otherwise noted.      Objective    BP (!) " 88/66   Pulse 75   Temp 98.2  F (36.8  C) (Oral)   SpO2 97%   There is no height or weight on file to calculate BMI.  Physical Exam   GENERAL: healthy, alert and no distress  RESP: lungs clear to auscultation - no rales, rhonchi or wheezes  CV: regular rate and rhythm, normal S1 S2, no S3 or S4, no murmur, click or rub, no peripheral edema and peripheral pulses strong  MS: RLE exam shows no deformities, no erythema, induration, or nodules, no evidence of joint effusion - decreased AROM and LLE exam shows no deformities, no erythema, induration, or nodules, no evidence of joint effusion and decreased AROM  SKIN: no suspicious lesions or rashes  PSYCH: mentation appears normal, affect normal/bright    No diagnostics ordered today

## 2021-09-20 LAB — URATE SERPL-MCNC: 4.1 MG/DL (ref 2.6–6)

## 2021-09-23 ENCOUNTER — VIRTUAL VISIT (OUTPATIENT)
Dept: PALLIATIVE MEDICINE | Facility: CLINIC | Age: 51
End: 2021-09-23
Payer: COMMERCIAL

## 2021-09-23 DIAGNOSIS — M79.7 FIBROMYALGIA: ICD-10-CM

## 2021-09-23 DIAGNOSIS — M17.0 PRIMARY OSTEOARTHRITIS OF BOTH KNEES: Primary | ICD-10-CM

## 2021-09-23 DIAGNOSIS — F10.21 HISTORY OF ALCOHOLISM (H): ICD-10-CM

## 2021-09-23 DIAGNOSIS — M47.896 OTHER SPONDYLOSIS, LUMBAR REGION: ICD-10-CM

## 2021-09-23 DIAGNOSIS — M70.62 TROCHANTERIC BURSITIS OF LEFT HIP: ICD-10-CM

## 2021-09-23 PROCEDURE — 99214 OFFICE O/P EST MOD 30 MIN: CPT | Mod: 95 | Performed by: ANESTHESIOLOGY

## 2021-09-23 ASSESSMENT — PAIN SCALES - GENERAL: PAINLEVEL: NO PAIN (0)

## 2021-09-23 NOTE — PROGRESS NOTES
"Paige is a 50 year old who is being evaluated via a billable video visit.      How would you like to obtain your AVS? MyChart  If the video visit is dropped, the invitation should be resent by: Other e-mail: My Chart  Will anyone else be joining your video visit? No      Is Pt currently in [place]? Yes    NOTE:  If Pt is not in [place], Appointment needs to be canceled and rescheduled.    Milli Hoover CMA  [facility]    Video Start Time: 10:10 AM  Video-Visit Details  Type of service:  Video Visit  Video End Time:10:32 AM  Originating Location (pt. Location): Home  Distant Location (provider location):  [facility]   Platform used for Video Visit: [facility] [place]    Date of visit: 9/23/2021    Chief complaint:   Chief Complaint   Patient presents with     Pain       Interval history:  Paige Le is a 50 year old female last seen by me for follow up on 3/10/2010 VIDEO VISIT.      Since her last visit, Paige Le reports:    - Bilateral L3,4,5 medial branch block to RFA was not done.  She cancelled the medial branch block on 8/11/2021.   - Her central low back pain has gotten better over the last few months.  She does not desire more injections at this time.   - Went to  on 9/18/2021 as she was having bilateral knee pain and stiffness. They diagnosed her with gout.  She followed up with her PCP a couple days later and she agreed with the diagnosis.  She was started on colchicine and prednisone and it resolved.  She would like to know what my opinion is on this as it was a scary thing for her to go through.  She wants to make sure it is not some kind of rheumatologic problem.  - Saw her PCP about this and had an MRI done at [facility] (that I cannot see) and her PCP said it was \"normal\".     - She has a vacation planned to [place] and hopes to go hiking  - She has been doing really well aside " "from this and has been working to decrease all her medications with the goal of getting off them.  She has discontinued her topamax, decreased her prozac from 80mg daily to 40mg daily, decreased her gabapentin from 1200mg daily to 600mg daily and discontinued her maxalt and low dose naltrexone.   - She is moved to a single family home in [place], [place] with her now .  - She has been exercising less and is less active since covid  - Had a sleep study done and got a machine and is sleeping better.  Feels refreshed when she wakes up in the morning.   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       Current pain treatments:   Gabapentin 300mg qam and 300mg qhs  Trazodone 50mg -100mg qhs  Maxalt 10mg PRN headache  Tumeric 450mg daily  Prozac 20mg BID    INJECTIONS:   LEFT hip injection 7/14/2021  RIGHT L5-S1 facet joint injections 6/4/2021 & 3/17/2021      Medications:  Current Outpatient Medications   Medication Sig Dispense Refill     albuterol (PROAIR HFA/PROVENTIL HFA/VENTOLIN HFA) 108 (90 Base) MCG/ACT inhaler Inhale 2 puffs into the lungs every 6 hours as needed for shortness of breath / dyspnea or wheezing 1 Inhaler 6     albuterol (PROVENTIL) (2.5 MG/3ML) 0.083% neb solution Take 1 vial (2.5 mg) by nebulization every 6 hours as needed for shortness of breath / dyspnea or wheezing 30 vial 1     B Complex Vitamins (VITAMIN B COMPLEX PO)        beclomethasone (QVAR) 80 MCG/ACT Inhaler Inhale 2 puffs into the lungs 2 times daily 1 Inhaler 11     CALCIUM CARBONATE PO Take by mouth daily       cetirizine-psuedoePHEDrine (ZYRTEC-D) 5-120 MG per tablet Take 1 tablet by mouth 2 times daily 90 tablet 3     cholecalciferol (VITAMIN D) 1000 UNIT tablet Take 1 tablet by mouth daily.       FLUoxetine (PROZAC) 20 MG capsule Take 20 mg by mouth 2 times daily       gabapentin (NEURONTIN) 300 MG capsule Take 2 capsules  (600mg) in the morning and 2 capsules (600mg) at bedtime. 90day supply (Patient taking differently: " 300 mg 2 times daily Take 2 capsules  (600mg) in the morning and 2 capsules (600mg) at bedtime. 90day supply) 360 capsule 1     ipratropium - albuterol 0.5 mg/2.5 mg/3 mL (DUONEB) 0.5-2.5 (3) MG/3ML neb solution Take 1 vial (3 mLs) by nebulization every 6 hours as needed for shortness of breath / dyspnea or wheezing Profile Rx: patient will contact pharmacy when needed 50 vial 3     ipratropium - albuterol 0.5 mg/2.5 mg/3 mL (DUONEB) 0.5-2.5 (3) MG/3ML neb solution Take 1 vial (3 mLs) by nebulization every 6 hours as needed for shortness of breath / dyspnea or wheezing 1 Box 0     levonorgestrel (MIRENA) 20 MCG/24HR IUD 1 each by Intrauterine route once       MAGNESIUM CITRATE PO Take by mouth daily       Naproxen Sodium (ALEVE PO) Take 220 mg by mouth       order for DME Equipment being ordered: Nebulizer 1 Device 0     predniSONE (DELTASONE) 20 MG tablet Take 2 tablets (40 mg) by mouth daily for 5 days 10 tablet 0     rizatriptan (MAXALT-MLT) 10 MG ODT tab Take 1 tablet (10 mg) by mouth at onset of headache for migraine May repeat in 2 hours. Max 3 tablets/24 hours. 18 tablet 3     Turmeric 450 MG CAPS        COMPOUNDED NON-CONTROLLED SUBSTANCE (CMPD RX) - PHARMACY TO MIX COMPOUNDED MEDICATION Low dose Naltrexone:  6mg capsules/tablets one PO qhs 30 capsule 6       Medical History: any changes in medical history since they were last seen? No    Review of Systems:  ROS:  Constitutional, neuro, ENT, endocrine, pulmonary, cardiac, gastrointestinal, genitourinary, musculoskeletal, integument and psychiatric systems are negative, except as otherwise noted.    Physical Exam:  not currently breastfeeding.    GENERAL: Healthy, alert and no distress  EYES: Eyes grossly normal to inspection.  No discharge or erythema, or obvious scleral/conjunctival abnormalities.  RESP: No audible wheeze, cough, or visible cyanosis.  No visible retractions or increased work of breathing.    SKIN: Visible skin clear. No significant rash,  abnormal pigmentation or lesions.  NEURO: Cranial nerves grossly intact.  Mentation and speech appropriate for age.  PSYCH: Mentation appears normal, affect normal/bright, judgement and insight intact, normal speech and appearance well-groomed.      ASSESSMENT/PLAN:     1. Primary osteoarthritis of both knees  I let her know that gout is not a specialty of mine but her episode of pain is consistent with this and I agree with her other providers.  Recommend that she wait this out and see if she has another episode in the future.      2. Other spondylosis, lumbar region  Her severe low right sided back pain has improved.  It is worse with prolonged sitting and she can actually feel the spot where the pain originates.  It does not radiate to her leg.  Etiology could be right L5-S1 facet arthropathy vs Right SI joint pain vs possible trigger point injection.    3. Trochanteric bursitis of left hip  Continue trochanteric bursa injections PRN    4. Fibromyalgia  Well managed.  I do not think this is related to her fibromyalgia.   She does not want to take a muscle relaxant -  This was offered.   Recommend ongoing exercise regiment.     5. History of alcoholism (H)  In sustained remission - doing great with an active recovery program.        BILLING TIME DOCUMENTATION:   The total TIME spent on this patient on the date of the encounter/appointment was 31 minutes.      TOTAL TIME includes:   Time spent preparing to see the patient (reviewing records and tests) - 1 min  Time spent face to face (or over the phone) with the patient - 22 min  Time spent ordering tests, medications, procedures and referrals - 1 min  Time spent Referring and communicating with other healthcare professionals - 0 min  Time spent documenting clinical information in Epic - 7 min      NYDIA HAUSER MD   Pain Management & Addiction Medicine

## 2021-12-26 ENCOUNTER — HEALTH MAINTENANCE LETTER (OUTPATIENT)
Age: 51
End: 2021-12-26

## 2021-12-26 ENCOUNTER — OFFICE VISIT (OUTPATIENT)
Dept: URGENT CARE | Facility: URGENT CARE | Age: 51
End: 2021-12-26
Payer: COMMERCIAL

## 2021-12-26 VITALS
TEMPERATURE: 97.9 F | HEART RATE: 73 BPM | DIASTOLIC BLOOD PRESSURE: 83 MMHG | OXYGEN SATURATION: 96 % | SYSTOLIC BLOOD PRESSURE: 133 MMHG

## 2021-12-26 DIAGNOSIS — J45.40 MODERATE PERSISTENT ASTHMA WITHOUT COMPLICATION: ICD-10-CM

## 2021-12-26 DIAGNOSIS — J01.90 ACUTE SINUSITIS WITH SYMPTOMS > 10 DAYS: Primary | ICD-10-CM

## 2021-12-26 DIAGNOSIS — R05.9 COUGH: ICD-10-CM

## 2021-12-26 PROCEDURE — 99213 OFFICE O/P EST LOW 20 MIN: CPT | Performed by: FAMILY MEDICINE

## 2021-12-26 RX ORDER — CEFUROXIME AXETIL 500 MG/1
500 TABLET ORAL 2 TIMES DAILY
Qty: 28 TABLET | Refills: 0 | Status: SHIPPED | OUTPATIENT
Start: 2021-12-26 | End: 2022-01-09

## 2021-12-26 RX ORDER — BENZONATATE 200 MG/1
200 CAPSULE ORAL 3 TIMES DAILY PRN
Qty: 20 CAPSULE | Refills: 0 | Status: SHIPPED | OUTPATIENT
Start: 2021-12-26 | End: 2024-03-06

## 2021-12-26 NOTE — PROGRESS NOTES
Paige Fajardo is a 51 year old female who comes in today for 3 weeks of symptoms    Goes to [facility]    Seen for asthma    Got prednisone    Was on antibiotics    Two rounds of zpack    Asthma better    taperedoff prednisone, done yest    Tessalon helpedsome, out of those    Facial pressure    Under eyes    Some drainage, was not for a while      ROS    Physical Exam  Constitutional:       Appearance: Normal appearance.   HENT:      Head: Normocephalic and atraumatic.      Right Ear: Tympanic membrane, ear canal and external ear normal.      Left Ear: Tympanic membrane, ear canal and external ear normal.      Nose: Nose normal.      Mouth/Throat:      Mouth: Mucous membranes are moist.      Pharynx: Oropharynx is clear.      Comments: Tender maxillary and frontal sinuses,  Some in submandib area  Eyes:      Conjunctiva/sclera: Conjunctivae normal.   Cardiovascular:      Rate and Rhythm: Normal rate and regular rhythm.      Heart sounds: Normal heart sounds.   Pulmonary:      Effort: Pulmonary effort is normal. No respiratory distress.      Breath sounds: Normal breath sounds.   Chest:      Chest wall: No tenderness.   Abdominal:      Palpations: Abdomen is soft.      Tenderness: There is no abdominal tenderness. There is no right CVA tenderness or left CVA tenderness.   Musculoskeletal:      Cervical back: Neck supple.   Neurological:      Mental Status: She is alert.         ASSESSMENT / PLAN:  (J01.90) Acute sinusitis with symptoms > 10 days  (primary encounter diagnosis)  Comment: will treat.  She has tolerated amox in past, just had problems with augmentin  Plan: cefuroxime (CEFTIN) 500 MG tablet             (R05.9) Cough  Comment: this helped. Refill.   Plan: benzonatate (TESSALON) 200 MG capsule             (J45.40) Moderate persistent asthma without complication  Comment: stay on same asthma meds  Plan: as above. No more prednisone needed.    Follow up as needed based on symptoms     Be seen promptly if  symptoms acutely worsen     Stay on cetirizine D for now.      I reviewed the patient's medications, allergies, medical history, family history, and social history.    Yobani Encinas MD

## 2021-12-26 NOTE — PATIENT INSTRUCTIONS
Take the antibiotics    Tessalon as needed    Follow up as needed based on symptoms     Be seen promptly if symptoms acutely worsen

## 2022-01-04 ENCOUNTER — TRANSCRIBE ORDERS (OUTPATIENT)
Dept: OTHER | Age: 52
End: 2022-01-04
Payer: COMMERCIAL

## 2022-01-04 DIAGNOSIS — J45.40 MODERATE PERSISTENT ASTHMA, UNSPECIFIED WHETHER COMPLICATED: Primary | ICD-10-CM

## 2022-01-06 ENCOUNTER — ANCILLARY PROCEDURE (OUTPATIENT)
Dept: RADIOLOGY | Facility: CLINIC | Age: 52
End: 2022-01-06
Payer: COMMERCIAL

## 2022-01-06 DIAGNOSIS — R05.9 COUGH: Primary | ICD-10-CM
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
## 2022-03-08 ENCOUNTER — OFFICE VISIT (OUTPATIENT)
Dept: PULMONOLOGY | Facility: OTHER | Age: 52
End: 2022-03-08
Payer: COMMERCIAL

## 2022-03-08 ENCOUNTER — ALLIED HEALTH/NURSE VISIT (OUTPATIENT)
Dept: PULMONOLOGY | Facility: OTHER | Age: 52
End: 2022-03-08
Payer: COMMERCIAL

## 2022-03-08 VITALS
HEART RATE: 70 BPM | BODY MASS INDEX: 47.09 KG/M2 | SYSTOLIC BLOOD PRESSURE: 130 MMHG | RESPIRATION RATE: 12 BRPM | DIASTOLIC BLOOD PRESSURE: 80 MMHG | HEIGHT: 66 IN | WEIGHT: 293 LBS

## 2022-03-08 DIAGNOSIS — J45.40 MODERATE PERSISTENT ASTHMA, UNSPECIFIED WHETHER COMPLICATED: ICD-10-CM

## 2022-03-08 DIAGNOSIS — J45.41 MODERATE PERSISTENT ASTHMA WITH ACUTE EXACERBATION: ICD-10-CM

## 2022-03-08 DIAGNOSIS — R05.9 COUGH: ICD-10-CM

## 2022-03-08 DIAGNOSIS — J45.21 MILD INTERMITTENT REACTIVE AIRWAY DISEASE WITH WHEEZING WITH ACUTE EXACERBATION: ICD-10-CM

## 2022-03-08 LAB
DLCOCOR-%PRED-PRE: 163 %
DLCOCOR-PRE: 34.39 ML/MIN/MMHG
DLCOUNC-%PRED-PRE: 160 %
DLCOUNC-PRE: 33.74 ML/MIN/MMHG
DLCOUNC-PRED: 21.05 ML/MIN/MMHG
ERV-%PRED-PRE: 234 %
ERV-PRE: 0.33 L
ERV-PRED: 0.14 L
EXPTIME-PRE: 7.61 SEC
FEF2575-%PRED-POST: 141 %
FEF2575-%PRED-PRE: 117 %
FEF2575-POST: 3.85 L/SEC
FEF2575-PRE: 3.2 L/SEC
FEF2575-PRED: 2.73 L/SEC
FEFMAX-%PRED-PRE: 120 %
FEFMAX-PRE: 8.12 L/SEC
FEFMAX-PRED: 6.73 L/SEC
FEV1-%PRED-PRE: 109 %
FEV1-PRE: 3.04 L
FEV1FEV6-PRE: 82 %
FEV1FEV6-PRED: 82 %
FEV1FVC-PRE: 82 %
FEV1FVC-PRED: 81 %
FEV1SVC-PRE: 79 %
FEV1SVC-PRED: 79 %
FIFMAX-PRE: 6.8 L/SEC
FRCPLETH-%PRED-PRE: 82 %
FRCPLETH-PRE: 2.23 L
FRCPLETH-PRED: 2.69 L
FVC-%PRED-PRE: 106 %
FVC-PRE: 3.69 L
FVC-PRED: 3.45 L
HGB BLD-MCNC: 12.8 G/DL
IC-%PRED-PRE: 102 %
IC-PRE: 3.43 L
IC-PRED: 3.34 L
RVPLETH-%PRED-PRE: 105 %
RVPLETH-PRE: 1.83 L
RVPLETH-PRED: 1.74 L
TLCPLETH-%PRED-PRE: 114 %
TLCPLETH-PRE: 5.66 L
TLCPLETH-PRED: 4.94 L
VA-%PRED-PRE: 117 %
VA-PRE: 5.76 L
VC-%PRED-PRE: 110 %
VC-PRE: 3.83 L
VC-PRED: 3.48 L

## 2022-03-08 PROCEDURE — 94726 PLETHYSMOGRAPHY LUNG VOLUMES: CPT | Performed by: INTERNAL MEDICINE

## 2022-03-08 PROCEDURE — 94060 EVALUATION OF WHEEZING: CPT | Performed by: INTERNAL MEDICINE

## 2022-03-08 PROCEDURE — 94729 DIFFUSING CAPACITY: CPT | Performed by: INTERNAL MEDICINE

## 2022-03-08 PROCEDURE — 99204 OFFICE O/P NEW MOD 45 MIN: CPT | Mod: 25 | Performed by: INTERNAL MEDICINE

## 2022-03-08 PROCEDURE — 85018 HEMOGLOBIN: CPT

## 2022-03-08 RX ORDER — ALBUTEROL SULFATE 90 UG/1
2 AEROSOL, METERED RESPIRATORY (INHALATION) EVERY 6 HOURS PRN
Qty: 18 G | Refills: 3 | Status: SHIPPED | OUTPATIENT
Start: 2022-03-08 | End: 2023-03-08

## 2022-03-08 RX ORDER — IPRATROPIUM BROMIDE AND ALBUTEROL SULFATE 2.5; .5 MG/3ML; MG/3ML
1 SOLUTION RESPIRATORY (INHALATION) EVERY 6 HOURS PRN
Qty: 960 ML | Refills: 4 | Status: SHIPPED | OUTPATIENT
Start: 2022-03-08 | End: 2023-03-08

## 2022-03-08 ASSESSMENT — ASTHMA QUESTIONNAIRES
QUESTION_4 LAST FOUR WEEKS HOW OFTEN HAVE YOU USED YOUR RESCUE INHALER OR NEBULIZER MEDICATION (SUCH AS ALBUTEROL): NOT AT ALL
QUESTION_1 LAST FOUR WEEKS HOW MUCH OF THE TIME DID YOUR ASTHMA KEEP YOU FROM GETTING AS MUCH DONE AT WORK, SCHOOL OR AT HOME: A LITTLE OF THE TIME
QUESTION_2 LAST FOUR WEEKS HOW OFTEN HAVE YOU HAD SHORTNESS OF BREATH: ONCE OR TWICE A WEEK
QUESTION_3 LAST FOUR WEEKS HOW OFTEN DID YOUR ASTHMA SYMPTOMS (WHEEZING, COUGHING, SHORTNESS OF BREATH, CHEST TIGHTNESS OR PAIN) WAKE YOU UP AT NIGHT OR EARLIER THAN USUAL IN THE MORNING: NOT AT ALL
QUESTION_5 LAST FOUR WEEKS HOW WOULD YOU RATE YOUR ASTHMA CONTROL: WELL CONTROLLED
ACT_TOTALSCORE: 22
ACT_TOTALSCORE: 22

## 2022-03-08 NOTE — PATIENT INSTRUCTIONS
"Plan:    For your asthma, you should use your QVAR (beclomethasone) during seasons that you know your asthma will be problematic. So for you this would mean taking it during the spring flowering season and during winter. Qvar is your main medication for asthma control -- it is an inhaled steroid that works in the background (preventative) to help reduce the inflammation in your airways.    Qvar takes 2-3 weeks to \"kick in\", so start taking it 2-3 weeks before you know your \"bad season\" is starting    And you would want to continue taking Qvar for about 1-2 weeks after the \"bad season\" is over    When you take Qvar in preparation for and during your \"bad season\", you need to take 2 puffs twice a day for the full effect    As long as you are doing well, we can continue with Qvar just during your \"bad seasons\". But keep in mind that if your asthma gets worse (it can happen and it does not mean you did anything wrong), we may need to keep you on Qvar throughout the year, or change Qvar to something stronger.    The key thing to remember is that Qvar does not kick in right away -- you will not feel it working right when you take it. It is a background preventative medication.    The other medications you have (albuterol & DuoNeb in inhaler and nebulizer forms) are only to be used in breathing emergencies. So for example, the nebulizers would be good to use IN ADDITION to you Qvar when you are sick.     Please, remember to rinse and gargle you mouth and throat after you use Qvar. Some of the medication can and does settle in your mouth/throat and can cause irritation, sore throat, or thrush.     You should follow up in 12 months, or earlier if you are having issues with your breathing.     If you have any questions or concerns, please, call our clinic at [phone].     "

## 2022-05-17 ENCOUNTER — TELEPHONE (OUTPATIENT)
Dept: PULMONOLOGY | Facility: OTHER | Age: 52
End: 2022-05-17
Payer: COMMERCIAL

## 2022-05-17 NOTE — TELEPHONE ENCOUNTER
Phone call from Paige. States that she tested positive for COVID last night.  Thought she was just having issues with allergies and her asthma, but then developed fever and chills.  Has been using her nebs every 4 hours. sats mostly in the 90% range, but do go down to 82-88% at times with activities.      Recommended she be seen in the ED for evaluation.  She does not want to be seen at [facility] and does not want to drive to HE sites.  Recommended she could start with an on-line visit through [employer] and see if she can qualify for COVID therapies. Warned her that if they feel she needs to go to the ED, she will need to be seen in the ED for care.

## 2022-06-12 ENCOUNTER — HEALTH MAINTENANCE LETTER (OUTPATIENT)
Age: 52
End: 2022-06-12

## 2022-06-14 ENCOUNTER — TELEPHONE (OUTPATIENT)
Dept: PALLIATIVE MEDICINE | Facility: CLINIC | Age: 52
End: 2022-06-14
Payer: COMMERCIAL

## 2022-06-14 DIAGNOSIS — M47.896 OTHER SPONDYLOSIS, LUMBAR REGION: Primary | ICD-10-CM

## 2022-06-14 NOTE — TELEPHONE ENCOUNTER
"Called pt. Advised that as provider is out of office until Friday but would route for review.     Pt requesting repeat of injection (06/04/2021- Right L5-S1 facet) was so helpful that she did not move forward to RFA. Reports currently pain is as bad or worse than before. Pt reports that she recently had fibro flareup as a result of covid and cold. Describes deconditioning/weakness due to covid. States that is doing yoga, acupuncture and not its helpful.    Describes pain is center/right low back. Reports that does have pain down her right leg, States is hard to lift right leg due to pain and weakness. Pain goes across lower buttock and down the side and stops at hip. States that getting in a out of car is \"horrible\". Pt reports concerns with driving as her raction time to foot is slower\" and also verbalized that is afraid she is going to fall due to leg weakness.   \"I can tell my brain to lift my foot and it just does not go\". Believes that weakness is coming from hip and not necessarily weakness in her leg or foot. \"One part of my body is faster than the other, takes a minute for my brain to get my leg going\"    Also reports that she does not think it is related but her gout is also flaring up in left knee- is following instructions from PCP- took colchicine for gout.     Last MRI 02/2021. Report no accidents since this imaging but that did fall, minor bruising when she had covid.     Advised would route for review, however she may need appointment to be seen, last virtual appt 09/23/21. Pt wishes to have injection as she lives a distance  away. Advised that this would be provider discretion whether to order injection vs needing an appointment        ----------------------------------   Date of Visit: 6/4/2021     Pre procedure Diagnosis: Lumbar facet arthropathy   Post procedure Diagnosis: Same  Procedure performed: RIGHT L5-S1 facet joint injections  Anesthesia: none  Complications: none  Operators: Yvonne Garrido, " MD      Indications:   Paige Fajardo is a 50 year old female was sent by myself for lumbar facet joint injection.  They have a history of right sided low back pain without radiation to the leg.  Exam shows TTP over the right L5-S1 facet joint and they have tried conservative treatment including previous injections, PT and medications.

## 2022-06-14 NOTE — TELEPHONE ENCOUNTER
Screening Questions for Radiology Injections:    Injection to be done at which interventional clinic site? [facility]    Procedure ordered by Hema    Procedure ordered? Repeat RIGHT L5-S1 facet joint injection     Transforaminal Cervical DEA - [place] [facility] - No [facility] providers perform this procedure    What insurance would patient like us to bill for this procedure? [employer]     Worker's comp or MVA (motor vehicle accident) -Any injection DO NOT SCHEDULE and route to Johnathon Kline.      [employer] insurance - For SI joint injections, DO NOT SCHEDULE and route to Paulina Chapin.       ALL BCBS, [employer] and [employer] - DO NOT SCHEDULE and route to Paulina Samson    Is an  needed? No     Patient has a  home? (Review Grid) YES: ok    Any chance of pregnancy? NO   If YES, do NOT schedule and route to RN pool    Is patient actively being treated for cancer or immunocompromised? No  If YES, do NOT schedule and route to RN pool     Does the patient have a bleeding or clotting disorder? No     If YES, okay to schedule AND route to RN nurse pool. (For any patients with platelet count <100, RN must forward to provider)    Is patient taking any Blood Thinners OR Antiplatelet medication?  No   If hold needed, do NOT schedule, route to RN pool    Examples:   o Blood Thinners: (Coumadin, Warfarin, Jantoven, Pradaxa, Xarelto, Eliquis, Edoxaban, Enoxaparin, Lovenox, Heparin, Arixtra, Fondaparinux or Fragmin)  o Antiplatelet Medications: (Plavix, Brilinta or Effient)     Is patient taking any aspirin products (includes Excedrin and Fiorinal)? No     If more than 325mg/day, OK to schedule; Instruct Pt to decrease to less than 325 mg for 7 days AND route to RN pool    For CERVICAL procedures, hold all aspirin products for 6 days.     Tell Pt that if aspirin product is not held for 6 days, the procedure WILL BE cancelled.     Any allergies to contrast dye, iodine, shellfish, or  numbing and steroid medications? No    If YES, schedule and add allergy information to appointment notes AND route to the RN pool     If DEA and Contrast Dye / Iodine Allergy? DO NOT SCHEDULE, route to RN pool    Allergies: Augmentin, Codeine, and Dairy products [milk protein extract]     Does patient have an active infection or treated for one within the past week? No    Is patient currently taking any antibiotics or steroid medications?  No     For patients on chronic, preventative, or prophylactic antibiotics, procedures may be scheduled.     For patients on antibiotics for active or recent infection, schedule 4 days after completed.    For patients on steroid medications, schedule 4 days after completed.     Has the patient had a flu shot or any other vaccinations within the past 7 days? No  If yes, explain that for the vaccine to work best they need to:       wait 1 week before and 1 week after getting any Vaccine    wait 1 week before and 2 weeks after getting Covid Vaccine #2 or BOOSTER    If patient has concerns about the timing, send to RN pool     Does patient have an MRI/CT?  Not Applicable Include Date and Check Procedure Scheduling Grid to see if required.    Was the MRI/CT done within the last 3 years?  NA     If no route to RN Pool    If yes, where was the MRI/CT done?      Refer to PACS Transmissions list for approved external locations and route to RN Pool High Priority    If MRI was not done at approved external location do NOT schedule and route to RN pool.      If patient has an imaging disc, the injection MAY be scheduled but patient must bring disc to appt or appt will be cancelled.    Procedure Specific Instructions:    If celiac plexus block, informed patient NPO for 6 hours and that it is okay to take medications with sips of water, especially blood pressure medications Not Applicable         If this is for a cervical procedure, informed patient that aspirin needs to be held for 6 days.    NO      Sedation, If Sedation is ordered for any procedure, patient must be NPO for 6 hours prior to procedure Not Applicable      If IV needed:    Do not schedule procedures requiring IV placement in the first appointment of the day or first appointment after lunch. Do NOT schedule at 0745, 0815 or 1245. ok    Instructed patient to arrive 30 minutes early for IV start if required. (Check Procedure Scheduling Grid)  NO    Reminders:    If you are started on any steroids or antibiotics between now and your appointment, you must contact us because the procedure may need to be cancelled.  Yes      As a reminder, receiving steroids can decrease your body's ability to fight infection.   Would you still like to move forward with scheduling the injection?  Yes      IV Sedation is not provided for procedures. If oral anti-anxiety medication is needed, the patient should request this from their referring provider.      Instruct patient to arrive as directed prior to the scheduled appointment time:  [place]: 30 minutes before; if IV needed 1 hour before       For patients 85 or older we recommend having an adult stay w/ them for the remainder of the day.       If the patient is Diabetic, remind them to bring their glucometer.      Does the patient have any questions?  NO  Marsha Kline  [facility]

## 2022-06-14 NOTE — TELEPHONE ENCOUNTER
Routing to  for prescreen and scheduling    Kate NINO RN Care Coordinator  [facility]

## 2022-06-14 NOTE — TELEPHONE ENCOUNTER
Reason for call:  Other   Patient called regarding (reason for call):   Additional comments: Pt requesting a repeat RIGHT L5-S1 facet joint injection. Last done 6/2021    Phone number to reach patient:  Home number on file [phone] (home)    Can we leave a detailed message on this number?  YES    Travel screening: Not Applicable          Johnathon Kline    [facility] [place]

## 2022-06-28 NOTE — PROGRESS NOTES
[facility] - Procedure Note    Date of Visit: 6/29/2022    Pre procedure Diagnosis: Lumbar facet arthropathy   Post procedure Diagnosis: Same  Procedure performed: RIGHT L5-S1 facet joint injections  Anesthesia: none  Complications: none  Operators: Yvonne Garrido MD & Eliezer Gallego DO     Indications:   Paige Fajardo is a 51 year old female was sent by myself for lumbar facet joint injection.  They have a history of right sided low back pain without radiation to the leg.  Exam shows TTP over the right L5-S1 facet joint and they have tried conservative treatment including previous injections, PT and medications.    This is a repeat injection.  Previous RIGHT L5-S1 facet joint injection done by myself on 6/4/2021 & 3/17/2021 provided good pain relief for a period of 2 months.      Options/alternatives, benefits and risks were discussed with the patient including bleeding, infection, flared pain, tissue trauma, exposure to radiation, reaction to medications including seizure, spinal cord injury, paralysis, weakness, numbness and headache.    Questions were answered to her satisfaction and she agrees to proceed. Voluntary informed consent was obtained and signed.     Vitals were reviewed: Yes  Allergies were reviewed:  Yes   Medications were reviewed:  Yes   Pre-procedure pain score: 5/10    LUMBAR MRI:        Procedure:  After getting informed consent, patient was brought into the procedure suite and was placed in a prone position on the procedure table.   A Pause for the Cause was performed.  Patient was prepped and draped in sterile fashion.     Under AP fluoroscopic guidance the L5-S1 facet joints on the RIGHT side were identified, and the C-arm was rotated obliquely to the affected side to open the joint space. A total of 2 ml of 1% lidocaine was injected at the needle entry point and needle tract. Then a 22 gauge 9 inch quincke type spinal needle was inserted and advanced under  fluoroscopic guidance targeting the superior articular pillar. Once the needle made a contact with SAP, it was rotated and was then advanced into the joint.    AP fluoroscopic views were obtained to confirm the needle placement. Then,  Omnipaque 300 contrast dye was injected after negative aspiration for heme and CSF in each joint, confirming appropriate placement.  A total of 0.5mL of Omnipaque was used and 9.5mL was wasted.    The injection was then accomplished using a solution containing 2ml of 0.5% bupivacaine mixed and 40mg of kenolog. The needles were removed..     Hemostasis was achieved, the area was cleaned, and bandaids were placed when appropriate.  The patient tolerated the procedure well, and was taken to the recovery room.    Images were saved to PACS.    Post-procedure pain score: 0/10  Follow-up includes:   -f/u phone call in one week  -f/u with the referring provider    NYDIA HAUSER MD   Pain Management & Addiction Medicine

## 2022-06-29 ENCOUNTER — RADIOLOGY INJECTION OFFICE VISIT (OUTPATIENT)
Dept: PALLIATIVE MEDICINE | Facility: CLINIC | Age: 52
End: 2022-06-29
Attending: ANESTHESIOLOGY
Payer: COMMERCIAL

## 2022-06-29 VITALS — HEART RATE: 67 BPM | SYSTOLIC BLOOD PRESSURE: 124 MMHG | DIASTOLIC BLOOD PRESSURE: 75 MMHG | OXYGEN SATURATION: 97 %

## 2022-06-29 DIAGNOSIS — M47.816 SPONDYLOSIS OF LUMBAR REGION WITHOUT MYELOPATHY OR RADICULOPATHY: ICD-10-CM

## 2022-06-29 DIAGNOSIS — M47.896 OTHER SPONDYLOSIS, LUMBAR REGION: Primary | ICD-10-CM

## 2022-06-29 PROCEDURE — 64493 INJ PARAVERT F JNT L/S 1 LEV: CPT | Mod: RT | Performed by: ANESTHESIOLOGY

## 2022-06-29 RX ORDER — TRIAMCINOLONE ACETONIDE 40 MG/ML
40 INJECTION, SUSPENSION INTRA-ARTICULAR; INTRAMUSCULAR ONCE
Status: COMPLETED | OUTPATIENT
Start: 2022-06-29 | End: 2022-06-29

## 2022-06-29 RX ADMIN — TRIAMCINOLONE ACETONIDE 40 MG: 40 INJECTION, SUSPENSION INTRA-ARTICULAR; INTRAMUSCULAR at 09:45

## 2022-06-29 ASSESSMENT — PAIN SCALES - GENERAL: PAINLEVEL: MODERATE PAIN (5)

## 2022-06-29 NOTE — NURSING NOTE
Pre-procedure Intake  If YES to any questions or NO to having a   Please complete laminated checklist and leave on the computer keyboard for Provider, verbally inform provider if able.    For SCS Trial, RFA's or any sedation procedure:  Have you been fasting? NA    If yes, for how long? NA    Are you taking any any blood thinners such as Coumadin, Warfarin, Jantoven, Pradaxa Xarelto, Eliquis, Edoxaban, Enoxaparin, Lovenox, Heparin, Arixtra, Fondaparinux, or Fragmin? OR Antiplatelet medication such as Plavix, Brilinta, or Effient?   No     If yes, when did you take your last dose? NA    Do you take aspirin?  No    If cervical procedure, have you held aspirin for 6 days?   NA    Do you have any allergies to contrast dye, iodine, steroid and/or numbing medications?  NO    Are you currently taking antibiotics or have an active infection?  NO    Have you had a fever/elevated temperature within the past week? NO    Are you currently taking oral steroids? NO    Do you have a ? Yes    Are you pregnant or breastfeeding?  NO    Have you received the COVID-19 vaccine? Yes    If yes, was it your 1st, 2nd or only dose needed? 2nd    Date of most recent vaccine: 10/05/21    Notify provider and RNs if systolic BP >170, diastolic BP >100, P >100 or O2 sats < 90%      Tawny Kaminski CMA (AAMA)

## 2022-06-29 NOTE — NURSING NOTE
Discharge Information    IV Discontiued Time:  NA    Amount of Fluid Infused:  NA    Discharge Criteria = When patient returns to baseline or as per MD order    Consciousness:  Pt is fully awake    Circulation:  BP +/- 20% of pre-procedure level    Respiration:  Patient is able to breathe deeply    O2 Sat:  Patient is able to maintain O2 Sat >92% on room air    Activity:  Moves 4 extremities on command    Ambulation:  Patient is able to stand and walk or stand and pivot into wheelchair    Dressing:  Clean/dry or No Dressing    Notes:   Discharge instructions and AVS given to patient    Patient meets criteria for discharge?  YES    Admitted to PCU?  No    Responsible adult present to accompany patient home?  Yes    Signature/Title:    Kate Garcia RN  RN Care Coordinator  [facility] [place]

## 2022-06-29 NOTE — PATIENT INSTRUCTIONS
[facility] Procedure Discharge Instructions    Today you saw:   Dr. Yvonne Garrido      Your procedure:  Facet joint injection      Medications used:  Lidocaine (anesthetic)  Bupivacaine (anesthetic)      Kenalog (steroid)  Omnipaque (contrast)           Be cautious when walking as numbness and/or weakness in the legs may occur up to 6-8 hours after the procedure due to effect of the local anesthetic  Do not drive for 6 hours. The effect of the local anesthetic could slow your reflexes.   Avoid strenuous activity for the first 24 hours. You may resume your regular activities after that.   You may shower, however avoid swimming, tub baths or hot tubs for 24 hours following your procedure  You may have a mild to moderate increase in pain for several days following the injection.    You may use ice packs for 10-15 minutes, 3 to 4 times a day at the injection site for comfort  Do not use heat to painful areas for 6 to 8 hours. This will give the local anesthetic time to wear off and prevent you from accidentally burning your skin.  Unless you have been directed to avoid the use of anti-inflammatory medications (NSAIDS-ibuprofen, Aleve, Motrin), you may use these medications or Tylenol for pain control if needed.   Possible side effects of steroids that you may experience include flushing, elevated blood pressure, increased appetite, mild headaches and restlessness.  All of these symptoms will get better with time.  It may take up to 14 days for the steroid medication to start working although you may feel the effect as early as a few days after the procedure.   Follow up with Dr Garrido as needed    If you experience any of the following, call the pain center line during work hours at [phone] or on-call physician after hours at [phone]:  -Fever over 100 degree F  -Swelling, bleeding, redness, drainage, warmth at the injection site  -Progressive weakness or numbness in your legs or arms  -Loss  of bowel or bladder function  -Unusual headache that is not relieved by Tylenol or your regular headache medication  -Unusual new onset of pain that is not improving

## 2022-07-11 ENCOUNTER — ANCILLARY PROCEDURE (OUTPATIENT)
Dept: MRI IMAGING | Facility: CLINIC | Age: 52
End: 2022-07-11
Attending: NATUROPATH
Payer: COMMERCIAL

## 2022-07-11 DIAGNOSIS — R29.898 WEAKNESS OF LOWER EXTREMITY, UNSPECIFIED LATERALITY: ICD-10-CM

## 2022-07-11 DIAGNOSIS — M54.50 LUMBAR PAIN WITH RADIATION DOWN LEG: ICD-10-CM

## 2022-07-11 DIAGNOSIS — M79.606 LUMBAR PAIN WITH RADIATION DOWN LEG: ICD-10-CM

## 2022-07-11 PROCEDURE — 72148 MRI LUMBAR SPINE W/O DYE: CPT | Mod: TC | Performed by: RADIOLOGY

## 2022-08-02 ENCOUNTER — VIRTUAL VISIT (OUTPATIENT)
Dept: ENDOCRINOLOGY | Facility: CLINIC | Age: 52
End: 2022-08-02
Payer: COMMERCIAL

## 2022-08-02 VITALS — WEIGHT: 293 LBS | BODY MASS INDEX: 49.07 KG/M2

## 2022-08-02 DIAGNOSIS — Z71.3 NUTRITIONAL COUNSELING: Primary | ICD-10-CM

## 2022-08-02 DIAGNOSIS — E66.01 MORBID OBESITY (H): Primary | ICD-10-CM

## 2022-08-02 DIAGNOSIS — E66.01 MORBID OBESITY (H): ICD-10-CM

## 2022-08-02 PROCEDURE — 97802 MEDICAL NUTRITION INDIV IN: CPT | Mod: GT | Performed by: DIETITIAN, REGISTERED

## 2022-08-02 PROCEDURE — 99204 OFFICE O/P NEW MOD 45 MIN: CPT | Mod: 95 | Performed by: INTERNAL MEDICINE

## 2022-08-02 RX ORDER — GABAPENTIN 300 MG/1
300 CAPSULE ORAL
COMMUNITY
Start: 2022-05-23 | End: 2022-08-11

## 2022-08-02 RX ORDER — COLCHICINE 0.6 MG/1
TABLET ORAL
COMMUNITY
Start: 2022-06-20

## 2022-08-02 NOTE — LETTER
2022       RE: Paige Fajardo  [address]  [place] [address]     Dear Colleague,    Thank you for referring your patient, Paige Fajardo, to the [facility] [place] at [facility]. Please see a copy of my visit note below.        New Medical Weight Management Consult    PATIENT:  Paige Fajardo  MRN:         [id number]  :         1970  VIRI:         2022    Dear PCP,    I had the pleasure of seeing your patient, Paige Fajardo. Full intake/assessment was done to determine barriers to weight loss success and develop a treatment plan. Paige Fajardo is a 51 year old female interested in treatment of medical problems associated with excess weight. She has a height of Data Unavailable, a weight of 304 lbs 0 oz, and the calculated Body mass index is 49.07 kg/m .      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 Her baseline weight was 250 lbs  She gradually gained 50 lbs in the last couple of years. She said weight gain is likely from multiple reasons -- christina COVID x2, multiple rounds of prednisone due to asthma (increased appetite and craving).    Previous attempts: WW, portion control    Eating habit:  is cooking, meat, vegetable, salad, potato and rice, eating out x2 per week  Overeat sometimes -- sweet or ice-cream or roll -- once a month  Love to explore food    Diet recalled:  BF: fruit, cereal and milk  Lunch: leftover, meat, salad or sandwich  Dinner: meat, salad, some starch   Snack: fruit, sweet some ice-cream    Work: work from home 3-4 days per week  Activity: not much due to back pain, relaxation yoga once a week    Sleep: 8 hours per night    Has not met with nutritionist and would like to meet them    She was on topiramate for migraine a few  "years ago but could not remember whether she lost weight or not.       8/2/2022   I have the following health issues associated with obesity: Sleep Apnea, Asthma, Stress Incontinence   I have the following symptoms associated with obesity: Knee Pain, Depression, Lower Extremity Swelling, Back Pain, Fatigue, Hip Pain       Patient Goals 8/2/2022   I am interested in having a healthier weight to diminish current health problems: Yes   I am interested in having a healthier weight in order to prevent future health problems: Yes   I am interested in having a healthier weight in order to have a future surgery: No       Referring Provider 8/2/2022   Please name the provider who referred you to Medical Weight Management.  If you do not know, please answer: \"I Don't Know\". Derek ANSARI       Weight History 8/2/2022   How concerned are you about your weight? Very Concerned   Would you describe your weight gain as gradual? Yes   I became overweight: As a Child   The following factors have contributed to my weight gain:  Mental Health Issues, Change in Schedule, Started on Medication that Caused Weight Gain, A Health Crisis, Eating Wrong Types of Food, Eating Too Much, Lack of Exercise, Genetic (Runs in the Family), Stress   I have tried the following methods to lose weight: Watching Portions or Calories, Exercise, Weight Watchers   My lowest weight since age 19 was: 175   My highest weight since age 19 was: 320   The most weight I have ever lost was: (lbs) 20   I have the following family history of obesity/being overweight:  My mother is overweight, My father is overweight, Many of my relatives are overweight   Has anyone in your family had weight loss surgery? No   How has your weight changed over the last year?  Gained   How many pounds? 50 lbs       Diet Recall Review with Patient 8/2/2022   Do you typically eat breakfast? Yes   If you do eat breakfast, what types of food do you eat? cereal, fruit, coffee, protein (meat and " egg)   Do you typically eat lunch? Yes   If you do eat lunch, what types of food do you typically eat?  leftovers, salads, sandwich   Do you typically eat supper? Yes   If you do eat supper, what types of food do you typically eat? Mostly what  is cooking   Do you typically eat snacks? Yes   If you do snack, what types of food do you typically eat? fruit, veggies, nuts, popcorn, ice cream, chips, crackers, dip   Do you like vegetables?  Yes   Do you drink water? Yes   How many glasses of juice do you drink in a typical day? 0   How many of glasses of milk do you drink in a typical day? 2   How many 8oz glasses of sugar containing drinks such as Mahendra-Aid/sweet tea do you drink in a day? 0   How many cans/bottles of sugar pop/soda/tea/sports drinks do you drink in a day? 0   How many cans/bottles of diet pop/soda/tea or sports drink do you drink in a day? 0   How often do you have a drink of alcohol? Never       Eating Habits 8/2/2022   Generally, my meals include foods like these: bread, pasta, rice, potatoes, corn, crackers, sweet dessert, pop, or juice. A Few Times a Week   Generally, my meals include foods like these: fried meats, brats, burgers, french fries, pizza, cheese, chips, or ice cream. A Few Times a Week   Eat fast food (like McDonal[employer], [employer], Taco Bell). Less Than Weekly   Eat at a buffet or sit-down restaurant. A Few Times a Week   Eat most of my meals in front of the TV or computer. Once a Week   Often skip meals, eat at random times, have no regular eating times. Once a Week   Rarely sit down for a meal but snack or graze throughout.  Once a Week   Eat extra snacks between meals. Almost Everyday   Eat most of my food at the end of the day. Less Than Weekly   Eat in the middle of the night or wake up at night to eat. Never   Eat extra snacks to prevent or correct low blood sugar. Less Than Weekly   Eat to prevent acid reflux or stomach pain. Never   Worry about not having enough food to  eat. Never   Have you been to the food shelf at least a few times this year? No   I eat when I am depressed. Less Than Weekly   I eat when I am stressed. A Few Times a Week   I eat when I am bored. Once a Week   I eat when I am anxious. Once a Week   I eat when I am happy or as a reward. Once a Week   I feel hungry all the time even if I just have eaten. Never   Feeling full is important to me. Once a Week   I finish all the food on my plate even if I am already full. Once a Week   I can't resist eating delicious food or walk past the good food/smell. Less Than Weekly   I eat/snack without noticing that I am eating. A Few Times a Week   I eat when I am preparing the meal. A Few Times a Week   I eat more than usual when I see others eating. Once a Week   I have trouble not eating sweets, ice cream, cookies, or chips if they are around the house. Everyday   I think about food all day. Less Than Weekly   What foods, if any, do you crave? Sweets/Candy/Chocolate   Please list any other foods you crave? I crave different things at different times.       Amount of Food 8/2/2022   I make myself vomit what I have eaten or use laxatives to get rid of food. Never   I eat a large amount of food, like a loaf of bread, a box of cookies, a pint/quart of ice cream, all at once. Monthly   I eat a large amount of food even when I am not hungry. Monthly   I eat rapidly. Everyday   I eat alone because I feel embarrassed and do not want others to see how much I have eaten. Monthly   I eat until I am uncomfortably full. Monthly   I feel bad, disgusted, or guilty after I overeat. Monthly   I make myself vomit what I have eaten or use laxatives to get rid of food. Never       Activity/Exercise History 8/2/2022   How much of a typical 12 hour day do you spend sitting? Most of the Day   How much of a typical 12 hour day do you spend lying down? Less Than Half the Day   How much of a typical day do you spend walking/standing? Less Than Half  the Day   How many hours (not including work) do you spend on the TV/Video Games/Computer/Tablet/Phone? 2-3 Hours   How many times a week are you active for the purpose of exercise? Once a Week   What keeps you from being more active? Pain, Lack of Time, Too tired   How many total minutes do you spend doing some activity for the purpose of exercising when you exercise? More Than 30 Minutes       PAST MEDICAL HISTORY:  Past Medical History:   Diagnosis Date     Allergic rhinitis      ALLERGIC RHINITIS NOS 3/8/2005     ANXIETY STATE NOS 3/8/2005     Complication of anesthesia      Cystic Fibrosis Screening negative 5/1/2009    [facility]     Dysmenorrhea 10/12/2008     Family history of aneurysm 4/28/2011     Fibromyalgia     Dx by head/neck/pain & Rheumatology     Fibromyalgia 4/15/2010    [facility]     Hyperlipidemia LDL goal <160 11/1/2013     INSOMNIA NEC 3/8/2005     Migraine      Migraine 4/22/2012    [facility]      Mild major depression (H)      Obesity 11/1/2013     Other and unspecified alcohol dependence, unspecified drinking behavior     Sober since 7/00     Other anxiety states      Other chronic pain     from fibromyalgia     Patellofemoral disorder     bilateral     Uncomplicated asthma     Not considered Asthma but I have breathing problems       Work/Social History Reviewed With Patient 8/2/2022   My employment status is: Full-Time   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 50%   How many hours do you spend commuting to work daily?  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 /In a Relationship   If in a relationship, is your significant other overweight? Yes   Do you have children? No   If you have children, are they overweight? N/A   Who do you live with?     Are they supportive of your health goals? Yes   Who does the food shopping?  both       Mental Health History Reviewed With Patient  8/2/2022   Have you ever been physically or sexually abused? No   If yes, do you feel that the abuse is affecting your weight? No   If yes, would you like to talk to a counselor about the abuse? No   How often in the past 2 weeks have you felt little interest or pleasure in doing things? Not at all   Over the past 2 weeks how often have you felt down, depressed, or hopeless? Not at all       Sleep History Reviewed With Patient 8/2/2022   How many hours do you sleep at night? 8   Do you think that you snore loudly or has anybody ever heard you snore loudly (louder than talking or so loud it can be heard behind a shut door)? Yes   Has anyone seen or heard you stop breathing during your sleep? Yes   Do you often feel tired, fatigued, or sleepy during the day? Yes   Do you have a TV/Computer in your bedroom? No       MEDICATIONS:   Current Outpatient Medications   Medication Sig Dispense Refill     colchicine (COLCYRS) 0.6 MG tablet Take 2 tablets orally at onset of symptoms and one more tablet 1 hour later, and then one tablet twice a day until flare resolves.       FLUoxetine (PROZAC) 20 MG capsule Take 20 mg by mouth       gabapentin (NEURONTIN) 300 MG capsule Take 300 mg by mouth       albuterol (PROAIR HFA/PROVENTIL HFA/VENTOLIN HFA) 108 (90 Base) MCG/ACT inhaler Inhale 2 puffs into the lungs every 6 hours as needed for shortness of breath / dyspnea or wheezing 18 g 3     albuterol (PROVENTIL) (2.5 MG/3ML) 0.083% neb solution Take 1 vial (2.5 mg) by nebulization every 6 hours as needed for shortness of breath / dyspnea or wheezing 30 vial 1     B Complex Vitamins (VITAMIN B COMPLEX PO) 3 drops per day       beclomethasone (QVAR) 80 MCG/ACT Inhaler Inhale 2 puffs into the lungs 2 times daily 1 Inhaler 11     beclomethasone HFA (QVAR REDIHALER) 80 MCG/ACT inhaler Inhale 2 puffs into the lungs 2 times daily 10.6 g 11     benzonatate (TESSALON) 200 MG capsule Take 1 capsule (200 mg) by mouth 3 times daily as needed for  cough 20 capsule 0     CALCIUM CARBONATE PO Take by mouth daily 5 drops per day       cetirizine-psuedoePHEDrine (ZYRTEC-D) 5-120 MG per tablet Take 1 tablet by mouth 2 times daily 90 tablet 3     cholecalciferol (VITAMIN D) 1000 UNIT tablet 3 drops per day       COMPOUNDED NON-CONTROLLED SUBSTANCE (CMPD RX) - PHARMACY TO MIX COMPOUNDED MEDICATION Low dose Naltrexone:  6mg capsules/tablets one PO qhs 30 capsule 6     FLUoxetine (PROZAC) 20 MG capsule Take 20 mg by mouth 2 times daily       gabapentin (NEURONTIN) 300 MG capsule Take 2 capsules  (600mg) in the morning and 2 capsules (600mg) at bedtime. 90day supply (Patient taking differently: 300 mg 2 times daily Take 2 capsules  (600mg) in the morning and 2 capsules (600mg) at bedtime. 90day supply) 360 capsule 1     ipratropium - albuterol 0.5 mg/2.5 mg/3 mL (DUONEB) 0.5-2.5 (3) MG/3ML neb solution Take 1 vial (3 mLs) by nebulization every 6 hours as needed for shortness of breath / dyspnea or wheezing 960 mL 4     ipratropium - albuterol 0.5 mg/2.5 mg/3 mL (DUONEB) 0.5-2.5 (3) MG/3ML neb solution Take 1 vial (3 mLs) by nebulization every 6 hours as needed for shortness of breath / dyspnea or wheezing Profile Rx: patient will contact pharmacy when needed 50 vial 3     levonorgestrel (MIRENA) 20 MCG/24HR IUD 1 each by Intrauterine route once       MAGNESIUM CITRATE PO 3 drops per day       Naproxen Sodium (ALEVE PO) Take 220 mg by mouth 2 times daily as needed        order for DME Equipment being ordered: Nebulizer 1 Device 0     rizatriptan (MAXALT-MLT) 10 MG ODT tab Take 1 tablet (10 mg) by mouth at onset of headache for migraine May repeat in 2 hours. Max 3 tablets/24 hours. 18 tablet 3     Turmeric 450 MG CAPS 1 scoop per day         ALLERGIES:   Allergies   Allergen Reactions     Augmentin      reacted to Augmentin--  stomach upset -- can take penicillin     Codeine Swelling     Dairy Products [Milk Protein Extract] Difficulty breathing     Products with milk  cause congestion, sinus mucous, difficulty breathing       PHYSICAL EXAM:  Wt 137.9 kg (304 lb)   BMI 49.07 kg/m      Waist circumference:      Wt Readings from Last 4 Encounters:   08/02/22 137.9 kg (304 lb)   03/08/22 136.3 kg (300 lb 8 oz)   12/03/19 122.5 kg (270 lb)   11/27/19 128.3 kg (282 lb 14.4 oz)     A & O x 3  HEENT: NCAT, mucous membranes moist  Respirations unlabored  Location of obesity: Mixed Obesity    Lab reviewed              ASSESSMENT/PLAN:  Paige is a patient with mature onset obesity with significant element of familial/genetic influence and with current health consequences. She does not need aggressive weight loss plan.  Paige Fajardo eats a high carb diet, eats out once or more per week, uses food as a reward and mostly eats during the evening.    Her problem is complicated by a hunger disorder, strong craving/reward pathways and poor lifestyle choices    Her ability to lose weight is impacted by current work life, lack of confidence and lack of education on nutrition and dietary needs.    PLAN:    Decrease portion sizes  Decrease eating out  Purge house of food triggers  Dietician visit of education  Volumetrics eating plan  Calorie/low fat diet  Meal planning  Increase activity as tolerated    Craving/Reward   Ancillary testing:  N/A.  Food Plan:  Volumetrics and High protein/low carbohydrate.   Activity Plan:  Exercise after meals.  Supplementary:  N/A.   Medication:  The patient will begin medication in pursuit of improved medical status as influenced by body weight. She will start Ozempic 0.25 mg weekly for 2 weeks then increase to 0.5 mg weekly for 1 month and then increase to 1.0 mg weekly thereafter.  There is a mutual understanding of the goals and risks of this therapy. The patient is in agreement. She is educated on dosage regimen and possible side effects.    Orders Placed This Encounter   Procedures     Med Therapy Management Referral     FOLLOW-UP:   See Lauren Bloch, MeearD  in 4-6 weeks   See Dr.Tasma ANSARI in 3 month(s)    Start: 08/02/2022 10:25 am  Stop: 08/02/2022 10:54 am  Total time 29 minutes    External notes/medical records independently reviewed, labs and imaging independently reviewed, medical management and tests to be discussed/communicated to patient.    Time: I spent 49 minutes spent on the date of the encounter preparing to see patient (including chart review and preparation), obtaining and or reviewing additional medical history, performing a physical exam and evaluation, documenting clinical information in the electronic health record, independently interpreting results, communicating results to the patient and coordinating care.      Sincerely,    Derek Schwab MD

## 2022-08-02 NOTE — NURSING NOTE
Chief Complaint   Patient presents with     Consult     New consultation for weight management.         Vitals:    08/02/22 0946   Weight: 304 lb       Body mass index is 49.07 kg/m .      Kelly Botello, EMT  Sur[facility] Clinic

## 2022-08-02 NOTE — PROGRESS NOTES
Virtual Visit Check-In    During this virtual visit the patient is located in [place], patient verifies this as the location during the entirety of this visit.     Paige is a 51 year old who is being evaluated via a billable video visit.      How would you like to obtain your AVS? MyChart  If the video visit is dropped, the invitation should be resent by: Text to cell phone:  [phone]  Will anyone else be joining your video visit? No        Video-Visit Details    Type of service:  Video Visit    Originating Location (pt. Location): Home    Distant Location (provider location):  [facility] [place]     Platform used for Video Visit: Malathi Botello NREMT

## 2022-08-02 NOTE — PROGRESS NOTES
"Paige Fajardo is a 51 year old female who is being evaluated via a billable video visit.      The patient has been notified of following:     \"This video visit will be conducted via a call between you and your physician/provider. We have found that certain health care needs can be provided without the need for an in-person physical exam.  This service lets us provide the care you need with a video conversation.  If a prescription is necessary we can send it directly to your pharmacy.  If lab work is needed we can place an order for that and you can then stop by our lab to have the test done at a later time.    Video visits are billed at different rates depending on your insurance coverage.  Please reach out to your insurance provider with any questions.    If during the course of the call the physician/provider feels a video visit is not appropriate, you will not be charged for this service.\"    How would you like to obtain your AVS? MyChart  If the video visit is dropped, the invitation should be resent by: Text to cell phone:  [phone]  Will anyone else be joining your video visit? No      Video-Visit Details    Type of service:  Video Visit    Video Start Time: 11:34 am   Video End Time: 12:12 pm    Originating Location (pt. Location): Home    Distant Location (provider location):  [facility] [place]     Platform used for Video Visit: Speek    During this virtual visit the patient is located in [place], patient verifies this as the location during the entirety of this visit.     New Weight Management Nutrition Consultation    Paige Fajardo is a 51 year old female presents today for new weight management nutrition consultation.  Patient referred by Dr. Reed on August 2, 2022.    Patient with Co-morbidities of obesity including:  Type II DM no  Renal Failure no  Sleep apnea yes  Hypertension no   Dyslipidemia no  Joint pain no  Back pain yes  GERD no     PMH: " "  Depression  Swelling  Fatigue  Gout   Asthma  Fibromyalgia   Covid at least 2x  ? Weight gain associated with prednisone        Hx of alcohol abuse 20 years ago    Anthropometrics:  Weight 8/2/22: 304 lbs with BMI 49.07    Estimated body mass index is 49.07 kg/m  as calculated from the following:    Height as of 3/8/22: 1.676 m (5' 6\").    Weight as of an earlier encounter on 8/2/22: 137.9 kg (304 lb).    Medications for Weight Loss:  Ozempic prescribed 8/2/22    Supplements:  Vit D in winter  Turmeric   B-complex  Calcium - not right now    Labs 6/20/22  ? CBC off (just getting over covid)     Labs 5/17/22  ? GFR 90 (will watch - likely dehydrated due to covid)    Hx of high cholesterol     Donates platelets every 2 weeks     NUTRITION HISTORY    NKFA  Limits dairy - notices clogged sinuses and worsening asthma symptoms per pt.   Does use dairy alternatives.     Has not met with a RD before.    Pt goals: lose weight, learn about food choices (what is more nutritious), feel better    Typical day  B - cereal, fruit, protein (yogurt or egg or meat stick)  L - leftovers OR salad OR sandwich   D - varies - goes out Monday/Friday. If at home -  cooks.  Snack: nuts OR carrots OR popcorn OR more nutritious chip per pt    Fluids: 1-2 cups of coffee with non-dairy milk.     PA: limited due to back pain. 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   has diabetes     Donates platelets every 2 weeks on avg    Nutrition Prescription  Recommended energy/nutrient modification.    Nutrition Diagnosis  Food and nutrition related knowledge deficit r/t lack of prior exposure to nutrition education aeb pt report and interest in learning     Obesity r/t long history of positive energy balance aeb BMI >30.    Nutrition Intervention  Reviewed current " dietary habits and pts history   Discussed long-term goals pt hopes to accomplish in RD appointments  Answered pt questions  Coordination of care   Nutrition education - Plate method, types of vegetables, types of fat, label reading  AVS and handouts via [employer]    Patient demonstrates understanding.    Expected Engagement: good    Nutrition Goals  1. Start peaking at resources. Reach out with any questions   2. Recommend following the plate method for meals (1/2 plate non-starchy vegetables, 1/4 plate protein, 1/4 plate carbohydrates - fruit, starchy vegetables and grains).    The Plate Method:  https://www.cdc.gov/diabetes/images/managing/Diabetes-Manage-Eat-Well-Plate-Graphic_600px.jpg     Starchy vegetables  ? Potatoes   ? Sweet potatoes  ? Green peas  ? Chickpeas   ? Corn  ? Lentils  ? Beans (black, domingo, etc)     Types of fats  https://www.[facility].[place].edu/nutritionsource/what-should-you-eat/fats-and-cholesterol/types-of-fat/      Good resources on inflammation      https://www.health.[place].edu/staying-healthy/foods-that-fight-inflammation     https://www.heart.org/en/healthy-living/healthy-eating/eat-smart/nutrition-basics/mediterranean-diet     https://[employer]pt.org/traditional-diets/mediterranean-diet     https://my.[facility].org/health/articles/36406-rimcihmynihrv-ubeh     Good article on label reading   https://www.fda.gov/food/new-nutrition-facts-label/how-understand-and-use-nutrition-facts-label     Additional resources:     Protein Sources   [email or web address]     Carbohydrates  [email or web address]     Mindful Eating  [email or web address]     Summary of Volumetrics Eating Plan  [email or web address]     Follow-Up:  1 month    Time spent with patient: 38 minutes.  AARON Tom, RD, LD

## 2022-08-02 NOTE — PATIENT INSTRUCTIONS
-Start Ozempic 0.25 mg weekly for 2 weeks then increase to 0.5 mg weekly for 1 month and then increase to 1.0 mg weekly thereafter    See Lauren Bloch, PharmD in 4-6 weeks   See Dr.Tasma ANSARI in 3 month(s)    If you have any questions, please do not hesitate to call Weight management clinic at [phone] or [phone].  If you need to fax, please fax to [phone].    Sincerely,    Derek Schwab MD  Endocrinology

## 2022-08-02 NOTE — LETTER
"8/2/2022       RE: Paige Fajardo  [address]  [facility] [address]     Dear Colleague,    Thank you for referring your patient, Paige Fajardo, to the [facility] [place] at [facility]. Please see a copy of my visit note below.    Paige Fajardo is a 51 year old female who is being evaluated via a billable video visit.      The patient has been notified of following:     \"This video visit will be conducted via a call between you and your physician/provider. We have found that certain health care needs can be provided without the need for an in-person physical exam.  This service lets us provide the care you need with a video conversation.  If a prescription is necessary we can send it directly to your pharmacy.  If lab work is needed we can place an order for that and you can then stop by our lab to have the test done at a later time.    Video visits are billed at different rates depending on your insurance coverage.  Please reach out to your insurance provider with any questions.    If during the course of the call the physician/provider feels a video visit is not appropriate, you will not be charged for this service.\"    How would you like to obtain your AVS? MyChart  If the video visit is dropped, the invitation should be resent by: Text to cell phone:  [phone]  Will anyone else be joining your video visit? No      Video-Visit Details    Type of service:  Video Visit    Video Start Time: 11:34 am   Video End Time: 12:12 pm    Originating Location (pt. Location): Home    Distant Location (provider location):  [facility] [place]     Platform used for Video Visit: [employer] Portal    During this virtual visit the patient is located in [place], patient verifies this as the location during the entirety of this visit.     New Weight Management Nutrition Consultation    Paige Fajardo is a 51 year old " "female presents today for new weight management nutrition consultation.  Patient referred by Dr. Reed on August 2, 2022.    Patient with Co-morbidities of obesity including:  Type II DM no  Renal Failure no  Sleep apnea yes  Hypertension no   Dyslipidemia no  Joint pain no  Back pain yes  GERD no     PMH:   Depression  Swelling  Fatigue  Gout   Asthma  Fibromyalgia   Covid at least 2x  ? Weight gain associated with prednisone        Hx of alcohol abuse 20 years ago    Anthropometrics:  Weight 8/2/22: 304 lbs with BMI 49.07    Estimated body mass index is 49.07 kg/m  as calculated from the following:    Height as of 3/8/22: 1.676 m (5' 6\").    Weight as of an earlier encounter on 8/2/22: 137.9 kg (304 lb).    Medications for Weight Loss:  Ozempic prescribed 8/2/22    Supplements:  Vit D in winter  Turmeric   B-complex  Calcium - not right now    Labs 6/20/22  ? CBC off (just getting over covid)     Labs 5/17/22  ? GFR 90 (will watch - likely dehydrated due to covid)    Hx of high cholesterol     Donates platelets every 2 weeks     NUTRITION HISTORY    NKFA  Limits dairy - notices clogged sinuses and worsening asthma symptoms per pt.   Does use dairy alternatives.     Has not met with a RD before.    Pt goals: lose weight, learn about food choices (what is more nutritious), feel better    Typical day  B - cereal, fruit, protein (yogurt or egg or meat stick)  L - leftovers OR salad OR sandwich   D - varies - goes out Monday/Friday. If at home -  cooks.  Snack: nuts OR carrots OR popcorn OR more nutritious chip per pt    Fluids: 1-2 cups of coffee with non-dairy milk.     PA: limited due to back pain. 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   has diabetes     Donates platelets every 2 weeks on avg    Nutrition " Prescription  Recommended energy/nutrient modification.    Nutrition Diagnosis  Food and nutrition related knowledge deficit r/t lack of prior exposure to nutrition education aeb pt report and interest in learning     Obesity r/t long history of positive energy balance aeb BMI >30.    Nutrition Intervention  Reviewed current dietary habits and pts history   Discussed long-term goals pt hopes to accomplish in RD appointments  Answered pt questions  Coordination of care   Nutrition education - Plate method, types of vegetables, types of fat, label reading  AVS and handouts via [employer]    Patient demonstrates understanding.    Expected Engagement: good    Nutrition Goals  1. Start peaking at resources. Reach out with any questions   2. Recommend following the plate method for meals (1/2 plate non-starchy vegetables, 1/4 plate protein, 1/4 plate carbohydrates - fruit, starchy vegetables and grains).    The Plate Method:  https://www.cdc.gov/diabetes/images/managing/Diabetes-Manage-Eat-Well-Plate-Graphic_600px.jpg     Starchy vegetables  ? Potatoes   ? Sweet potatoes  ? Green peas  ? Chickpeas   ? Corn  ? Lentils  ? Beans (black, domingo, etc)     Types of fats  https://www.[facility].[place].edu/nutritionsource/what-should-you-eat/fats-and-cholesterol/types-of-fat/      Good resources on inflammation      https://www.health.[place].edu/staying-healthy/foods-that-fight-inflammation     https://www.heart.org/en/healthy-living/healthy-eating/eat-smart/nutrition-basics/mediterranean-diet     https://oldwayspt.org/traditional-diets/mediterranean-diet     https://my.[facility].org/health/articles/40629-aredifksajjeo-unyd     Good article on label reading   https://www.fda.gov/food/new-nutrition-facts-label/how-understand-and-use-nutrition-facts-label     Additional resources:     Protein Sources   [email or web address]     Carbohydrates  [email or web address]     Mindful Eating  [email or web address]      Summary of Volumetrics Eating Plan  [email or web address]     Follow-Up:  1 month    Time spent with patient: 38 minutes.  AARON Tom, RD, LD

## 2022-08-02 NOTE — PROGRESS NOTES
New Medical Weight Management Consult    PATIENT:  Paige Fajardo  MRN:         [id number]  :         1970  VIRI:         2022    Dear PCP,    I had the pleasure of seeing your patient, Paige Fajardo. Full intake/assessment was done to determine barriers to weight loss success and develop a treatment plan. Paige Fajardo is a 51 year old female interested in treatment of medical problems associated with excess weight. She has a height of Data Unavailable, a weight of 304 lbs 0 oz, and the calculated Body mass index is 49.07 kg/m .      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 Her baseline weight was 250 lbs  She gradually gained 50 lbs in the last couple of years. She said weight gain is likely from multiple reasons -- christina COVID x2, multiple rounds of prednisone due to asthma (increased appetite and craving).    Previous attempts: WW, portion control    Eating habit:  is cooking, meat, vegetable, salad, potato and rice, eating out x2 per week  Overeat sometimes -- sweet or ice-cream or roll -- once a month  Love to explore food    Diet recalled:  BF: fruit, cereal and milk  Lunch: leftover, meat, salad or sandwich  Dinner: meat, salad, some starch   Snack: fruit, sweet some ice-cream    Work: work from home 3-4 days per week  Activity: not much due to back pain, relaxation yoga once a week    Sleep: 8 hours per night    Has not met with nutritionist and would like to meet them    She was on topiramate for migraine a few years ago but could not remember whether she lost weight or not.       2022   I have the following health issues associated with obesity: Sleep Apnea, Asthma, Stress Incontinence   I have the following symptoms associated with obesity: Knee Pain, Depression, Lower Extremity Swelling, Back Pain, Fatigue, Hip Pain  "      Patient Goals 8/2/2022   I am interested in having a healthier weight to diminish current health problems: Yes   I am interested in having a healthier weight in order to prevent future health problems: Yes   I am interested in having a healthier weight in order to have a future surgery: No       Referring Provider 8/2/2022   Please name the provider who referred you to Medical Weight Management.  If you do not know, please answer: \"I Don't Know\". Derek ANSARI       Weight History 8/2/2022   How concerned are you about your weight? Very Concerned   Would you describe your weight gain as gradual? Yes   I became overweight: As a Child   The following factors have contributed to my weight gain:  Mental Health Issues, Change in Schedule, Started on Medication that Caused Weight Gain, A Health Crisis, Eating Wrong Types of Food, Eating Too Much, Lack of Exercise, Genetic (Runs in the Family), Stress   I have tried the following methods to lose weight: Watching Portions or Calories, Exercise, Weight Watchers   My lowest weight since age 19 was: 175   My highest weight since age 19 was: 320   The most weight I have ever lost was: (lbs) 20   I have the following family history of obesity/being overweight:  My mother is overweight, My father is overweight, Many of my relatives are overweight   Has anyone in your family had weight loss surgery? No   How has your weight changed over the last year?  Gained   How many pounds? 50 lbs       Diet Recall Review with Patient 8/2/2022   Do you typically eat breakfast? Yes   If you do eat breakfast, what types of food do you eat? cereal, fruit, coffee, protein (meat and egg)   Do you typically eat lunch? Yes   If you do eat lunch, what types of food do you typically eat?  leftovers, salads, sandwich   Do you typically eat supper? Yes   If you do eat supper, what types of food do you typically eat? Mostly what  is cooking   Do you typically eat snacks? Yes   If you do snack, what " types of food do you typically eat? fruit, veggies, nuts, popcorn, ice cream, chips, crackers, dip   Do you like vegetables?  Yes   Do you drink water? Yes   How many glasses of juice do you drink in a typical day? 0   How many of glasses of milk do you drink in a typical day? 2   How many 8oz glasses of sugar containing drinks such as Mahendra-Aid/sweet tea do you drink in a day? 0   How many cans/bottles of sugar pop/soda/tea/sports drinks do you drink in a day? 0   How many cans/bottles of diet pop/soda/tea or sports drink do you drink in a day? 0   How often do you have a drink of alcohol? Never       Eating Habits 8/2/2022   Generally, my meals include foods like these: bread, pasta, rice, potatoes, corn, crackers, sweet dessert, pop, or juice. A Few Times a Week   Generally, my meals include foods like these: fried meats, brats, burgers, french fries, pizza, cheese, chips, or ice cream. A Few Times a Week   Eat fast food (like McDonalds, BurTimely Regan, Taco Bell). Less Than Weekly   Eat at a buffet or sit-down restaurant. A Few Times a Week   Eat most of my meals in front of the TV or computer. Once a Week   Often skip meals, eat at random times, have no regular eating times. Once a Week   Rarely sit down for a meal but snack or graze throughout.  Once a Week   Eat extra snacks between meals. Almost Everyday   Eat most of my food at the end of the day. Less Than Weekly   Eat in the middle of the night or wake up at night to eat. Never   Eat extra snacks to prevent or correct low blood sugar. Less Than Weekly   Eat to prevent acid reflux or stomach pain. Never   Worry about not having enough food to eat. Never   Have you been to the food shelf at least a few times this year? No   I eat when I am depressed. Less Than Weekly   I eat when I am stressed. A Few Times a Week   I eat when I am bored. Once a Week   I eat when I am anxious. Once a Week   I eat when I am happy or as a reward. Once a Week   I feel hungry all  the time even if I just have eaten. Never   Feeling full is important to me. Once a Week   I finish all the food on my plate even if I am already full. Once a Week   I can't resist eating delicious food or walk past the good food/smell. Less Than Weekly   I eat/snack without noticing that I am eating. A Few Times a Week   I eat when I am preparing the meal. A Few Times a Week   I eat more than usual when I see others eating. Once a Week   I have trouble not eating sweets, ice cream, cookies, or chips if they are around the house. Everyday   I think about food all day. Less Than Weekly   What foods, if any, do you crave? Sweets/Candy/Chocolate   Please list any other foods you crave? I crave different things at different times.       Amount of Food 8/2/2022   I make myself vomit what I have eaten or use laxatives to get rid of food. Never   I eat a large amount of food, like a loaf of bread, a box of cookies, a pint/quart of ice cream, all at once. Monthly   I eat a large amount of food even when I am not hungry. Monthly   I eat rapidly. Everyday   I eat alone because I feel embarrassed and do not want others to see how much I have eaten. Monthly   I eat until I am uncomfortably full. Monthly   I feel bad, disgusted, or guilty after I overeat. Monthly   I make myself vomit what I have eaten or use laxatives to get rid of food. Never       Activity/Exercise History 8/2/2022   How much of a typical 12 hour day do you spend sitting? Most of the Day   How much of a typical 12 hour day do you spend lying down? Less Than Half the Day   How much of a typical day do you spend walking/standing? Less Than Half the Day   How many hours (not including work) do you spend on the TV/Video Games/Computer/Tablet/Phone? 2-3 Hours   How many times a week are you active for the purpose of exercise? Once a Week   What keeps you from being more active? Pain, Lack of Time, Too tired   How many total minutes do you spend doing some activity  for the purpose of exercising when you exercise? More Than 30 Minutes       PAST MEDICAL HISTORY:  Past Medical History:   Diagnosis Date     Allergic rhinitis      ALLERGIC RHINITIS NOS 3/8/2005     ANXIETY STATE NOS 3/8/2005     Complication of anesthesia      Cystic Fibrosis Screening negative 5/1/2009    [facility]     Dysmenorrhea 10/12/2008     Family history of aneurysm 4/28/2011     Fibromyalgia     Dx by head/neck/pain & Rheumatology     Fibromyalgia [facility]/15/2010    [facility]     Hyperlipidemia LDL goal <160 11/1/2013     INSOMNIA NEC 3/8/2005     Migraine      Migraine 4/22/2012    [facility]      Mild major depression (H)      Obesity 11/1/2013     Other and unspecified alcohol dependence, unspecified drinking behavior     Sober since 7/00     Other anxiety states      Other chronic pain     from fibromyalgia     Patellofemoral disorder     bilateral     Uncomplicated asthma     Not considered Asthma but I have breathing problems       Work/Social History Reviewed With Patient 8/2/2022   My employment status is: Full-Time   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 50%   How many hours do you spend commuting to work daily?  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 /In a Relationship   If in a relationship, is your significant other overweight? Yes   Do you have children? No   If you have children, are they overweight? N/A   Who do you live with?     Are they supportive of your health goals? Yes   Who does the food shopping?  both       Mental Health History Reviewed With Patient 8/2/2022   Have you ever been physically or sexually abused? No   If yes, do you feel that the abuse is affecting your weight? No   If yes, would you like to talk to a counselor about the abuse? No   How often in the past 2 weeks have you felt little interest or pleasure in doing things? Not at all   Over the past 2 weeks  how often have you felt down, depressed, or hopeless? Not at all       Sleep History Reviewed With Patient 8/2/2022   How many hours do you sleep at night? 8   Do you think that you snore loudly or has anybody ever heard you snore loudly (louder than talking or so loud it can be heard behind a shut door)? Yes   Has anyone seen or heard you stop breathing during your sleep? Yes   Do you often feel tired, fatigued, or sleepy during the day? Yes   Do you have a TV/Computer in your bedroom? No       MEDICATIONS:   Current Outpatient Medications   Medication Sig Dispense Refill     colchicine (COLCYRS) 0.6 MG tablet Take 2 tablets orally at onset of symptoms and one more tablet 1 hour later, and then one tablet twice a day until flare resolves.       FLUoxetine (PROZAC) 20 MG capsule Take 20 mg by mouth       gabapentin (NEURONTIN) 300 MG capsule Take 300 mg by mouth       albuterol (PROAIR HFA/PROVENTIL HFA/VENTOLIN HFA) 108 (90 Base) MCG/ACT inhaler Inhale 2 puffs into the lungs every 6 hours as needed for shortness of breath / dyspnea or wheezing 18 g 3     albuterol (PROVENTIL) (2.5 MG/3ML) 0.083% neb solution Take 1 vial (2.5 mg) by nebulization every 6 hours as needed for shortness of breath / dyspnea or wheezing 30 vial 1     B Complex Vitamins (VITAMIN B COMPLEX PO) 3 drops per day       beclomethasone (QVAR) 80 MCG/ACT Inhaler Inhale 2 puffs into the lungs 2 times daily 1 Inhaler 11     beclomethasone HFA (QVAR REDIHALER) 80 MCG/ACT inhaler Inhale 2 puffs into the lungs 2 times daily 10.6 g 11     benzonatate (TESSALON) 200 MG capsule Take 1 capsule (200 mg) by mouth 3 times daily as needed for cough 20 capsule 0     CALCIUM CARBONATE PO Take by mouth daily 5 drops per day       cetirizine-psuedoePHEDrine (ZYRTEC-D) 5-120 MG per tablet Take 1 tablet by mouth 2 times daily 90 tablet 3     cholecalciferol (VITAMIN D) 1000 UNIT tablet 3 drops per day       COMPOUNDED NON-CONTROLLED SUBSTANCE (CMPD RX) - PHARMACY TO  MIX COMPOUNDED MEDICATION Low dose Naltrexone:  6mg capsules/tablets one PO qhs 30 capsule 6     FLUoxetine (PROZAC) 20 MG capsule Take 20 mg by mouth 2 times daily       gabapentin (NEURONTIN) 300 MG capsule Take 2 capsules  (600mg) in the morning and 2 capsules (600mg) at bedtime. 90day supply (Patient taking differently: 300 mg 2 times daily Take 2 capsules  (600mg) in the morning and 2 capsules (600mg) at bedtime. 90day supply) 360 capsule 1     ipratropium - albuterol 0.5 mg/2.5 mg/3 mL (DUONEB) 0.5-2.5 (3) MG/3ML neb solution Take 1 vial (3 mLs) by nebulization every 6 hours as needed for shortness of breath / dyspnea or wheezing 960 mL 4     ipratropium - albuterol 0.5 mg/2.5 mg/3 mL (DUONEB) 0.5-2.5 (3) MG/3ML neb solution Take 1 vial (3 mLs) by nebulization every 6 hours as needed for shortness of breath / dyspnea or wheezing Profile Rx: patient will contact pharmacy when needed 50 vial 3     levonorgestrel (MIRENA) 20 MCG/24HR IUD 1 each by Intrauterine route once       MAGNESIUM CITRATE PO 3 drops per day       Naproxen Sodium (ALEVE PO) Take 220 mg by mouth 2 times daily as needed        order for DME Equipment being ordered: Nebulizer 1 Device 0     rizatriptan (MAXALT-MLT) 10 MG ODT tab Take 1 tablet (10 mg) by mouth at onset of headache for migraine May repeat in 2 hours. Max 3 tablets/24 hours. 18 tablet 3     Turmeric 450 MG CAPS 1 scoop per day         ALLERGIES:   Allergies   Allergen Reactions     Augmentin      reacted to Augmentin--  stomach upset -- can take penicillin     Codeine Swelling     Dairy Products [Milk Protein Extract] Difficulty breathing     Products with milk cause congestion, sinus mucous, difficulty breathing       PHYSICAL EXAM:  Wt 137.9 kg (304 lb)   BMI 49.07 kg/m      Waist circumference:      Wt Readings from Last 4 Encounters:   08/02/22 137.9 kg (304 lb)   03/08/22 136.3 kg (300 lb 8 oz)   12/03/19 122.5 kg (270 lb)   11/27/19 128.3 kg (282 lb 14.4 oz)     A & O x  3  HEENT: NCAT, mucous membranes moist  Respirations unlabored  Location of obesity: Mixed Obesity    Lab reviewed              ASSESSMENT/PLAN:  Paige is a patient with mature onset obesity with significant element of familial/genetic influence and with current health consequences. She does not need aggressive weight loss plan.  Paige Fajardo eats a high carb diet, eats out once or more per week, uses food as a reward and mostly eats during the evening.    Her problem is complicated by a hunger disorder, strong craving/reward pathways and poor lifestyle choices    Her ability to lose weight is impacted by current work life, lack of confidence and lack of education on nutrition and dietary needs.    PLAN:    Decrease portion sizes  Decrease eating out  Purge house of food triggers  Dietician visit of education  Volumetrics eating plan  Calorie/low fat diet  Meal planning  Increase activity as tolerated    Craving/Reward   Ancillary testing:  N/A.  Food Plan:  Volumetrics and High protein/low carbohydrate.   Activity Plan:  Exercise after meals.  Supplementary:  N/A.   Medication:  The patient will begin medication in pursuit of improved medical status as influenced by body weight. She will start Ozempic 0.25 mg weekly for 2 weeks then increase to 0.5 mg weekly for 1 month and then increase to 1.0 mg weekly thereafter.  There is a mutual understanding of the goals and risks of this therapy. The patient is in agreement. She is educated on dosage regimen and possible side effects.    Orders Placed This Encounter   Procedures     Med Therapy Management Referral     FOLLOW-UP:   See Lauren Bloch, PharmD in 4-6 weeks   See Dr.Tasma ANSARI in 3 month(s)    Start: 08/02/2022 10:25 am  Stop: 08/02/2022 10:54 am  Total time 29 minutes    External notes/medical records independently reviewed, labs and imaging independently reviewed, medical management and tests to be discussed/communicated to patient.    Time: I spent 49 minutes  spent on the date of the encounter preparing to see patient (including chart review and preparation), obtaining and or reviewing additional medical history, performing a physical exam and evaluation, documenting clinical information in the electronic health record, independently interpreting results, communicating results to the patient and coordinating care.      Sincerely,    Derek Schwab MD

## 2022-08-02 NOTE — PATIENT INSTRUCTIONS
Nutrition Goals  Start peaking at resources. Reach out with any questions   Recommend following the plate method for meals (1/2 plate non-starchy vegetables, 1/4 plate protein, 1/4 plate carbohydrates - fruit, starchy vegetables and grains).    The Plate Method:  https://www.cdc.gov/diabetes/images/managing/Diabetes-Manage-Eat-Well-Plate-Graphic_600px.jpg     Starchy vegetables  Potatoes   Sweet potatoes  Green peas  Chickpeas   Corn  Lentils  Beans (black, domingo, etc)     Types of fats  https://www.[facility].[place].[facility]/nutritionsource/what-should-you-eat/fats-and-cholesterol/types-of-fat/      Good resources on inflammation      https://www.health.[place].edu/staying-healthy/foods-that-fight-inflammation     https://www.heart.org/en/healthy-living/healthy-eating/eat-smart/nutrition-basics/mediterranean-diet     https://oldSPARQCode.org/traditional-diets/mediterranean-diet     https://my.[facility].org/health/articles/64599-omwyjgrdkuraj-qmsg     Good article on label reading   https://www.fda.gov/food/new-nutrition-facts-label/how-understand-and-use-nutrition-facts-label     Additional resources:     Protein Sources   [email or web address]     Carbohydrates  [email or web address]     Mindful Eating  [email or web address]     Summary of Volumetrics Eating Plan  [email or web address]     Follow-Up:  1 month    AARON Dunne, RD, LD  Clinic #: [phone]

## 2022-08-09 ENCOUNTER — TELEPHONE (OUTPATIENT)
Dept: ENDOCRINOLOGY | Facility: CLINIC | Age: 52
End: 2022-08-09

## 2022-08-09 NOTE — TELEPHONE ENCOUNTER
Patient will be calling back to schedule with Derek in 3 months around 11/2/22 and Christina Pelaez in 5 weeks around 9/6/22

## 2022-08-11 ENCOUNTER — OFFICE VISIT (OUTPATIENT)
Dept: PALLIATIVE MEDICINE | Facility: CLINIC | Age: 52
End: 2022-08-11
Payer: COMMERCIAL

## 2022-08-11 VITALS — DIASTOLIC BLOOD PRESSURE: 76 MMHG | OXYGEN SATURATION: 97 % | HEART RATE: 64 BPM | SYSTOLIC BLOOD PRESSURE: 112 MMHG

## 2022-08-11 DIAGNOSIS — M54.50 CHRONIC BILATERAL LOW BACK PAIN WITHOUT SCIATICA: Primary | ICD-10-CM

## 2022-08-11 DIAGNOSIS — G89.29 CHRONIC BILATERAL LOW BACK PAIN WITHOUT SCIATICA: Primary | ICD-10-CM

## 2022-08-11 DIAGNOSIS — M79.7 FIBROMYALGIA: ICD-10-CM

## 2022-08-11 PROCEDURE — 99214 OFFICE O/P EST MOD 30 MIN: CPT | Performed by: ANESTHESIOLOGY

## 2022-08-11 ASSESSMENT — PAIN SCALES - GENERAL: PAINLEVEL: MODERATE PAIN (5)

## 2022-08-11 NOTE — PROGRESS NOTES
"[facility]    Date of visit: 8/11/2022    Chief complaint:   Chief Complaint   Patient presents with     Pain       Interval history:  Paige Le is a 51 year old female last seen by me for follow up on 9/23/2021 VIRTUAL VISIT.       Since her last visit, Paige Le reports:    - S/P RIGHT L5-S1 facet joint injections on 6/29/2022.  This was helpful for her spot tenderness on the low back buttock on the right side.   - She feels \"overall exhausted\" and is taking a nap almost every day.  She had Covid in May and \"it wiped me out\"  - She continues to have bilateral low back pain but it is stable.    - She previously did traditional PT that was helpful and she is interested in doing the medex program.   - She was doing yoga 3-4 times a week at a local studio, however, this closed because of covid.   - She is going to the nutrition clinic to work on her weight loss.  They are starting her on Ozempic shots and she is working with a nutritionist.   - She was previously on low dose naltrexone and went off it a couple years ago because she was doing so well at the time.   - She is on Gabapentin 600mg BID now.   - She is sleeping well at night and using CPAP.  Discontinued trazodone.   - She is moved to a single family home near [place], [place] with her now .  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       Current pain treatments:   Gabapentin 600mg qam and 600mg qhs  Maxalt 10mg PRN headache  Tumeric 450mg daily  Prozac 20mg BID    INJECTIONS:   LEFT hip injection 7/14/2021  RIGHT L5-S1 facet joint injections 6/29/22,  6/4/2021 & 3/17/2021    IMAGING:   LUMBAR MRI 7/11/2022:   FINDINGS:   Nomenclature is based on 5 lumbar type vertebral bodies. There is minimal grade 1 retrolisthesis of L5 on S1. The alignment of the lumbar spine is otherwise normal. The vertebral body heights are maintained. No aggressive marrow replacing lesions are   identified in " the lumbar spine.     The conus medullaris appears normal, terminating at the L1 level.     There is mild multilevel intervertebral disc space narrowing and endplate degenerative change.     There are several parapelvic left renal cysts. The remainder the visualized abdominal soft tissues are unremarkable.     T12-L1: No significant posterior disc abnormality, spinal canal, or neural foraminal stenosis.      L1-L2: No significant posterior disc abnormality, spinal canal, or neural foraminal stenosis.     L2-L3: Right foraminal disc protrusion superimposed on a diffuse posterior disc bulge without significant spinal canal stenosis. There is mild right and no significant left neural foraminal stenosis.      L3-L4: Mild diffuse posterior disc bulge without significant spinal canal stenosis. There is mild left and no significant right neural foraminal stenosis.     L4-L5: Left foraminal disc protrusion with annular tear superimposed on a diffuse posterior disc bulge without significant spinal canal stenosis. Bilateral facet arthropathy contributes to mild bilateral neural foraminal stenosis.     L5-S1: No significant posterior disc abnormality, spinal canal, or neural foraminal stenosis.                                                                   IMPRESSION:     1.  Mild multilevel degenerative change without significant spinal canal or high-grade neural foraminal stenosis, as detailed above.      Medications:  Current Outpatient Medications   Medication Sig Dispense Refill     albuterol (PROAIR HFA/PROVENTIL HFA/VENTOLIN HFA) 108 (90 Base) MCG/ACT inhaler Inhale 2 puffs into the lungs every 6 hours as needed for shortness of breath / dyspnea or wheezing 18 g 3     albuterol (PROVENTIL) (2.5 MG/3ML) 0.083% neb solution Take 1 vial (2.5 mg) by nebulization every 6 hours as needed for shortness of breath / dyspnea or wheezing 30 vial 1     B Complex Vitamins (VITAMIN B COMPLEX PO) 3 drops per day        beclomethasone (QVAR) 80 MCG/ACT Inhaler Inhale 2 puffs into the lungs 2 times daily 1 Inhaler 11     beclomethasone HFA (QVAR REDIHALER) 80 MCG/ACT inhaler Inhale 2 puffs into the lungs 2 times daily 10.6 g 11     benzonatate (TESSALON) 200 MG capsule Take 1 capsule (200 mg) by mouth 3 times daily as needed for cough 20 capsule 0     CALCIUM CARBONATE PO Take by mouth daily 5 drops per day       cetirizine-psuedoePHEDrine (ZYRTEC-D) 5-120 MG per tablet Take 1 tablet by mouth 2 times daily 90 tablet 3     cholecalciferol (VITAMIN D) 1000 UNIT tablet 3 drops per day       colchicine (COLCYRS) 0.6 MG tablet Take 2 tablets orally at onset of symptoms and one more tablet 1 hour later, and then one tablet twice a day until flare resolves.       FLUoxetine (PROZAC) 20 MG capsule Take 20 mg by mouth       gabapentin (NEURONTIN) 300 MG capsule Take 2 capsules  (600mg) in the morning and 2 capsules (600mg) at bedtime. 90day supply (Patient taking differently: 300 mg 2 times daily Take 2 capsules  (600mg) in the morning and 2 capsules (600mg) at bedtime. 90day supply) 360 capsule 1     ipratropium - albuterol 0.5 mg/2.5 mg/3 mL (DUONEB) 0.5-2.5 (3) MG/3ML neb solution Take 1 vial (3 mLs) by nebulization every 6 hours as needed for shortness of breath / dyspnea or wheezing 960 mL 4     ipratropium - albuterol 0.5 mg/2.5 mg/3 mL (DUONEB) 0.5-2.5 (3) MG/3ML neb solution Take 1 vial (3 mLs) by nebulization every 6 hours as needed for shortness of breath / dyspnea or wheezing Profile Rx: patient will contact pharmacy when needed 50 vial 3     levonorgestrel (MIRENA) 20 MCG/24HR IUD 1 each by Intrauterine route once       MAGNESIUM CITRATE PO 3 drops per day       Naproxen Sodium (ALEVE PO) Take 220 mg by mouth 2 times daily as needed        order for DME Equipment being ordered: Nebulizer 1 Device 0     rizatriptan (MAXALT-MLT) 10 MG ODT tab Take 1 tablet (10 mg) by mouth at onset of headache for migraine May repeat in 2 hours.  Max 3 tablets/24 hours. 18 tablet 3     semaglutide (OZEMPIC) 2 MG/1.5ML SOPN pen Start Ozempic 0.25 mg weekly for 2 weeks then increase to 0.5 mg weekly for 1 month and then increase to 1.0 mg weekly thereafter 4.5 mL 0     Semaglutide, 1 MG/DOSE, 4 MG/3ML SOPN Inject 1 mg Subcutaneous once a week To be used after finished 0.5 mg weekly 9 mL 3     Turmeric 450 MG CAPS 1 scoop per day         Medical History: any changes in medical history since they were last seen? No    Review of Systems:  ROS:  Constitutional, neuro, ENT, endocrine, pulmonary, cardiac, gastrointestinal, genitourinary, musculoskeletal, integument and psychiatric systems are negative, except as otherwise noted.    Physical Exam:  Blood pressure 112/76, pulse 64, SpO2 97 %, not currently breastfeeding.    GENERAL: Healthy, alert and no distress  EYES: Eyes grossly normal to inspection.  No discharge or erythema, or obvious scleral/conjunctival abnormalities.  RESP: No audible wheeze, cough, or visible cyanosis.  No visible retractions or increased work of breathing.    SKIN: Visible skin clear. No significant rash, abnormal pigmentation or lesions.  NEURO: Cranial nerves grossly intact.  Mentation and speech appropriate for age.  PSYCH: Mentation appears normal, affect normal/bright, judgement and insight intact, normal speech and appearance well-groomed.      ASSESSMENT/PLAN:     1. Chronic bilateral low back pain without sciatica  Recommend medex program at [facility].  An external referral has been placed.     - Physical Therapy Referral; Future    2. Fibromyalgia  Decrease gabapentin from 600mg twice a day to 300mg twice a day. I am hopeful this will help with your drowsiness during the day.     Restart Low Dose Naltrexone 6mg daily at bedtime.  A prescription was sent to the [facility] pharmacy and they will mail this to you once approved by your insurance.     - COMPOUNDED NON-CONTROLLED SUBSTANCE (CMPD RX) -  PHARMACY TO MIX COMPOUNDED MEDICATION; LOW DOSE NALTREXONE 6MG one tablet qhs  Dispense: 30 capsule; Refill: 11    - Recent Lumbar MRI was reviewed and was normal.  The majority of her pain is likely myofascial pain.       MEDICATIONS:   Orders Placed This Encounter   Medications     COMPOUNDED NON-CONTROLLED SUBSTANCE (CMPD RX) - PHARMACY TO MIX COMPOUNDED MEDICATION     Sig: LOW DOSE NALTREXONE 6MG one tablet qhs     Dispense:  30 capsule     Refill:  11       FOLLOW UP:  In 3 months - 11/11/2022 @ 10am VIRTUAL         BILLING TIME DOCUMENTATION:   The total TIME spent on this patient on the date of the encounter/appointment was 36 minutes.      TOTAL TIME includes:   Time spent preparing to see the patient (reviewing records and tests) - 2 min  Time spent face to face (or over the phone) with the patient - 26 min  Time spent ordering tests, medications, procedures and referrals - 2 min  Time spent Referring and communicating with other healthcare professionals - 0 min  Time spent documenting clinical information in Epic - 6 min      NYDIA HAUSER MD   Pain Management & Addiction Medicine

## 2022-08-11 NOTE — PATIENT INSTRUCTIONS
I will fax a external referral to the medex program   Restart Low Dose Naltrexone.    Follow up in 3 months.     Yvonne Garrido MD     ----------------------------------------------------------------  Clinic Number:  [phone]   Call with any questions about your care and for scheduling assistance.   Calls are returned Monday through Friday between 8 AM and 4:30 PM. We usually get back to you within 2 business days depending on the issue/request.    If we are prescribing your medications:  For opioid medication refills, call the clinic or send a Oncodesign message 7 days in advance.  Please include:  Name of requested medication  Name of the pharmacy.  For non-opioid medications, call your pharmacy directly to request a refill. Please allow 3-4 days to be processed.   Per [place] State Law:  All controlled substance prescriptions must be filled within 30 days of being written.    For those controlled substances allowing refills, pickup must occur within 30 days of last fill.      We believe regular attendance is key to your success in our program!    Any time you are unable to keep your appointment we ask that you call us at least 24 hours in advance to cancel.This will allow us to offer the appointment time to another patient.   Multiple missed appointments may lead to dismissal from the clinic.

## 2022-09-01 ENCOUNTER — VIRTUAL VISIT (OUTPATIENT)
Dept: PHARMACY | Facility: CLINIC | Age: 52
End: 2022-09-01
Payer: COMMERCIAL

## 2022-09-01 DIAGNOSIS — Z78.9 TAKES DIETARY SUPPLEMENTS: ICD-10-CM

## 2022-09-01 DIAGNOSIS — F33.9 RECURRENT MAJOR DEPRESSIVE DISORDER, REMISSION STATUS UNSPECIFIED (H): ICD-10-CM

## 2022-09-01 DIAGNOSIS — E66.01 MORBID OBESITY (H): Primary | ICD-10-CM

## 2022-09-01 DIAGNOSIS — J45.40 MODERATE PERSISTENT ASTHMA WITHOUT COMPLICATION: ICD-10-CM

## 2022-09-01 DIAGNOSIS — M10.9 GOUT, UNSPECIFIED CAUSE, UNSPECIFIED CHRONICITY, UNSPECIFIED SITE: ICD-10-CM

## 2022-09-01 DIAGNOSIS — R05.9 COUGH: ICD-10-CM

## 2022-09-01 DIAGNOSIS — J30.1 SEASONAL ALLERGIC RHINITIS DUE TO POLLEN: ICD-10-CM

## 2022-09-01 DIAGNOSIS — G43.109 MIGRAINE WITH AURA AND WITHOUT STATUS MIGRAINOSUS, NOT INTRACTABLE: ICD-10-CM

## 2022-09-01 DIAGNOSIS — F32.9 MAJOR DEPRESSION: ICD-10-CM

## 2022-09-01 DIAGNOSIS — Z23 ENCOUNTER FOR IMMUNIZATION: ICD-10-CM

## 2022-09-01 DIAGNOSIS — M79.7 FIBROMYALGIA: ICD-10-CM

## 2022-09-01 PROCEDURE — 99605 MTMS BY PHARM NP 15 MIN: CPT | Performed by: PHARMACIST

## 2022-09-01 PROCEDURE — 99607 MTMS BY PHARM ADDL 15 MIN: CPT | Performed by: PHARMACIST

## 2022-09-01 NOTE — PATIENT INSTRUCTIONS
"Recommendations from today's MTM visit:                                                    MTM (medication therapy management) is a service provided by a clinical pharmacist designed to help you get the most of out of your medicines.   Today we reviewed what your medicines are for, how to know if they are working, that your medicines are safe and how to make your medicine regimen as easy as possible.      Call the pharmacy to have your prescription for Ozempic 1 mg filled next week to avoid delays with receiving the medication on time.   Send a message via [employer] with information about your supplements. Include the strength of the medication, how many drops or tablets you are taking per day, and what you are using them for.   As for travel vaccines, the CDC recommends a booster for the injectable typhoid vaccine two years after your previous dose. Our records indicate that your last dose was in 2018 and you would qualify for a booster if you choose to receive one.   You are eligible for shingrix and influenza vaccination - can get completed at pharmacy.      Follow-up: 6 weeks with Dr. Derek Schwab and 3-4 with MTM pharmacist if needed thereafter.     It was great speaking with you today.  I value your experience and would be very thankful for your time in providing feedback in our clinic survey. In the next few days, you may receive an email or text message from [employer] with a link to a survey related to your  clinical pharmacist.\"         My Clinical Pharmacist's contact information:                                                      Please feel free to contact me with any questions or concerns you have.      Lauren Bloch, PharmD, BCACP   Medication Therapy Management Pharmacist   [facility] [place]        "

## 2022-09-01 NOTE — PROGRESS NOTES
Medication Therapy Management (MTM) Encounter    ASSESSMENT:                            Medication Adherence/Access: No issues identified    Class III Obesity (BMI 40 or more): Improving; bruising due to Ozempic administration - Continue to monitor for worsened bruising to injection sites and consider switching to longer needles in the future if she continues to have issues with bruising.     Asthma: Stable. Due for ACT in future.     Episodic Migraine: Stable.     Depression:  Stable. PHQ9 due.     Back pain/Fibromyalgia:  Stable; followed by pain clinic.    Acute Gout Flares: Stable. Could consider repeat Uric acid level in future.     Immunizations: Educated patient on vaccine eligibility. As for travel vaccine recommendations, the CDC recommends a booster for injectable typhoid vaccine 2 years after previous dose. Will include information regarding travel vaccine boosters in the AVS. Would be eligible for shingrix, influenza vaccine in future.     Allergic Rhinitis: Stable    Supplements: Unable to assess at this time. Patient would benefit from mycharting current supplements she is taking via [employer].      Cough: Stable.     PLAN:                            1. Call the pharmacy to have your prescription for Ozempic 1 mg filled next week to avoid delays with receiving the medication on time.   2. Send a message via [employer] with information about your supplements. Include the strength of the medication, how many drops or tablets you are taking per day, and what you are using them for.   3. As for travel vaccines, the CDC recommends a booster for the injectable typhoid vaccine two years after your previous dose. Our records indicate that your last dose was in 2018 and you would qualify for a booster if you choose to receive one.   4. You are eligible for shingrix and influenza vaccination - can get completed at pharmacy.      Follow-up: 6 weeks with Dr. Derek Schwab and 3-4 with MTM pharmacist if needed  "thereafter.     SUBJECTIVE/OBJECTIVE:                          Paige Fajardo is a 51 year old female contacted via secure video for an initial visit. She was referred to me from [facility] by Dr. Derek Schwab on 8/2/22.      Reason for visit: Ozempic follow up and comprehensive medication review.     Allergies/ADRs: Reviewed in chart  Past Medical History: Reviewed in chart  Tobacco: She reports that she has never smoked. She has never used smokeless tobacco.  Alcohol: none. History of alcohol dependence, 22 years sober.     Medication Adherence/Access: Patient takes medications directly from bottles -  between AM and PM meds. Flips bottle after she takes a certain med to make sure that she takes them. Patient takes medications 2 time(s) per day.     Class III Obesity (BMI 40 or more): Current medication(s) include: Ozempic 0.5 mg weekly . Patient is experiencing the follow side effects: None.   Nutrition/Eating Habits: Patient reports eating three meals per day.    Exercise/Activity: Patient reports gardening and being outside, but had COVID in May and currently unable to have physical exertion and pain from fibromyalgia exacerbation.   Failed medications include: None.    Patient reports bruising \"really bad\" from the Ozempic injections and injects into the belly. Bruising with each injection, but patient also reports issues with iron and associates most of her bruising with this. She is scheduled to start receiving IV iron supplementation starting tomorrow and would like to see if this prevents any future recurrence of bruising before trying alternatives like using a longer needle. Reports feeling full quickly now that she is on the 0.5 mg dose, surprised at how effective the medication is and is not used to feeling full with smaller portions.      Wt Readings from Last 4 Encounters:   08/02/22 304 lb (137.9 kg)   03/08/22 300 lb 8 oz (136.3 kg)   12/03/19 270 lb (122.5 kg) " "  11/27/19 282 lb 14.4 oz (128.3 kg)     Estimated body mass index is 49.07 kg/m  as calculated from the following:    Height as of 3/8/22: 5' 6\" (1.676 m).    Weight as of 8/2/22: 304 lb (137.9 kg).    Asthma: Current medications: ICS - Qvar 80 mcg 2 puff(s) twice daily  Short-Acting Bronchodilator: Albuterol MDI 2 puffs every 6 hours PRN, denies use in the past three months, Ipratropium/Albuterol Nebs 1 vial every 6 hours PRN and pt denies using it in the past three months.. Patient rinses their mouth after using steroid inhaler. Triggers include: smoke, pollens and humidity.  Patient reports the following symptoms: none.  Asthma Action Plan on file: UNKNOWN  ACT Total Scores 3/12/2019 11/27/2019 3/8/2022   ACT TOTAL SCORE - - -   ASTHMA ER VISITS - - -   ASTHMA HOSPITALIZATIONS - - -   ACT TOTAL SCORE (Goal Greater than or Equal to 20) 23 16 22   In the past 12 months, how many times did you visit the emergency room for your asthma without being admitted to the hospital? 0 0 0   In the past 12 months, how many times were you hospitalized overnight because of your asthma? 0 0 0     Episodic Migraine: Current preventive medications include: none. Current abortive medications include:  Rizatriptan 50 mg as needed.   Patient reports using one tablet of Rizatriptan in the past month and finds it effective for managing her migraine symptoms. Was on topiramate for prevention in the past, but her symptoms are less severe and manageable with PRN rizatriptan use.     Depression:  Current medications include: Fluoxetine 20 mg once daily. Patient reports that depression symptoms are improved. Reports \"doing fine\" and finds therapy effective.   Patient reports the following stressors: none  PHQ-9 SCORE 9/13/2018 1/3/2019 3/12/2019   PHQ-9 Total Score - - -   PHQ-9 Total Score 3 7 4     Back pain/fibromyalgia:  Current medications include: gabapentin 300 mg twice daily, naproxen 220 mg twice daily as needed, and naltrexone 6 " "mg at bedtime.   Noticed an improvement on drowsiness with lower dose of gabapentin. Takes one tablet of naproxen as needed if she requires additional pain control - used once in the past two weeks and is effective for pain control. Now back on low dose naltrexone and its addition to her current therapy has resulted in better pain management.     Acute Gout Flares: Currently taking colchicine 0.6 mg - two tablets PRN for onset of symptoms, then one tablet 1 hour later, then one tablet twice daily until flare resolves. Patient reports no current pain concerns. Patient is experiencing the following medication side effects: none.   Reports having two flares around 6 months apart and recalls the second flare \"clearing up immediately.\" Attributes her flare episodes due to prednisone use six different times in less than six months. Historically used prednisone in the past for flare management.   Uric Acid   Date Value Ref Range Status   09/18/2021 4.1 2.6 - 6.0 mg/dL Final     Immunizations: Had a conversation regarding patient's eligibility for PCV15 or PCV20, Shingrix, flu, and the new COVID 19 booster for Omicron variants. Patient had additional questions regarding travel vaccines and if she qualifies for any boosters. Reports going on spontaneous vacations outside the country with her family and had issues with receiving previous travel vaccinations in a timely manner and would like to stay up to date with said vaccines as much as possible.   Most Recent Immunizations   Administered Date(s) Administered     COVID-19,PF,Pfizer (12+ Yrs) 10/05/2021     HepA-Adult 09/13/2018     HepB 04/28/2011     HepB-Adult 09/13/2018     Influenza (IIV3) PF 10/13/2013     Influenza Intranasal Vaccine 4 valent (FluMist) 10/01/2015     Influenza Vaccine IM > 6 months Valent IIV4 (Alfuria,Fluzone) 11/04/2019     Influenza Vaccine, 6+MO IM (QUADRIVALENT W/PRESERVATIVES) 10/01/2017     Mantoux Tuberculin Skin Test 07/24/2016     TDAP " Vaccine (Adacel) 05/17/2017     Typhoid IM 02/23/2018     Allergic Rhinitis: Current medications include cetirizine-pseudophedrine 5-120 mg as needed.   Primary triggers are pollens. Uses Zyrtec-D as needed during allergy season. Has not used it recently and usually finds it effective at managing her symptoms.    Supplements: Current medication on medication list: magnesium citrate, B complex vitamins, Cholecalciferol, Turmeric 450 mg  Patient does not recall which supplements she is currently taking and was agreeable to sending more detailed information when she is able to do so.     Cough: Current medications include benzonatate 200 mg TID PRN - currently not using  Has benzonatate on hand as needed if she has cough. Finds it effective at controlling her cough and helps with avoiding back pain exacerbation from coughing too much.   ----------------      I spent 50 minutes with this patient today. All changes were made via collaborative practice agreement with Dr. Derek Schwab. A copy of the visit note was provided to the patient's provider(s).    The patient was sent via [employer] a summary of these recommendations.      Goyo Solis  PD4 Pharmacy Student    Lauren Bloch, PharmD, BCACP   Medication Therapy Management Pharmacist   [facility]    Telemedicine Visit Details  Type of service:  Video Conference via [employer]  Start Time: 10:10 AM  End Time: 11:00 AM  Originating Location (patient location): Home  Distant Location (provider location):  [facility]     Medication Therapy Recommendations  Encounter for immunization    Rationale: Preventive therapy - Needs additional medication therapy - Indication   Recommendation: Start Medication - Shingrix 50 MCG/0.5ML Susr   Status: Patient Agreed - Adherence/Education

## 2022-10-20 ENCOUNTER — VIRTUAL VISIT (OUTPATIENT)
Dept: ENDOCRINOLOGY | Facility: CLINIC | Age: 52
End: 2022-10-20
Payer: COMMERCIAL

## 2022-10-20 ENCOUNTER — TELEPHONE (OUTPATIENT)
Dept: ENDOCRINOLOGY | Facility: CLINIC | Age: 52
End: 2022-10-20

## 2022-10-20 VITALS — WEIGHT: 292 LBS | BODY MASS INDEX: 47.13 KG/M2

## 2022-10-20 DIAGNOSIS — Z71.3 NUTRITIONAL COUNSELING: Primary | ICD-10-CM

## 2022-10-20 DIAGNOSIS — E66.01 MORBID OBESITY (H): ICD-10-CM

## 2022-10-20 PROCEDURE — 97803 MED NUTRITION INDIV SUBSEQ: CPT | Mod: GT | Performed by: DIETITIAN, REGISTERED

## 2022-10-20 NOTE — TELEPHONE ENCOUNTER
Dr.T Gabby wanted to see pt again in 2 months, but she only has a single appt 12/27    Can I use one of the 11/22 holds for a  15min vid return?    Thanks-Clarence

## 2022-10-20 NOTE — PATIENT INSTRUCTIONS
Nutrition Goals  Look into gym membership in town   Continue checking in on food as fuel   Recommend anti-inflammatory foods     Keep up the hard work!    The Plate Method:  https://www.cdc.gov/diabetes/images/managing/Diabetes-Manage-Eat-Well-Plate-Graphic_600px.jpg     Starchy vegetables  Potatoes   Sweet potatoes  Green peas  Chickpeas   Corn  Lentils  Beans (black, domingo, etc)     Types of fats  https://www.[facility] [place].[facility]/nutritionsource/what-should-you-eat/fats-and-cholesterol/types-of-fat/      Good resources on inflammation    https://www.health.[place].edu/staying-healthy/foods-that-fight-inflammation     https://www.heart.org/en/healthy-living/healthy-eating/eat-smart/nutrition-basics/mediterranean-diet     https://GuidesMob.org/traditional-diets/mediterranean-diet     https://my.[facility].org/health/articles/46011-ihtwiknpdwikb-tiqh     Good article on label reading   https://www.fda.gov/food/new-nutrition-facts-label/how-understand-and-use-nutrition-facts-label     Additional resources:     Protein Sources   http://[email or web address]     Carbohydrates  [email or web address]     Mindful Eating  [email or web address]     Summary of Volumetrics Eating Plan  [email or web address]     Follow-Up:  1 month    AARON Dunne, RD, LD  Clinic #: [phone]

## 2022-10-20 NOTE — PROGRESS NOTES
"Paige Fajardo is a 51 year old female who is being evaluated via a billable video visit.      The patient has been notified of following:     \"This video visit will be conducted via a call between you and your physician/provider. We have found that certain health care needs can be provided without the need for an in-person physical exam.  This service lets us provide the care you need with a video conversation.  If a prescription is necessary we can send it directly to your pharmacy.  If lab work is needed we can place an order for that and you can then stop by our lab to have the test done at a later time.    Video visits are billed at different rates depending on your insurance coverage.  Please reach out to your insurance provider with any questions.    If during the course of the call the physician/provider feels a video visit is not appropriate, you will not be charged for this service.\"    How would you like to obtain your AVS? MyChart  If the video visit is dropped, the invitation should be resent by: Text to cell phone:  [phone]  Will anyone else be joining your video visit? No      Video-Visit Details    Type of service:  Video Visit    Video Start Time: 11:01 am  Video End Time: 11:36 pm    Originating Location (pt. Location): Home    Distant Location (provider location):  [facility] [place]     Platform used for Video Visit: [employer]    During this virtual visit the patient is located in [place], patient verifies this as the location during the entirety of this visit.      Weight Management Nutrition Consultation    Paige Fajardo is a 51 year old female presents today for weight management nutrition consultation.  Patient referred by Dr. Reed on August 2, 2022.    Patient with Co-morbidities of obesity including:  Type II DM no  Renal Failure no  Sleep apnea yes  Hypertension no   Dyslipidemia no  Joint pain no  Back pain yes  GERD no     PMH: " "  Depression  Swelling  Fatigue  Gout   Asthma  Fibromyalgia   Covid at least 2x  ? Weight gain associated with prednisone      Hx of alcohol abuse 20 years ago    Anthropometrics:  Weight 8/2/22: 304 lbs with BMI 49.07    Estimated body mass index is 47.13 kg/m  as calculated from the following:    Height as of 3/8/22: 1.676 m (5' 6\").    Weight as of this encounter: 132.5 kg (292 lb).     Current weight: 292 lbs, pt report    Medications for Weight Loss:  Ozempic prescribed 8/2/22 - now at 1.0 mg dose     Supplements:  Vit D in winter  Turmeric   B-complex  Calcium carbonate  Mg Citrate     Labs 6/20/22  ? CBC off (just getting over covid)     Labs 5/17/22  ? GFR 90 (will watch - likely dehydrated due to covid)      Labs 8/24/22  - Ferritin low end of normal at 25    Hx of high cholesterol     Use to donate platelets every 2 weeks typically, but not since May due to covid and low iron.    NUTRITION HISTORY    NKFA  Limits dairy - notices clogged sinuses and worsening asthma symptoms per pt.   Does use dairy alternatives.     Has not met with a RD before.    Pt goals: lose weight, learn about food choices (what is more nutritious), feel better    Typical day  B - cereal, fruit, protein (yogurt or egg or meat stick)  L - leftovers OR salad OR sandwich   D - varies - goes out Monday/Friday. If at home -  cooks.  Snack: nuts OR carrots OR popcorn OR more nutritious chip per pt    Fluids: 1-2 cups of coffee with non-dairy milk.     PA: limited due to back pain. Does relaxation yoga 1x/week  Starting PT    Oct 2022:  Started ozempic, going well. Not as hungry, earlier satiety. Helping with portion control.    Down 15 lbs since starting per pt. Mobility improving    Eating 3 meals/day.  went back to school so she is now making dinners. Trying to follow plate method.     Recent recall  B - granola, oatmeal or cereal with berries (1/2 of what she use to be able to eat), coffee with unsweetened almond or oat " milk   L - salad with meat, nuts or seeds and peas/sweet potatoes for carbohydrate (didn t realize it was a carb previously) OR leftovers   D - salmon or chicken breast with vegetables (broccoli, [place], salad) and carbs (quinoa, brown rice, couscous, potatoes)  Snacks - if hungry, cheese or meat stick, fruit, veggies (has lots of tomatoes from garden right now)    Using lots of fresh herbs and vegetables from her garden  Treats occ but not keeping as many in house.   Catered dinners at [employer] on Wednesdays - noticing they serve very large portions but have been serving more nutritious options (use to be pizza, now food from a local diner)    Looking at food differently - energy as opposed to as just food.     Switched from cooking with butter to olive oil.    Going to [employer] in December for 2 weeks- purposely didn t get an all inclusive to try to be healthier.     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   has diabetes     Donates platelets every 2 weeks on avg    Nutrition Prescription  Recommended energy/nutrient modification.    Nutrition Diagnosis  Food and nutrition related knowledge deficit r/t lack of prior exposure to nutrition education aeb pt report and interest in learning     Obesity r/t long history of positive energy balance aeb BMI >30.    Nutrition Intervention  Reviewed current dietary habits and pts history   Discussed long-term goals pt hopes to accomplish in RD appointments  Answered pt questions  Coordination of care   Nutrition education - Plate method, types of vegetables, types of fat, label reading  AVS and handouts via [employer]    Patient demonstrates understanding.    Expected Engagement: good    Nutrition Goals  1. Look into gym membership in town    2. Continue checking in on food as fuel   3. Recommend anti-inflammatory foods     Keep up the hard work!    The Plate Method:  https://www.cdc.gov/diabetes/images/managing/Diabetes-Manage-Eat-Well-Plate-Graphic_600px.jpg     Starchy vegetables  ? Potatoes   ? Sweet potatoes  ? Green peas  ? Chickpeas   ? Corn  ? Lentils  ? Beans (black, domingo, etc)     Types of fats  https://www.[facility].[place].[facility]/nutritionsource/what-should-you-eat/fats-and-cholesterol/types-of-fat/      Good resources on inflammation    https://www.health.[place].edu/staying-healthy/foods-that-fight-inflammation     https://www.heart.org/en/healthy-living/healthy-eating/eat-smart/nutrition-basics/mediterranean-diet     https://LiquidText.org/traditional-diets/mediterranean-diet     https://my.[facility].org/health/articles/42873-zhtsxncoxloms-oomv     Good article on label reading   https://www.fda.gov/food/new-nutrition-facts-label/how-understand-and-use-nutrition-facts-label     Additional resources:     Protein Sources   [email or web address]     Carbohydrates  [email or web address]     Mindful Eating  [email or web address]     Summary of Volumetrics Eating Plan  [email or web address]     Follow-Up:  1 month    Time spent with patient: 31 minutes.  AARON Tom, RD, LD

## 2022-10-20 NOTE — LETTER
"10/20/2022       RE: Paige Fajardo  [address]  [place] [address]     Dear Colleague,    Thank you for referring your patient, Paige Fajardo, to the [facility] [place] at [facility]. Please see a copy of my visit note below.    Paige Fajardo is a 51 year old female who is being evaluated via a billable video visit.      The patient has been notified of following:     \"This video visit will be conducted via a call between you and your physician/provider. We have found that certain health care needs can be provided without the need for an in-person physical exam.  This service lets us provide the care you need with a video conversation.  If a prescription is necessary we can send it directly to your pharmacy.  If lab work is needed we can place an order for that and you can then stop by our lab to have the test done at a later time.    Video visits are billed at different rates depending on your insurance coverage.  Please reach out to your insurance provider with any questions.    If during the course of the call the physician/provider feels a video visit is not appropriate, you will not be charged for this service.\"    How would you like to obtain your AVS? MyChart  If the video visit is dropped, the invitation should be resent by: Text to cell phone:  [phone]  Will anyone else be joining your video visit? No      Video-Visit Details    Type of service:  Video Visit    Video Start Time: 11:01 am  Video End Time: 11:36 pm    Originating Location (pt. Location): Home    Distant Location (provider location):  [facility] [place]     Platform used for Video Visit: [employer]    During this virtual visit the patient is located in [place], patient verifies this as the location during the entirety of this visit.      Weight Management Nutrition Consultation    Paige Fajardo is a 51 year old " "female presents today for weight management nutrition consultation.  Patient referred by Dr. Reed on August 2, 2022.    Patient with Co-morbidities of obesity including:  Type II DM no  Renal Failure no  Sleep apnea yes  Hypertension no   Dyslipidemia no  Joint pain no  Back pain yes  GERD no     PMH:   Depression  Swelling  Fatigue  Gout   Asthma  Fibromyalgia   Covid at least 2x  ? Weight gain associated with prednisone      Hx of alcohol abuse 20 years ago    Anthropometrics:  Weight 8/2/22: 304 lbs with BMI 49.07    Estimated body mass index is 47.13 kg/m  as calculated from the following:    Height as of 3/8/22: 1.676 m (5' 6\").    Weight as of this encounter: 132.5 kg (292 lb).     Current weight: 292 lbs, pt report    Medications for Weight Loss:  Ozempic prescribed 8/2/22 - now at 1.0 mg dose     Supplements:  Vit D in winter  Turmeric   B-complex  Calcium carbonate  Mg Citrate     Labs 6/20/22  ? CBC off (just getting over covid)     Labs 5/17/22  ? GFR 90 (will watch - likely dehydrated due to covid)      Labs 8/24/22  - Ferritin low end of normal at 25    Hx of high cholesterol     Use to donate platelets every 2 weeks typically, but not since May due to covid and low iron.    NUTRITION HISTORY    NKFA  Limits dairy - notices clogged sinuses and worsening asthma symptoms per pt.   Does use dairy alternatives.     Has not met with a RD before.    Pt goals: lose weight, learn about food choices (what is more nutritious), feel better    Typical day  B - cereal, fruit, protein (yogurt or egg or meat stick)  L - leftovers OR salad OR sandwich   D - varies - goes out Monday/Friday. If at home -  cooks.  Snack: nuts OR carrots OR popcorn OR more nutritious chip per pt    Fluids: 1-2 cups of coffee with non-dairy milk.     PA: limited due to back pain. Does relaxation yoga 1x/week  Starting PT    Oct 2022:  Started ozempic, going well. Not as hungry, earlier satiety. Helping with portion control.    Down " 15 lbs since starting per pt. Mobility improving    Eating 3 meals/day.  went back to school so she is now making dinners. Trying to follow plate method.     Recent recall  B - granola, oatmeal or cereal with berries (1/2 of what she use to be able to eat), coffee with unsweetened almond or oat milk   L - salad with meat, nuts or seeds and peas/sweet potatoes for carbohydrate (didn t realize it was a carb previously) OR leftovers   D - salmon or chicken breast with vegetables (broccoli, [place], salad) and carbs (quinoa, brown rice, couscous, potatoes)  Snacks - if hungry, cheese or meat stick, fruit, veggies (has lots of tomatoes from garden right now)    Using lots of fresh herbs and vegetables from her garden  Treats occ but not keeping as many in house.   Catered dinners at [facility] on Wednesdays - noticing they serve very large portions but have been serving more nutritious options (use to be pizza, now food from a local diner)    Looking at food differently - energy as opposed to as just food.     Switched from cooking with butter to olive oil.    Going to [place] in December for 2 weeks- purposely didn t get an all inclusive to try to be healthier.     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   has diabetes     Donates platelets every 2 weeks on avg    Nutrition Prescription  Recommended energy/nutrient modification.    Nutrition Diagnosis  Food and nutrition related knowledge deficit r/t lack of prior exposure to nutrition education aeb pt report and interest in learning     Obesity r/t long history of positive energy balance aeb BMI >30.    Nutrition Intervention  Reviewed current dietary habits and pts history   Discussed long-term goals pt hopes to accomplish in  RD appointments  Answered pt questions  Coordination of care   Nutrition education - Plate method, types of vegetables, types of fat, label reading  AVS and handouts via [employer]    Patient demonstrates understanding.    Expected Engagement: good    Nutrition Goals  1. Look into gym membership in town   2. Continue checking in on food as fuel   3. Recommend anti-inflammatory foods     Keep up the hard work!    The Plate Method:  https://www.cdc.gov/diabetes/images/managing/Diabetes-Manage-Eat-Well-Plate-Graphic_600px.jpg     Starchy vegetables  ? Potatoes   ? Sweet potatoes  ? Green peas  ? Chickpeas   ? Corn  ? Lentils  ? Beans (black, domingo, etc)     Types of fats  https://www.[facility].[place].edu/nutritionsource/what-should-you-eat/fats-and-cholesterol/types-of-fat/      Good resources on inflammation    https://www.health.[place].edu/staying-healthy/foods-that-fight-inflammation     https://www.heart.org/en/healthy-living/healthy-eating/eat-smart/nutrition-basics/mediterranean-diet     https://[employer].org/traditional-diets/mediterranean-diet     https://my.[facility].org/health/articles/62538-qamovmgopawxm-pwkg     Good article on label reading   https://www.fda.gov/food/new-nutrition-facts-label/how-understand-and-use-nutrition-facts-label     Additional resources:     Protein Sources   [email or web address]     Carbohydrates  [email or web address]     Mindful Eating  [email or web address]     Summary of Volumetrics Eating Plan  [email or web address]     Follow-Up:  1 month    Time spent with patient: 31 minutes.  AARON Tom, RD, LD

## 2022-10-28 ENCOUNTER — VIRTUAL VISIT (OUTPATIENT)
Dept: PHARMACY | Facility: CLINIC | Age: 52
End: 2022-10-28
Payer: COMMERCIAL

## 2022-10-28 DIAGNOSIS — G43.109 MIGRAINE WITH AURA AND WITHOUT STATUS MIGRAINOSUS, NOT INTRACTABLE: ICD-10-CM

## 2022-10-28 DIAGNOSIS — F33.9 RECURRENT MAJOR DEPRESSIVE DISORDER, REMISSION STATUS UNSPECIFIED (H): ICD-10-CM

## 2022-10-28 DIAGNOSIS — D50.9 IRON DEFICIENCY ANEMIA, UNSPECIFIED IRON DEFICIENCY ANEMIA TYPE: Primary | ICD-10-CM

## 2022-10-28 DIAGNOSIS — E66.01 MORBID OBESITY (H): ICD-10-CM

## 2022-10-28 DIAGNOSIS — Z13.820 OSTEOPOROSIS SCREENING: ICD-10-CM

## 2022-10-28 DIAGNOSIS — J45.40 MODERATE PERSISTENT ASTHMA WITHOUT COMPLICATION: ICD-10-CM

## 2022-10-28 DIAGNOSIS — M79.7 FIBROMYALGIA: ICD-10-CM

## 2022-10-28 PROCEDURE — 99607 MTMS BY PHARM ADDL 15 MIN: CPT | Performed by: PHARMACIST

## 2022-10-28 PROCEDURE — 99606 MTMS BY PHARM EST 15 MIN: CPT | Performed by: PHARMACIST

## 2022-10-28 NOTE — PATIENT INSTRUCTIONS
"Recommendations from today's MTM visit:                                                    MTM (medication therapy management) is a service provided by a clinical pharmacist designed to help you get the most of out of your medicines.      1) Continue Ozempic 1mg weekly for 3 months (refill sent in to pharmacy today).    2)  Your goal is to get 1200 mg calcium daily for bone health. Your body can only absorb about 500 mg at one time. So a good guideline is to take a calcium supplement 500 mg twice daily and then get one serving of dairy or green leafy vegetables daily (each serving is about 300 mg calcium).      Follow-up: 12/2 with Christina Registered Dietician. 12/27 with Dr. Derek Schwab, and as needed with Pharmacist. Call [phone] to schedule.     It was great speaking with you today.  I value your experience and would be very thankful for your time in providing feedback in our clinic survey. In the next few days, you may receive an email or text message from [employer] with a link to a survey related to your  clinical pharmacist.\"     My Clinical Pharmacist's contact information:                                                      Please feel free to contact me with any questions or concerns you have.      Mirlande Castano, MeeraD, MPH  Medication Therapy Management Pharmacist  [facility]    Secure Voicemail: [phone]              "

## 2022-10-28 NOTE — Clinical Note
Paige Branham is having great success on Ozempic! Her weight loss and increase in energy is having very positive impact on multiple comorbidities including asthma, migraine, depression, and pain. She will see see you in about 2 months. Please have her schedule with PharmD as needed after your visit.  Thank you, Mirlande Castano, MeeraD, MPH Medication Therapy Management Pharmacist [facility]  Secure Voicemail: [phone]

## 2022-10-28 NOTE — PROGRESS NOTES
Medication Therapy Management (MTM) Encounter    ASSESSMENT:                            Medication Adherence/Access: See below for considerations    Class III Obesity (BMI 40 or more): Improving. As patient  tolerating 1mg Ozempic well with significant weight loss and lifestyle improvements, recommended to continue at current dose. Patient agreeable and would like to revisit increase to 3 mg weekly Ozempic dose after returns from trip out of the country in December. Would prefer not to adjust meds prior to that trip. Has visit with Dr. Derek Schwab before the end of the year and will discuss whether dose increase recommended at that time and will work to get 90 day supply prior to change in insurance in the event of significant cost.    Anemia: Stable. Defer management and monitoring of ferritin returning to normal limits after transfusion to hematology.     Asthma: Stable.    Episodic Migraine:Stable.    Depression:  Stable.    Back pain/fibromyalgia:  Stable.    Osteoporosis Prevention: Patient is not meeting RDI of calcium 1200mg/day. Patient is exceeding RDI of Vitamin D 1000 IU/day.        PLAN:                            1) Continue Ozempic 1mg weekly for 3 months (refill sent in to pharmacy today).    2)  Your goal is to get 1200 mg calcium daily for bone health. Your body can only absorb about 500 mg at one time. So a good guideline is to take a calcium supplement 500 mg twice daily and then get one serving of dairy or green leafy vegetables daily (each serving is about 300 mg calcium).      Follow-up: 12/2 with Christina Registered Dietician. 12/27 with Dr. Derek Schwab, and as needed with Pharmacist.      SUBJECTIVE/OBJECTIVE:                          Paige Fajardo is a 52 year old female contacted via secure video for a follow-up visit.  Today's visit is a follow-up MTM visit from 9/1/2022     Reason for visit: Medication Therapy Management - Weight Management.    Allergies/ADRs: Reviewed in  "chart  Past Medical History: Reviewed in chart  Tobacco: She reports that she has never smoked. She has never used smokeless tobacco.  Alcohol: none. History of alcohol dependence, 22 years sober.     Medication Adherence/Access: Patient takes medications directly from bottles -  between AM and PM meds. Flips bottle after she takes a certain med to make sure that she takes them. Patient takes medications 2 time(s) per day.  Ozempic affordable - about $40/month. Insurance changing as of 1/1/23 - has some concern may not continue to be affordable.    Class III Obesity (BMI 40 or more):   Current medication(s) include: Ozempic 1 mg weekly.   Patient is experiencing the follow side effects: None - initially had some nausea with titration - feeling great now and notices huge improvement in satiety and weight loss.    Nutrition/Eating Habits: Patient reports eating three meals per day.  Picky on meals - eating healthy foods and working to get energy and iron (anemia). So feels limited space in stomach and wants to save it for healthy foods.  Exercise/Activity: Patient reports significant improvement in pain/fibromyalgia so out hiking and doing yard work. Feels so much more energy with current weight loss.  Failed medications include: None.    Patient reports bruising \"really bad\" from the Ozempic injections and injects into the belly. Bruising with each injection, but patient also reports issues with iron and associates most of her bruising with this. She is scheduled to start receiving IV iron supplementation starting tomorrow and would like to see if this prevents any future recurrence of bruising before trying alternatives like using a longer needle. Reports feeling full quickly now that she is on the 0.5 mg dose, surprised at how effective the medication is and is not used to feeling full with smaller portions.      10/28 - 289 lb  Wt Readings from Last 4 Encounters:   08/02/22 304 lb (137.9 kg)   03/08/22 " "300 lb 8 oz (136.3 kg)   12/03/19 270 lb (122.5 kg)   11/27/19 282 lb 14.4 oz (128.3 kg)     Estimated body mass index is 49.07 kg/m  as calculated from the following:    Height as of 3/8/22: 5' 6\" (1.676 m).    Weight as of 8/2/22: 304 lb (137.9 kg).    Anemia: Following with hemeatology. Recent Fe  infusion and now stable and values returned to normal levels - no oral Fe required ( has caused severe GI issues in the past, so patient prefers transfusion).   Ref Range & Units 4 d ago   FERRITIN 15.0 - 205.0 ng/mL 461.5 High        Ref Range & Units 4 d ago   IRON 25 - 156 ug/dL 112    UIBC (UNSATURATED)         204    IRON BINDING CAPACITY     245 - 400 ug/dL 316    IRON,% SATURATION         20 - 55 % 35      Ref Range & Units 4 d ago    WHITE BLOOD COUNT         4.5 - 11.0 thou/cu mm 3.5 Low     RED BLOOD COUNT           4.00 - 5.20 mil/cu mm 4.89    HEMOGLOBIN                12.0 - 16.0 g/dL 14.9    HEMATOCRIT                33.0 - 51.0 % 46.3    MCV                       80 - 100 fL 95    MCH                       26.0 - 34.0 pg 30.5    MCHC                      32.0 - 36.0 g/dL 32.2    RDW                       11.5 - 15.5 % 13.2    PLATELET COUNT            140 - 440 thou/cu mm 177    MPV                       6.5 - 11.0 fL 11.5 High     NRBC                      % 0.0    ABS NRBC thou /cu mm 0.0    % NEUT % 50.8    % LYMPH % 29.3    % MONO % 14.2    % EOS % 4.8    % BASO % 0.9    % IMMATURE GRAN (METAS,MYELOS,PROS) % 0.0    ABSOLUTE NEUTROPHILS      1.7 - 7.0 thou/cu mm 1.8    ABSOLUTE LYMPHOCYTES      0.9 - 2.9 thou/cu mm 1.0    ABSOLUTE MONOCYTES        <0.9 thou/cu mm 0.5    ABSOLUTE EOSINOPHILS      <0.5 thou/cu mm 0.2    ABSOLUTE BASOPHILS        <0.3 thou/cu mm 0.0    ABSOLUTE IMMATURE GRANULOCYTES(METAS,MYELOS,PROS) <0.3 thou/cu mm 0.0        Asthma: Current medications: ICS - Qvar 80 mcg 2 puff(s) twice daily  Short-Acting Bronchodilator: Albuterol MDI 2 puffs every 6 hours PRN, denies use since July " with Covid, Ipratropium/Albuterol Nebs 1 vial every 6 hours PRN and pt denies use since July with Covid. Patient rinses their mouth after using steroid inhaler. Triggers include: smoke, pollens and humidity.  Patient reports the following symptoms: none.  Asthma Action Plan on file: UNKNOWN  ACT Total Scores 3/12/2019 11/27/2019 3/8/2022   ACT TOTAL SCORE - - -   ASTHMA ER VISITS - - -   ASTHMA HOSPITALIZATIONS - - -   ACT TOTAL SCORE (Goal Greater than or Equal to 20) 23 16 22   In the past 12 months, how many times did you visit the emergency room for your asthma without being admitted to the hospital? 0 0 0   In the past 12 months, how many times were you hospitalized overnight because of your asthma? 0 0 0     Episodic Migraine: Current preventive medications include: none. Current abortive medications include:  Rizatriptan 50 mg as needed.   Patient reports not needing Rizatriptan in several months. Ozempic and weight loss seem to be helping with this. Was on topiramate for prevention in the past, but her symptoms are less severe and manageable with PRN rizatriptan use.     Depression:  Current medications include: Fluoxetine 20 mg once daily. Patient reports that depression symptoms are improved. Denies side effects.  Patient reports the following stressors: none  PHQ-9 SCORE 9/13/2018 1/3/2019 3/12/2019   PHQ-9 Total Score - - -   PHQ-9 Total Score 3 7 4     Back pain/fibromyalgia:  Current medications include: gabapentin 300 mg twice daily, naproxen 220 mg twice daily as needed, and naltrexone 6 mg at bedtime.   Noticed an improvement on drowsiness with lower dose of gabapentin. Takes one tablet of naproxen as needed if she requires additional pain control - has not required recently and is effective for pain control. Now back on low dose naltrexone and its addition to her current therapy has resulted in better pain management - this has been very helpful and goal is to decrease gabapentin as naltrexone so  effective with fewer side effects. Occasional drowsiness with gabapentin - feeling so much more energy and less pain since losing weight with Ozempic.    Osteoporosis Prevention: Current therapy includes: Vitamin D supplements: 3000 international unit(s) once daily . Patient is not experiencing side effects. Currently not taking Ca supplement.  DEXA History: none  Patient is getting the following sources of dietary calcium: milk, yogurt, cheese and green leafy vegetables - about 1-2 servings daily  Risk factors: post-menopausal  Last vitamin D level:  Lab Results   Component Value Date    VITDT 36 03/02/2015       Today's Vitals: There were no vitals taken for this visit. - virtual visit  ----------------      I spent 40 minutes with this patient today. All changes were made via collaborative practice agreement with Dr. Derek Schwab. A copy of the visit note was provided to the patient's provider(s).    The patient was sent via [employer] a summary of these recommendations.     Mirlande Castano, Pharm D., MPH  MTM Pharmacist - [facility]  Pager: [phone]            Telemedicine Visit Details  Type of service:  Video Conference via Log[employer]  Start Time: 10:01 AM  End Time: 10:41 AM  Originating Location (pt. Location): Home      Distant Location (provider location):  Off-site  Provider has received verbal consent for a visit from the patient? Yes     Medication Therapy Recommendations  Osteoporosis screening    Current Medication: CALCIUM CARBONATE PO   Rationale: Synergistic therapy - Needs additional medication therapy - Indication   Recommendation: Start Medication - recommend supplementation with ca in addition to dietary ca - goal 1200 mg  daily   Status: Patient Agreed - Adherence/Education

## 2022-11-10 ENCOUNTER — VIRTUAL VISIT (OUTPATIENT)
Dept: PALLIATIVE MEDICINE | Facility: CLINIC | Age: 52
End: 2022-11-10
Payer: COMMERCIAL

## 2022-11-10 DIAGNOSIS — G89.4 CHRONIC PAIN SYNDROME: Primary | ICD-10-CM

## 2022-11-10 DIAGNOSIS — M79.7 FIBROMYALGIA: ICD-10-CM

## 2022-11-10 PROCEDURE — 99214 OFFICE O/P EST MOD 30 MIN: CPT | Mod: 95 | Performed by: ANESTHESIOLOGY

## 2022-11-10 ASSESSMENT — PAIN SCALES - GENERAL: PAINLEVEL: SEVERE PAIN (6)

## 2022-11-10 NOTE — PROGRESS NOTES
Paige is a 52 year old who is being evaluated via a billable video visit.    Is Pt currently in [place]? Yes    NOTE:  If Pt is not in [place], Appointment needs to be canceled and rescheduled.    How would you like to obtain your AVS? MyChart  If the video visit is dropped, the invitation should be resent by: Text to cell phone: [phone]  Will anyone else be joining your video visit? No      [facility]  Video-Visit Details    Video Start Time: 11:08 AM  Type of service:  Video Visit  Video End Time:11:30  Originating Location (pt. Location): Home        Distant Location (provider location):  On-site  Platform used for Video Visit: Malathi                              [facility]    Date of visit: 11/10/2022    Chief complaint:   Chief Complaint   Patient presents with     Pain       Interval history:  Paige Le is a 52 year old female last seen by me for follow up on 8/11/2022 VIRTUAL VISIT.       Since her last visit, Paige Le reports:    - Doing much better than at her last follow-up.    -She continues to participate in [facility] PT in [place], [place] and making some improvements although this has been slow.  - She will be starting yoga again in December once a week.  There is a  in her community that organizes this and runs the class for free.  - She has been back on low dose naltrexone now for 3 months and is feeling better.   - She found out she was iron deficient from the hematologist. She completed #3 iron infusions and is no longer napping during the day.  Her fatigue has resolved.  - She had covid twice in a 6 month period.  She believes this started her downward spiral of pain and fatigue.  - She went and had an ECHO for a heart murmur and they did not look at the correct valve but she no longer has an audible murmur.  She believes the iron infusion helped with this as well.  - She continues to have  "bilateral low back pain but it is stable.    - She is going to the nutrition clinic to work on her weight loss.  They is doing Ozempic shots and she is working with a nutritionist. She has lost some weight and is down to 278#  - She is on Gabapentin 300mg BID now.   - She is sleeping well at night and using CPAP.  - She is living in a single family home near [place], [place] with her .  They are hoping to travel more in the upcoming year.  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       Current pain treatments:   Gabapentin 300mg BID  Maxalt 10mg PRN headache  Prozac 20mg BID  Low-dose naltrexone 6 mg daily    INJECTIONS:   LEFT hip injection 7/14/2021  RIGHT L5-S1 facet joint injections 6/29/22,  6/4/2021 & 3/17/2021    IMAGING:   LUMBAR MRI 7/11/2022:   FINDINGS:   Nomenclature is based on 5 lumbar type vertebral bodies. There is minimal grade 1 retrolisthesis of L5 on S1. The alignment of the lumbar spine is otherwise normal. The vertebral body heights are maintained. No aggressive marrow replacing lesions are   identified in the lumbar spine.     The conus medullaris appears normal, terminating at the L1 level.     There is mild multilevel intervertebral disc space narrowing and endplate degenerative change.     There are several parapelvic left renal cysts. The remainder the visualized abdominal soft tissues are unremarkable.     T12-L1: No significant posterior disc abnormality, spinal canal, or neural foraminal stenosis.      L1-L2: No significant posterior disc abnormality, spinal canal, or neural foraminal stenosis.     L2-L3: Right foraminal disc protrusion superimposed on a diffuse posterior disc bulge without significant spinal canal stenosis. There is mild right and no significant left neural foraminal stenosis.      L3-L4: Mild diffuse posterior disc bulge without significant spinal canal stenosis. There is mild left and no significant right neural foraminal stenosis.     L4-L5: Left " foraminal disc protrusion with annular tear superimposed on a diffuse posterior disc bulge without significant spinal canal stenosis. Bilateral facet arthropathy contributes to mild bilateral neural foraminal stenosis.     L5-S1: No significant posterior disc abnormality, spinal canal, or neural foraminal stenosis.                                                                   IMPRESSION:     1.  Mild multilevel degenerative change without significant spinal canal or high-grade neural foraminal stenosis, as detailed above.      Medications:  Current Outpatient Medications   Medication Sig Dispense Refill     albuterol (PROAIR HFA/PROVENTIL HFA/VENTOLIN HFA) 108 (90 Base) MCG/ACT inhaler Inhale 2 puffs into the lungs every 6 hours as needed for shortness of breath / dyspnea or wheezing 18 g 3     B Complex Vitamins (VITAMIN B COMPLEX PO) 3 drops per day       beclomethasone HFA (QVAR REDIHALER) 80 MCG/ACT inhaler Inhale 2 puffs into the lungs 2 times daily (Patient taking differently: Inhale 2 puffs into the lungs as needed) 10.6 g 11     benzonatate (TESSALON) 200 MG capsule Take 1 capsule (200 mg) by mouth 3 times daily as needed for cough (Patient not taking: Reported on 10/28/2022) 20 capsule 0     CALCIUM CARBONATE PO Take by mouth daily 5 drops per day (Patient not taking: Reported on 10/28/2022)       cetirizine-psuedoePHEDrine (ZYRTEC-D) 5-120 MG per tablet Take 1 tablet by mouth 2 times daily 90 tablet 3     cholecalciferol (VITAMIN D) 1000 UNIT tablet 3 drops per day       colchicine (COLCYRS) 0.6 MG tablet Take 2 tablets orally at onset of symptoms and one more tablet 1 hour later, and then one tablet twice a day until flare resolves.       COMPOUNDED NON-CONTROLLED SUBSTANCE (CMPD RX) - PHARMACY TO MIX COMPOUNDED MEDICATION LOW DOSE NALTREXONE 6MG one tablet qhs 30 capsule 11     FLUoxetine (PROZAC) 20 MG capsule Take 20 mg by mouth       gabapentin (NEURONTIN) 300 MG capsule Take 2 capsules  (600mg)  in the morning and 2 capsules (600mg) at bedtime. 90day supply (Patient taking differently: Take 300 mg by mouth 2 times daily) 360 capsule 1     ipratropium - albuterol 0.5 mg/2.5 mg/3 mL (DUONEB) 0.5-2.5 (3) MG/3ML neb solution Take 1 vial (3 mLs) by nebulization every 6 hours as needed for shortness of breath / dyspnea or wheezing 960 mL 4     levonorgestrel (MIRENA) 20 MCG/24HR IUD 1 each by Intrauterine route once       MAGNESIUM CITRATE PO 3 drops per day       Naproxen Sodium (ALEVE PO) Take 220 mg by mouth 2 times daily as needed        order for DME Equipment being ordered: Nebulizer 1 Device 0     rizatriptan (MAXALT-MLT) 10 MG ODT tab Take 1 tablet (10 mg) by mouth at onset of headache for migraine May repeat in 2 hours. Max 3 tablets/24 hours. 18 tablet 3     Semaglutide, 1 MG/DOSE, 4 MG/3ML SOPN Inject 1 mg Subcutaneous once a week To be used after finished 0.5 mg weekly 9 mL 3     Turmeric 450 MG CAPS 1 scoop per day         Medical History: any changes in medical history since they were last seen? No    Review of Systems:  ROS:  Constitutional, neuro, ENT, endocrine, pulmonary, cardiac, gastrointestinal, genitourinary, musculoskeletal, integument and psychiatric systems are negative, except as otherwise noted.    Physical Exam:  not currently breastfeeding.    GENERAL: Healthy, alert and no distress  EYES: Eyes grossly normal to inspection.  No discharge or erythema, or obvious scleral/conjunctival abnormalities.  RESP: No audible wheeze, cough, or visible cyanosis.  No visible retractions or increased work of breathing.    SKIN: Visible skin clear. No significant rash, abnormal pigmentation or lesions.  NEURO: Cranial nerves grossly intact.  Mentation and speech appropriate for age.  PSYCH: Mentation appears normal, affect normal/bright, judgement and insight intact, normal speech and appearance well-groomed.      ASSESSMENT/PLAN:     1. Chronic pain syndrome   Continue [employer] program for PT  Continue  with nutrition and weight loss.      She has been having full body pruritus and has noticed some scaly sores on her head and arms.  I let her know I did not think this was related to her chronic pain although she does think that it flares along with her pain.  Recommended she go see dermatology.    2. Fibromyalgia  Continue Low Dose Naltrexone 6mg daily at bedtime.  A prescription was sent to the [facility] pharmacy and they will mail this to you once approved by your insurance. A three month refill was sent today    - COMPOUNDED NON-CONTROLLED SUBSTANCE (CMPD RX) - PHARMACY TO MIX COMPOUNDED MEDICATION; LOW DOSE NALTREXONE 6MG one tablet qhs  Dispense: 90 capsule; Refill: 4    - Recent Lumbar MRI was reviewed and was normal.  The majority of pain is likely myofascial pain.       MEDICATIONS:   Orders Placed This Encounter   Medications     COMPOUNDED NON-CONTROLLED SUBSTANCE (CMPD RX) - PHARMACY TO MIX COMPOUNDED MEDICATION     Sig: LOW DOSE NALTREXONE 6MG one tablet qhs     Dispense:  90 capsule     Refill:  4     3 month supply       FOLLOW UP:  In 12 months or PRN     BILLING TIME DOCUMENTATION:   The total TIME spent on this patient on the date of the encounter/appointment was 29 minutes.      TOTAL TIME includes:   Time spent preparing to see the patient (reviewing records and tests) - 2 min  Time spent face to face (or over the phone) with the patient - 22 min  Time spent ordering tests, medications, procedures and referrals - 1 min  Time spent Referring and communicating with other healthcare professionals - 0 min  Time spent documenting clinical information in Epic - 4 min      NYDIA HAUSER MD   Pain Management & Addiction Medicine

## 2022-11-10 NOTE — PATIENT INSTRUCTIONS
Continued low-dose naltrexone.  I sent a 3-month prescription to the compounding pharmacy.  I am not sure if they will fill this or not.    Am recommending that you go see dermatology for your itching and sores.  I do not think these are related to your chronic pain or fibromyalgia.    Follow-up with me in about a year or as needed before that.    Yvonne Garrido MD

## 2022-12-02 ENCOUNTER — VIRTUAL VISIT (OUTPATIENT)
Dept: ENDOCRINOLOGY | Facility: CLINIC | Age: 52
End: 2022-12-02
Payer: COMMERCIAL

## 2022-12-02 VITALS — BODY MASS INDEX: 46 KG/M2 | WEIGHT: 285 LBS

## 2022-12-02 DIAGNOSIS — E66.01 MORBID OBESITY (H): ICD-10-CM

## 2022-12-02 DIAGNOSIS — Z71.3 NUTRITIONAL COUNSELING: Primary | ICD-10-CM

## 2022-12-02 PROCEDURE — 97803 MED NUTRITION INDIV SUBSEQ: CPT | Mod: GT | Performed by: DIETITIAN, REGISTERED

## 2022-12-02 NOTE — LETTER
"12/2/2022       RE: Paige Fajardo  [address]  [place] [address]     Dear Colleague,    Thank you for referring your patient, Paige Fajardo, to the [facility] [place] at [facility]. Please see a copy of my visit note below.    Paige Fajardo is a 52 year old female who is being evaluated via a billable video visit.      The patient has been notified of following:     \"This video visit will be conducted via a call between you and your physician/provider. We have found that certain health care needs can be provided without the need for an in-person physical exam.  This service lets us provide the care you need with a video conversation.  If a prescription is necessary we can send it directly to your pharmacy.  If lab work is needed we can place an order for that and you can then stop by our lab to have the test done at a later time.    Video visits are billed at different rates depending on your insurance coverage.  Please reach out to your insurance provider with any questions.    If during the course of the call the physician/provider feels a video visit is not appropriate, you will not be charged for this service.\"    How would you like to obtain your AVS? MyChart  If the video visit is dropped, the invitation should be resent by: Text to cell phone:  [phone]  Will anyone else be joining your video visit? No      Video-Visit Details    Type of service:  Video Visit    Video Start Time: 9:32 am  Video End Time: 10:02 am    Originating Location (pt. Location): Home    Distant Location (provider location):  Offsite    Platform used for Video Visit: twtrland    During this virtual visit the patient is located in [place], patient verifies this as the location during the entirety of this visit.      Weight Management Nutrition Consultation    Paige Fajardo is a 52 year old female presents today for weight management nutrition " "consultation.  Patient referred by Dr. Reed on August 2, 2022.    Patient with Co-morbidities of obesity including:  Type II DM no  Renal Failure no  Sleep apnea yes  Hypertension no   Dyslipidemia no  Joint pain no  Back pain yes  GERD no     PMH:   Depression  Swelling  Fatigue  Gout   Asthma  Fibromyalgia   Covid at least 2x  ? Weight gain associated with prednisone      Hx of alcohol abuse 20 years ago    Anthropometrics:  Weight 8/2/22: 304 lbs with BMI 49.07    Estimated body mass index is 47.13 kg/m  as calculated from the following:    Height as of 3/8/22: 1.676 m (5' 6\").    Weight as of 10/20/22: 132.5 kg (292 lb).     Current weight: 285 lbs, pt report    Medications for Weight Loss:  Ozempic prescribed 8/2/22 - now at 1.0 mg dose     Supplements:  Vit D in winter  Turmeric   B-complex  Calcium carbonate  Mg Citrate     Labs 6/20/22  ? CBC off (just getting over covid)     Labs 5/17/22  ? GFR 90 (will watch - likely dehydrated due to covid)      Labs 8/24/22  - Ferritin low end of normal at 25    Hx of high cholesterol     Use to donate platelets every 2 weeks typically, but not since May due to covid and low iron.    NUTRITION HISTORY    NKFA  Limits dairy - notices clogged sinuses and worsening asthma symptoms per pt.   Does use dairy alternatives.     Has not met with a RD before.    Pt goals: lose weight, learn about food choices (what is more nutritious), feel better    Typical day  B - cereal, fruit, protein (yogurt or egg or meat stick)  L - leftovers OR salad OR sandwich   D - varies - goes out Monday/Friday. If at home -  cooks.  Snack: nuts OR carrots OR popcorn OR more nutritious chip per pt    Fluids: 1-2 cups of coffee with non-dairy milk.     PA: limited due to back pain. Does relaxation yoga 1x/week  Starting PT    Oct 2022:  Started ozempic, going well. Not as hungry, earlier satiety. Helping with portion control.    Down 15 lbs since starting per pt. Mobility improving    Eating 3 " meals/day.  went back to school so she is now making dinners. Trying to follow plate method.     Recent recall  B - granola, oatmeal or cereal with berries (1/2 of what she use to be able to eat), coffee with unsweetened almond or oat milk   L - salad with meat, nuts or seeds and peas/sweet potatoes for carbohydrate (didn t realize it was a carb previously) OR leftovers   D - salmon or chicken breast with vegetables (broccoli, [place], salad) and carbs (quinoa, brown rice, couscous, potatoes)  Snacks - if hungry, cheese or meat stick, fruit, veggies (has lots of tomatoes from garden right now)    Using lots of fresh herbs and vegetables from her garden  Treats occ but not keeping as many in house.   Catered dinners at [employer] on Wednesdays - noticing they serve very large portions but have been serving more nutritious options (use to be pizza, now food from a local diner)    Looking at food differently - energy as opposed to as just food.     Switched from cooking with butter to olive oil.    Going to [employer] in December for 2 weeks- purposely didn t get an all inclusive to try to be healthier.     Fluids: water, la croix occ, 1-2 cups of coffee with non-dairy milk    PA: doing PT, feeling stronger    Dec 2022:  Nutrition going well per pt. Feels more energized.     Bad hip pain, was recommended steroids but refused (fearful of weight gain). Did a cortisone shot instead. Very immobile right now. PA limited. Plans to start yoga this month.    Leaves for [employer] trip next week. Plans to go to [employer] first day to buy fruit/vegetables. Reports they eat really well while there - lots of fresh food and fish/meats.     Running out of ozempic. Plans to call pharmacy today to try to get more. Side effects improving - still at 1 mg dose.     Trying to make better choices - side salad instead of fries. Splitting meals with  when out to eat.     Dinner last night: out to eat - turkey oscar melt       Previous  goals:  1. Look into gym membership in town - Met, not able to go right now due to hip pain  2. Continue checking in on food as fuel - Met  3. 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   has diabetes     Donates platelets every 2 weeks on avg    Nutrition Prescription  Recommended energy/nutrient modification.    Nutrition Diagnosis  Food and nutrition related knowledge deficit r/t lack of prior exposure to nutrition education aeb pt report and interest in learning     Obesity r/t long history of positive energy balance aeb BMI >30.    Nutrition Intervention  Reviewed and modified previous goals  Answered pt questions  Coordination of care   Nutrition education - Plate method, types of vegetables, types of fat, label reading, dietary sources of iron, ways to incorporate PA (fiton ami)  AVS and handouts via [employer]    Patient demonstrates understanding.    Expected Engagement: good    Nutrition Goals  1. Yoga as able/tolerated   2. Consider FitOn application   3. Continue with nutrition goals     Keep up the hard work!    High-iron foods:  100% iron-fortified ready-to-eat cereal   cup, 18 mg  Grits, instant   cup, 7.1   Cream of wheat   cup, 5.2   Oatmeal, instant   cup, 5 mg  Soybeans, cooked   cup, 4.4 mg  White beans, canned   cup, 3.9 mg   Lentils   cup, 3.3 mg  White rice 1/3 cup, 3 mg  Spinach   cup cooked, 1 cup raw, 3 mg   Beef tenderloin 3 oz, 3 mg  Baked beans 1/3 cup, 3 mg  Vegetable or soy burger 1 sarah, 2.9 mg  Soy milk 1 cup (8 oz), 2.7 mg   Chickpeas   cup, 2.5 mg  Kidney beans   cup, 2.5 mg  Sardines 3 oz, 2.5 mg   Nuts: almonds or pistachios   cup, 1.3 mg   [place] sprouts, cooked   cup, 1 mg   Egg 1 whole, 1 mg    Some good sources on nut comparison:    https://www.[employer]/sites/default/files/2020-04/nutrient_comparison_chart_for_tree_nuts_redesign.pdf     https://www.[employer]/Images/nutrition/Tree_Nut%20Nutrient_Comparison_Chart.pdf        The Plate Method:  https://www.cdc.gov/diabetes/images/managing/Diabetes-Manage-Eat-Well-Plate-Graphic_600px.jpg     Starchy vegetables  ? Potatoes   ? Sweet potatoes  ? Green peas  ? Chickpeas   ? Corn  ? Lentils  ? Beans (black, domingo, etc)     Types of fats  https://www.[facility].[place].edu/nutritionsource/what-should-you-eat/fats-and-cholesterol/types-of-fat/      Good resources on inflammation    https://www.health.[place].edu/staying-healthy/foods-that-fight-inflammation     https://www.heart.org/en/healthy-living/healthy-eating/eat-smart/nutrition-basics/mediterranean-diet     https://oldqcue.org/traditional-diets/mediterranean-diet     https://my.[facility].org/health/articles/91371-njbfztjrpwniy-cogf     Good article on label reading   https://www.fda.gov/food/new-nutrition-facts-label/how-understand-and-use-nutrition-facts-label     Additional resources:     Protein Sources   http://[employer]/[email or web address]     Carbohydrates  [email or web address]     Mindful Eating  http://[email or web address]     Summary of Volumetrics Eating Plan  [email or web address]     Follow-Up:  Friday, January 6th at 9:30 am    Time spent with patient: 30 minutes.  AARON Tom, RD, LD

## 2022-12-02 NOTE — PROGRESS NOTES
"Paige Fajardo is a 52 year old female who is being evaluated via a billable video visit.      The patient has been notified of following:     \"This video visit will be conducted via a call between you and your physician/provider. We have found that certain health care needs can be provided without the need for an in-person physical exam.  This service lets us provide the care you need with a video conversation.  If a prescription is necessary we can send it directly to your pharmacy.  If lab work is needed we can place an order for that and you can then stop by our lab to have the test done at a later time.    Video visits are billed at different rates depending on your insurance coverage.  Please reach out to your insurance provider with any questions.    If during the course of the call the physician/provider feels a video visit is not appropriate, you will not be charged for this service.\"    How would you like to obtain your AVS? MyChart  If the video visit is dropped, the invitation should be resent by: Text to cell phone:  [phone]  Will anyone else be joining your video visit? No      Video-Visit Details    Type of service:  Video Visit    Video Start Time: 9:32 am  Video End Time: 10:02 am    Originating Location (pt. Location): Home    Distant Location (provider location):  Offsite    Platform used for Video Visit: Pet360    During this virtual visit the patient is located in [place], patient verifies this as the location during the entirety of this visit.      Weight Management Nutrition Consultation    Paige Fajardo is a 52 year old female presents today for weight management nutrition consultation.  Patient referred by Dr. Reed on August 2, 2022.    Patient with Co-morbidities of obesity including:  Type II DM no  Renal Failure no  Sleep apnea yes  Hypertension no   Dyslipidemia no  Joint pain no  Back pain yes  GERD no     PMH:   Depression  Swelling  Fatigue  Gout   Asthma  Fibromyalgia   Covid at " "least 2x  ? Weight gain associated with prednisone      Hx of alcohol abuse 20 years ago    Anthropometrics:  Weight 8/2/22: 304 lbs with BMI 49.07    Estimated body mass index is 47.13 kg/m  as calculated from the following:    Height as of 3/8/22: 1.676 m (5' 6\").    Weight as of 10/20/22: 132.5 kg (292 lb).     Current weight: 285 lbs, pt report    Medications for Weight Loss:  Ozempic prescribed 8/2/22 - now at 1.0 mg dose     Supplements:  Vit D in winter  Turmeric   B-complex  Calcium carbonate  Mg Citrate     Labs 6/20/22  ? CBC off (just getting over covid)     Labs 5/17/22  ? GFR 90 (will watch - likely dehydrated due to covid)      Labs 8/24/22  - Ferritin low end of normal at 25    Hx of high cholesterol     Use to donate platelets every 2 weeks typically, but not since May due to covid and low iron.    NUTRITION HISTORY    NKFA  Limits dairy - notices clogged sinuses and worsening asthma symptoms per pt.   Does use dairy alternatives.     Has not met with a RD before.    Pt goals: lose weight, learn about food choices (what is more nutritious), feel better    Typical day  B - cereal, fruit, protein (yogurt or egg or meat stick)  L - leftovers OR salad OR sandwich   D - varies - goes out Monday/Friday. If at home -  cooks.  Snack: nuts OR carrots OR popcorn OR more nutritious chip per pt    Fluids: 1-2 cups of coffee with non-dairy milk.     PA: limited due to back pain. Does relaxation yoga 1x/week  Starting PT    Oct 2022:  Started ozempic, going well. Not as hungry, earlier satiety. Helping with portion control.    Down 15 lbs since starting per pt. Mobility improving    Eating 3 meals/day.  went back to school so she is now making dinners. Trying to follow plate method.     Recent recall  B - granola, oatmeal or cereal with berries (1/2 of what she use to be able to eat), coffee with unsweetened almond or oat milk   L - salad with meat, nuts or seeds and peas/sweet potatoes for " carbohydrate (didn t realize it was a carb previously) OR leftovers   D - salmon or chicken breast with vegetables (broccoli, [place], salad) and carbs (quinoa, brown rice, couscous, potatoes)  Snacks - if hungry, cheese or meat stick, fruit, veggies (has lots of tomatoes from garden right now)    Using lots of fresh herbs and vegetables from her garden  Treats occ but not keeping as many in house.   Catered dinners at [employer] on Wednesdays - noticing they serve very large portions but have been serving more nutritious options (use to be pizza, now food from a local diner)    Looking at food differently - energy as opposed to as just food.     Switched from cooking with butter to olive oil.    Going to [employer] in December for 2 weeks- purposely didn t get an all inclusive to try to be healthier.     Fluids: water, la croix occ, 1-2 cups of coffee with non-dairy milk    PA: doing PT, feeling stronger    Dec 2022:  Nutrition going well per pt. Feels more energized.     Bad hip pain, was recommended steroids but refused (fearful of weight gain). Did a cortisone shot instead. Very immobile right now. PA limited. Plans to start yoga this month.    Leaves for [employer] trip next week. Plans to go to [employer] first day to buy fruit/vegetables. Reports they eat really well while there - lots of fresh food and fish/meats.     Running out of ozempic. Plans to call pharmacy today to try to get more. Side effects improving - still at 1 mg dose.     Trying to make better choices - side salad instead of fries. Splitting meals with  when out to eat.     Dinner last night: out to eat - turkey oscar melt       Previous goals:  1. Look into gym membership in town - Met, not able to go right now due to hip pain  2. Continue checking in on food as fuel - Met  3. 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   has diabetes     Donates platelets every 2 weeks on avg    Nutrition Prescription  Recommended energy/nutrient modification.    Nutrition Diagnosis  Food and nutrition related knowledge deficit r/t lack of prior exposure to nutrition education aeb pt report and interest in learning     Obesity r/t long history of positive energy balance aeb BMI >30.    Nutrition Intervention  Reviewed and modified previous goals  Answered pt questions  Coordination of care   Nutrition education - Plate method, types of vegetables, types of fat, label reading, dietary sources of iron, ways to incorporate PA (fiton ami)  AVS and handouts via [employer]    Patient demonstrates understanding.    Expected Engagement: good    Nutrition Goals  1. Yoga as able/tolerated   2. Consider FitOn application   3. Continue with nutrition goals     Keep up the hard work!    High-iron foods:  100% iron-fortified ready-to-eat cereal   cup, 18 mg  Grits, instant   cup, 7.1   Cream of wheat   cup, 5.2   Oatmeal, instant   cup, 5 mg  Soybeans, cooked   cup, 4.4 mg  White beans, canned   cup, 3.9 mg   Lentils   cup, 3.3 mg  White rice 1/3 cup, 3 mg  Spinach   cup cooked, 1 cup raw, 3 mg   Beef tenderloin 3 oz, 3 mg  Baked beans 1/3 cup, 3 mg  Vegetable or soy burger 1 sarah, 2.9 mg  Soy milk 1 cup (8 oz), 2.7 mg   Chickpeas   cup, 2.5 mg  Kidney beans   cup, 2.5 mg  Sardines 3 oz, 2.5 mg   Nuts: almonds or pistachios   cup, 1.3 mg   [place] sprouts, cooked   cup, 1 mg   Egg 1 whole, 1 mg    Some good sources on nut comparison:   https://www.[employer].SeeFuture/sites/default/files/2020-04/nutrient_comparison_chart_for_tree_nuts_redesign.pdf     https://www.[employer].SeeFuture/Images/nutrition/Tree_Nut%20Nutrient_Comparison_Chart.pdf        The Plate Method:  https://www.cdc.gov/diabetes/images/managing/Diabetes-Manage-Eat-Well-Plate-Graphic_600px.jpg     Starchy vegetables  ? Potatoes   ? Sweet  potatoes  ? Green peas  ? Chickpeas   ? Corn  ? Lentils  ? Beans (black, domingo, etc)     Types of fats  https://www.[facility].[place].edu/nutritionsource/what-should-you-eat/fats-and-cholesterol/types-of-fat/      Good resources on inflammation    https://www.health.[place].[facility]/staying-healthy/foods-that-fight-inflammation     https://www.heart.org/en/healthy-living/healthy-eating/eat-smart/nutrition-basics/mediterranean-diet     https://[employer].org/traditional-diets/mediterranean-diet     https://my.[facility].org/health/articles/74867-kyrbptwzcjqso-fhlu     Good article on label reading   https://www.fda.gov/food/new-nutrition-facts-label/how-understand-and-use-nutrition-facts-label     Additional resources:     Protein Sources   http://[employer]/743617.pdf     Carbohydrates  [email or web address]://[email or web address]     Mindful Eating  http://[employer]/[email or web address]     Summary of Volumetrics Eating Plan  [email or web address]     Follow-Up:  Friday, January 6th at 9:30 am    Time spent with patient: 30 minutes.  AARON Tom, RD, LD

## 2022-12-02 NOTE — PATIENT INSTRUCTIONS
Nutrition Goals  1. Yoga as able/tolerated   2. Consider FitOn application   3. Continue with nutrition goals     Keep up the hard work!    High-iron foods:  100% iron-fortified ready-to-eat cereal   cup, 18 mg  Grits, instant   cup, 7.1   Cream of wheat   cup, 5.2   Oatmeal, instant   cup, 5 mg  Soybeans, cooked   cup, 4.4 mg  White beans, canned   cup, 3.9 mg   Lentils   cup, 3.3 mg  White rice 1/3 cup, 3 mg  Spinach   cup cooked, 1 cup raw, 3 mg   Beef tenderloin 3 oz, 3 mg  Baked beans 1/3 cup, 3 mg  Vegetable or soy burger 1 sarah, 2.9 mg  Soy milk 1 cup (8 oz), 2.7 mg   Chickpeas   cup, 2.5 mg  Kidney beans   cup, 2.5 mg  Sardines 3 oz, 2.5 mg   Nuts: almonds or pistachios   cup, 1.3 mg   [place] sprouts, cooked   cup, 1 mg   Egg 1 whole, 1 mg    Some good sources on nut comparison:   https://www.[employer]/sites/default/files/2020-04/nutrient_comparison_chart_for_tree_nuts_redesign.pdf     https://www.[employer]/Images/nutrition/Tree_Nut%20Nutrient_Comparison_Chart.pdf        The Plate Method:  https://www.cdc.gov/diabetes/images/managing/Diabetes-Manage-Eat-Well-Plate-Graphic_600px.jpg     Starchy vegetables  Potatoes   Sweet potatoes  Green peas  Chickpeas   Corn  Lentils  Beans (black, domingo, etc)     Types of fats  https://www.[facility].[place].edu/nutritionsource/what-should-you-eat/fats-and-cholesterol/types-of-fat/      Good resources on inflammation    https://www.health.[employer].edu/staying-healthy/foods-that-fight-inflammation     https://www.heart.org/en/healthy-living/healthy-eating/eat-smart/nutrition-basics/mediterranean-diet     https://oldwayspt.org/traditional-diets/mediterranean-diet     https://my.[facility].org/health/articles/33004-usbaokcmiorpq-jmbb     Good article on label reading   https://www.fda.gov/food/new-nutrition-facts-label/how-understand-and-use-nutrition-facts-label     Additional resources:     Protein Sources   [email or web address]://[email or web address]      Carbohydrates  [email or web address]     Mindful Eating  [email or web address]://[email or web address]     Summary of Volumetrics Eating Plan  [email or web address]     Follow-Up:  Friday, January 6th at 9:30 am

## 2022-12-27 ENCOUNTER — VIRTUAL VISIT (OUTPATIENT)
Dept: ENDOCRINOLOGY | Facility: CLINIC | Age: 52
End: 2022-12-27
Payer: COMMERCIAL

## 2022-12-27 VITALS — WEIGHT: 280 LBS | HEIGHT: 66 IN | BODY MASS INDEX: 45 KG/M2

## 2022-12-27 DIAGNOSIS — E66.01 MORBID OBESITY (H): Primary | ICD-10-CM

## 2022-12-27 PROCEDURE — 99214 OFFICE O/P EST MOD 30 MIN: CPT | Mod: 95 | Performed by: INTERNAL MEDICINE

## 2022-12-27 RX ORDER — SEMAGLUTIDE 2.68 MG/ML
2 INJECTION, SOLUTION SUBCUTANEOUS WEEKLY
Qty: 9 ML | Refills: 3 | Status: SHIPPED | OUTPATIENT
Start: 2022-12-27 | End: 2023-06-12

## 2022-12-27 NOTE — PROGRESS NOTES
"  Return Medical Weight Management Note     Paige Fajardo  MRN:  [id number]  :  1970  VIRI:  2022    Dear Francesca Samson MD,    I had the pleasure of seeing your patient Paige Fajardo. She is a 52 year old female who I am continuing to see for treatment of obesity related to:  Gout, DMITRIY, asthma, lumbar radiculopathy, fibromyalgia       2022   I have the following health issues associated with obesity: Sleep Apnea, Asthma, Stress Incontinence   I have the following symptoms associated with obesity: Knee Pain, Depression, Lower Extremity Swelling, Back Pain, Fatigue, Hip Pain       INTERVAL HISTORY:  Last seen in Aug 2022. Started on Ozempic injection. Did have some side effect initially but improved.  She said that Ozempic made her feel campbell longer and faster. Craving is down. Lost 24 lbs in 4 months.   Currently on Ozempic 1.0 mg weekly.     Activity: cannot walk outside due to asthma, now doing yoga once a week    CURRENT WEIGHT:   280 lbs 0 oz    Initial Weight (lbs): 304 lbs  Last Visits Weight: 129.3 kg (285 lb)  Cumulative weight loss (lbs): 24  Weight Loss Percentage: 7.89%    Changes and Difficulties 2022   I have made the following changes to my diet since my last visit: Eating a lot less and what I do eat has to be really nutritious because I can't eat as much.   With regards to my diet, I am still struggling with: Constipation. Unsure if food or medication.   I have made the following changes to my activity/exercise since my last visit: I have been more active. Finished a PT program. Able to go on vacation and go scuba diving   With regards to my activity/exercise, I am still struggling with: I can't walk outside anymore because I have asthma in the cold. Trying to get part of gym in town that has pool. Trying to get coverage for that.       VITALS:  Ht 1.676 m (5' 6\")   Wt 127 kg (280 lb)   BMI 45.19 kg/m      MEDICATIONS:   Current Outpatient Medications   Medication Sig " Dispense Refill     albuterol (PROAIR HFA/PROVENTIL HFA/VENTOLIN HFA) 108 (90 Base) MCG/ACT inhaler Inhale 2 puffs into the lungs every 6 hours as needed for shortness of breath / dyspnea or wheezing 18 g 3     B Complex Vitamins (VITAMIN B COMPLEX PO) 3 drops per day       beclomethasone HFA (QVAR REDIHALER) 80 MCG/ACT inhaler Inhale 2 puffs into the lungs 2 times daily (Patient taking differently: Inhale 2 puffs into the lungs as needed) 10.6 g 11     benzonatate (TESSALON) 200 MG capsule Take 1 capsule (200 mg) by mouth 3 times daily as needed for cough (Patient not taking: Reported on 10/28/2022) 20 capsule 0     CALCIUM CARBONATE PO Take by mouth daily 5 drops per day (Patient not taking: Reported on 10/28/2022)       cetirizine-psuedoePHEDrine (ZYRTEC-D) 5-120 MG per tablet Take 1 tablet by mouth 2 times daily 90 tablet 3     cholecalciferol (VITAMIN D) 1000 UNIT tablet 3 drops per day       colchicine (COLCYRS) 0.6 MG tablet Take 2 tablets orally at onset of symptoms and one more tablet 1 hour later, and then one tablet twice a day until flare resolves.       COMPOUNDED NON-CONTROLLED SUBSTANCE (CMPD RX) - PHARMACY TO MIX COMPOUNDED MEDICATION LOW DOSE NALTREXONE 6MG one tablet qhs 90 capsule 4     FLUoxetine (PROZAC) 20 MG capsule Take 20 mg by mouth       gabapentin (NEURONTIN) 300 MG capsule Take 2 capsules  (600mg) in the morning and 2 capsules (600mg) at bedtime. 90day supply (Patient taking differently: Take 300 mg by mouth 2 times daily) 360 capsule 1     ipratropium - albuterol 0.5 mg/2.5 mg/3 mL (DUONEB) 0.5-2.5 (3) MG/3ML neb solution Take 1 vial (3 mLs) by nebulization every 6 hours as needed for shortness of breath / dyspnea or wheezing 960 mL 4     levonorgestrel (MIRENA) 20 MCG/24HR IUD 1 each by Intrauterine route once       MAGNESIUM CITRATE PO 3 drops per day       Naproxen Sodium (ALEVE PO) Take 220 mg by mouth 2 times daily as needed        order for DME Equipment being ordered: Nebulizer 1  Device 0     rizatriptan (MAXALT-MLT) 10 MG ODT tab Take 1 tablet (10 mg) by mouth at onset of headache for migraine May repeat in 2 hours. Max 3 tablets/24 hours. 18 tablet 3     Semaglutide, 1 MG/DOSE, 4 MG/3ML SOPN Inject 1 mg Subcutaneous once a week To be used after finished 0.5 mg weekly 9 mL 3     Turmeric 450 MG CAPS 1 scoop per day         Weight Loss Medication History Reviewed With Patient 12/27/2022   Which weight loss medications are you currently taking on a regular basis?  Ozempic   Are you having any side effects from the weight loss medication that we have prescribed you? Yes   If you are having side effects please describe: Constipation?       ASSESSMENT:   Paige Fajardo is a 52 year old female who I am continuing to see for treatment of obesity related to:  Gout, DMITRIY, asthma, lumbar radiculopathy, fibromyalgia    Started on Ozempic injection. Feel campbell longer and faster. Craving is down.   Lost 24 lbs in 4 months.   Tolerable side effects    PLAN:   - Increase Ozempic to 2.0 mg weekly  - continue to work on diet and exercise    FOLLOW-UP:    See Lauren Bloch, PharmD in 6 weeks  See Dr.Tasma ANSARI in 3 month(s)    Joined the call at 12/27/2022, 12:32:28 pm.  Left the call at 12/27/2022, 12:43:19 pm.  You were on the call for 10 minutes 51 seconds .    External notes/medical records independently reviewed, labs and imaging independently reviewed, medical management and tests to be discussed/communicated to patient.    Time: I spent 38 minutes spent on the date of the encounter preparing to see patient (including chart review and preparation), obtaining and or reviewing additional medical history, performing a physical exam and evaluation, documenting clinical information in the electronic health record, independently interpreting results, communicating results to the patient and coordinating care.      Sincerely,    Derek Schwab MD

## 2022-12-27 NOTE — LETTER
2022       RE: Paige Fajardo  [address]  [place] [address]     Dear Colleague,    Thank you for referring your patient, Paige Fajardo, to the [facility]NAG[facility] [place] [facility]. Please see a copy of my visit note below.      Return Medical Weight Management Note     Paige Fajardo  MRN:  [id number]  :  1970  VIRI:  2022    Dear Francesca Samson MD,    I had the pleasure of seeing your patient Paige Fajardo. She is a 52 year old female who I am continuing to see for treatment of obesity related to:  Gout, DMITRIY, asthma, lumbar radiculopathy, fibromyalgia       2022   I have the following health issues associated with obesity: Sleep Apnea, Asthma, Stress Incontinence   I have the following symptoms associated with obesity: Knee Pain, Depression, Lower Extremity Swelling, Back Pain, Fatigue, Hip Pain       INTERVAL HISTORY:  Last seen in Aug 2022. Started on Ozempic injection. Did have some side effect initially but improved.  She said that Ozempic made her feel campbell longer and faster. Craving is down. Lost 24 lbs in 4 months.   Currently on Ozempic 1.0 mg weekly.     Activity: cannot walk outside due to asthma, now doing yoga once a week    CURRENT WEIGHT:   280 lbs 0 oz    Initial Weight (lbs): 304 lbs  Last Visits Weight: 129.3 kg (285 lb)  Cumulative weight loss (lbs): 24  Weight Loss Percentage: 7.89%    Changes and Difficulties 2022   I have made the following changes to my diet since my last visit: Eating a lot less and what I do eat has to be really nutritious because I can't eat as much.   With regards to my diet, I am still struggling with: Constipation. Unsure if food or medication.   I have made the following changes to my activity/exercise since my last visit: I have been more active. Finished a PT program. Able to go on vacation and go scuba diving   With regards to my  "activity/exercise, I am still struggling with: I can't walk outside anymore because I have asthma in the cold. Trying to get part of gym in town that has pool. Trying to get coverage for that.       VITALS:  Ht 1.676 m (5' 6\")   Wt 127 kg (280 lb)   BMI 45.19 kg/m      MEDICATIONS:   Current Outpatient Medications   Medication Sig Dispense Refill     albuterol (PROAIR HFA/PROVENTIL HFA/VENTOLIN HFA) 108 (90 Base) MCG/ACT inhaler Inhale 2 puffs into the lungs every 6 hours as needed for shortness of breath / dyspnea or wheezing 18 g 3     B Complex Vitamins (VITAMIN B COMPLEX PO) 3 drops per day       beclomethasone HFA (QVAR REDIHALER) 80 MCG/ACT inhaler Inhale 2 puffs into the lungs 2 times daily (Patient taking differently: Inhale 2 puffs into the lungs as needed) 10.6 g 11     benzonatate (TESSALON) 200 MG capsule Take 1 capsule (200 mg) by mouth 3 times daily as needed for cough (Patient not taking: Reported on 10/28/2022) 20 capsule 0     CALCIUM CARBONATE PO Take by mouth daily 5 drops per day (Patient not taking: Reported on 10/28/2022)       cetirizine-psuedoePHEDrine (ZYRTEC-D) 5-120 MG per tablet Take 1 tablet by mouth 2 times daily 90 tablet 3     cholecalciferol (VITAMIN D) 1000 UNIT tablet 3 drops per day       colchicine (COLCYRS) 0.6 MG tablet Take 2 tablets orally at onset of symptoms and one more tablet 1 hour later, and then one tablet twice a day until flare resolves.       COMPOUNDED NON-CONTROLLED SUBSTANCE (CMPD RX) - PHARMACY TO MIX COMPOUNDED MEDICATION LOW DOSE NALTREXONE 6MG one tablet qhs 90 capsule 4     FLUoxetine (PROZAC) 20 MG capsule Take 20 mg by mouth       gabapentin (NEURONTIN) 300 MG capsule Take 2 capsules  (600mg) in the morning and 2 capsules (600mg) at bedtime. 90day supply (Patient taking differently: Take 300 mg by mouth 2 times daily) 360 capsule 1     ipratropium - albuterol 0.5 mg/2.5 mg/3 mL (DUONEB) 0.5-2.5 (3) MG/3ML neb solution Take 1 vial (3 mLs) by nebulization " every 6 hours as needed for shortness of breath / dyspnea or wheezing 960 mL 4     levonorgestrel (MIRENA) 20 MCG/24HR IUD 1 each by Intrauterine route once       MAGNESIUM CITRATE PO 3 drops per day       Naproxen Sodium (ALEVE PO) Take 220 mg by mouth 2 times daily as needed        order for DME Equipment being ordered: Nebulizer 1 Device 0     rizatriptan (MAXALT-MLT) 10 MG ODT tab Take 1 tablet (10 mg) by mouth at onset of headache for migraine May repeat in 2 hours. Max 3 tablets/24 hours. 18 tablet 3     Semaglutide, 1 MG/DOSE, 4 MG/3ML SOPN Inject 1 mg Subcutaneous once a week To be used after finished 0.5 mg weekly 9 mL 3     Turmeric 450 MG CAPS 1 scoop per day         Weight Loss Medication History Reviewed With Patient 12/27/2022   Which weight loss medications are you currently taking on a regular basis?  Ozempic   Are you having any side effects from the weight loss medication that we have prescribed you? Yes   If you are having side effects please describe: Constipation?       ASSESSMENT:   Paige Fajardo is a 52 year old female who I am continuing to see for treatment of obesity related to:  Gout, DMITRIY, asthma, lumbar radiculopathy, fibromyalgia    Started on Ozempic injection. Feel campbell longer and faster. Craving is down.   Lost 24 lbs in 4 months.   Tolerable side effects    PLAN:   - Increase Ozempic to 2.0 mg weekly  - continue to work on diet and exercise    FOLLOW-UP:    See Lauren Bloch, PharmD in 6 weeks  See Dr.Tasma ANSARI in 3 month(s)    Joined the call at 12/27/2022, 12:32:28 pm.  Left the call at 12/27/2022, 12:43:19 pm.  You were on the call for 10 minutes 51 seconds .    External notes/medical records independently reviewed, labs and imaging independently reviewed, medical management and tests to be discussed/communicated to patient.    Time: I spent 38 minutes spent on the date of the encounter preparing to see patient (including chart review and preparation), obtaining and or reviewing  additional medical history, performing a physical exam and evaluation, documenting clinical information in the electronic health record, independently interpreting results, communicating results to the patient and coordinating care.      Sincerely,    Derek Schwab MD

## 2022-12-27 NOTE — PROGRESS NOTES
Virtual Visit Check-In    During this virtual visit the patient is located in [place], patient verifies this as the location during the entirety of this visit.     Piage is a 52 year old who is being evaluated via a billable video visit.      How would you like to obtain your AVS? MyChart  If the video visit is dropped, the invitation should be resent by: Text to cell phone: [phone]  Will anyone else be joining your video visit? No        Video-Visit Details    Type of service:  Video Visit     Originating Location (pt. Location): Home    Distant Location (provider location):  Off-site  Platform used for Video Visit: Malathi Botello NREMT

## 2022-12-27 NOTE — NURSING NOTE
Chief Complaint   Patient presents with     Follow Up     Return weight management.         Vitals:    12/27/22 1217   Weight: 280 lb       Body mass index is 45.19 kg/m .      Kelly Botello, EMT  [facility]

## 2022-12-27 NOTE — PATIENT INSTRUCTIONS
Increase Ozempic to 2.0 mg weekly    See Lauren Bloch, PharmD in 6 weeks  See Dr.Tasma ANSARI in 3 month(s)    If you have any questions, please do not hesitate to call Weight management clinic at [phone] or [phone].  If you need to fax, please fax to [phone].    Sincerely,    Derek Schwab MD  Endocrinology

## 2022-12-28 ENCOUNTER — TELEPHONE (OUTPATIENT)
Dept: ENDOCRINOLOGY | Facility: CLINIC | Age: 52
End: 2022-12-28

## 2022-12-28 NOTE — TELEPHONE ENCOUNTER
SINDI and sent [employer] for scheduling   appt with Lauren Bloch, PharmD in 6 weeks, and appt with Dr.Tasma ANSARI in 3 months around 3/27/23.

## 2023-01-06 ENCOUNTER — VIRTUAL VISIT (OUTPATIENT)
Dept: ENDOCRINOLOGY | Facility: CLINIC | Age: 53
End: 2023-01-06
Payer: COMMERCIAL

## 2023-01-06 DIAGNOSIS — E66.9 OBESITY: ICD-10-CM

## 2023-01-06 DIAGNOSIS — Z71.3 NUTRITIONAL COUNSELING: Primary | ICD-10-CM

## 2023-01-06 PROCEDURE — 97803 MED NUTRITION INDIV SUBSEQ: CPT | Mod: GT | Performed by: DIETITIAN, REGISTERED

## 2023-01-06 NOTE — PROGRESS NOTES
"Paige Fajardo is a 52 year old female who is being evaluated via a billable video visit.      The patient has been notified of following:     \"This video visit will be conducted via a call between you and your physician/provider. We have found that certain health care needs can be provided without the need for an in-person physical exam.  This service lets us provide the care you need with a video conversation.  If a prescription is necessary we can send it directly to your pharmacy.  If lab work is needed we can place an order for that and you can then stop by our lab to have the test done at a later time.    Video visits are billed at different rates depending on your insurance coverage.  Please reach out to your insurance provider with any questions.    If during the course of the call the physician/provider feels a video visit is not appropriate, you will not be charged for this service.\"    How would you like to obtain your AVS? MyChart  If the video visit is dropped, the invitation should be resent by: Text to cell phone:  [phone]  Will anyone else be joining your video visit? No      Video-Visit Details    Type of service:  Video Visit    Video Start Time: 10:04 am  Video End Time: 10:40 am    Originating Location (pt. Location): Home    Distant Location (provider location):  Offsite    Platform used for Video Visit: CiDRA    During this virtual visit the patient is located in [place], patient verifies this as the location during the entirety of this visit.      Weight Management Nutrition Consultation    Paige Fajardo is a 52 year old female presents today for weight management nutrition consultation.  Patient referred by Dr. Reed on August 2, 2022.    Patient with Co-morbidities of obesity including:  Type II DM no  Renal Failure no  Sleep apnea yes  Hypertension no   Dyslipidemia no  Joint pain no  Back pain yes  GERD no     PMH:   Depression  Swelling  Fatigue  Gout   Asthma  Fibromyalgia   Covid at " "least 2x  ? Weight gain associated with prednisone      Hx of alcohol abuse 20 years ago    Anthropometrics:  Weight 8/2/22: 304 lbs with BMI 49.07    Estimated body mass index is 45.03 kg/m  as calculated from the following:    Height as of 12/27/22: 1.676 m (5' 6\").    Weight as of this encounter: 126.6 kg (279 lb).     Current weight: 279 lbs, pt report    Medications for Weight Loss:  Ozempic prescribed 8/2/22 - just got 2.0 mg dose 1/5/23    Supplements:  Vit D 3,000 IUs/day   Turmeric   B-complex  Calcium carbonate  Mg Citrate     Labs 6/20/22  ? CBC off (just getting over covid)     Labs 5/17/22  ? GFR 90 (will watch - likely dehydrated due to covid)    Labs 8/24/22  - Ferritin low end of normal at 25    Hx of high cholesterol     Use to donate platelets every 2 weeks typically, but not since May due to covid and low iron.    NUTRITION HISTORY    NKFA  Limits dairy - notices clogged sinuses and worsening asthma symptoms per pt.   Does use dairy alternatives.     Has not met with a RD before.    Pt goals: lose weight, learn about food choices (what is more nutritious), feel better    Typical day  B - cereal, fruit, protein (yogurt or egg or meat stick)  L - leftovers OR salad OR sandwich   D - varies - goes out Monday/Friday. If at home -  cooks.  Snack: nuts OR carrots OR popcorn OR more nutritious chip per pt    Fluids: 1-2 cups of coffee with non-dairy milk.     PA: limited due to back pain. Does relaxation yoga 1x/week  Starting PT    Oct 2022:  Started ozempic, going well. Not as hungry, earlier satiety. Helping with portion control.    Down 15 lbs since starting per pt. Mobility improving    Eating 3 meals/day.  went back to school so she is now making dinners. Trying to follow plate method.     Recent recall  B - granola, oatmeal or cereal with berries (1/2 of what she use to be able to eat), coffee with unsweetened almond or oat milk   L - salad with meat, nuts or seeds and peas/sweet " potatoes for carbohydrate (didn t realize it was a carb previously) OR leftovers   D - salmon or chicken breast with vegetables (broccoli, [place], salad) and carbs (quinoa, brown rice, couscous, potatoes)  Snacks - if hungry, cheese or meat stick, fruit, veggies (has lots of tomatoes from garden right now)    Using lots of fresh herbs and vegetables from her garden  Treats occ but not keeping as many in house.   Catered dinners at [employer] on Wednesdays - noticing they serve very large portions but have been serving more nutritious options (use to be pizza, now food from a local diner)    Looking at food differently - energy as opposed to as just food.     Switched from cooking with butter to olive oil.    Going to [place] in December for 2 weeks- purposely didn t get an all inclusive to try to be healthier.     Fluids: water, la croix occ, 1-2 cups of coffee with non-dairy milk    PA: doing PT, feeling stronger    Dec 2022:  Nutrition going well per pt. Feels more energized.     Bad hip pain, was recommended steroids but refused (fearful of weight gain). Did a cortisone shot instead. Very immobile right now. PA limited. Plans to start yoga this month.    Leaves for [place] trip next week. Plans to go to [employer] first day to buy fruit/vegetables. Reports they eat really well while there - lots of fresh food and fish/meats.     Running out of ozempic. Plans to call pharmacy today to try to get more. Side effects improving - still at 1 mg dose.     Trying to make better choices - side salad instead of fries. Splitting meals with  when out to eat.     Dinner last night: out to eat - turkey oscar melt     Previous goals:  1. Look into gym membership in town - Met, not able to go right now due to hip pain  2. Continue checking in on food as fuel - Met  3. Recommend anti-inflammatory foods - Met    Jan 2023:  Continues to have good portion control and mindful of choices. Feels this is going really well.     Did well  with moderation at [employer] - broke a bunch of cookies into pieces so she could try a little of them all     Trying to eat slower - helps with noticing satiety. Gets nausea if overeating. Using smaller bowls.    Trying to make homemade food as much as possible     BM: constipation, got worse when in [place]. Does well with fruits/vegetable intake and hydration. Tries to get probiotics in yogurt (non-dairy yogurt)  ? She is curious if related to ozempic. Seeing Ruthie later this month.    Having blood noses - seeing ENT next month. Got worse after scuba diving in [place]    PA: Finished PT - doing well. Can lift 150 lbs now. No back spasms. Gym membership started Jan 1st, super nice. All low impact machines. Therapeutic pool     Patient shared some negative experiences she had at [facility]. She is considering looking for a new PCP within [place] but wishes there was a [place] clinic closer to her home.     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   has diabetes     Donates platelets every 2 weeks on avg    Nutrition Prescription  Recommended energy/nutrient modification.    Nutrition Diagnosis  Food and nutrition related knowledge deficit r/t lack of prior exposure to nutrition education aeb pt report and interest in learning     Obesity r/t long history of positive energy balance aeb BMI >30.    Nutrition Intervention  Reviewed and modified previous goals  Answered pt questions  Coordination of care   Nutrition education - Plate method, types of vegetables, types of fat, label reading, dietary sources of iron, ways to incorporate PA ([employer] ami)  AVS and handouts via [employer]    Patient demonstrates understanding.    Expected Engagement: good    Nutrition Goals  1. Check in on how it s going with the gym   2. Continue with mindfulness and portion control (chewing, choices, pace,  etc)  3. Consider new PCP if desired     Keep up the hard work!    High-iron foods:  100% iron-fortified ready-to-eat cereal   cup, 18 mg  Grits, instant   cup, 7.1   Cream of wheat   cup, 5.2   Oatmeal, instant   cup, 5 mg  Soybeans, cooked   cup, 4.4 mg  White beans, canned   cup, 3.9 mg   Lentils   cup, 3.3 mg  White rice 1/3 cup, 3 mg  Spinach   cup cooked, 1 cup raw, 3 mg   Beef tenderloin 3 oz, 3 mg  Baked beans 1/3 cup, 3 mg  Vegetable or soy burger 1 sarah, 2.9 mg  Soy milk 1 cup (8 oz), 2.7 mg   Chickpeas   cup, 2.5 mg  Kidney beans   cup, 2.5 mg  Sardines 3 oz, 2.5 mg   Nuts: almonds or pistachios   cup, 1.3 mg   [place] sprouts, cooked   cup, 1 mg   Egg 1 whole, 1 mg    Some good sources on nut comparison:   https://www.25eight/sites/default/files/2020-04/nutrient_comparison_chart_for_tree_nuts_redesign.pdf     https://www.Datto/Images/nutrition/Tree_Nut%20Nutrient_Comparison_Chart.pdf        The Plate Method:  https://www.cdc.gov/diabetes/images/managing/Diabetes-Manage-Eat-Well-Plate-Graphic_600px.jpg     Starchy vegetables  ? Potatoes   ? Sweet potatoes  ? Green peas  ? Chickpeas   ? Corn  ? Lentils  ? Beans (black, domingo, etc)     Types of fats  https://www.[facility].[place].edu/nutritionsource/what-should-you-eat/fats-and-cholesterol/types-of-fat/      Good resources on inflammation    https://www.health.har[employer].edu/staying-healthy/foods-that-fight-inflammation     https://www.heart.org/en/healthy-living/healthy-eating/eat-smart/nutrition-basics/mediterranean-diet     https://[employer] Sem[employer]pt.org/traditional-diets/mediterranean-diet     https://my.[facility].org/health/articles/86303-nifuypvqqulxg-prnj     Good article on label reading   https://www.fda.gov/food/new-nutrition-facts-label/how-understand-and-use-nutrition-facts-label     Additional resources:     Protein Sources   http://[employer]/726382.pdf     Carbohydrates  http://fvfiles.[email or web address].pdf     Mindful  Eating  http://Enjoyor/953258.pdf     Summary of Volumetrics Eating Plan  http://fvfiles.[email or web address]     Follow-Up:  Friday, Feb 3rd at 8:30     Time spent with patient: 36 minutes.  AARON Tom, RD, LD

## 2023-01-06 NOTE — LETTER
"1/6/2023       RE: Paige Fajardo  [address]  [place] [address]     Dear Colleague,    Thank you for referring your patient, Paige Fajardo, to the [facility] [place] [facility]. Please see a copy of my visit note below.    Paige Fajardo is a 52 year old female who is being evaluated via a billable video visit.      The patient has been notified of following:     \"This video visit will be conducted via a call between you and your physician/provider. We have found that certain health care needs can be provided without the need for an in-person physical exam.  This service lets us provide the care you need with a video conversation.  If a prescription is necessary we can send it directly to your pharmacy.  If lab work is needed we can place an order for that and you can then stop by our lab to have the test done at a later time.    Video visits are billed at different rates depending on your insurance coverage.  Please reach out to your insurance provider with any questions.    If during the course of the call the physician/provider feels a video visit is not appropriate, you will not be charged for this service.\"    How would you like to obtain your AVS? MyChart  If the video visit is dropped, the invitation should be resent by: Text to cell phone:  [phone]  Will anyone else be joining your video visit? No      Video-Visit Details    Type of service:  Video Visit    Video Start Time: 10:04 am  Video End Time: 10:40 am    Originating Location (pt. Location): Home    Distant Location (provider location):  Offsite    Platform used for Video Visit: [employer]    During this virtual visit the patient is located in [place], patient verifies this as the location during the entirety of this visit.      Weight Management Nutrition Consultation    Paige Fajardo is a 52 year old female presents today for weight management nutrition " "consultation.  Patient referred by Dr. Reed on August 2, 2022.    Patient with Co-morbidities of obesity including:  Type II DM no  Renal Failure no  Sleep apnea yes  Hypertension no   Dyslipidemia no  Joint pain no  Back pain yes  GERD no     PMH:   Depression  Swelling  Fatigue  Gout   Asthma  Fibromyalgia   Covid at least 2x  ? Weight gain associated with prednisone      Hx of alcohol abuse 20 years ago    Anthropometrics:  Weight 8/2/22: 304 lbs with BMI 49.07    Estimated body mass index is 45.03 kg/m  as calculated from the following:    Height as of 12/27/22: 1.676 m (5' 6\").    Weight as of this encounter: 126.6 kg (279 lb).     Current weight: 279 lbs, pt report    Medications for Weight Loss:  Ozempic prescribed 8/2/22 - just got 2.0 mg dose 1/5/23    Supplements:  Vit D 3,000 IUs/day   Turmeric   B-complex  Calcium carbonate  Mg Citrate     Labs 6/20/22  ? CBC off (just getting over covid)     Labs 5/17/22  ? GFR 90 (will watch - likely dehydrated due to covid)    Labs 8/24/22  - Ferritin low end of normal at 25    Hx of high cholesterol     Use to donate platelets every 2 weeks typically, but not since May due to covid and low iron.    NUTRITION HISTORY    NKFA  Limits dairy - notices clogged sinuses and worsening asthma symptoms per pt.   Does use dairy alternatives.     Has not met with a RD before.    Pt goals: lose weight, learn about food choices (what is more nutritious), feel better    Typical day  B - cereal, fruit, protein (yogurt or egg or meat stick)  L - leftovers OR salad OR sandwich   D - varies - goes out Monday/Friday. If at home -  cooks.  Snack: nuts OR carrots OR popcorn OR more nutritious chip per pt    Fluids: 1-2 cups of coffee with non-dairy milk.     PA: limited due to back pain. Does relaxation yoga 1x/week  Starting PT    Oct 2022:  Started ozempic, going well. Not as hungry, earlier satiety. Helping with portion control.    Down 15 lbs since starting per pt. Mobility " improving    Eating 3 meals/day.  went back to school so she is now making dinners. Trying to follow plate method.     Recent recall  B - granola, oatmeal or cereal with berries (1/2 of what she use to be able to eat), coffee with unsweetened almond or oat milk   L - salad with meat, nuts or seeds and peas/sweet potatoes for carbohydrate (didn t realize it was a carb previously) OR leftovers   D - salmon or chicken breast with vegetables (broccoli, [place], salad) and carbs (quinoa, brown rice, couscous, potatoes)  Snacks - if hungry, cheese or meat stick, fruit, veggies (has lots of tomatoes from garden right now)    Using lots of fresh herbs and vegetables from her garden  Treats occ but not keeping as many in house.   Catered dinners at [employer] on Wednesdays - noticing they serve very large portions but have been serving more nutritious options (use to be pizza, now food from a local diner)    Looking at food differently - energy as opposed to as just food.     Switched from cooking with butter to olive oil.    Going to [employer] in December for 2 weeks- purposely didn t get an all inclusive to try to be healthier.     Fluids: water, la croix occ, 1-2 cups of coffee with non-dairy milk    PA: doing PT, feeling stronger    Dec 2022:  Nutrition going well per pt. Feels more energized.     Bad hip pain, was recommended steroids but refused (fearful of weight gain). Did a cortisone shot instead. Very immobile right now. PA limited. Plans to start yoga this month.    Leaves for [employer] trip next week. Plans to go to [employer] first day to buy fruit/vegetables. Reports they eat really well while there - lots of fresh food and fish/meats.     Running out of ozempic. Plans to call pharmacy today to try to get more. Side effects improving - still at 1 mg dose.     Trying to make better choices - side salad instead of fries. Splitting meals with  when out to eat.     Dinner last night: out to eat - turkey oscar melt      Previous goals:  1. Look into gym membership in [facility] - [facility], not able to go right now due to hip pain  2. Continue checking in on food as fuel - Met  3. Recommend anti-inflammatory foods - Met    Jan 2023:  Continues to have good portion control and mindful of choices. Feels this is going really well.     Did well with moderation at [place] - broke a bunch of cookies into pieces so she could try a little of them all     Trying to eat slower - helps with noticing satiety. Gets nausea if overeating. Using smaller bowls.    Trying to make homemade food as much as possible     BM: constipation, got worse when in [place]. Does well with fruits/vegetable intake and hydration. Tries to get probiotics in yogurt (non-dairy yogurt)  ? She is curious if related to ozempic. Seeing Ruthie later this month.    Having blood noses - seeing ENT next month. Got worse after scuba diving in [place]    PA: Finished PT - doing well. Can lift 150 lbs now. No back spasms. Gym membership started Jan 1st, super nice. All low impact machines. Therapeutic pool     Patient shared some negative experiences she had at [facility]. She is considering looking for a new PCP within [place] but wishes there was a [place] clinic closer to [place].     Additional information:  Works for [employer] Read [employer]  [facility] 3-4 days/week  2 hr drive when in office      No children     does cooking.   has diabetes     Donates platelets every 2 weeks on avg    Nutrition Prescription  Recommended energy/nutrient modification.    Nutrition Diagnosis  Food and nutrition related knowledge deficit r/t lack of prior exposure to nutrition education aeb pt report and interest in learning     Obesity r/t long history of positive energy balance aeb BMI >30.    Nutrition Intervention  Reviewed and modified previous goals  Answered pt questions  Coordination of care   Nutrition education - Plate method, types of vegetables, types of  fat, label reading, dietary sources of iron, ways to incorporate PA (fiton ami)  AVS and handouts via [employer]    Patient demonstrates understanding.    Expected Engagement: good    Nutrition Goals  1. Check in on how it s going with the gym   2. Continue with mindfulness and portion control (chewing, choices, pace, etc)  3. Consider new PCP if desired     Keep up the hard work!    High-iron foods:  100% iron-fortified ready-to-eat cereal   cup, 18 mg  Grits, instant   cup, 7.1   Cream of wheat   cup, 5.2   Oatmeal, instant   cup, 5 mg  Soybeans, cooked   cup, 4.4 mg  White beans, canned   cup, 3.9 mg   Lentils   cup, 3.3 mg  White rice 1/3 cup, 3 mg  Spinach   cup cooked, 1 cup raw, 3 mg   Beef tenderloin 3 oz, 3 mg  Baked beans 1/3 cup, 3 mg  Vegetable or soy burger 1 sarah, 2.9 mg  Soy milk 1 cup (8 oz), 2.7 mg   Chickpeas   cup, 2.5 mg  Kidney beans   cup, 2.5 mg  Sardines 3 oz, 2.5 mg   Nuts: almonds or pistachios   cup, 1.3 mg   [place] sprouts, cooked   cup, 1 mg   Egg 1 whole, 1 mg    Some good sources on nut comparison:   https://www.[employer]/sites/default/files/2020-04/nutrient_comparison_chart_for_tree_nuts_redesign.pdf     https://www.[employer]/Images/nutrition/Tree_Nut%20Nutrient_Comparison_Chart.pdf        The Plate Method:  https://www.cdc.gov/diabetes/images/managing/Diabetes-Manage-Eat-Well-Plate-Graphic_600px.jpg     Starchy vegetables  ? Potatoes   ? Sweet potatoes  ? Green peas  ? Chickpeas   ? Corn  ? Lentils  ? Beans (black, domingo, etc)     Types of fats  https://www.[facility].[place].edu/nutritionsource/what-should-you-eat/fats-and-cholesterol/types-of-fat/      Good resources on inflammation    https://www.health.[place].edu/staying-healthy/foods-that-fight-inflammation     https://www.heart.org/en/healthy-living/healthy-eating/eat-smart/nutrition-basics/mediterranean-diet     https://[employer].org/traditional-diets/mediterranean-diet      https://.[facility].org/health/articles/08108-umuhjhavrmmdr-zonb     Good article on label reading   https://www.fda.gov/food/new-nutrition-facts-label/how-understand-and-use-nutrition-facts-label     Additional resources:     Protein Sources   http://[employer]/484859.pdf     Carbohydrates  [email or web address]     Mindful Eating  http://[employer]/205426.pdf     Summary of Volumetrics Eating Plan  [email or web address]     Follow-Up:  Friday, Feb 3rd at 8:30     Time spent with patient: 36 minutes.  AARON Tom, RD, LD

## 2023-01-08 VITALS — BODY MASS INDEX: 45.03 KG/M2 | WEIGHT: 279 LBS

## 2023-01-08 NOTE — PATIENT INSTRUCTIONS
Nutrition Goals  1. Check in on how it s going with the gym   2. Continue with mindfulness and portion control (chewing, choices, pace, etc)  3. Consider new PCP if desired     Keep up the hard work!    High-iron foods:  100% iron-fortified ready-to-eat cereal   cup, 18 mg  Grits, instant   cup, 7.1   Cream of wheat   cup, 5.2   Oatmeal, instant   cup, 5 mg  Soybeans, cooked   cup, 4.4 mg  White beans, canned   cup, 3.9 mg   Lentils   cup, 3.3 mg  White rice 1/3 cup, 3 mg  Spinach   cup cooked, 1 cup raw, 3 mg   Beef tenderloin 3 oz, 3 mg  Baked beans 1/3 cup, 3 mg  Vegetable or soy burger 1 sarah, 2.9 mg  Soy milk 1 cup (8 oz), 2.7 mg   Chickpeas   cup, 2.5 mg  Kidney beans   cup, 2.5 mg  Sardines 3 oz, 2.5 mg   Nuts: almonds or pistachios   cup, 1.3 mg   [place] sprouts, cooked   cup, 1 mg   Egg 1 whole, 1 mg    Some good sources on nut comparison:   https://www.[employer]/sites/default/files/2020-04/nutrient_comparison_chart_for_tree_nuts_redesign.pdf     https://www.[employer]/Images/nutrition/Tree_Nut%20Nutrient_Comparison_Chart.pdf        The Plate Method:  https://www.cdc.gov/diabetes/images/managing/Diabetes-Manage-Eat-Well-Plate-Graphic_600px.jpg     Starchy vegetables  Potatoes   Sweet potatoes  Green peas  Chickpeas   Corn  Lentils  Beans (black, domingo, etc)     Types of fats  https://www.[facility].[place].edu/nutritionsource/what-should-you-eat/fats-and-cholesterol/types-of-fat/      Good resources on inflammation    https://www.health.[employer].edu/staying-healthy/foods-that-fight-inflammation     https://www.heart.org/en/healthy-living/healthy-eating/eat-smart/nutrition-basics/mediterranean-diet     https://oldwayspt.org/traditional-diets/mediterranean-diet     https://my.[facility].org/health/articles/44232-nufyzzkdcgrpi-lypi     Good article on label reading   https://www.fda.gov/food/new-nutrition-facts-label/how-understand-and-use-nutrition-facts-label     Additional resources:      Protein Sources   http://Medstory/978819.pdf     Carbohydrates  [email or web address]     Mindful Eating  http://[email or web address]     Summary of Volumetrics Eating Plan  [email or web address]     Follow-Up:  Friday, Feb 3rd at 8:30 am    AARON Dunne, RD, LD  Clinic #: [phone]

## 2023-01-25 ENCOUNTER — VIRTUAL VISIT (OUTPATIENT)
Dept: PHARMACY | Facility: CLINIC | Age: 53
End: 2023-01-25
Payer: COMMERCIAL

## 2023-01-25 DIAGNOSIS — E66.01 MORBID OBESITY (H): Primary | ICD-10-CM

## 2023-01-25 PROCEDURE — 99207 PR NO CHARGE LOS: CPT | Performed by: PHARMACIST

## 2023-01-25 NOTE — PROGRESS NOTES
"Disease State Management Encounter:                          Paige Fajardo is a 52 year old female contacted via secure video for a follow-up visit.  Today's visit is a follow-up visit from 10/2022. Insurance changed with 1/1/2023, Medication Therapy Management (MTM) not covered so completed consult.     Reason for visit: Ozempic increased dose follow up.     Medication Adherence/Access: no issues reported    Weight Managment  Ozempic 2 mg weekly     Followed by Dr. Derek Schwab, seen last 1/6/2023 for Return Medical Weight Management. Increased to Ozempic 2 mg weekly last week. Patient is experiencing the follow side effects: minor constipation, monitoring. No nausea unless overeats. History of significant weight gain from chronic prednisone. Now she has lost 50 lb.   Diet/Eating Habits: Patient reports eating 3 meals per day, portions are small. She reports she is more purposeful about getting in healthier meals due to the smaller quantity of food. When going out to eat, doesn't finish even half meal. She does feel she is getting enough nutrition.     Exercise/Activity: Patient reports she has joined a [employer] years where she works out with different activities, has been weight lifting, swimming (15 laps in pool per time), aqua-aerobics class, trying different fitness classes.     Wt Readings from Last 4 Encounters:   01/08/23 279 lb (126.6 kg)   12/27/22 280 lb (127 kg)   12/02/22 285 lb (129.3 kg)   10/20/22 292 lb (132.5 kg)     Estimated body mass index is 45.03 kg/m  as calculated from the following:    Height as of 12/27/22: 5' 6\" (1.676 m).    Weight as of 1/8/23: 279 lb (126.6 kg).    Assessment/Plan:    1. Continue Ozempic 2 mg weekly    Follow-up: March with MTM pharmacist if necessary, otherwise follow up with dietitian and Dr. Derek Schwab as planned.     I spent 30 minutes with this patient today. A copy of the visit note was provided to the patient's provider(s).    A summary " of these recommendations was declined by the patient.    Lauren Bloch, PharmD, BCACP   Medication Therapy Management Pharmacist   [facility]       Medication Therapy Recommendations  No medication therapy recommendations to display

## 2023-01-25 NOTE — PATIENT INSTRUCTIONS
Recommendations from today's disease management visit:                                                      1. Continue Ozempic 2 mg weekly    Follow-up: March with MTM pharmacist if necessary, otherwise follow up with dietitian and Dr. Derek Schwab as planned.     To schedule another MTM appointment, please call the clinic directly or you may call the MTM scheduling line at [phone] or toll-free at [phone].     My Clinical Pharmacist's contact information:                                                      Please feel free to contact me with any questions or concerns you have.      Lauren Bloch, PharmD, BCACP   Medication Therapy Management Pharmacist   [facility] [place]

## 2023-02-03 ENCOUNTER — VIRTUAL VISIT (OUTPATIENT)
Dept: ENDOCRINOLOGY | Facility: CLINIC | Age: 53
End: 2023-02-03
Payer: COMMERCIAL

## 2023-02-03 VITALS — WEIGHT: 274 LBS | BODY MASS INDEX: 44.22 KG/M2

## 2023-02-03 DIAGNOSIS — E66.9 OBESITY: ICD-10-CM

## 2023-02-03 DIAGNOSIS — Z71.3 NUTRITIONAL COUNSELING: Primary | ICD-10-CM

## 2023-02-03 PROCEDURE — 97803 MED NUTRITION INDIV SUBSEQ: CPT | Mod: GT | Performed by: DIETITIAN, REGISTERED

## 2023-02-03 NOTE — PROGRESS NOTES
"Paige Fajardo is a 52 year old female who is being evaluated via a billable video visit.      The patient has been notified of following:     \"This video visit will be conducted via a call between you and your physician/provider. We have found that certain health care needs can be provided without the need for an in-person physical exam.  This service lets us provide the care you need with a video conversation.  If a prescription is necessary we can send it directly to your pharmacy.  If lab work is needed we can place an order for that and you can then stop by our lab to have the test done at a later time.    Video visits are billed at different rates depending on your insurance coverage.  Please reach out to your insurance provider with any questions.    If during the course of the call the physician/provider feels a video visit is not appropriate, you will not be charged for this service.\"    How would you like to obtain your AVS? MyChart  If the video visit is dropped, the invitation should be resent by: Text to cell phone:  [phone]  Will anyone else be joining your video visit? No      Video-Visit Details    Type of service:  Video Visit    Video Start Time: 8:30 am  Video End Time: 9:05 am    Originating Location (pt. Location): Home    Distant Location (provider location):  Offsite    Platform used for Video Visit: Trusera    During this virtual visit the patient is located in [place], patient verifies this as the location during the entirety of this visit.      Weight Management Nutrition Consultation    Paige Fajardo is a 52 year old female presents today for weight management nutrition consultation.  Patient referred by Dr. Reed on August 2, 2022.    Patient with Co-morbidities of obesity including:  Type II DM no  Renal Failure no  Sleep apnea yes  Hypertension no   Dyslipidemia no  Joint pain no  Back pain yes  GERD no     PMH:   Depression  Swelling  Fatigue  Gout   Asthma  Fibromyalgia   Covid at " "least 2x  ? Weight gain associated with prednisone (was on for covid and gout)      Hx of alcohol abuse 20 years ago    Anthropometrics:  Highest weight per pt: 338 lbs  Weight 8/2/22: 304 lbs with BMI 49.07    Estimated body mass index is 44.22 kg/m  as calculated from the following:    Height as of 12/27/22: 1.676 m (5' 6\").    Weight as of this encounter: 124.3 kg (274 lb).     Current weight: 274 lbs, pt report  - Down 4 sizes now per pt  - Feeling a lot better physically/pain wise  - Down 30 lbs (9.9%) since starting with us  - Down 64 lbs (23.4%)     Medications for Weight Loss:  Ozempic prescribed 8/2/22 - just got 2.0 mg dose 1/5/23    Supplements:  Vit D 3,000 IUs/day   Turmeric   B-complex  Calcium carbonate  Mg Citrate     Labs 6/20/22  ? CBC off (just getting over covid)      Labs 5/17/22  ? GFR 90 (will watch - likely dehydrated due to covid)    Labs 8/24/22  - Ferritin low end of normal at 25  -GFR 90  -Alk phos low    Hx of high cholesterol     Use to donate platelets every 2 weeks typically, but not since May due to covid and low iron.    NUTRITION HISTORY    NKFA  Limits dairy - notices clogged sinuses and worsening asthma symptoms per pt.   Does use dairy alternatives.     Has not met with a RD before.    Pt goals: lose weight, learn about food choices (what is more nutritious), feel better    Typical day  B - cereal, fruit, protein (yogurt or egg or meat stick)  L - leftovers OR salad OR sandwich   D - varies - goes out Monday/Friday. If at home -  cooks.  Snack: nuts OR carrots OR popcorn OR more nutritious chip per pt    Fluids: 1-2 cups of coffee with non-dairy milk.     PA: limited due to back pain. Does relaxation yoga 1x/week  Starting PT    Oct 2022:  Started ozempic, going well. Not as hungry, earlier satiety. Helping with portion control.    Down 15 lbs since starting per pt. Mobility improving    Eating 3 meals/day.  went back to school so she is now making dinners. Trying " to follow plate method.     Recent recall  B - granola, oatmeal or cereal with berries (1/2 of what she use to be able to eat), coffee with unsweetened almond or oat milk   L - salad with meat, nuts or seeds and peas/sweet potatoes for carbohydrate (didn t realize it was a carb previously) OR leftovers   D - salmon or chicken breast with vegetables (broccoli, [place], salad) and carbs (quinoa, brown rice, couscous, potatoes)  Snacks - if hungry, cheese or meat stick, fruit, veggies (has lots of tomatoes from garden right now)    Using lots of fresh herbs and vegetables from her garden  Treats occ but not keeping as many in house.   Catered dinners at [facility] on Wednesdays - noticing they serve very large portions but have been serving more nutritious options (use to be pizza, now food from a local diner)    Looking at food differently - energy as opposed to as just food.     Switched from cooking with butter to olive oil.    Going to [place] in December for 2 weeks- purposely didn t get an all inclusive to try to be healthier.     Fluids: water, la croix occ, 1-2 cups of coffee with non-dairy milk    PA: doing PT, feeling stronger    Dec 2022:  Nutrition going well per pt. Feels more energized.     Bad hip pain, was recommended steroids but refused (fearful of weight gain). Did a cortisone shot instead. Very immobile right now. PA limited. Plans to start yoga this month.    Leaves for [place] trip next week. [place] to [place] [employer] to [place] fruit/vegetables. Reports they eat really well while there - lots of fresh food and fish/meats.     Running out of ozempic. Plans to call pharmacy today to try to get more. Side effects improving - still at 1 mg dose.     Trying to make better choices - side salad instead of fries. Splitting meals with  when out to eat.     Dinner last night: out to eat - turkey oscar melt     Jan 2023:  Continues to have good portion control and mindful of choices. Feels this is going  really well.     Did well with moderation at [place] - broke a bunch of cookies into pieces so she could try a little of them all     Trying to eat slower - helps with noticing satiety. Gets nausea if overeating. Using smaller bowls.    Trying to make homemade food as much as possible     BM: constipation, got worse when in [place]. Does well with fruits/vegetable intake and hydration. Tries to get probiotics in yogurt (non-dairy yogurt)  ? She is curious if related to ozempic. Seeing Ruthie later this month.    Having blood noses - seeing ENT next month. Got worse after scuba diving in [place]    PA: Finished PT - doing well. Can lift 150 lbs now. No back spasms. Gym membership started Jan 1st, super nice. All low impact machines. Therapeutic pool     Patient shared some negative experiences she had at [facility]. She is considering looking for a new PCP within [place] but wishes there was a [place] clinic closer to her home.     Feb 2023:  Things going well overall.     Continues with mindfulness with choices due to small portions. Trying to ensure she is meeting her needs.    Does not tolerate foods that expand as well (pasta, bread, rice, etc). Trying to avoid or do smaller portions.    PA: continues with gym - doing water aerobics 3x/week.  - Good accountability, they call if people miss class to make sure you are okay    Barriers: weather (cold makes it harder for her to breath)  - humidity in pool at gym is helpful but getting there is hard    Stress/mental health good overall per pt. Work busier this time of year and manager is leaving but she is doing well. Doing well with self care - massages, gym, etc.    Previous goals:  1. Check in on how it s going with the gym - Met, going really well  2. Continue with mindfulness and portion control (chewing, choices, pace, etc) - Discussed. Does not think about chewing/pace a lot. Being intentional about taking a lunch break when on site. When at home, eats at desk  but takes her time.  3. 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   has diabetes     Donates platelets every 2 weeks on avg    Nutrition Prescription  Recommended energy/nutrient modification.    Nutrition Diagnosis  Food and nutrition related knowledge deficit r/t lack of prior exposure to nutrition education aeb pt report and interest in learning     Obesity r/t long history of positive energy balance aeb BMI >30.    Nutrition Intervention  Reviewed and modified previous goals  Answered pt questions  Coordination of care   Nutrition education - Plate method, types of vegetables, types of fat, label reading, dietary sources of iron, ways to incorporate PA ([employer] ami)  AVS and handouts via [employer]    Patient demonstrates understanding.    Expected Engagement: good    Nutrition Goals  1. Continue with activity as able/tolerated (gym, hiking/walking when weather gets nice, 5k trail run if able)   2. Consider couch to 5k application   3. Continue with mindfulness and portion control (chewing, choices, pace, etc)  4. Check in with doctors when able/desired on Vitamin D status, iron status and re-checking GFR     Keep up the hard work!    Long-term goal: 5k     Coping: reading, planning garden, sewing/quilting     High-iron foods:  100% iron-fortified ready-to-eat cereal   cup, 18 mg  Grits, instant   cup, 7.1   Cream of wheat   cup, 5.2   Oatmeal, instant   cup, 5 mg  Soybeans, cooked   cup, 4.4 mg  White beans, canned   cup, 3.9 mg   Lentils   cup, 3.3 mg  White rice 1/3 cup, 3 mg  Spinach   cup cooked, 1 cup raw, 3 mg   Beef tenderloin 3 oz, 3 mg  Baked beans 1/3 cup, 3 mg  Vegetable or soy burger 1 sarah, 2.9 mg  Soy milk 1 cup (8 oz), 2.7 mg   Chickpeas   cup, 2.5 mg  Kidney beans   cup, 2.5 mg  Sardines 3 oz, 2.5 mg   Nuts: almonds or  pistachios   cup, 1.3 mg   [place] sprouts, cooked   cup, 1 mg   Egg 1 whole, 1 mg    Some good sources on nut comparison:   https://www.[employer]/sites/default/files/2020-04/nutrient_comparison_chart_for_tree_nuts_redesign.pdf     https://www.Affinity/Images/nutrition/Tree_Nut%20Nutrient_Comparison_Chart.pdf        The Plate Method:  https://www.cdc.gov/diabetes/images/managing/Diabetes-Manage-Eat-Well-Plate-Graphic_600px.jpg     Starchy vegetables  ? Potatoes   ? Sweet potatoes  ? Green peas  ? Chickpeas   ? Corn  ? Lentils  ? Beans (black, domingo, etc)     Types of fats  https://www.[facility].[place].edu/nutritionsource/what-should-you-eat/fats-and-cholesterol/types-of-fat/      Good resources on inflammation    https://www.health.[employer].edu/staying-healthy/foods-that-fight-inflammation     https://www.heart.org/en/healthy-living/healthy-eating/eat-smart/nutrition-basics/mediterranean-diet     https://oldwayspt.org/traditional-diets/mediterranean-diet     https://my.[facility].org/health/articles/61158-drbfruirtrhuc-tict     Good article on label reading   https://www.fda.gov/food/new-nutrition-facts-label/how-understand-and-use-nutrition-facts-label     Additional resources:     Protein Sources   [email or web address]     Carbohydrates  [email or web address]     Mindful Eating  [email or web address]     Summary of Volumetrics Eating Plan  [email or web address]     Follow-Up:  Friday, April 14th at 8:30 am    Time spent with patient: 35 minutes.  Christina Pelaez, ADRIENNED, RD, LD    *Patient not sure on insurance coverage changes with [email or web address] insurance changing. Was informed of changes

## 2023-02-03 NOTE — LETTER
"2/3/2023       RE: Paige Fajardo  [address]  [place] [address]     Dear Colleague,    Thank you for referring your patient, Paige Fajardo, to the [facility] [place] at [facility]. Please see a copy of my visit note below.    Paige Fajardo is a 52 year old female who is being evaluated via a billable video visit.      The patient has been notified of following:     \"This video visit will be conducted via a call between you and your physician/provider. We have found that certain health care needs can be provided without the need for an in-person physical exam.  This service lets us provide the care you need with a video conversation.  If a prescription is necessary we can send it directly to your pharmacy.  If lab work is needed we can place an order for that and you can then stop by our lab to have the test done at a later time.    Video visits are billed at different rates depending on your insurance coverage.  Please reach out to your insurance provider with any questions.    If during the course of the call the physician/provider feels a video visit is not appropriate, you will not be charged for this service.\"    How would you like to obtain your AVS? MyChart  If the video visit is dropped, the invitation should be resent by: Text to cell phone:  [phone]  Will anyone else be joining your video visit? No      Video-Visit Details    Type of service:  Video Visit    Video Start Time: 8:30 am  Video End Time: 9:05 am    Originating Location (pt. Location): Home    Distant Location (provider location):  Offsite    Platform used for Video Visit: Serometrix    During this virtual visit the patient is located in [place], patient verifies this as the location during the entirety of this visit.      Weight Management Nutrition Consultation    Paige Fajardo is a 52 year old female presents today for weight management nutrition " "consultation.  Patient referred by Dr. Reed on August 2, 2022.    Patient with Co-morbidities of obesity including:  Type II DM no  Renal Failure no  Sleep apnea yes  Hypertension no   Dyslipidemia no  Joint pain no  Back pain yes  GERD no     PMH:   Depression  Swelling  Fatigue  Gout   Asthma  Fibromyalgia   Covid at least 2x  ? Weight gain associated with prednisone (was on for covid and gout)      Hx of alcohol abuse 20 years ago    Anthropometrics:  Highest weight per pt: 338 lbs  Weight 8/2/22: 304 lbs with BMI 49.07    Estimated body mass index is 44.22 kg/m  as calculated from the following:    Height as of 12/27/22: 1.676 m (5' 6\").    Weight as of this encounter: 124.3 kg (274 lb).     Current weight: 274 lbs, pt report  - Down 4 sizes now per pt  - Feeling a lot better physically/pain wise  - Down 30 lbs (9.9%) since starting with us  - Down 64 lbs (23.4%)     Medications for Weight Loss:  Ozempic prescribed 8/2/22 - just got 2.0 mg dose 1/5/23    Supplements:  Vit D 3,000 IUs/day   Turmeric   B-complex  Calcium carbonate  Mg Citrate     Labs 6/20/22  ? CBC off (just getting over covid)      Labs 5/17/22  ? GFR 90 (will watch - likely dehydrated due to covid)    Labs 8/24/22  - Ferritin low end of normal at 25  -GFR 90  -Alk phos low    Hx of high cholesterol     Use to donate platelets every 2 weeks typically, but not since May due to covid and low iron.    NUTRITION HISTORY    NKFA  Limits dairy - notices clogged sinuses and worsening asthma symptoms per pt.   Does use dairy alternatives.     Has not met with a RD before.    Pt goals: lose weight, learn about food choices (what is more nutritious), feel better    Typical day  B - cereal, fruit, protein (yogurt or egg or meat stick)  L - leftovers OR salad OR sandwich   D - varies - goes out Monday/Friday. If at home -  cooks.  Snack: nuts OR carrots OR popcorn OR more nutritious chip per pt    Fluids: 1-2 cups of coffee with non-dairy milk. "     PA: limited due to back pain. Does relaxation yoga 1x/week  Starting PT    Oct 2022:  Started ozempic, going well. Not as hungry, earlier satiety. Helping with portion control.    Down 15 lbs since starting per pt. Mobility improving    Eating 3 meals/day.  went back to school so she is now making dinners. Trying to follow plate method.     Recent recall  B - granola, oatmeal or cereal with berries (1/2 of what she use to be able to eat), coffee with unsweetened almond or oat milk   L - salad with meat, nuts or seeds and peas/sweet potatoes for carbohydrate (didn t realize it was a carb previously) OR leftovers   D - salmon or chicken breast with vegetables (broccoli, [place], salad) and carbs (quinoa, brown rice, couscous, potatoes)  Snacks - if hungry, cheese or meat stick, fruit, veggies (has lots of tomatoes from garden right now)    Using lots of fresh herbs and vegetables from her garden  Treats occ but not keeping as many in house.   Catered dinners at [employer] on Wednesdays - noticing they serve very large portions but have been serving more nutritious options (use to be pizza, now food from a local diner)    Looking at food differently - energy as opposed to as just food.     Switched from cooking with butter to olive oil.    Going to [place] in December for 2 weeks- purposely didn t get an all inclusive to try to be healthier.     Fluids: water, la croix occ, 1-2 cups of coffee with non-dairy milk    PA: doing PT, feeling stronger    Dec 2022:  Nutrition going well per pt. Feels more energized.     Bad hip pain, was recommended steroids but refused (fearful of weight gain). Did a cortisone shot instead. Very immobile right now. PA limited. Plans to start yoga this month.    Leaves for [place] trip next week. [place] to [place] [employer] to buy fruit/vegetables. Reports they eat really well while there - lots of fresh food and fish/meats.     Running out of ozempic. Plans to call pharmacy today  to try to get more. Side effects improving - still at 1 mg dose.     Trying to make better choices - side salad instead of fries. Splitting meals with  when out to eat.     Dinner last night: out to eat - turkey oscar melt     Jan 2023:  Continues to have good portion control and mindful of choices. Feels this is going really well.     Did well with moderation at [place] - broke a bunch of cookies into pieces so she could try a little of them all     Trying to eat slower - helps with noticing satiety. Gets nausea if overeating. Using smaller bowls.    Trying to make homemade food as much as possible     BM: constipation, got worse when in [place]. Does well with fruits/vegetable intake and hydration. Tries to get probiotics in yogurt (non-dairy yogurt)  ? She is curious if related to ozempic. Seeing Ruthie later this month.    Having blood noses - seeing ENT next month. Got worse after scuba diving in [place]    PA: Finished PT - doing well. Can lift 150 lbs now. No back spasms. Gym membership started Jan 1st, super nice. All low impact machines. Therapeutic pool     Patient shared some negative experiences she had at [facility]. She is considering looking for a new PCP within [place] but wishes there was a [place] clinic closer to her home.     Feb 2023:  Things going well overall.     Continues with mindfulness with choices due to small portions. Trying to ensure she is meeting her needs.    Does not tolerate foods that expand as well (pasta, bread, rice, etc). Trying to avoid or do smaller portions.    PA: continues with gym - doing water aerobics 3x/week.  - Good accountability, they call if people miss class to make sure you are okay    Barriers: weather (cold makes it harder for her to breath)  - humidity in pool at gym is helpful but getting there is hard    Stress/mental health good overall per pt. Work busier this time of year and manager is leaving but she is doing well. Doing well with self care -  massages, gym, etc.    Previous goals:  1. Check in on how it s going with the gym - Met, going really well  2. Continue with mindfulness and portion control (chewing, choices, pace, etc) - Discussed. Does not think about chewing/pace a lot. Being intentional about taking a lunch break when on site. When at home, eats at desk but takes her time.  3. 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   has diabetes     Donates platelets every 2 weeks on avg    Nutrition Prescription  Recommended energy/nutrient modification.    Nutrition Diagnosis  Food and nutrition related knowledge deficit r/t lack of prior exposure to nutrition education aeb pt report and interest in learning     Obesity r/t long history of positive energy balance aeb BMI >30.    Nutrition Intervention  Reviewed and modified previous goals  Answered pt questions  Coordination of care   Nutrition education - Plate method, types of vegetables, types of fat, label reading, dietary sources of iron, ways to incorporate PA ([employer] ami)  AVS and handouts via [employer]    Patient demonstrates understanding.    Expected Engagement: good    Nutrition Goals  1. Continue with activity as able/tolerated (gym, hiking/walking when weather gets nice, 5k trail run if able)   2. Consider couch to 5k application   3. Continue with mindfulness and portion control (chewing, choices, pace, etc)  4. Check in with doctors when able/desired on Vitamin D status, iron status and re-checking GFR     Keep up the hard work!    Long-term goal: 5k     Coping: reading, planning garden, sewing/quilting     High-iron foods:  100% iron-fortified ready-to-eat cereal   cup, 18 mg  Grits, instant   cup, 7.1   Cream of wheat   cup, 5.2   Oatmeal, instant   cup, 5 mg  Soybeans, cooked   cup, 4.4 mg  White beans, canned    cup, 3.9 mg   Lentils   cup, 3.3 mg  White rice 1/3 cup, 3 mg  Spinach   cup cooked, 1 cup raw, 3 mg   Beef tenderloin 3 oz, 3 mg  Baked beans 1/3 cup, 3 mg  Vegetable or soy burger 1 sarah, 2.9 mg  Soy milk 1 cup (8 oz), 2.7 mg   Chickpeas   cup, 2.5 mg  Kidney beans   cup, 2.5 mg  Sardines 3 oz, 2.5 mg   Nuts: almonds or pistachios   cup, 1.3 mg   [place] sprouts, cooked   cup, 1 mg   Egg 1 whole, 1 mg    Some good sources on nut comparison:   https://www.[employer]/sites/default/files/2020-04/nutrient_comparison_chart_for_tree_nuts_redesign.pdf     https://www.[employer]/Images/nutrition/Tree_Nut%20Nutrient_Comparison_Chart.pdf        The Plate Method:  https://www.cdc.gov/diabetes/images/managing/Diabetes-Manage-Eat-Well-Plate-Graphic_600px.jpg     Starchy vegetables  ? Potatoes   ? Sweet potatoes  ? Green peas  ? Chickpeas   ? Corn  ? Lentils  ? Beans (black, domingo, etc)     Types of fats  https://www.[facility].[place].edu/nutritionsource/what-should-you-eat/fats-and-cholesterol/types-of-fat/      Good resources on inflammation    https://www.health.[place].edu/staying-healthy/foods-that-fight-inflammation     https://www.heart.org/en/healthy-living/healthy-eating/eat-smart/nutrition-basics/mediterranean-diet     https://oldwayspt.org/traditional-diets/mediterranean-diet     https://my.[facility].org/health/articles/67500-atavghqlnnyxr-gnok     Good article on label reading   https://www.fda.gov/food/new-nutrition-facts-label/how-understand-and-use-nutrition-facts-label     Additional resources:     Protein Sources   http://Months Of Me/759530.pdf     Carbohydrates  [email or web address]     Mindful Eating  http://Months Of Me/420423.pdf     Summary of Volumetrics Eating Plan  [email or web address]     Follow-Up:  Friday, April 14th at 8:30 am    Time spent with patient: 35 minutes.  AARON Tom, RD, LD    *Patient not sure on insurance coverage changes with [facility]  changing. Was informed of changes

## 2023-02-03 NOTE — PATIENT INSTRUCTIONS
Nutrition Goals  1. Continue with activity as able/tolerated (gym, hiking/walking when weather gets nice, 5k trail run if able)   2. Consider couch to 5k application   3. Continue with mindfulness and portion control (chewing, choices, pace, etc)  4. Check in with doctors when able/desired on Vitamin D status, iron status and re-checking GFR     Keep up the hard work!    Long-term goal: 5k     Coping: reading, planning garden, sewing/quilting     High-iron foods:  100% iron-fortified ready-to-eat cereal   cup, 18 mg  Grits, instant   cup, 7.1   Cream of wheat   cup, 5.2   Oatmeal, instant   cup, 5 mg  Soybeans, cooked   cup, 4.4 mg  White beans, canned   cup, 3.9 mg   Lentils   cup, 3.3 mg  White rice 1/3 cup, 3 mg  Spinach   cup cooked, 1 cup raw, 3 mg   Beef tenderloin 3 oz, 3 mg  Baked beans 1/3 cup, 3 mg  Vegetable or soy burger 1 sarah, 2.9 mg  Soy milk 1 cup (8 oz), 2.7 mg   Chickpeas   cup, 2.5 mg  Kidney beans   cup, 2.5 mg  Sardines 3 oz, 2.5 mg   Nuts: almonds or pistachios   cup, 1.3 mg   [place] sprouts, cooked   cup, 1 mg   Egg 1 whole, 1 mg    Some good sources on nut comparison:   https://www.[employer]GO[employer]nis/sites/default/files/2020-04/nutrient_comparison_chart_for_tree_nuts_redesign.pdf     https://www.PDV/Images/nutrition/Tree_Nut%20Nutrient_Comparison_Chart.pdf        The Plate Method:  https://www.cdc.gov/diabetes/images/managing/Diabetes-Manage-Eat-Well-Plate-Graphic_600px.jpg     Starchy vegetables  Potatoes   Sweet potatoes  Green peas  Chickpeas   Corn  Lentils  Beans (black, domingo, etc)     Types of fats  https://www.[facility].[employer].edu/nutritionsource/what-should-you-eat/fats-and-cholesterol/types-of-fat/      Good resources on inflammation    https://www.health.[employer].edu/staying-healthy/foods-that-fight-inflammation     https://www.heart.org/en/healthy-living/healthy-eating/eat-smart/nutrition-basics/mediterranean-diet      https://oldAmeristreampt.org/traditional-diets/mediterranean-diet     https://my.[facility].org/health/articles/94729-ffofvwlvbsfuk-ecou     Good article on label reading   https://www.fda.gov/food/new-nutrition-facts-label/how-understand-and-use-nutrition-facts-label     Additional resources:     Protein Sources   [email or web address]://[email or web address]     Carbohydrates  http://[email or web address]     Mindful Eating  [email or web address]     Summary of Volumetrics Eating Plan  [email or web address]     Follow-Up:  Friday, April 14th at 8:30 am    AARON Dunne, RD, LD  Clinic #: [phone]

## 2023-02-28 ENCOUNTER — ANCILLARY PROCEDURE (OUTPATIENT)
Dept: MAMMOGRAPHY | Facility: CLINIC | Age: 53
End: 2023-02-28
Attending: FAMILY MEDICINE
Payer: COMMERCIAL

## 2023-02-28 DIAGNOSIS — Z12.31 VISIT FOR SCREENING MAMMOGRAM: ICD-10-CM

## 2023-02-28 PROCEDURE — 77067 SCR MAMMO BI INCL CAD: CPT | Performed by: RADIOLOGY

## 2023-02-28 PROCEDURE — 77063 BREAST TOMOSYNTHESIS BI: CPT | Performed by: RADIOLOGY

## 2023-03-04 NOTE — PROGRESS NOTES
Chief Complaint - Epistaxis    History of Present Illness - Paige Fajardo is a 52 year old female presents with approximately moonths of epistaxis. The history is provided by the patient. It has worsened since December when she was diving. She wears a CPAP and has allergies. It occurs on the right side. It usually only comes out the front of the nose, but it has ran down the back of the throat at times. The patient has never gone to the emergency department or required a blood transfusion due to nose bleeding. The patient has no personal or family history of bleeding disorders. Her blood pressure seems under control. The patient takes no blood-thinning medication. The patient has tried humidification.     Tests personally reviewed today for this visit:   1.) CBC 10/24/22 showed WBC 3.5, otherwise normal  2.) iron studies normal, ferritin elevated on 12/24/22  3.) CMP 8/24/22 normal    Past Medical History -   Patient Active Problem List   Diagnosis     Anxiety state     Insomnia     Allergic rhinitis     Dysmenorrhea     Cystic Fibrosis Screening negative     Mild major depression (H)     Fibromyalgia     Patellofemoral disorder     Family history of aneurysm     Migraine     Obesity     Grieving     Insomnia     History of alcoholism (H)     Morbid obesity (H)     Moderate persistent asthma     DMITRIY (obstructive sleep apnea)     IUD (intrauterine device) in place     Trochanteric bursitis of left hip     Posterior tibial tendinitis of right lower extremity       Current Medications -   Current Outpatient Medications:      albuterol (PROAIR HFA/PROVENTIL HFA/VENTOLIN HFA) 108 (90 Base) MCG/ACT inhaler, Inhale 2 puffs into the lungs every 6 hours as needed for shortness of breath / dyspnea or wheezing, Disp: 18 g, Rfl: 3     B Complex Vitamins (VITAMIN B COMPLEX PO), 3 drops per day, Disp: , Rfl:      beclomethasone HFA (QVAR REDIHALER) 80 MCG/ACT inhaler, Inhale 2 puffs into the lungs 2 times daily (Patient taking  differently: Inhale 2 puffs into the lungs as needed), Disp: 10.6 g, Rfl: 11     benzonatate (TESSALON) 200 MG capsule, Take 1 capsule (200 mg) by mouth 3 times daily as needed for cough (Patient not taking: Reported on 10/28/2022), Disp: 20 capsule, Rfl: 0     CALCIUM CARBONATE PO, Take by mouth daily 5 drops per day (Patient not taking: Reported on 10/28/2022), Disp: , Rfl:      cetirizine-psuedoePHEDrine (ZYRTEC-D) 5-120 MG per tablet, Take 1 tablet by mouth 2 times daily, Disp: 90 tablet, Rfl: 3     cholecalciferol (VITAMIN D) 1000 UNIT tablet, 3 drops per day, Disp: , Rfl:      colchicine (COLCYRS) 0.6 MG tablet, Take 2 tablets orally at onset of symptoms and one more tablet 1 hour later, and then one tablet twice a day until flare resolves., Disp: , Rfl:      COMPOUNDED NON-CONTROLLED SUBSTANCE (CMPD RX) - PHARMACY TO MIX COMPOUNDED MEDICATION, LOW DOSE NALTREXONE 6MG one tablet qhs, Disp: 90 capsule, Rfl: 4     FLUoxetine (PROZAC) 20 MG capsule, Take 20 mg by mouth, Disp: , Rfl:      gabapentin (NEURONTIN) 300 MG capsule, Take 2 capsules  (600mg) in the morning and 2 capsules (600mg) at bedtime. 90day supply (Patient taking differently: Take 300 mg by mouth 2 times daily), Disp: 360 capsule, Rfl: 1     ipratropium - albuterol 0.5 mg/2.5 mg/3 mL (DUONEB) 0.5-2.5 (3) MG/3ML neb solution, Take 1 vial (3 mLs) by nebulization every 6 hours as needed for shortness of breath / dyspnea or wheezing, Disp: 960 mL, Rfl: 4     levonorgestrel (MIRENA) 20 MCG/24HR IUD, 1 each by Intrauterine route once, Disp: , Rfl:      MAGNESIUM CITRATE PO, 3 drops per day, Disp: , Rfl:      Naproxen Sodium (ALEVE PO), Take 220 mg by mouth 2 times daily as needed , Disp: , Rfl:      order for DME, Equipment being ordered: Nebulizer, Disp: 1 Device, Rfl: 0     rizatriptan (MAXALT-MLT) 10 MG ODT tab, Take 1 tablet (10 mg) by mouth at onset of headache for migraine May repeat in 2 hours. Max 3 tablets/24 hours., Disp: 18 tablet, Rfl: 3      Semaglutide, 2 MG/DOSE, (OZEMPIC, 2 MG/DOSE,) 8 MG/3ML SOPN, Inject 2 mg Subcutaneous once a week, Disp: 9 mL, Rfl: 3     Turmeric 450 MG CAPS, 1 scoop per day, Disp: , Rfl:     Allergies -   Allergies   Allergen Reactions     Amoxicillin      Other reaction(s): Stomach Upset     Augmentin      reacted to Augmentin--  stomach upset -- can take penicillin     Codeine Swelling     Dairy Products [Milk Protein Extract] Difficulty breathing     Products with milk cause congestion, sinus mucous, difficulty breathing       Social History -   Social History     Socioeconomic History     Marital status:      Number of children: 0   Occupational History     Occupation: [employer]     Employer: [employer]     Comment: [employer]    Tobacco Use     Smoking status: Never     Smokeless tobacco: Never   Substance and Sexual Activity     Alcohol use: No     Alcohol/week: 0.0 standard drinks     Comment: recovering     Drug use: No     Sexual activity: Yes     Partners: Male     Birth control/protection: I.U.D.   Other Topics Concern     Blood Transfusions No     Exercise Yes     Comment: walk three days/week     Seat Belt Yes     Self-Exams Yes     Parent/sibling w/ CABG, MI or angioplasty before 65F 55M? No   Social History Narrative    Living arrangements - the patient lives alone.       Family History -   Family History   Problem Relation Age of Onset     Neurologic Disorder Mother         ruptured cerebral aneurysm age 60      Hypertension Mother      Depression/Anxiety Mother      Cerebrovascular Disease Mother      Obesity Mother      Macular Degeneration Mother      Musculoskeletal Disorder Father         OA      Gastrointestinal Disease Father         colon polyps age 60     Other Cancer Father         Brain Cancer-neuroblastoma     Depression/Anxiety Father      Obesity Father      Cancer Father      Gynecology Sister         Rachel. irregular menses.  pre-eclampsia     Aneurysm Sister         cerebral      Heart Disease Paternal Grandfather         Multiple MI's (first MI age 60)     Diabetes Paternal Grandfather         IDDM dx age 60  Severe/brittle/complications     Coronary Artery Disease Paternal Grandfather      Depression/Anxiety Paternal Grandfather      Obesity Paternal Grandfather      Gynecology Paternal Grandmother          of uterine CA in her 40's     Ovarian Cancer Paternal Grandmother          at age 40 from cancer     Cancer Paternal Grandmother      Eye Disorder Maternal Grandmother         glacucoma     Diabetes Maternal Grandmother         IDDM-onset age 70     Obesity Maternal Grandmother      Macular Degeneration Maternal Grandmother      Cancer Maternal Grandfather      Gynecology Paternal Aunt         uterine CA diagnosed @ 40yrs       Physical Exam  /76   Pulse 75   Resp 18   SpO2 98%   General - The patient is in no distress.  Alert, answers questions and cooperates with examination appropriately.   Voice and Breathing - The patient was breathing comfortably without the use of accessory muscles. There was no wheezing, stridor, or stertor.  The patients voice was clear and strong, with no dysphonia.  Eyes - Extraocular movements intact. Sclera were not icteric or injected, conjunctiva were pink and moist.  Neurologic - Cranial nerves II-XII are grossly intact. Specifically, the facial nerve is intact, House-Brackmann grade 1 of 6.   Nose - No significant external deformity. The nasal mucosa along the anterior septum on the right side shows crusting and dried blood. It is dry. There are 2 prominent blood vessels superficially located. She has a deviated septum that is bent anteriorly on the right and this spot the cartilage is prominent and the mucosa has worn thin. Turbinates are of normal size and position.  No polyps, masses, or purulence.    Procedure - I sprayed the right anterior nasal cavity and septum with phenylephrine and lidocaine. I applied 2 sticks of silver  nitrate to the prominent blood vessels along the anterior septum. Hemostasis was achieved. I applied bacitracin ointment to the wound with a q-tip.    A/P - Paige Fajardo is a 52 year old female with epistaxis. This is almost certainly coming from the right anterior septum. I cauterized this today. We discussed restrictions on manipulation and picking of the nose. I explained using aquaphor 2-3 times daily to the anterior septum. The patient can also use nasal saline spray 3-5 times per day, and a humidifier at the bedside at night.    I also explained applying digital pressure to the nasal tip for a minimum of 15-20 minutes to stop a nose bleed. If that doesn't stop the bleeding the patient needs to proceed to the nearest emergency department or return to ENT clinic.     Stalin Briggs MD  Otolaryngology  [facility]

## 2023-03-07 ENCOUNTER — OFFICE VISIT (OUTPATIENT)
Dept: OTOLARYNGOLOGY | Facility: CLINIC | Age: 53
End: 2023-03-07
Payer: COMMERCIAL

## 2023-03-07 VITALS
DIASTOLIC BLOOD PRESSURE: 76 MMHG | HEART RATE: 75 BPM | OXYGEN SATURATION: 98 % | RESPIRATION RATE: 18 BRPM | SYSTOLIC BLOOD PRESSURE: 104 MMHG

## 2023-03-07 DIAGNOSIS — R04.0 EPISTAXIS: Primary | ICD-10-CM

## 2023-03-07 PROCEDURE — 30901 CONTROL OF NOSEBLEED: CPT | Performed by: OTOLARYNGOLOGY

## 2023-03-07 ASSESSMENT — ASTHMA QUESTIONNAIRES
ACT_TOTALSCORE: 22
QUESTION_4 LAST FOUR WEEKS HOW OFTEN HAVE YOU USED YOUR RESCUE INHALER OR NEBULIZER MEDICATION (SUCH AS ALBUTEROL): ONCE A WEEK OR LESS
QUESTION_5 LAST FOUR WEEKS HOW WOULD YOU RATE YOUR ASTHMA CONTROL: WELL CONTROLLED
QUESTION_2 LAST FOUR WEEKS HOW OFTEN HAVE YOU HAD SHORTNESS OF BREATH: NOT AT ALL
QUESTION_1 LAST FOUR WEEKS HOW MUCH OF THE TIME DID YOUR ASTHMA KEEP YOU FROM GETTING AS MUCH DONE AT WORK, SCHOOL OR AT HOME: NONE OF THE TIME
QUESTION_3 LAST FOUR WEEKS HOW OFTEN DID YOUR ASTHMA SYMPTOMS (WHEEZING, COUGHING, SHORTNESS OF BREATH, CHEST TIGHTNESS OR PAIN) WAKE YOU UP AT NIGHT OR EARLIER THAN USUAL IN THE MORNING: ONCE OR TWICE
ACT_TOTALSCORE: 22

## 2023-03-07 NOTE — LETTER
3/7/2023         RE: Paige Fajardo  [address]  [place] [address]        Dear Colleague,    Thank you for referring your patient, Paige Fajardo, to the [facility]. Please see a copy of my visit note below.    Chief Complaint - Epistaxis    History of Present Illness - Paige Fajardo is a 52 year old female presents with approximately moonths of epistaxis. The history is provided by the patient. It has worsened since December when she was diving. She wears a CPAP and has allergies. It occurs on the right side. It usually only comes out the front of the nose, but it has ran down the back of the throat at times. The patient has never gone to the emergency department or required a blood transfusion due to nose bleeding. The patient has no personal or family history of bleeding disorders. Her blood pressure seems under control. The patient takes no blood-thinning medication. The patient has tried humidification.     Tests personally reviewed today for this visit:   1.) CBC 10/24/22 showed WBC 3.5, otherwise normal  2.) iron studies normal, ferritin elevated on 12/24/22  3.) CMP 8/24/22 normal    Past Medical History -   Patient Active Problem List   Diagnosis     Anxiety state     Insomnia     Allergic rhinitis     Dysmenorrhea     Cystic Fibrosis Screening negative     Mild major depression (H)     Fibromyalgia     Patellofemoral disorder     Family history of aneurysm     Migraine     Obesity     Grieving     Insomnia     History of alcoholism (H)     Morbid obesity (H)     Moderate persistent asthma     DMITRIY (obstructive sleep apnea)     IUD (intrauterine device) in place     Trochanteric bursitis of left hip     Posterior tibial tendinitis of right lower extremity       Current Medications -   Current Outpatient Medications:      albuterol (PROAIR HFA/PROVENTIL HFA/VENTOLIN HFA) 108 (90 Base) MCG/ACT inhaler, Inhale 2 puffs into the lungs every 6 hours as needed for shortness  of breath / dyspnea or wheezing, Disp: 18 g, Rfl: 3     B Complex Vitamins (VITAMIN B COMPLEX PO), 3 drops per day, Disp: , Rfl:      beclomethasone HFA (QVAR REDIHALER) 80 MCG/ACT inhaler, Inhale 2 puffs into the lungs 2 times daily (Patient taking differently: Inhale 2 puffs into the lungs as needed), Disp: 10.6 g, Rfl: 11     benzonatate (TESSALON) 200 MG capsule, Take 1 capsule (200 mg) by mouth 3 times daily as needed for cough (Patient not taking: Reported on 10/28/2022), Disp: 20 capsule, Rfl: 0     CALCIUM CARBONATE PO, Take by mouth daily 5 drops per day (Patient not taking: Reported on 10/28/2022), Disp: , Rfl:      cetirizine-psuedoePHEDrine (ZYRTEC-D) 5-120 MG per tablet, Take 1 tablet by mouth 2 times daily, Disp: 90 tablet, Rfl: 3     cholecalciferol (VITAMIN D) 1000 UNIT tablet, 3 drops per day, Disp: , Rfl:      colchicine (COLCYRS) 0.6 MG tablet, Take 2 tablets orally at onset of symptoms and one more tablet 1 hour later, and then one tablet twice a day until flare resolves., Disp: , Rfl:      COMPOUNDED NON-CONTROLLED SUBSTANCE (CMPD RX) - PHARMACY TO MIX COMPOUNDED MEDICATION, LOW DOSE NALTREXONE 6MG one tablet qhs, Disp: 90 capsule, Rfl: 4     FLUoxetine (PROZAC) 20 MG capsule, Take 20 mg by mouth, Disp: , Rfl:      gabapentin (NEURONTIN) 300 MG capsule, Take 2 capsules  (600mg) in the morning and 2 capsules (600mg) at bedtime. 90day supply (Patient taking differently: Take 300 mg by mouth 2 times daily), Disp: 360 capsule, Rfl: 1     ipratropium - albuterol 0.5 mg/2.5 mg/3 mL (DUONEB) 0.5-2.5 (3) MG/3ML neb solution, Take 1 vial (3 mLs) by nebulization every 6 hours as needed for shortness of breath / dyspnea or wheezing, Disp: 960 mL, Rfl: 4     levonorgestrel (MIRENA) 20 MCG/24HR IUD, 1 each by Intrauterine route once, Disp: , Rfl:      MAGNESIUM CITRATE PO, 3 drops per day, Disp: , Rfl:      Naproxen Sodium (ALEVE PO), Take 220 mg by mouth 2 times daily as needed , Disp: , Rfl:      order for  DME, Equipment being ordered: Nebulizer, Disp: 1 Device, Rfl: 0     rizatriptan (MAXALT-MLT) 10 MG ODT tab, Take 1 tablet (10 mg) by mouth at onset of headache for migraine May repeat in 2 hours. Max 3 tablets/24 hours., Disp: 18 tablet, Rfl: 3     Semaglutide, 2 MG/DOSE, (OZEMPIC, 2 MG/DOSE,) 8 MG/3ML SOPN, Inject 2 mg Subcutaneous once a week, Disp: 9 mL, Rfl: 3     Turmeric 450 MG CAPS, 1 scoop per day, Disp: , Rfl:     Allergies -   Allergies   Allergen Reactions     Amoxicillin      Other reaction(s): Stomach Upset     Augmentin      reacted to Augmentin--  stomach upset -- can take penicillin     Codeine Swelling     Dairy Products [Milk Protein Extract] Difficulty breathing     Products with milk cause congestion, sinus mucous, difficulty breathing       Social History -   Social History     Socioeconomic History     Marital status:      Number of children: 0   Occupational History     Occupation: [employer]     Employer: [employer]     Comment: [employer]    Tobacco Use     Smoking status: Never     Smokeless tobacco: Never   Substance and Sexual Activity     Alcohol use: No     Alcohol/week: 0.0 standard drinks     Comment: recovering     Drug use: No     Sexual activity: Yes     Partners: Male     Birth control/protection: I.U.D.   Other Topics Concern     Blood Transfusions No     Exercise Yes     Comment: walk three days/week     Seat Belt Yes     Self-Exams Yes     Parent/sibling w/ CABG, MI or angioplasty before 65F 55M? No   Social History Narrative    Living arrangements - the patient lives alone.       Family History -   Family History   Problem Relation Age of Onset     Neurologic Disorder Mother         ruptured cerebral aneurysm age 60      Hypertension Mother      Depression/Anxiety Mother      Cerebrovascular Disease Mother      Obesity Mother      Macular Degeneration Mother      Musculoskeletal Disorder Father         OA      Gastrointestinal Disease Father         colon  polyps age 60     Other Cancer Father         Brain Cancer-neuroblastoma     Depression/Anxiety Father      Obesity Father      Cancer Father      Gynecology Sister         Rachel. irregular menses.  pre-eclampsia     Aneurysm Sister         cerebral     Heart Disease Paternal Grandfather         Multiple MI's (first MI age 60)     Diabetes Paternal Grandfather         IDDM dx age 60  Severe/brittle/complications     Coronary Artery Disease Paternal Grandfather      Depression/Anxiety Paternal Grandfather      Obesity Paternal Grandfather      Gynecology Paternal Grandmother          of uterine CA in her 40's     Ovarian Cancer Paternal Grandmother          at age 40 from cancer     Cancer Paternal Grandmother      Eye Disorder Maternal Grandmother         glacucoma     Diabetes Maternal Grandmother         IDDM-onset age 70     Obesity Maternal Grandmother      Macular Degeneration Maternal Grandmother      Cancer Maternal Grandfather      Gynecology Paternal Aunt         uterine CA diagnosed @ 40yrs       Physical Exam  /76   Pulse 75   Resp 18   SpO2 98%   General - The patient is in no distress.  Alert, answers questions and cooperates with examination appropriately.   Voice and Breathing - The patient was breathing comfortably without the use of accessory muscles. There was no wheezing, stridor, or stertor.  The patients voice was clear and strong, with no dysphonia.  Eyes - Extraocular movements intact. Sclera were not icteric or injected, conjunctiva were pink and moist.  Neurologic - Cranial nerves II-XII are grossly intact. Specifically, the facial nerve is intact, House-Brackmann grade 1 of 6.   Nose - No significant external deformity. The nasal mucosa along the anterior septum on the right side shows crusting and dried blood. It is dry. There are 2 prominent blood vessels superficially located. She has a deviated septum that is bent anteriorly on the right and this spot the cartilage is  prominent and the mucosa has worn thin. Turbinates are of normal size and position.  No polyps, masses, or purulence.    Procedure - I sprayed the right anterior nasal cavity and septum with phenylephrine and lidocaine. I applied 2 sticks of silver nitrate to the prominent blood vessels along the anterior septum. Hemostasis was achieved. I applied bacitracin ointment to the wound with a q-tip.    A/P - Paige Fajardo is a 52 year old female with epistaxis. This is almost certainly coming from the right anterior septum. I cauterized this today. We discussed restrictions on manipulation and picking of the nose. I explained using aquaphor 2-3 times daily to the anterior septum. The patient can also use nasal saline spray 3-5 times per day, and a humidifier at the bedside at night.    I also explained applying digital pressure to the nasal tip for a minimum of 15-20 minutes to stop a nose bleed. If that doesn't stop the bleeding the patient needs to proceed to the nearest emergency department or return to ENT clinic.     Stalin Briggs MD  Otolaryngology  [facility]        Again, thank you for allowing me to participate in the care of your patient.        Sincerely,        Stalin Briggs MD

## 2023-03-07 NOTE — PROGRESS NOTES
Pulmonary Clinic Follow-Up        Assessment/Plan:     Paige Fajardo is a 52 year old never smoker with history of asthma and allergic rhinitis, presenting today for annual follow-up.    Moderate persistent asthma  She has specific triggers/times of year when her asthma becomes an issue and does well the rest of the year without medications or respiratory limitations.  COVID infection last May, required multiple prednisone courses.  No other exacerbations.  Did well with Qvar intermittently over the past year.  ACT score 22 today.  Plan:  - continue Qvar 80 mcg 2 puffs BID during spring & winter travel ([place] 2-3 times/year) -- her asthma trigger seasons  - continue albuterol inhaler and duonebs PRN  - Action plan:  Prednisone 40mg x7 days  - she is UTD with COVID bivalent booster, annual influenza vaccine, and pneumococcal vaccine.    DMITRIY  On cpap.  Has not seen sleep medicine since diagnosis, she endorses intermittent issues with mask, humidity, and epistaxis.  She is interested in seeing sleep medicine here.  Plan:  - referral to sleep medicine placed    Epistaxis  Intermittent issues with this 2/2 cpap mask and also when scuba diving.  Now followed by ENT, had cauterization recently.  Plan:  - follow-up with ENT PRN    Follow-up  - annual    Marlys Francis, CNP  Pulmonary Medicine  [facility]  [phone]     CC:     Asthma follow-up     HPI:     Paige Fajardo is a 52 year old never smoker with history of asthma and allergic rhinitis, presenting today for annual follow-up.    Since last visit, she reports her breathing has been doing well on the intermittent Qvar use (during her asthma trigger seasons/travel), aside from COVID infection in May.  She did not require hospitalization but she had to present to ED and required multiple prednisone courses.      She hasn't started Qvar yet for spring, but will start once snow has melted.  She will start zyrtec as well, and  together this works well for her.  She also travels to [facility] 2-3 times per year, and starts Qvar 3 weeks prior to travel and during vacation.    She endorses recent issues with epistaxis, sometimes when she has to turn down the humidity on her cpap - and also when she is scuba diving.  She did have to present to a hospital in [facility] for this.  She is now followed by ENT, and recently had cauterization through them.    Since initiating Qvar, she did not have her usual late summer asthma exacerbation from air conditioning or a late fall exacerbation.    Uses albuterol once every 2-3 weeks.    ACT Total Scores 11/27/2019 3/8/2022 3/7/2023   ACT TOTAL SCORE - - -   ASTHMA ER VISITS - - -   ASTHMA HOSPITALIZATIONS - - -   ACT TOTAL SCORE (Goal Greater than or Equal to 20) 16 22 22   In the past 12 months, how many times did you visit the emergency room for your asthma without being admitted to the hospital? 0 0 0   In the past 12 months, how many times were you hospitalized overnight because of your asthma? 0 0 0            ROS:     6-point ROS performed and is negative aside from those listed in HPI     Physical Exam:       /68 (BP Location: Left arm, Patient Position: Chair, Cuff Size: Adult Large)   Pulse 84   Wt 126.1 kg (278 lb)   SpO2 97%   BMI 44.87 kg/m    Gen: adult female, appears in NAD  HEENT: masked, PERRL; no stridor  CV: RRR, no M/G/R  Resp: CTA bilaterally, respirations even and unlabored, on RA, no cough.  Skin: no apparent rashes on visible skin  Ext: no cyanosis, no clubbing, no BLE edema  Neuro: alert, nonfocal     Data:       Imaging studies:   #. CT chest, 1/6/22:  LUNGS AND PLEURA: Lungs are clear. No pleural effusion. No interstitial disease/fibrosis or bronchiectasis. Couple tiny likely incidental 2-3 mm lung nodules, largest fissural nodule right midlung.   MEDIASTINUM/AXILLAE: No lymphadenopathy. No thoracic aortic aneurysm. Normal heart size without pericardial effusion.    CORONARY ARTERY CALCIFICATION: None.   UPPER ABDOMEN: No significant finding.   MUSCULOSKELETAL: Unremarkable.       PFT:  #. 12/7/2016: on personal review -- normal spirometry that is not significantly changed since last evaluation 08/03/16.    #. 3/8/2022: normal spirometry without clinically significant bronchodilator response, normal lung volumes, and supra-normal diffusion capacity. Consistent w/ prior diagnosis of asthma. There is clinically notable decrease in FEV1 compared to testing in 08/2016.      PMH:  Patient Active Problem List    Diagnosis Date Noted     Morbid obesity (H) 05/17/2017     Priority: High     Posterior tibial tendinitis of right lower extremity 12/03/2019     Priority: Medium     Trochanteric bursitis of left hip 05/23/2019     Priority: Medium     IUD (intrauterine device) in place 01/15/2019     Priority: Medium     Mirena Due for removal in 6/2022       DMITRIY (obstructive sleep apnea) 06/29/2018     Priority: Medium     Moderate persistent asthma 02/14/2018     Priority: Medium     History of alcoholism (H) 07/28/2016     Priority: Medium     In remission for 16 years       Grieving 11/24/2014     Priority: Medium     Insomnia 11/24/2014     Priority: Medium     Obesity 11/01/2013     Priority: Medium     Migraine 04/22/2012     Priority: Medium     Family history of aneurysm 04/28/2011     Priority: Medium     Patellofemoral disorder      Priority: Medium     Fibromyalgia 04/15/2010     Priority: Medium     Mild major depression (H) 04/06/2010     Priority: Medium     Cystic Fibrosis Screening negative 05/01/2009     Priority: Medium     [facility]       Dysmenorrhea 10/12/2008     Priority: Medium     Anxiety state 03/08/2005     Priority: Medium     Problem list name updated by automated process. Provider to review       Insomnia 03/08/2005     Priority: Medium     Problem list name updated by automated process. Provider to review       Allergic rhinitis 03/08/2005      Priority: Medium     Problem list name updated by automated process. Provider to review         PSH:  Past Surgical History:   Procedure Laterality Date     ARTHROSCOPY KNEE RT/LT  1997    Left      DILATION AND CURETTAGE N/A 6/14/2017    Procedure: DILATION AND CURETTAGE;;  Surgeon: Livia Barnett DO;  Location: [facility] OR     EXAM UNDER ANESTHESIA, ULTRASOUND N/A 6/14/2017    Procedure: EXAM UNDER ANESTHESIA, ULTRASOUND;  Ultrasound guided cervical dilation, IUD placement, Endometrial biopsy, repair of unavoidable cervical laceration;  Surgeon: Livia Barnett DO;  Location: [facility] OR     HC TOOTH EXTRACTION W/FORCEP  1998    wisdom teeth      INSERT INTRAUTERINE DEVICE N/A 6/14/2017    Procedure: INSERT INTRAUTERINE DEVICE;;  Surgeon: Livia Barnett DO;  Location: [facility] OR       Family HX: family history includes Aneurysm in her sister; Cancer in her father, maternal grandfather, and paternal grandmother; Cerebrovascular Disease in her mother; Coronary Artery Disease in her paternal grandfather; Depression/Anxiety in her father, mother, and paternal grandfather; Diabetes in her maternal grandmother and paternal grandfather; Eye Disorder in her maternal grandmother; Gastrointestinal Disease in her father; Gynecology in her paternal aunt, paternal grandmother, and sister; Heart Disease in her paternal grandfather; Hypertension in her mother; Macular Degeneration in her maternal grandmother and mother; Musculoskeletal Disorder in her father; Neurologic Disorder in her mother; Obesity in her father, maternal grandmother, mother, and paternal grandfather; Other Cancer in her father; Ovarian Cancer in her paternal grandmother.    Social Hx:  reports that she has never smoked. She has never used smokeless tobacco. She reports that she does not drink alcohol and does not use drugs.     Current Meds:  Current Outpatient Medications   Medication Sig Dispense Refill     albuterol (PROAIR HFA/PROVENTIL HFA/VENTOLIN  HFA) 108 (90 Base) MCG/ACT inhaler Inhale 2 puffs into the lungs every 6 hours as needed for shortness of breath or wheezing 18 g 3     B Complex Vitamins (VITAMIN B COMPLEX PO) 3 drops per day       beclomethasone HFA (QVAR REDIHALER) 80 MCG/ACT inhaler Inhale 2 puffs into the lungs 2 times daily 10.6 g 11     benzonatate (TESSALON) 200 MG capsule Take 1 capsule (200 mg) by mouth 3 times daily as needed for cough 20 capsule 0     CALCIUM CARBONATE PO Take by mouth daily 5 drops per day       cholecalciferol (VITAMIN D) 1000 UNIT tablet 3 drops per day       colchicine (COLCYRS) 0.6 MG tablet Take 2 tablets orally at onset of symptoms and one more tablet 1 hour later, and then one tablet twice a day until flare resolves.       COMPOUNDED NON-CONTROLLED SUBSTANCE (CMPD RX) - PHARMACY TO MIX COMPOUNDED MEDICATION LOW DOSE NALTREXONE 6MG one tablet qhs 90 capsule 4     FLUoxetine (PROZAC) 20 MG capsule Take 20 mg by mouth       gabapentin (NEURONTIN) 300 MG capsule Take 2 capsules  (600mg) in the morning and 2 capsules (600mg) at bedtime. 90day supply (Patient taking differently: Take 300 mg by mouth 2 times daily) 360 capsule 1     ipratropium - albuterol 0.5 mg/2.5 mg/3 mL (DUONEB) 0.5-2.5 (3) MG/3ML neb solution Take 1 vial (3 mLs) by nebulization every 6 hours as needed for shortness of breath or wheezing 960 mL 4     levonorgestrel (MIRENA) 20 MCG/24HR IUD 1 each by Intrauterine route once       MAGNESIUM CITRATE PO 3 drops per day       Naproxen Sodium (ALEVE PO) Take 220 mg by mouth 2 times daily as needed        order for DME Equipment being ordered: Nebulizer 1 Device 0     rizatriptan (MAXALT-MLT) 10 MG ODT tab Take 1 tablet (10 mg) by mouth at onset of headache for migraine May repeat in 2 hours. Max 3 tablets/24 hours. 18 tablet 3     Semaglutide, 2 MG/DOSE, (OZEMPIC, 2 MG/DOSE,) 8 MG/3ML SOPN Inject 2 mg Subcutaneous once a week 9 mL 3     Turmeric 450 MG CAPS 1 scoop per day        cetirizine-psuedoePHEDrine (ZYRTEC-D) 5-120 MG per tablet Take 1 tablet by mouth 2 times daily (Patient not taking: Reported on 3/8/2023) 90 tablet 3       Allergies:  Allergies   Allergen Reactions     Amoxicillin      Other reaction(s): Stomach Upset     Augmentin      reacted to Augmentin--  stomach upset -- can take penicillin     Codeine Swelling     Dairy Products [Milk Protein Extract] Difficulty breathing     Products with milk cause congestion, sinus mucous, difficulty breathing

## 2023-03-08 ENCOUNTER — OFFICE VISIT (OUTPATIENT)
Dept: PULMONOLOGY | Facility: CLINIC | Age: 53
End: 2023-03-08
Payer: COMMERCIAL

## 2023-03-08 VITALS
BODY MASS INDEX: 44.87 KG/M2 | DIASTOLIC BLOOD PRESSURE: 68 MMHG | HEART RATE: 84 BPM | SYSTOLIC BLOOD PRESSURE: 112 MMHG | OXYGEN SATURATION: 97 % | WEIGHT: 278 LBS

## 2023-03-08 DIAGNOSIS — J45.40 MODERATE PERSISTENT ASTHMA WITHOUT COMPLICATION: Primary | ICD-10-CM

## 2023-03-08 DIAGNOSIS — G47.33 OSA (OBSTRUCTIVE SLEEP APNEA): ICD-10-CM

## 2023-03-08 DIAGNOSIS — R04.0 EPISTAXIS: ICD-10-CM

## 2023-03-08 PROCEDURE — 99214 OFFICE O/P EST MOD 30 MIN: CPT | Performed by: NURSE PRACTITIONER

## 2023-03-08 RX ORDER — ALBUTEROL SULFATE 90 UG/1
2 AEROSOL, METERED RESPIRATORY (INHALATION) EVERY 6 HOURS PRN
Qty: 18 G | Refills: 3 | Status: SHIPPED | OUTPATIENT
Start: 2023-03-08 | End: 2024-05-07

## 2023-03-08 RX ORDER — IPRATROPIUM BROMIDE AND ALBUTEROL SULFATE 2.5; .5 MG/3ML; MG/3ML
1 SOLUTION RESPIRATORY (INHALATION) EVERY 6 HOURS PRN
Qty: 960 ML | Refills: 4 | Status: SHIPPED | OUTPATIENT
Start: 2023-03-08

## 2023-03-08 NOTE — PATIENT INSTRUCTIONS
It was a pleasure to see you in clinic today.   Here is what we discussed:    Continue Qvar inhaler 2 puffs twice daily during spring and when needed for seasonal support/travel.  Continue albuterol inhaler as needed for shortness of breath or wheezing.  Contact with any change or worsening of breathing and we can activate your action plan.  Follow-up with sleep medicine, I sent a referral today.  Follow-up with ENT as needed for ongoing bloody nose.  Follow-up in one year or sooner if needed.    Marlys Francis, CNP  Pulmonary Medicine  [facility]  [phone]

## 2023-03-09 ENCOUNTER — TELEPHONE (OUTPATIENT)
Dept: SLEEP MEDICINE | Facility: CLINIC | Age: 53
End: 2023-03-09
Payer: COMMERCIAL

## 2023-03-09 NOTE — TELEPHONE ENCOUNTER
SCHEDULED PT FOR MASK CONSULT ON 3/28/23 DUE TO HER LIVING FAR AND BEING IN TOWN ON TUESDAYS. PT DOES NOT HAVE RX I TOLD HER I CAN SHOW HER MASKS BUT CANNOT DISPENSE UNTIL WE GET RX. SHE SAID SHE WILL REACH OUT TO HER ENT BECAUSE SHE ISNT BEING SEEN BY HER SLEEP DOCTOR UNTIL JULY AND SHE SUFFERS FROM BAD NOSE BLEEDS.    Patrick Valero, Respiratory DME Coordinator

## 2023-03-30 NOTE — PATIENT INSTRUCTIONS
If you have labs or imaging done, the results will automatically release in [employer] without an interpretation.  Your health care professional will review those results and send an interpretation with recommendations as soon as possible, but this may be 1-3 business days.    If you have any questions regarding your visit, please contact your care team.     [employer]: [phone]  [facility] CLINIC HOURS TELEPHONE NUMBER       Capri Morales MD  Assistant Medical Director    [place] - Certified Medical Assistant     [place] Hager-NATALI Bhakta-  [place]-     Monday- [place]  8:00 a.m - 5:00 p.m    Tuesday- Surgery        Thursday- [place]  8:00 a.m - 5:00 p.m.    Friday- [place]  7:30 a.m - 4:00 p.m. [facility]  [address]  [place], [place] [address] [phone] Fax  Imaging Scheduling [phone]    [facility]  [address] [place] [address] [address].  [place], [place] [address] [phone]    [address] [address] [phone] Fax  Imaging Scheduling [phone]     Urgent Care locations:  [facility] Monday-Friday  10 am - 8 pm  Saturday and Sunday   9 am - 5 pm  Monday-Friday   10 am - 8 pm  Saturday and Sunday   9 am - 5 pm   [phone]     **Surgeries** Our Surgery Schedulers will contact you to schedule. If you do not receive a call within 3 business days, please call [phone].    If you need a medication refill, please contact your pharmacy. Please allow 3 business days for your refill to be completed.    As always, thank you for trusting us with your healthcare needs!

## 2023-03-31 ENCOUNTER — OFFICE VISIT (OUTPATIENT)
Dept: OBGYN | Facility: CLINIC | Age: 53
End: 2023-03-31
Payer: COMMERCIAL

## 2023-03-31 VITALS
WEIGHT: 273.7 LBS | BODY MASS INDEX: 43.99 KG/M2 | SYSTOLIC BLOOD PRESSURE: 103 MMHG | HEART RATE: 80 BPM | HEIGHT: 66 IN | DIASTOLIC BLOOD PRESSURE: 74 MMHG

## 2023-03-31 DIAGNOSIS — D50.9 IRON DEFICIENCY ANEMIA, UNSPECIFIED IRON DEFICIENCY ANEMIA TYPE: ICD-10-CM

## 2023-03-31 DIAGNOSIS — Z13.6 CARDIOVASCULAR SCREENING; LDL GOAL LESS THAN 160: ICD-10-CM

## 2023-03-31 DIAGNOSIS — Z97.5 IUD (INTRAUTERINE DEVICE) IN PLACE: ICD-10-CM

## 2023-03-31 DIAGNOSIS — Z01.419 WELL FEMALE EXAM WITH ROUTINE GYNECOLOGICAL EXAM: Primary | ICD-10-CM

## 2023-03-31 PROCEDURE — 99386 PREV VISIT NEW AGE 40-64: CPT | Performed by: OBSTETRICS & GYNECOLOGY

## 2023-03-31 NOTE — PROGRESS NOTES
SUBJECTIVE:   CC: Paige is an 52 year old who presents for preventive health visit.   Patient has been advised of split billing requirements and indicates understanding: Yes  Healthy Habits:    Getting at least 3 servings of Calcium per day:  Yes    Bi-annual eye exam:  Yes    Dental care twice a year:  Yes    Sleep apnea or symptoms of sleep apnea:  Sleep apnea    Diet:  Other    Frequency of exercise:  2-3 days/week    Duration of exercise:  30-45 minutes    Taking medications regularly:  Yes    Barriers to taking medications:  None    Medication side effects:  None    PHQ-2 Total Score:    Additional concerns today:  Yes       Mirena IUD placed 17 under anesthesia with Dr. Barnett.  No menses.  No pelvic concerns.    Her mother went through menopause about age 60 or later.        Today's PHQ-2 Score:   PHQ-2 (  [employer]) 2019   Q1: Little interest or pleasure in doing things 0   Q2: Feeling down, depressed or hopeless 0   PHQ-2 Score 0   PHQ-2 Total Score (12-17 Years)- Positive if 3 or more points; Administer PHQ-A if positive 0         Social History     Tobacco Use     Smoking status: Never     Smokeless tobacco: Never   Substance Use Topics     Alcohol use: No     Alcohol/week: 0.0 standard drinks     Comment: recovering       BP Readings from Last 3 Encounters:   23 103/74   23 112/68   23 104/76    Wt Readings from Last 3 Encounters:   23 124.1 kg (273 lb 11.2 oz)   23 126.1 kg (278 lb)   23 124.3 kg (274 lb)                  Patient Active Problem List   Diagnosis     Anxiety state     Insomnia     Allergic rhinitis     Dysmenorrhea     Cystic Fibrosis Screening negative     Mild major depression (H)     Fibromyalgia     Patellofemoral disorder     Family history of aneurysm     Migraine     Obesity     Grieving     Insomnia     History of alcoholism (H)     Morbid obesity (H)     Moderate persistent asthma     DMITRIY (obstructive sleep apnea)      IUD (intrauterine device) in place     Trochanteric bursitis of left hip     Posterior tibial tendinitis of right lower extremity     Past Surgical History:   Procedure Laterality Date     ARTHROSCOPY KNEE RT/LT  1997    Left      DILATION AND CURETTAGE N/A 6/14/2017    Procedure: DILATION AND CURETTAGE;;  Surgeon: Livia Barnett DO;  Location: [facility] OR     EXAM UNDER ANESTHESIA, ULTRASOUND N/A 6/14/2017    Procedure: EXAM UNDER ANESTHESIA, ULTRASOUND;  Ultrasound guided cervical dilation, IUD placement, Endometrial biopsy, repair of unavoidable cervical laceration;  Surgeon: Livia Barnett DO;  Location: [facility] OR     HC TOOTH EXTRACTION W/FORCEP  1998    wisdom teeth      INSERT INTRAUTERINE DEVICE N/A 6/14/2017    Procedure: INSERT INTRAUTERINE DEVICE;;  Surgeon: Livia Barnett DO;  Location: [facility] OR       Social History     Tobacco Use     Smoking status: Never     Smokeless tobacco: Never   Substance Use Topics     Alcohol use: No     Alcohol/week: 0.0 standard drinks     Comment: recovering     Family History   Problem Relation Age of Onset     Neurologic Disorder Mother         ruptured cerebral aneurysm age 60      Hypertension Mother      Depression/Anxiety Mother      Cerebrovascular Disease Mother      Obesity Mother      Macular Degeneration Mother      Musculoskeletal Disorder Father         OA      Gastrointestinal Disease Father         colon polyps age 60     Other Cancer Father         Brain Cancer-neuroblastoma     Depression/Anxiety Father      Obesity Father      Cancer Father      Gynecology Sister         Rachel. irregular menses.  pre-eclampsia     Aneurysm Sister         cerebral     Heart Disease Paternal Grandfather         Multiple MI's (first MI age 60)     Diabetes Paternal Grandfather         IDDM dx age 60  Severe/brittle/complications     Coronary Artery Disease Paternal Grandfather      Depression/Anxiety Paternal Grandfather      Obesity Paternal Grandfather       Gynecology Paternal Grandmother          of uterine CA in her 40's     Ovarian Cancer Paternal Grandmother          at age 40 from cancer     Cancer Paternal Grandmother      Eye Disorder Maternal Grandmother         glacucoma     Diabetes Maternal Grandmother         IDDM-onset age 70     Obesity Maternal Grandmother      Macular Degeneration Maternal Grandmother      Cancer Maternal Grandfather      Gynecology Paternal Aunt         uterine CA diagnosed @ 40yrs         Current Outpatient Medications   Medication Sig Dispense Refill     albuterol (PROAIR HFA/PROVENTIL HFA/VENTOLIN HFA) 108 (90 Base) MCG/ACT inhaler Inhale 2 puffs into the lungs every 6 hours as needed for shortness of breath or wheezing 18 g 3     B Complex Vitamins (VITAMIN B COMPLEX PO) 3 drops per day       beclomethasone HFA (QVAR REDIHALER) 80 MCG/ACT inhaler Inhale 2 puffs into the lungs 2 times daily 10.6 g 11     CALCIUM CARBONATE PO Take by mouth daily 5 drops per day       cetirizine-psuedoePHEDrine (ZYRTEC-D) 5-120 MG per tablet Take 1 tablet by mouth 2 times daily 90 tablet 3     cholecalciferol (VITAMIN D) 1000 UNIT tablet 3 drops per day       colchicine (COLCYRS) 0.6 MG tablet Take 2 tablets orally at onset of symptoms and one more tablet 1 hour later, and then one tablet twice a day until flare resolves.       COMPOUNDED NON-CONTROLLED SUBSTANCE (CMPD RX) - PHARMACY TO MIX COMPOUNDED MEDICATION LOW DOSE NALTREXONE 6MG one tablet qhs 90 capsule 4     FLUoxetine (PROZAC) 20 MG capsule Take 20 mg by mouth       gabapentin (NEURONTIN) 300 MG capsule Take 2 capsules  (600mg) in the morning and 2 capsules (600mg) at bedtime. 90day supply (Patient taking differently: Take 300 mg by mouth 2 times daily) 360 capsule 1     ipratropium - albuterol 0.5 mg/2.5 mg/3 mL (DUONEB) 0.5-2.5 (3) MG/3ML neb solution Take 1 vial (3 mLs) by nebulization every 6 hours as needed for shortness of breath or wheezing 960 mL 4     levonorgestrel (MIRENA)  20 MCG/24HR IUD 1 each by Intrauterine route once       MAGNESIUM CITRATE PO 3 drops per day       Naproxen Sodium (ALEVE PO) Take 220 mg by mouth 2 times daily as needed        order for DME Equipment being ordered: Nebulizer 1 Device 0     rizatriptan (MAXALT-MLT) 10 MG ODT tab Take 1 tablet (10 mg) by mouth at onset of headache for migraine May repeat in 2 hours. Max 3 tablets/24 hours. 18 tablet 3     Semaglutide, 2 MG/DOSE, (OZEMPIC, 2 MG/DOSE,) 8 MG/3ML SOPN Inject 2 mg Subcutaneous once a week 9 mL 3     Turmeric 450 MG CAPS 1 scoop per day       benzonatate (TESSALON) 200 MG capsule Take 1 capsule (200 mg) by mouth 3 times daily as needed for cough (Patient not taking: Reported on 3/31/2023) 20 capsule 0     Allergies   Allergen Reactions     Amoxicillin      Other reaction(s): Stomach Upset     Augmentin      reacted to Augmentin--  stomach upset -- can take penicillin     Codeine Swelling     Dairy Products [Milk Protein Extract] Difficulty breathing     Products with milk cause congestion, sinus mucous, difficulty breathing     Recent Labs   Lab Test 01/03/19  1034 01/03/19  1031 05/17/17  1216 05/04/16  1349 03/02/15  1143   A1C  --   --  5.1  --   --    LDL 75  --   --  98 98   HDL 63  --   --  69 64   TRIG 89  --   --  70 84   ALT  --   --  20 24 63*   CR  --  0.83 0.83 0.76 0.77   GFRESTIMATED  --  84 75 82 82   GFRESTBLACK  --  >90 >90   GFR Calc   >90   GFR Calc   >90   GFR Calc     POTASSIUM  --  3.9 4.1 3.9 3.8   TSH 1.57  --   --  2.50 1.78        Breast Cancer Screening:    FHS-7:   Breast CA Risk Assessment (FHS-7) 2/28/2023   Did any of your first-degree relatives have breast or ovarian cancer? No   Did any of your relatives have bilateral breast cancer? No   Did any man in your family have breast cancer? No   Did any woman in your family have breast and ovarian cancer? No   Did any woman in your family have breast cancer before age 50 y? No   Do  "you have 2 or more relatives with breast and/or ovarian cancer? No   Do you have 2 or more relatives with breast and/or bowel cancer? No     Mammogram Screening: Recommended annual mammography  Pertinent mammograms are reviewed under the imaging tab.    History of abnormal Pap smear: NO - age 30-65 PAP every 5 years with negative HPV co-testing recommended  PAP / HPV Latest Ref Rng & Units 1/18/2019 5/4/2016 11/1/2013   PAP (Historical) - NIL NIL NIL   HPV16 NEG:Negative Negative Negative -   HPV18 NEG:Negative Negative Negative -   HRHPV NEG:Negative Negative Negative -         Review of Systems  CONSTITUTIONAL: NEGATIVE for fever, chills, change in weight  INTEGUMENTARU/SKIN: NEGATIVE for worrisome rashes, moles or lesions  EYES: NEGATIVE for vision changes or irritation  ENT: NEGATIVE for ear, mouth and throat problems  RESP: NEGATIVE for significant cough or SOB  BREAST: NEGATIVE for masses, tenderness or discharge  CV: NEGATIVE for chest pain, palpitations or peripheral edema  GI: NEGATIVE for nausea, abdominal pain, heartburn, or change in bowel habits  : NEGATIVE for unusual urinary or vaginal symptoms.    MUSCULOSKELETAL: NEGATIVE for significant arthralgias or myalgia  NEURO: NEGATIVE for weakness, dizziness or paresthesias  PSYCHIATRIC: NEGATIVE for changes in mood or affect     OBJECTIVE:   /74 (BP Location: Right arm, Cuff Size: Adult Large)   Pulse 80   Ht 1.676 m (5' 6\")   Wt 124.1 kg (273 lb 11.2 oz)   BMI 44.18 kg/m    Physical Exam  Gen: Alert and oriented times 3, no acute distress.  Well developed, well nourished, pleasant.    Neck: Supple, no masses.  No thyromegaly.  Breast: Symmetrical without lesions.  No dimpling, nipple discharge, or discrete masses.  No lymphadenopathy.  Chest:  Non labored.  Clear to auscultation bilaterally.    Heart: Regular, normal S1, S2.  No murmurs.   Abdomen: Soft, nontender, nondistended.  No hepatosplenomegaly.    :  Normal female external genitalia. " " No lesions.  Urethral meatus normal.  Speculum exam reveals a normal vaginal vault, normal cervix with normal IUD strings.  No abnormal discharge.  Bimanual exam reveals a normal, mobile, nontender uterus .  No cervical motion tenderness.  Adnexa nontender with no palpable masses.    Extremities:  Nontender, no edema.       ASSESSMENT/PLAN:       ICD-10-CM    1. Well female exam with routine gynecological exam  Z01.419 Glucose      2. IUD (intrauterine device) in place  Z97.5       3. Iron deficiency anemia, unspecified iron deficiency anemia type  D50.9 CBC with platelets     Ferritin      4. CARDIOVASCULAR SCREENING; LDL GOAL LESS THAN 160  Z13.6 Lipid panel reflex to direct LDL Fasting           Additional imaging 4/4/23 for incomplete mammogram.    Discussed IUD.  If it is helping her bleeding I recommend she continue to use it for up to 8 years.  Plan to see her again next year for Pap and will reevaluate again at that time.    COUNSELING:  Reviewed preventive health counseling, as reflected in patient instructions      BMI:   Estimated body mass index is 44.18 kg/m  as calculated from the following:    Height as of this encounter: 1.676 m (5' 6\").    Weight as of this encounter: 124.1 kg (273 lb 11.2 oz).         She reports that she has never smoked. She has never used smokeless tobacco.      Capri Morales MD  [facility] [place]  "

## 2023-04-04 ENCOUNTER — LAB (OUTPATIENT)
Dept: LAB | Facility: CLINIC | Age: 53
End: 2023-04-04
Payer: COMMERCIAL

## 2023-04-04 ENCOUNTER — ANCILLARY PROCEDURE (OUTPATIENT)
Dept: MAMMOGRAPHY | Facility: CLINIC | Age: 53
End: 2023-04-04
Attending: FAMILY MEDICINE
Payer: COMMERCIAL

## 2023-04-04 ENCOUNTER — VIRTUAL VISIT (OUTPATIENT)
Dept: ENDOCRINOLOGY | Facility: CLINIC | Age: 53
End: 2023-04-04
Payer: COMMERCIAL

## 2023-04-04 ENCOUNTER — ANCILLARY PROCEDURE (OUTPATIENT)
Dept: ULTRASOUND IMAGING | Facility: CLINIC | Age: 53
End: 2023-04-04
Attending: FAMILY MEDICINE
Payer: COMMERCIAL

## 2023-04-04 VITALS — HEIGHT: 66 IN | WEIGHT: 270 LBS | BODY MASS INDEX: 43.39 KG/M2

## 2023-04-04 DIAGNOSIS — Z01.419 WELL FEMALE EXAM WITH ROUTINE GYNECOLOGICAL EXAM: ICD-10-CM

## 2023-04-04 DIAGNOSIS — Z13.6 CARDIOVASCULAR SCREENING; LDL GOAL LESS THAN 160: ICD-10-CM

## 2023-04-04 DIAGNOSIS — R92.8 ABNORMAL MAMMOGRAM: ICD-10-CM

## 2023-04-04 DIAGNOSIS — D50.9 IRON DEFICIENCY ANEMIA, UNSPECIFIED IRON DEFICIENCY ANEMIA TYPE: ICD-10-CM

## 2023-04-04 DIAGNOSIS — E66.01 MORBID OBESITY (H): Primary | ICD-10-CM

## 2023-04-04 LAB
CHOLEST SERPL-MCNC: 183 MG/DL
ERYTHROCYTE [DISTWIDTH] IN BLOOD BY AUTOMATED COUNT: 12.7 % (ref 10–15)
FASTING STATUS PATIENT QL REPORTED: YES
FASTING STATUS PATIENT QL REPORTED: YES
FERRITIN SERPL-MCNC: 245 NG/ML (ref 8–252)
GLUCOSE BLD-MCNC: 88 MG/DL (ref 70–99)
HCT VFR BLD AUTO: 45.5 % (ref 35–47)
HDLC SERPL-MCNC: 75 MG/DL
HGB BLD-MCNC: 15 G/DL (ref 11.7–15.7)
LDLC SERPL CALC-MCNC: 89 MG/DL
MCH RBC QN AUTO: 30.6 PG (ref 26.5–33)
MCHC RBC AUTO-ENTMCNC: 33 G/DL (ref 31.5–36.5)
MCV RBC AUTO: 93 FL (ref 78–100)
NONHDLC SERPL-MCNC: 108 MG/DL
PLATELET # BLD AUTO: 176 10E3/UL (ref 150–450)
RBC # BLD AUTO: 4.9 10E6/UL (ref 3.8–5.2)
TRIGL SERPL-MCNC: 93 MG/DL
WBC # BLD AUTO: 3.7 10E3/UL (ref 4–11)

## 2023-04-04 PROCEDURE — 82947 ASSAY GLUCOSE BLOOD QUANT: CPT

## 2023-04-04 PROCEDURE — 82728 ASSAY OF FERRITIN: CPT

## 2023-04-04 PROCEDURE — 99212 OFFICE O/P EST SF 10 MIN: CPT | Mod: VID | Performed by: INTERNAL MEDICINE

## 2023-04-04 PROCEDURE — 85027 COMPLETE CBC AUTOMATED: CPT

## 2023-04-04 PROCEDURE — 36415 COLL VENOUS BLD VENIPUNCTURE: CPT

## 2023-04-04 PROCEDURE — 77065 DX MAMMO INCL CAD UNI: CPT | Mod: RT | Performed by: RADIOLOGY

## 2023-04-04 PROCEDURE — G0279 TOMOSYNTHESIS, MAMMO: HCPCS | Performed by: RADIOLOGY

## 2023-04-04 PROCEDURE — 76642 ULTRASOUND BREAST LIMITED: CPT | Mod: RT | Performed by: RADIOLOGY

## 2023-04-04 PROCEDURE — 80061 LIPID PANEL: CPT

## 2023-04-04 NOTE — PROGRESS NOTES
"  Return Medical Weight Management Note     Paige Fajardo  MRN:  [id number]  :  1970  VIRI:  2023    Dear Francesca Samson MD,    I had the pleasure of seeing your patient Paige Fajardo. She is a 52 year old female who I am continuing to see for treatment of obesity related to:  Gout, DMITRIY, asthma, lumbar radiculopathy, fibromyalgia        2022    10:09 AM   --   I have the following health issues associated with obesity Sleep Apnea    Asthma    Stress Incontinence   I have the following symptoms associated with obesity Knee Pain    Depression    Lower Extremity Swelling    Back Pain    Fatigue    Hip Pain       INTERVAL HISTORY:  Last seen 2022.     Currently on Ozempic 2.0 mg weekly without side effects. Weight is down 34 lbs.  She said that Ozempic made her feel campbell longer and faster. Craving is down.     Activity:limited due to hips and knees pain, join the pool    CURRENT WEIGHT:   270 lbs 0 oz    Initial Weight (lbs): 304 lbs  Last Visits Weight: 127 kg (280 lb)  Cumulative weight loss (lbs): 34  Weight Loss Percentage: 11.18%        2023     3:56 PM   Changes and Difficulties   I have made the following changes to my diet since my last visit: Not a lot. Working with Christina and that is going really good.   With regards to my diet, I am still struggling with: None.   I have made the following changes to my activity/exercise since my last visit: Going to the gym more.   With regards to my activity/exercise, I am still struggling with: I have had some pain so it has made it hard to do some stuff. Weather plays into it.       VITALS:  Ht 1.676 m (5' 6\")   Wt 122.5 kg (270 lb)   BMI 43.58 kg/m      MEDICATIONS:   Current Outpatient Medications   Medication Sig Dispense Refill     albuterol (PROAIR HFA/PROVENTIL HFA/VENTOLIN HFA) 108 (90 Base) MCG/ACT inhaler Inhale 2 puffs into the lungs every 6 hours as needed for shortness of breath or wheezing 18 g 3     B Complex Vitamins (VITAMIN " B COMPLEX PO) 3 drops per day       beclomethasone HFA (QVAR REDIHALER) 80 MCG/ACT inhaler Inhale 2 puffs into the lungs 2 times daily 10.6 g 11     benzonatate (TESSALON) 200 MG capsule Take 1 capsule (200 mg) by mouth 3 times daily as needed for cough (Patient not taking: Reported on 3/31/2023) 20 capsule 0     CALCIUM CARBONATE PO Take by mouth daily 5 drops per day       cetirizine-psuedoePHEDrine (ZYRTEC-D) 5-120 MG per tablet Take 1 tablet by mouth 2 times daily 90 tablet 3     cholecalciferol (VITAMIN D) 1000 UNIT tablet 3 drops per day       colchicine (COLCYRS) 0.6 MG tablet Take 2 tablets orally at onset of symptoms and one more tablet 1 hour later, and then one tablet twice a day until flare resolves.       COMPOUNDED NON-CONTROLLED SUBSTANCE (CMPD RX) - PHARMACY TO MIX COMPOUNDED MEDICATION LOW DOSE NALTREXONE 6MG one tablet qhs 90 capsule 4     FLUoxetine (PROZAC) 20 MG capsule Take 20 mg by mouth       gabapentin (NEURONTIN) 300 MG capsule Take 2 capsules  (600mg) in the morning and 2 capsules (600mg) at bedtime. 90day supply (Patient taking differently: Take 300 mg by mouth 2 times daily) 360 capsule 1     ipratropium - albuterol 0.5 mg/2.5 mg/3 mL (DUONEB) 0.5-2.5 (3) MG/3ML neb solution Take 1 vial (3 mLs) by nebulization every 6 hours as needed for shortness of breath or wheezing 960 mL 4     levonorgestrel (MIRENA) 20 MCG/24HR IUD 1 each by Intrauterine route once       MAGNESIUM CITRATE PO 3 drops per day       Naproxen Sodium (ALEVE PO) Take 220 mg by mouth 2 times daily as needed        order for DME Equipment being ordered: Nebulizer 1 Device 0     rizatriptan (MAXALT-MLT) 10 MG ODT tab Take 1 tablet (10 mg) by mouth at onset of headache for migraine May repeat in 2 hours. Max 3 tablets/24 hours. 18 tablet 3     Semaglutide, 2 MG/DOSE, (OZEMPIC, 2 MG/DOSE,) 8 MG/3ML SOPN Inject 2 mg Subcutaneous once a week 9 mL 3     Turmeric 450 MG CAPS 1 scoop per day             12/27/2022    12:21 PM    Weight Loss Medication History Reviewed With Patient   Which weight loss medications are you currently taking on a regular basis? Ozempic   Are you having any side effects from the weight loss medication that we have prescribed you? Yes   If you are having side effects please describe: Constipation?       ASSESSMENT:   Paige Fajardo is a 52 year old female who I am continuing to see for treatment of obesity related to:  Gout, DMITRIY, asthma, lumbar radiculopathy, fibromyalgia    Started on Ozempic injection. Feel campbell longer and faster. Craving is down.   Lost 34 lbs since starting  Tolerable side effects    PLAN:   - continue Ozempic 2.0 mg weekly  - continue to work on diet and exercise    FOLLOW-UP:    4 months    Joined the call at 4/4/2023, 4:25:35 pm.  Left the call at 4/4/2023, 4:34:46 pm.  You were on the call for 9 minutes 11 seconds .    External notes/medical records independently reviewed, labs and imaging independently reviewed, medical management and tests to be discussed/communicated to patient.    Time: I spent 16 minutes spent on the date of the encounter preparing to see patient (including chart review and preparation), obtaining and or reviewing additional medical history, performing a physical exam and evaluation, documenting clinical information in the electronic health record, independently interpreting results, communicating results to the patient and coordinating care.      Sincerely,    Derek Schwab MD

## 2023-04-04 NOTE — LETTER
"2023       RE: Paige Fajardo  [address]  [place] [address]     Dear Colleague,    Thank you for referring your patient, Paige Fajardo, to the [facility] [place] [facility]. Please see a copy of my visit note below.      Return Medical Weight Management Note     Paige Fajardo  MRN:  [id number]  :  1970  VIRI:  2023    Dear Francesca Samson MD,    I had the pleasure of seeing your patient Paige Fajardo. She is a 52 year old female who I am continuing to see for treatment of obesity related to:  Gout, DMITRIY, asthma, lumbar radiculopathy, fibromyalgia        2022    10:09 AM   --   I have the following health issues associated with obesity Sleep Apnea    Asthma    Stress Incontinence   I have the following symptoms associated with obesity Knee Pain    Depression    Lower Extremity Swelling    Back Pain    Fatigue    Hip Pain       INTERVAL HISTORY:  Last seen 2022.     Currently on Ozempic 2.0 mg weekly without side effects. Weight is down 34 lbs.  She said that Ozempic made her feel campbell longer and faster. Craving is down.     Activity:limited due to hips and knees pain, join the pool    CURRENT WEIGHT:   270 lbs 0 oz    Initial Weight (lbs): 304 lbs  Last Visits Weight: 127 kg (280 lb)  Cumulative weight loss (lbs): 34  Weight Loss Percentage: 11.18%        2023     3:56 PM   Changes and Difficulties   I have made the following changes to my diet since my last visit: Not a lot. Working with Christina and that is going really good.   With regards to my diet, I am still struggling with: None.   I have made the following changes to my activity/exercise since my last visit: Going to the gym more.   With regards to my activity/exercise, I am still struggling with: I have had some pain so it has made it hard to do some stuff. Weather plays into it.       VITALS:  Ht 1.676 m (5' 6\")   Wt 122.5 kg " (270 lb)   BMI 43.58 kg/m      MEDICATIONS:   Current Outpatient Medications   Medication Sig Dispense Refill    albuterol (PROAIR HFA/PROVENTIL HFA/VENTOLIN HFA) 108 (90 Base) MCG/ACT inhaler Inhale 2 puffs into the lungs every 6 hours as needed for shortness of breath or wheezing 18 g 3    B Complex Vitamins (VITAMIN B COMPLEX PO) 3 drops per day      beclomethasone HFA (QVAR REDIHALER) 80 MCG/ACT inhaler Inhale 2 puffs into the lungs 2 times daily 10.6 g 11    benzonatate (TESSALON) 200 MG capsule Take 1 capsule (200 mg) by mouth 3 times daily as needed for cough (Patient not taking: Reported on 3/31/2023) 20 capsule 0    CALCIUM CARBONATE PO Take by mouth daily 5 drops per day      cetirizine-psuedoePHEDrine (ZYRTEC-D) 5-120 MG per tablet Take 1 tablet by mouth 2 times daily 90 tablet 3    cholecalciferol (VITAMIN D) 1000 UNIT tablet 3 drops per day      colchicine (COLCYRS) 0.6 MG tablet Take 2 tablets orally at onset of symptoms and one more tablet 1 hour later, and then one tablet twice a day until flare resolves.      COMPOUNDED NON-CONTROLLED SUBSTANCE (CMPD RX) - PHARMACY TO MIX COMPOUNDED MEDICATION LOW DOSE NALTREXONE 6MG one tablet qhs 90 capsule 4    FLUoxetine (PROZAC) 20 MG capsule Take 20 mg by mouth      gabapentin (NEURONTIN) 300 MG capsule Take 2 capsules  (600mg) in the morning and 2 capsules (600mg) at bedtime. 90day supply (Patient taking differently: Take 300 mg by mouth 2 times daily) 360 capsule 1    ipratropium - albuterol 0.5 mg/2.5 mg/3 mL (DUONEB) 0.5-2.5 (3) MG/3ML neb solution Take 1 vial (3 mLs) by nebulization every 6 hours as needed for shortness of breath or wheezing 960 mL 4    levonorgestrel (MIRENA) 20 MCG/24HR IUD 1 each by Intrauterine route once      MAGNESIUM CITRATE PO 3 drops per day      Naproxen Sodium (ALEVE PO) Take 220 mg by mouth 2 times daily as needed       order for DME Equipment being ordered: Nebulizer 1 Device 0    rizatriptan (MAXALT-MLT) 10 MG ODT tab Take  1 tablet (10 mg) by mouth at onset of headache for migraine May repeat in 2 hours. Max 3 tablets/24 hours. 18 tablet 3    Semaglutide, 2 MG/DOSE, (OZEMPIC, 2 MG/DOSE,) 8 MG/3ML SOPN Inject 2 mg Subcutaneous once a week 9 mL 3    Turmeric 450 MG CAPS 1 scoop per day             12/27/2022    12:21 PM   Weight Loss Medication History Reviewed With Patient   Which weight loss medications are you currently taking on a regular basis? Ozempic   Are you having any side effects from the weight loss medication that we have prescribed you? Yes   If you are having side effects please describe: Constipation?       ASSESSMENT:   Paige Fajardo is a 52 year old female who I am continuing to see for treatment of obesity related to:  Gout, DMITRIY, asthma, lumbar radiculopathy, fibromyalgia    Started on Ozempic injection. Feel campbell longer and faster. Craving is down.   Lost 34 lbs since starting  Tolerable side effects    PLAN:   - continue Ozempic 2.0 mg weekly  - continue to work on diet and exercise    FOLLOW-UP:    4 months    Joined the call at 4/4/2023, 4:25:35 pm.  Left the call at 4/4/2023, 4:34:46 pm.  You were on the call for 9 minutes 11 seconds .    External notes/medical records independently reviewed, labs and imaging independently reviewed, medical management and tests to be discussed/communicated to patient.    Time: I spent 16 minutes spent on the date of the encounter preparing to see patient (including chart review and preparation), obtaining and or reviewing additional medical history, performing a physical exam and evaluation, documenting clinical information in the electronic health record, independently interpreting results, communicating results to the patient and coordinating care.      Sincerely,    Derek Schwab MD

## 2023-04-04 NOTE — NURSING NOTE
Chief Complaint   Patient presents with     Follow Up     Return weight management.         Vitals:    04/04/23 1554   Weight: 270 lb       Body mass index is 43.58 kg/m .      Kelly Botello, EMT  [facility] Clinic

## 2023-04-04 NOTE — PATIENT INSTRUCTIONS
PLAN:   - continue Ozempic 2.0 mg weekly  - continue to work on diet and exercise     FOLLOW-UP:    4 months    If you have any questions, please do not hesitate to call Weight management clinic at [phone] or [phone].  If you need to fax, please fax to [phone].    Sincerely,    Derek Schwab MD  Endocrinology

## 2023-04-04 NOTE — PROGRESS NOTES
Virtual Visit Check-In    During this virtual visit the patient is located in [place], patient verifies this as the location during the entirety of this visit.     Paige is a 52 year old who is being evaluated via a billable video visit.      How would you like to obtain your AVS? MyChart  If the video visit is dropped, the invitation should be resent by: Text to cell phone: [phone]  Will anyone else be joining your video visit? No        Video-Visit Details    Type of service:  Video Visit     Originating Location (pt. Location): Home    Distant Location (provider location):  Off-site  Platform used for Video Visit: Malathi Schmitz, EMT

## 2023-04-07 ENCOUNTER — TELEPHONE (OUTPATIENT)
Dept: ENDOCRINOLOGY | Facility: CLINIC | Age: 53
End: 2023-04-07
Payer: COMMERCIAL

## 2023-04-14 ENCOUNTER — VIRTUAL VISIT (OUTPATIENT)
Dept: ENDOCRINOLOGY | Facility: CLINIC | Age: 53
End: 2023-04-14

## 2023-04-14 DIAGNOSIS — E66.9 OBESITY: ICD-10-CM

## 2023-04-14 DIAGNOSIS — Z71.3 NUTRITIONAL COUNSELING: Primary | ICD-10-CM

## 2023-04-14 PROCEDURE — 97803 MED NUTRITION INDIV SUBSEQ: CPT | Mod: VID | Performed by: DIETITIAN, REGISTERED

## 2023-04-14 PROCEDURE — 99207 PR NO CHARGE LOS: CPT | Mod: VID | Performed by: DIETITIAN, REGISTERED

## 2023-04-14 NOTE — PROGRESS NOTES
"Paige Fajardo is a 52 year old female who is being evaluated via a billable video visit.      The patient has been notified of following:     \"This video visit will be conducted via a call between you and your physician/provider. We have found that certain health care needs can be provided without the need for an in-person physical exam.  This service lets us provide the care you need with a video conversation.  If a prescription is necessary we can send it directly to your pharmacy.  If lab work is needed we can place an order for that and you can then stop by our lab to have the test done at a later time.    Video visits are billed at different rates depending on your insurance coverage.  Please reach out to your insurance provider with any questions.    If during the course of the call the physician/provider feels a video visit is not appropriate, you will not be charged for this service.\"    How would you like to obtain your AVS? MyChart  If the video visit is dropped, the invitation should be resent by: Text to cell phone:  [phone]  Will anyone else be joining your video visit? No      Video-Visit Details    Type of service:  Video Visit    Video Start Time: 8:34 am   Video End Time: 9:04 am    Originating Location (pt. Location): Home    Distant Location (provider location):  Offsite    Platform used for Video Visit: Cap That    During this virtual visit the patient is located in [place], patient verifies this as the location during the entirety of this visit.      Weight Management Nutrition Consultation    Paige Fajardo is a 52 year old female presents today for weight management nutrition consultation.  Patient referred by Dr. Reed on August 2, 2022.    Patient with Co-morbidities of obesity including:  Type II DM no  Renal Failure no  Sleep apnea yes  Hypertension no   Dyslipidemia no  Joint pain no  Back pain yes  GERD no     PMH:   Depression  Swelling  Fatigue  Gout   Asthma  Fibromyalgia   Covid at " "least 2x  ? Weight gain associated with prednisone (was on for covid and gout)      Hx of alcohol abuse 20 years ago    Anthropometrics:  Highest weight per pt: 338 lbs  Weight 8/2/22: 304 lbs with BMI 49.07    Estimated body mass index is 43.58 kg/m  as calculated from the following:    Height as of 4/4/23: 1.676 m (5' 6\").    Weight as of 4/4/23: 122.5 kg (270 lb).     Current weight: 268 lbs, pt report  - Down 4 sizes now per pt  - Feeling a lot better physically/pain wise    Medications for Weight Loss:  Ozempic prescribed 8/2/22 - just got 2.0 mg dose 1/5/23    Supplements:  Vit D 3,000 IUs/day - Usually decreases as weather gets nice  Turmeric   B-complex  Calcium carbonate - only if stomach is upset   Mg Citrate     Labs 6/20/22  ? CBC off (just getting over covid)      Labs 5/17/22  ? GFR 90 (will watch - likely dehydrated due to covid)    Labs 8/24/22  - Ferritin low end of normal at 25  -GFR 90  -Alk phos low    Hx of high cholesterol     Labs 3/10/23 (when in ED for dehydration)  Calcium slightly elevated at 10.6  eGFR 81    Use to donate platelets every 2 weeks typically, but not since May due to covid and low iron.    NUTRITION HISTORY    NKFA  Limits dairy - notices clogged sinuses and worsening asthma symptoms per pt.   Does use dairy alternatives.     Has not met with a RD before.    Pt goals: lose weight, learn about food choices (what is more nutritious), feel better    Typical day  B - cereal, fruit, protein (yogurt or egg or meat stick)  L - leftovers OR salad OR sandwich   D - varies - goes out Monday/Friday. If at home -  cooks.  Snack: nuts OR carrots OR popcorn OR more nutritious chip per pt    Fluids: 1-2 cups of coffee with non-dairy milk.     PA: limited due to back pain. Does relaxation yoga 1x/week  Starting PT    Oct 2022:  Started ozempic, going well. Not as hungry, earlier satiety. Helping with portion control.    Down 15 lbs since starting per pt. Mobility improving    Eating " 3 meals/day.  went back to school so she is now making dinners. Trying to follow plate method.     Recent recall  B - granola, oatmeal or cereal with berries (1/2 of what she use to be able to eat), coffee with unsweetened almond or oat milk   L - salad with meat, nuts or seeds and peas/sweet potatoes for carbohydrate (didn t realize it was a carb previously) OR leftovers   D - salmon or chicken breast with vegetables (broccoli, [place], salad) and carbs (quinoa, brown rice, couscous, potatoes)  Snacks - if hungry, cheese or meat stick, fruit, veggies (has lots of tomatoes from garden right now)    Using lots of fresh herbs and vegetables from her garden  Treats occ but not keeping as many in house.   Catered dinners at [employer] on Wednesdays - noticing they serve very large portions but have been serving more nutritious options (use to be pizza, now food from a local diner)    Looking at food differently - energy as opposed to as just food.     Switched from cooking with butter to olive oil.    Going to [place] [employer] in December for 2 weeks- purposely didn t get an all inclusive to try to be healthier.     Fluids: water, la croix occ, 1-2 cups of coffee with non-dairy milk    PA: doing PT, feeling stronger    Dec 2022:  Nutrition going well per pt. Feels more energized.     Bad hip pain, was recommended steroids but refused (fearful of weight gain). Did a cortisone shot instead. Very immobile right now. PA limited. Plans to start yoga this month.    Leaves for [employer] trip next week. Plans to go to [employer] first day to buy fruit/vegetables. Reports they eat really well while there - lots of fresh food and fish/meats.     Running out of ozempic. Plans to call pharmacy today to try to get more. Side effects improving - still at 1 mg dose.     Trying to make better choices - side salad instead of fries. Splitting meals with  when out to eat.     Dinner last night: out to eat - turkey oscar melt     [place]  2023:  Continues to have good portion control and mindful of choices. Feels this is going really well.     Did well with moderation at [employer] - broke a bunch of cookies into pieces so she could try a little of them all     Trying to eat slower - helps with noticing satiety. Gets nausea if overeating. Using smaller bowls.    Trying to make homemade food as much as possible     BM: constipation, got worse when in [place]. Does well with fruits/vegetable intake and hydration. Tries to get probiotics in yogurt (non-dairy yogurt)  ? She is curious if related to ozempic. Seeing Ruthie later this month.    Having blood noses - seeing ENT next month. Got worse after scuba diving in [place]    PA: Finished PT - doing well. Can lift 150 lbs now. No back spasms. Gym membership started Jan 1st, super nice. All low impact machines. Therapeutic pool     Patient shared some negative experiences she had at [facility]. She is considering looking for a new PCP within [place] but wishes there was a [facility] clinic closer to her home.     Feb 2023:  Things going well overall.     Continues with mindfulness with choices due to small portions. Trying to ensure she is meeting her needs.    Does not tolerate foods that expand as well (pasta, bread, rice, etc). Trying to avoid or do smaller portions.    PA: continues with gym - doing water aerobics 3x/week.  - Good accountability, they call if people miss class to make sure you are okay    Barriers: weather (cold makes it harder for her to breath)  - humidity in pool at gym is helpful but getting there is hard    Stress/mental health good overall per pt. Work busier this time of year and manager is leaving but she is doing well. Doing well with self care - massages, gym, etc.    April 2023:    Continues with Monday Mindfulness with [place] and recovery yoga.      tends to bring tempting foods into house.  Feels good about fruits, vegetables and protein. Trying to do lean protein more  often - fish, poultry.    Started her garden - leafy greens, tomatoes, cucumbers, etc.  Continues to try to stay away from breads. Incorporating cereals and oatmeal, etc.    Hydration: lots of water    PA: hip continues to bug her, is using pool. Lots of yard work to do     More fatigue. Checked iron - looks good.     Previous goals:  1. Continue with activity as able/tolerated (gym, hiking/walking when weather gets nice, 5k trail run if able) - Limited by hip pain. Continues with pool   2. Consider couch to 5k application   3. Continue with mindfulness and portion control (chewing, choices, pace, etc) -   4. 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   has diabetes     Donates platelets every 2 weeks on avg    Nutrition Prescription  Recommended energy/nutrient modification.    Nutrition Diagnosis  Food and nutrition related knowledge deficit r/t lack of prior exposure to nutrition education aeb pt report and interest in learning     Obesity r/t long history of positive energy balance aeb BMI >30.    Nutrition Intervention  Reviewed and modified previous goals  Answered pt questions  Coordination of care   Nutrition education - Plate method, types of vegetables, types of fat, label reading, dietary sources of iron, ways to incorporate PA (My-Apps ami)  AVS and handouts via [employer]    Patient demonstrates understanding.    Expected Engagement: good    Nutrition Goals  1. Consider PT for hip. Recommend checking in further with Dr. Samson on hip pain.  2.. Call back to look into research study Dr. Reed had told you about if interested  3. FYIs for us to keep in mind - calcium level slightly elevated and GFR slightly low (dehyrated at time)  4. Continue with  previous goals - mindfulness, food choice/portoins.     Keep up the hard work!    Long-term goal: 5k     Coping: reading, planning garden, sewing/quilting     High-iron foods:  100% iron-fortified ready-to-eat cereal   cup, 18 mg  Grits, instant   cup, 7.1   Cream of wheat   cup, 5.2   Oatmeal, instant   cup, 5 mg  Soybeans, cooked   cup, 4.4 mg  White beans, canned   cup, 3.9 mg   Lentils   cup, 3.3 mg  White rice 1/3 cup, 3 mg  Spinach   cup cooked, 1 cup raw, 3 mg   Beef tenderloin 3 oz, 3 mg  Baked beans 1/3 cup, 3 mg  Vegetable or soy burger 1 sarah, 2.9 mg  Soy milk 1 cup (8 oz), 2.7 mg   Chickpeas   cup, 2.5 mg  Kidney beans   cup, 2.5 mg  Sardines 3 oz, 2.5 mg   Nuts: almonds or pistachios   cup, 1.3 mg   [place] sprouts, cooked   cup, 1 mg   Egg 1 whole, 1 mg    Some good sources on nut comparison:   https://www.[employer]/sites/default/files/2020-04/nutrient_comparison_chart_for_tree_nuts_redesign.pdf     https://www.[employer]/Images/nutrition/Tree_Nut%20Nutrient_Comparison_Chart.pdf        The Plate Method:  https://www.cdc.gov/diabetes/images/managing/Diabetes-Manage-Eat-Well-Plate-Graphic_600px.jpg     Starchy vegetables  ? Potatoes   ? Sweet potatoes  ? Green peas  ? Chickpeas   ? Corn  ? Lentils  ? Beans (black, domingo, etc)     Types of fats  https://www.\[facility]\""h.[place].edu/nutritionsource/what-should-you-eat/fats-and-cholesterol/types-of-fat/      Good resources on inflammation    https://www.health.[employer].edu/staying-healthy/foods-that-fight-inflammation     https://www.heart.org/en/healthy-living/healthy-eating/eat-smart/nutrition-basics/mediterranean-diet     https://oldwayspt.org/traditional-diets/mediterranean-diet     https://my.[facility].org/health/articles/72687-mvcfejjvuvdhr-wwtz     Good article on label reading   https://www.fda.gov/food/new-nutrition-facts-label/how-understand-and-use-nutrition-facts-label     Additional resources:     Protein Sources    [email or web address]     Carbohydrates  [email or web address]     Mindful Eating  [email or web address]     Summary of Volumetrics Eating Plan  [email or web address]     Follow-Up:  Thursday, June 8th at 8:30 am    Time spent with patient: 30 minutes.  AARON oTm, RD, LD

## 2023-04-14 NOTE — PATIENT INSTRUCTIONS
Nutrition Goals  1. Consider PT for hip. Recommend checking in further with Dr. Samson on hip pain.  2.. Call back to look into research study Dr. Reed had told you about if interested  3. FYIs for us to keep in mind - calcium level slightly elevated and GFR slightly low (dehyrated at time)  4. Continue with previous goals - mindfulness, food choice/portoins.     Keep up the hard work!    Long-term goal: 5k     Coping: reading, planning garden, sewing/quilting     High-iron foods:  100% iron-fortified ready-to-eat cereal   cup, 18 mg  Grits, instant   cup, 7.1   Cream of wheat   cup, 5.2   Oatmeal, instant   cup, 5 mg  Soybeans, cooked   cup, 4.4 mg  White beans, canned   cup, 3.9 mg   Lentils   cup, 3.3 mg  White rice 1/3 cup, 3 mg  Spinach   cup cooked, 1 cup raw, 3 mg   Beef tenderloin 3 oz, 3 mg  Baked beans 1/3 cup, 3 mg  Vegetable or soy burger 1 sarah, 2.9 mg  Soy milk 1 cup (8 oz), 2.7 mg   Chickpeas   cup, 2.5 mg  Kidney beans   cup, 2.5 mg  Sardines 3 oz, 2.5 mg   Nuts: almonds or pistachios   cup, 1.3 mg   [place] sprouts, cooked   cup, 1 mg   Egg 1 whole, 1 mg    Some good sources on nut comparison:   https://www.[employer]/sites/default/files/2020-04/nutrient_comparison_chart_for_tree_nuts_redesign.pdf     https://www.[employer]/Images/nutrition/Tree_Nut%20Nutrient_Comparison_Chart.pdf        The Plate Method:  https://www.cdc.gov/diabetes/images/managing/Diabetes-Manage-Eat-Well-Plate-Graphic_600px.jpg     Starchy vegetables  Potatoes   Sweet potatoes  Green peas  Chickpeas   Corn  Lentils  Beans (black, domingo, etc)     Types of fats  https://www.[facility] [place].edu/nutritionsource/what-should-you-eat/fats-and-cholesterol/types-of-fat/      Good resources on inflammation    https://www.health.[place].edu/staying-healthy/foods-that-fight-inflammation     https://www.heart.org/en/healthy-living/healthy-eating/eat-smart/nutrition-basics/mediterranean-diet      https://old1o1Mediapt.org/traditional-diets/mediterranean-diet     https://my.[facility].org/health/articles/38744-syhiavkiclbxi-yxqe     Good article on label reading   https://www.fda.gov/food/new-nutrition-facts-label/how-understand-and-use-nutrition-facts-label     Additional resources:     Protein Sources   [email or web address]     Carbohydrates  [email or web address]     Mindful Eating  [email or web address]     Summary of Volumetrics Eating Plan  [email or web address]     Follow-Up:  Thursday, June 8th at 8:30 am    AARON Dunne, RD, LD  Clinic #: [phone]

## 2023-04-14 NOTE — LETTER
"4/14/2023       RE: Paige Fajardo  [address]  [facility] [address]     Dear Colleague,    Thank you for referring your patient, Paige Fajardo, to the [facility] WEIGHT MANAGEMENT CLINIC [place] at [facility]. Please see a copy of my visit note below.    Paige Fajardo is a 52 year old female who is being evaluated via a billable video visit.      The patient has been notified of following:     \"This video visit will be conducted via a call between you and your physician/provider. We have found that certain health care needs can be provided without the need for an in-person physical exam.  This service lets us provide the care you need with a video conversation.  If a prescription is necessary we can send it directly to your pharmacy.  If lab work is needed we can place an order for that and you can then stop by our lab to have the test done at a later time.    Video visits are billed at different rates depending on your insurance coverage.  Please reach out to your insurance provider with any questions.    If during the course of the call the physician/provider feels a video visit is not appropriate, you will not be charged for this service.\"    How would you like to obtain your AVS? MyChart  If the video visit is dropped, the invitation should be resent by: Text to cell phone:  [phone]  Will anyone else be joining your video visit? No      Video-Visit Details    Type of service:  Video Visit    Video Start Time: 8:34 am   Video End Time: 9:04 am    Originating Location (pt. Location): Home    Distant Location (provider location):  Offsite    Platform used for Video Visit: MyCare    During this virtual visit the patient is located in [place], patient verifies this as the location during the entirety of this visit.      Weight Management Nutrition Consultation    Paige Fajardo is a 52 year old female presents today for weight management nutrition " "consultation.  Patient referred by Dr. Reed on August 2, 2022.    Patient with Co-morbidities of obesity including:  Type II DM no  Renal Failure no  Sleep apnea yes  Hypertension no   Dyslipidemia no  Joint pain no  Back pain yes  GERD no     PMH:   Depression  Swelling  Fatigue  Gout   Asthma  Fibromyalgia   Covid at least 2x  Weight gain associated with prednisone (was on for covid and gout)      Hx of alcohol abuse 20 years ago    Anthropometrics:  Highest weight per pt: 338 lbs  Weight 8/2/22: 304 lbs with BMI 49.07    Estimated body mass index is 43.58 kg/m  as calculated from the following:    Height as of 4/4/23: 1.676 m (5' 6\").    Weight as of 4/4/23: 122.5 kg (270 lb).     Current weight: 268 lbs, pt report  - Down 4 sizes now per pt  - Feeling a lot better physically/pain wise    Medications for Weight Loss:  Ozempic prescribed 8/2/22 - just got 2.0 mg dose 1/5/23    Supplements:  Vit D 3,000 IUs/day - Usually decreases as weather gets nice  Turmeric   B-complex  Calcium carbonate - only if stomach is upset   Mg Citrate     Labs 6/20/22  CBC off (just getting over covid)      Labs 5/17/22  GFR 90 (will watch - likely dehydrated due to covid)    Labs 8/24/22  - Ferritin low end of normal at 25  -GFR 90  -Alk phos low    Hx of high cholesterol     Labs 3/10/23 (when in ED for dehydration)  Calcium slightly elevated at 10.6  eGFR 81    Use to donate platelets every 2 weeks typically, but not since May due to covid and low iron.    NUTRITION HISTORY    NKFA  Limits dairy - notices clogged sinuses and worsening asthma symptoms per pt.   Does use dairy alternatives.     Has not met with a RD before.    Pt goals: lose weight, learn about food choices (what is more nutritious), feel better    Typical day  B - cereal, fruit, protein (yogurt or egg or meat stick)  L - leftovers OR salad OR sandwich   D - varies - goes out Monday/Friday. If at home -  cooks.  Snack: nuts OR carrots OR popcorn OR more " nutritious chip per pt    Fluids: 1-2 cups of coffee with non-dairy milk.     PA: limited due to back pain. Does relaxation yoga 1x/week  Starting PT    Oct 2022:  Started ozempic, going well. Not as hungry, earlier satiety. Helping with portion control.    Down 15 lbs since starting per pt. Mobility improving    Eating 3 meals/day.  went back to school so she is now making dinners. Trying to follow plate method.     Recent recall  B - granola, oatmeal or cereal with berries (1/2 of what she use to be able to eat), coffee with unsweetened almond or oat milk   L - salad with meat, nuts or seeds and peas/sweet potatoes for carbohydrate (didn t realize it was a carb previously) OR leftovers   D - salmon or chicken breast with vegetables (broccoli, [place], salad) and carbs (quinoa, brown rice, couscous, potatoes)  Snacks - if hungry, cheese or meat stick, fruit, veggies (has lots of tomatoes from garden right now)    Using lots of fresh herbs and vegetables from her garden  Treats occ but not keeping as many in house.   Catered dinners at [facility] on Wednesdays - noticing they serve very large portions but have been serving more nutritious options (use to be pizza, now food from a local diner)    Looking at food differently - energy as opposed to as just food.     Switched from cooking with butter to olive oil.    Going to [place] in December for 2 weeks- purposely didn t get an all inclusive to try to be healthier.     Fluids: water, la croix occ, 1-2 cups of coffee with non-dairy milk    PA: doing PT, feeling stronger    Dec 2022:  Nutrition going well per pt. Feels more energized.     Bad hip pain, was recommended steroids but refused (fearful of weight gain). Did a cortisone shot instead. Very immobile right now. PA limited. Plans to start yoga this month.    Leaves for [place] trip next week. Plans to go to [employer] first day to buy fruit/vegetables. Reports they eat really well while there - lots of fresh food  and fish/meats.     Running out of ozempic. Plans to call pharmacy today to try to get more. Side effects improving - still at 1 mg dose.     Trying to make better choices - side salad instead of fries. Splitting meals with  when out to eat.     Dinner last night: out to eat - turkey oscar melt     Jan 2023:  Continues to have good portion control and mindful of choices. Feels this is going really well.     Did well with moderation at [place] - broke a bunch of cookies into pieces so she could try a little of them all     Trying to eat slower - helps with noticing satiety. Gets nausea if overeating. Using smaller bowls.    Trying to make homemade food as much as possible     BM: constipation, got worse when in [place]. Does well with fruits/vegetable intake and hydration. Tries to get probiotics in yogurt (non-dairy yogurt)  She is curious if related to ozempic. Seeing Ruthie later this month.    Having blood noses - seeing ENT next month. Got worse after scuba diving in [place]    PA: Finished PT - doing well. Can lift 150 lbs now. No back spasms. Gym membership started Jan 1st, super nice. All low impact machines. Therapeutic pool     Patient shared some negative experiences she had at [facility]. She is considering looking for a new PCP within [place] but wishes there was a [place] clinic closer to her home.     Feb 2023:  Things going well overall.     Continues with mindfulness with choices due to small portions. Trying to ensure she is meeting her needs.    Does not tolerate foods that expand as well (pasta, bread, rice, etc). Trying to avoid or do smaller portions.    PA: continues with gym - doing water aerobics 3x/week.  - Good accountability, they call if people miss class to make sure you are okay    Barriers: weather (cold makes it harder for her to breath)  - humidity in pool at gym is helpful but getting there is hard    Stress/mental health good overall per pt. Work busier this time of year and  manager is leaving but she is doing well. Doing well with self care - massages, gym, etc.    April 2023:    Continues with Monday Mindfulness with [place] and recovery yoga.      tends to bring tempting foods into house.  Feels good about fruits, vegetables and protein. Trying to do lean protein more often - fish, poultry.    Started her garden - leafy greens, tomatoes, cucumbers, etc.  Continues to try to stay away from breads. Incorporating cereals and oatmeal, etc.    Hydration: lots of water    PA: hip continues to bug her, is using pool. Lots of yard work to do     More fatigue. Checked iron - looks good.     Previous goals:  1. Continue with activity as able/tolerated (gym, hiking/walking when weather gets nice, 5k trail run if able) - Limited by hip pain. Continues with pool   2. Consider couch to 5k application   3. Continue with mindfulness and portion control (chewing, choices, pace, etc) -   4. 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   has diabetes     Donates platelets every 2 weeks on avg    Nutrition Prescription  Recommended energy/nutrient modification.    Nutrition Diagnosis  Food and nutrition related knowledge deficit r/t lack of prior exposure to nutrition education aeb pt report and interest in learning     Obesity r/t long history of positive energy balance aeb BMI >30.    Nutrition Intervention  Reviewed and modified previous goals  Answered pt questions  Coordination of care   Nutrition education - Plate method, types of vegetables, types of fat, label reading, dietary sources of iron, ways to incorporate PA ([employer] ami)  AVS and handouts via [employer]    Patient demonstrates understanding.    Expected Engagement:  good    Nutrition Goals  1. Consider PT for hip. Recommend checking in further with Dr. Samson on hip pain.  2.. Call back to look into research study Dr. Reed had told you about if interested  3. FYIs for us to keep in mind - calcium level slightly elevated and GFR slightly low (dehyrated at time)  4. Continue with previous goals - mindfulness, food choice/portoins.     Keep up the hard work!    Long-term goal: 5k     Coping: reading, planning garden, sewing/quilting     High-iron foods:  100% iron-fortified ready-to-eat cereal   cup, 18 mg  Grits, instant   cup, 7.1   Cream of wheat   cup, 5.2   Oatmeal, instant   cup, 5 mg  Soybeans, cooked   cup, 4.4 mg  White beans, canned   cup, 3.9 mg   Lentils   cup, 3.3 mg  White rice 1/3 cup, 3 mg  Spinach   cup cooked, 1 cup raw, 3 mg   Beef tenderloin 3 oz, 3 mg  Baked beans 1/3 cup, 3 mg  Vegetable or soy burger 1 sarah, 2.9 mg  Soy milk 1 cup (8 oz), 2.7 mg   Chickpeas   cup, 2.5 mg  Kidney beans   cup, 2.5 mg  Sardines 3 oz, 2.5 mg   Nuts: almonds or pistachios   cup, 1.3 mg   [place] sprouts, cooked   cup, 1 mg   Egg 1 whole, 1 mg    Some good sources on nut comparison:   https://www.[employer]/sites/default/files/2020-04/nutrient_comparison_chart_for_tree_nuts_redesign.pdf     https://www.[employer]Gift/Images/nutrition/Tree_Nut%20Nutrient_Comparison_Chart.pdf        The Plate Method:  https://www.cdc.gov/diabetes/images/managing/Diabetes-Manage-Eat-Well-Plate-Graphic_600px.jpg     Starchy vegetables  Potatoes   Sweet potatoes  Green peas  Chickpeas   Corn  Lentils  Beans (black, domingo, etc)     Types of fats  https://www.[facility].[place].edu/nutritionsource/what-should-you-eat/fats-and-cholesterol/types-of-fat/      Good resources on inflammation    https://www.health.[place].edu/staying-healthy/foods-that-fight-inflammation     https://www.heart.org/en/healthy-living/healthy-eating/eat-smart/nutrition-basics/mediterranean-diet      https://oldSquareOne Mailpt.org/traditional-diets/mediterranean-diet     https://my.[facility].org/health/articles/94577-nkqkxkqfwvkfc-jayq     Good article on label reading   https://www.fda.gov/food/new-nutrition-facts-label/how-understand-and-use-nutrition-facts-label     Additional resources:     Protein Sources   http://BG Networking/652340.pdf     Carbohydrates  http://[email or web address]     Mindful Eating  http://BG Networking/982879.pdf     Summary of Volumetrics Eating Plan  [email or web address]://[email or web address]     Follow-Up:  Thursday, June 8th at 8:30 am    Time spent with patient: 30 minutes.  AARON Tom, RD, LD

## 2023-06-05 ENCOUNTER — TELEPHONE (OUTPATIENT)
Dept: ENDOCRINOLOGY | Facility: CLINIC | Age: 53
End: 2023-06-05
Payer: COMMERCIAL

## 2023-06-05 NOTE — TELEPHONE ENCOUNTER
[facility]    Fax: [phone]    Spec: [phone]    MO: [phone]

## 2023-06-08 NOTE — TELEPHONE ENCOUNTER
PA Initiation    Medication: OZEMPIC (2 MG/DOSE) 8 MG/3ML SC SOPN  Insurance Company: [employer] - Phone [phone] Fax [phone]  Pharmacy Filling the Rx: [facility] - [place], [place] - [facility] [address]  Filling Pharmacy Phone: [phone]  Filling Pharmacy Fax: [phone]  Start Date: 6/8/2023

## 2023-06-08 NOTE — TELEPHONE ENCOUNTER
PRIOR AUTHORIZATION DENIED    Medication: OZEMPIC (2 MG/DOSE) 8 MG/3ML SC SOPN    Insurance Company: [employer] - Phone [phone] Fax [phone]    Denial Date: 6/8/2023    Denial Rational: Patient is not using to treat type 2 diabetes.     Appeal Information:

## 2023-06-12 DIAGNOSIS — E66.01 MORBID OBESITY (H): Primary | ICD-10-CM

## 2023-06-13 ENCOUNTER — TELEPHONE (OUTPATIENT)
Dept: ENDOCRINOLOGY | Facility: CLINIC | Age: 53
End: 2023-06-13
Payer: COMMERCIAL

## 2023-06-13 NOTE — TELEPHONE ENCOUNTER
Unable to complete on CMM.    Called [phone] and started PA over the phone. Plan will fax any information over within 24-72 hours.    PA Initiation    Medication: WEGOVY 1.7 MG/0.75ML SC SOAJ  Insurance Company: [email or web address] - Phone [phone] Fax [phone]  Pharmacy Filling the Rx: [facility] - [place], [place] - [address]  Filling Pharmacy Phone:    Filling Pharmacy Fax:    Start Date: 6/13/2023

## 2023-06-14 NOTE — TELEPHONE ENCOUNTER
Received PA form from insurance. Completed and faxed back with last 2 visit notes to [phone].

## 2023-06-15 NOTE — TELEPHONE ENCOUNTER
Prior Authorization Approval    Medication: WEGOVY 1.7 MG/0.75ML SC SOAJ  Authorization Effective Date: 6/15/2023  Authorization Expiration Date: 1/4/2024  Approved Dose/Quantity: 4 pens per 28 days  Reference #:     Insurance Company: [employer] - Phone [phone] Fax [phone]  Expected CoPay:       CoPay Card Available:      Financial Assistance Needed: None  Which Pharmacy is filling the prescription: [facility] - [place] [address]  Pharmacy Notified: Yes  Patient Notified: Yes

## 2023-07-27 ENCOUNTER — TELEPHONE (OUTPATIENT)
Dept: ENDOCRINOLOGY | Facility: CLINIC | Age: 53
End: 2023-07-27
Payer: COMMERCIAL

## 2023-08-04 ENCOUNTER — VIRTUAL VISIT (OUTPATIENT)
Dept: ENDOCRINOLOGY | Facility: CLINIC | Age: 53
End: 2023-08-04
Payer: COMMERCIAL

## 2023-08-04 VITALS — BODY MASS INDEX: 41.78 KG/M2 | HEIGHT: 66 IN | WEIGHT: 260 LBS

## 2023-08-04 DIAGNOSIS — E66.9 OBESITY: ICD-10-CM

## 2023-08-04 DIAGNOSIS — Z71.3 NUTRITIONAL COUNSELING: Primary | ICD-10-CM

## 2023-08-04 PROCEDURE — 99207 PR NO CHARGE LOS: CPT | Mod: VID | Performed by: DIETITIAN, REGISTERED

## 2023-08-04 PROCEDURE — 97803 MED NUTRITION INDIV SUBSEQ: CPT | Mod: VID | Performed by: DIETITIAN, REGISTERED

## 2023-08-04 ASSESSMENT — PAIN SCALES - GENERAL: PAINLEVEL: SEVERE PAIN (6)

## 2023-08-04 NOTE — NURSING NOTE
Is the patient currently in the state of [place]? YES    Visit mode:VIDEO    If the visit is dropped, the patient can be reconnected by: VIDEO VISIT: Send to e-mail at: [email or web address]    Will anyone else be joining the visit? NO      How would you like to obtain your AVS? MyChart    Are changes needed to the allergy or medication list? NO    Reason for visit: Follow Up

## 2023-08-04 NOTE — LETTER
"8/4/2023       RE: Paige Fajardo  [address]  [facility] [address]     Dear Colleague,    Thank you for referring your patient, Paige Fajardo, to the [facility] WEIGHT MANAGEMENT CLINIC [place] at [facility]. Please see a copy of my visit note below.    Paige Fajardo is a 52 year old female who is being evaluated via a billable video visit.      The patient has been notified of following:     \"This video visit will be conducted via a call between you and your physician/provider. We have found that certain health care needs can be provided without the need for an in-person physical exam.  This service lets us provide the care you need with a video conversation.  If a prescription is necessary we can send it directly to your pharmacy.  If lab work is needed we can place an order for that and you can then stop by our lab to have the test done at a later time.    Video visits are billed at different rates depending on your insurance coverage.  Please reach out to your insurance provider with any questions.    If during the course of the call the physician/provider feels a video visit is not appropriate, you will not be charged for this service.\"    How would you like to obtain your AVS? MyChart  If the video visit is dropped, the invitation should be resent by: Text to cell phone:  [phone]  Will anyone else be joining your video visit? No      Video-Visit Details    Type of service:  Video Visit    Video Start Time:  9:36 am  Video End Time: 10:12 am    Originating Location (pt. Location): Home    Distant Location (provider location):  Offsite    Platform used for Video Visit: Edfolio    During this virtual visit the patient is located in [place], patient verifies this as the location during the entirety of this visit.      Weight Management Nutrition Consultation    Paige Fajardo is a 52 year old female presents today for weight management nutrition " "consultation.  Patient referred by Dr. Reed on August 2, 2022.    Patient with Co-morbidities of obesity including:  Type II DM no  Renal Failure no  Sleep apnea yes  Hypertension no   Dyslipidemia no  Joint pain no  Back pain yes  GERD no     PMH:   Depression  Swelling  Fatigue  Gout   Asthma  Fibromyalgia   Covid at least 2x  Weight gain associated with prednisone (was on for covid and gout)      Hx of alcohol abuse 20 years ago    Anthropometrics:  Highest weight per pt: 338 lbs  Weight 8/2/22: 304 lbs with BMI 49.07    Estimated body mass index is 41.99 kg/m  as calculated from the following:    Height as of this encounter: 1.676 m (5' 5.98\").    Weight as of this encounter: 117.9 kg (260 lb).     Current weight: 260 lbs, pt report  - Down 8 lbs since last visit, 78 from highest weight  - Down 4+ sizes now per pt  - Feeling a lot better physically/pain wise    Medications for Weight Loss:  Just switched from Ozempic to Wegovy     Supplements:  Vit D 3,000 IUs/day - Usually decreases as weather gets nice  Turmeric   B-complex  Calcium carbonate - only if stomach is upset   Mg Citrate     Labs 6/20/22  CBC off (just getting over covid)      Labs 5/17/22  GFR 90 (will watch - likely dehydrated due to covid)    Labs 8/24/22  - Ferritin low end of normal at 25  -GFR 90  -Alk phos low    Hx of high cholesterol     Labs 3/10/23 (when in ED for dehydration)  Calcium slightly elevated at 10.6  eGFR 81    NUTRITION HISTORY    NKFA  Limits dairy - notices clogged sinuses and worsening asthma symptoms per pt.   Does use dairy alternatives.     Has not met with a RD before.    Pt goals: lose weight, learn about food choices (what is more nutritious), feel better    Please see previous RD notes for more information.     April 2023:    Continues with Monday Mindfulness with [place] and recovery yoga.      tends to bring tempting foods into house.  Feels good about fruits, vegetables and protein. Trying to do lean " "protein more often - fish, poultry.    Started her garden - leafy greens, tomatoes, cucumbers, etc.  Continues to try to stay away from breads. Incorporating cereals and oatmeal, etc.    Hydration: lots of water    PA: hip continues to bug her, is using pool. Lots of yard work to do     More fatigue. Checked iron - looks good.     August 2023    Was on mission trip last [place] - no control over food. Lots of carbohydrates and \"kid foods\" per pt. Tried to eat fruits/vegetables - made a lettuce sandwich some days over bread.    Just switched to Wegovy - noticing increased fullness already.     Doing well with balance overall. Feels she might not be getting enough protein some days.   Lots of vegetables from her garden - making vegetable soups and freezing for winter.    Hydration: lots of water     PA: continues with bad hip pain, doing [employer] yoga class on Tuesdays  -  working on strengthening hip, seeing Dr. Hu in future.  - Feeling a lot better overall, less SOB    Previous goals:  1. Consider PT for hip. Recommend checking in further with Dr. Samson on hip pain. - Did PT for hip but not getting better per pt. Working on strengthening. Seeing Dr. Hu to check in further.   2.. Call back to look into research study Dr. Reed had told you about if interested  3. FYIs for us to keep in mind - calcium level slightly elevated and GFR slightly low (dehyrated at time) - Discussed again today, no recent labs.   4. Continue with previous goals - mindfulness, food choice/portions  - Continues, doing yoga on Tuesday and Mindfulness Mondays through work.    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   has diabetes     Nutrition Prescription  Recommended energy/nutrient modification.    Nutrition Diagnosis  Food and nutrition related knowledge deficit r/t lack of prior exposure to " nutrition education aeb pt report and interest in learning     Obesity r/t long history of positive energy balance aeb BMI >30.    Nutrition Intervention  Reviewed and modified previous goals  Answered pt questions  Coordination of care   Nutrition education - Plate method, types of vegetables, types of fat, label reading, dietary sources of iron, ways to incorporate PA ([employer]on ami)  AVS and handouts via [employer]    Patient demonstrates understanding.    Expected Engagement: good    Nutrition Goals  Check in on Hip (Seeing Dr. Hu 8/28/23)  Continue with previous goals (hydration, food choices, portions, mindfulness)    Keep up the hard work!    Long-term goal: 5k     Coping: reading, planning garden, sewing/quilting     High-iron foods:  100% iron-fortified ready-to-eat cereal   cup, 18 mg  Grits, instant   cup, 7.1   Cream of wheat   cup, 5.2   Oatmeal, instant   cup, 5 mg  Soybeans, cooked   cup, 4.4 mg  White beans, canned   cup, 3.9 mg   Lentils   cup, 3.3 mg  White rice 1/3 cup, 3 mg  Spinach   cup cooked, 1 cup raw, 3 mg   Beef tenderloin 3 oz, 3 mg  Baked beans 1/3 cup, 3 mg  Vegetable or soy burger 1 sarah, 2.9 mg  Soy milk 1 cup (8 oz), 2.7 mg   Chickpeas   cup, 2.5 mg  Kidney beans   cup, 2.5 mg  Sardines 3 oz, 2.5 mg   Nuts: almonds or pistachios   cup, 1.3 mg   [place] sprouts, cooked   cup, 1 mg   Egg 1 whole, 1 mg    Some good sources on nut comparison:   https://www.[employer]/sites/default/files/2020-04/nutrient_comparison_chart_for_tree_nuts_redesign.pdf     https://www.[employer]/Images/nutrition/Tree_Nut%20Nutrient_Comparison_Chart.pdf        The Plate Method:  https://www.cdc.gov/diabetes/images/managing/Diabetes-Manage-Eat-Well-Plate-Graphic_600px.jpg     Starchy vegetables  Potatoes   Sweet potatoes  Green peas  Chickpeas   Corn  Lentils  Beans (black, domingo, etc)     Types of  fats  https://www.hospitals.[place].edu/nutritionsource/what-should-you-eat/fats-and-cholesterol/types-of-fat/      Good resources on inflammation    https://www.health.[place].edu/staying-healthy/foods-that-fight-inflammation     https://www.heart.org/en/healthy-living/healthy-eating/eat-smart/nutrition-basics/mediterranean-diet     https://enavu.org/traditional-diets/mediterranean-diet     https://my.[facility].org/health/articles/65931-mgjszioysxmlk-mwpt     Good article on label reading   https://www.fda.gov/food/new-nutrition-facts-label/how-understand-and-use-nutrition-facts-label     Additional resources:     Protein Sources   http://CultureAlley/305486.pdf     Carbohydrates  http://[email or web address]     Mindful Eating  http://CultureAlley/[email or web address]     Summary of Volumetrics Eating Plan  [email or web address]://[email or web address]     Follow-Up:  Friday, November 3rd at 9:30 am    Time spent with patient: 36 minutes.  AARON Tom, RD, LD

## 2023-08-04 NOTE — PATIENT INSTRUCTIONS
Nutrition Goals  Check in on Hip (Seeing Dr. Hu 8/28/23)  Continue with previous goals (hydration, food choices, portions, mindfulness)    Keep up the hard work!    Long-term goal: 5k     Coping: reading, planning garden, sewing/quilting     High-iron foods:  100% iron-fortified ready-to-eat cereal   cup, 18 mg  Grits, instant   cup, 7.1   Cream of wheat   cup, 5.2   Oatmeal, instant   cup, 5 mg  Soybeans, cooked   cup, 4.4 mg  White beans, canned   cup, 3.9 mg   Lentils   cup, 3.3 mg  White rice 1/3 cup, 3 mg  Spinach   cup cooked, 1 cup raw, 3 mg   Beef tenderloin 3 oz, 3 mg  Baked beans 1/3 cup, 3 mg  Vegetable or soy burger 1 sarah, 2.9 mg  Soy milk 1 cup (8 oz), 2.7 mg   Chickpeas   cup, 2.5 mg  Kidney beans   cup, 2.5 mg  Sardines 3 oz, 2.5 mg   Nuts: almonds or pistachios   cup, 1.3 mg   [place] sprouts, cooked   cup, 1 mg   Egg 1 whole, 1 mg    Some good sources on nut comparison:   https://www.JumpIn.Echoing Green/sites/default/files/2020-04/nutrient_comparison_chart_for_tree_nuts_redesign.pdf     https://www.bizHive/Images/nutrition/Tree_Nut%20Nutrient_Comparison_Chart.pdf        The Plate Method:  https://www.cdc.gov/diabetes/images/managing/Diabetes-Manage-Eat-Well-Plate-Graphic_600px.jpg     Starchy vegetables  Potatoes   Sweet potatoes  Green peas  Chickpeas   Corn  Lentils  Beans (black, domingo, etc)     Types of fats  https://www.[facility].[employer].edu/nutritionsource/what-should-you-eat/fats-and-cholesterol/types-of-fat/      Good resources on inflammation    https://www.health.[place].edu/staying-healthy/foods-that-fight-inflammation     https://www.heart.org/en/healthy-living/healthy-eating/eat-smart/nutrition-basics/mediterranean-diet     https://oldVyclonept.org/traditional-diets/mediterranean-diet     https://my.[facility].org/health/articles/24212-tzanugqyvgrgx-oiws     Good article on label reading    https://www.fda.gov/food/new-nutrition-facts-label/how-understand-and-use-nutrition-facts-label     Additional resources:     Protein Sources   http://Scarecrow Visual Effects/106566.pdf     Carbohydrates  http://fvfiles.com/438956.pdf     Mindful Eating  http://Scarecrow Visual Effects/455308.pdf     Summary of Volumetrics Eating Plan  http://[email or web address]     Follow-Up:  Friday, November 3rd at 9:30 am    AARON Dunne, RD, LD  Clinic #: [phone]

## 2023-08-04 NOTE — PROGRESS NOTES
"Paige Fajardo is a 52 year old female who is being evaluated via a billable video visit.      The patient has been notified of following:     \"This video visit will be conducted via a call between you and your physician/provider. We have found that certain health care needs can be provided without the need for an in-person physical exam.  This service lets us provide the care you need with a video conversation.  If a prescription is necessary we can send it directly to your pharmacy.  If lab work is needed we can place an order for that and you can then stop by our lab to have the test done at a later time.    Video visits are billed at different rates depending on your insurance coverage.  Please reach out to your insurance provider with any questions.    If during the course of the call the physician/provider feels a video visit is not appropriate, you will not be charged for this service.\"    How would you like to obtain your AVS? MyChart  If the video visit is dropped, the invitation should be resent by: Text to cell phone:  [phone]  Will anyone else be joining your video visit? No      Video-Visit Details    Type of service:  Video Visit    Video Start Time:  9:36 am  Video End Time: 10:12 am    Originating Location (pt. Location): Home    Distant Location (provider location):  Offsite    Platform used for Video Visit: "[employer]    During this virtual visit the patient is located in [place], patient verifies this as the location during the entirety of this visit.      Weight Management Nutrition Consultation    Paige Fajardo is a 52 year old female presents today for weight management nutrition consultation.  Patient referred by Dr. Reed on August 2, 2022.    Patient with Co-morbidities of obesity including:  Type II DM no  Renal Failure no  Sleep apnea yes  Hypertension no   Dyslipidemia no  Joint pain no  Back pain yes  GERD no     PMH:   Depression  Swelling  Fatigue  Gout   Asthma  Fibromyalgia   Covid at " "least 2x  Weight gain associated with prednisone (was on for covid and gout)      Hx of alcohol abuse 20 years ago    Anthropometrics:  Highest weight per pt: 338 lbs  Weight 8/2/22: 304 lbs with BMI 49.07    Estimated body mass index is 41.99 kg/m  as calculated from the following:    Height as of this encounter: 1.676 m (5' 5.98\").    Weight as of this encounter: 117.9 kg (260 lb).     Current weight: 260 lbs, pt report  - Down 8 lbs since last visit, 78 from highest weight  - Down 4+ sizes now per pt  - Feeling a lot better physically/pain wise    Medications for Weight Loss:  Just switched from Ozempic to Wegovy     Supplements:  Vit D 3,000 IUs/day - Usually decreases as weather gets nice  Turmeric   B-complex  Calcium carbonate - only if stomach is upset   Mg Citrate     Labs 6/20/22  CBC off (just getting over covid)      Labs 5/17/22  GFR 90 (will watch - likely dehydrated due to covid)    Labs 8/24/22  - Ferritin low end of normal at 25  -GFR 90  -Alk phos low    Hx of high cholesterol     Labs 3/10/23 (when in ED for dehydration)  Calcium slightly elevated at 10.6  eGFR 81    NUTRITION HISTORY    NKFA  Limits dairy - notices clogged sinuses and worsening asthma symptoms per pt.   Does use dairy alternatives.     Has not met with a RD before.    Pt goals: lose weight, learn about food choices (what is more nutritious), feel better    Please see previous RD notes for more information.     April 2023:    Continues with Monday Mindfulness with [place] and recovery yoga.      tends to bring tempting foods into house.  Feels good about fruits, vegetables and protein. Trying to do lean protein more often - fish, poultry.    Started her garden - leafy greens, tomatoes, cucumbers, etc.  Continues to try to stay away from breads. Incorporating cereals and oatmeal, etc.    Hydration: lots of water    PA: hip continues to bug her, is using pool. Lots of yard work to do     More fatigue. Checked iron - looks " "good.     August 2023    Was on mission trip last week - no control over food. Lots of carbohydrates and \"kid foods\" per pt. Tried to eat fruits/vegetables - made a lettuce sandwich some days over bread.    Just switched to Wegovy - noticing increased fullness already.     Doing well with balance overall. Feels she might not be getting enough protein some days.   Lots of vegetables from her garden - making vegetable soups and freezing for winter.    Hydration: lots of water     PA: continues with bad hip pain, doing Happiness yoga class on Tuesdays  -  working on strengthening hip, seeing Dr. Hu in future.  - Feeling a lot better overall, less SOB    Previous goals:  1. Consider PT for hip. Recommend checking in further with Dr. Samson on hip pain. - Did PT for hip but not getting better per pt. Working on strengthening. Seeing Dr. Hu to check in further.   2.. Call back to look into research study Dr. Reed had told you about if interested  3. FYIs for us to keep in mind - calcium level slightly elevated and GFR slightly low (dehyrated at time) - Discussed again today, no recent labs.   4. Continue with previous goals - mindfulness, food choice/portions  - Continues, doing yoga on Tuesday and Mindfulness Mondays through work.    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   has diabetes     Nutrition Prescription  Recommended energy/nutrient modification.    Nutrition Diagnosis  Food and nutrition related knowledge deficit r/t lack of prior exposure to nutrition education aeb pt report and interest in learning     Obesity r/t long history of positive energy balance aeb BMI >30.    Nutrition Intervention  Reviewed and modified previous goals  Answered pt questions  Coordination of care   Nutrition education - Plate method, types of vegetables, types of fat, label reading, " dietary sources of iron, ways to incorporate PA (Banki.ru ami)  AVS and handouts via [employer]    Patient demonstrates understanding.    Expected Engagement: good    Nutrition Goals  Check in on Hip (Seeing Dr. Hu 8/28/23)  Continue with previous goals (hydration, food choices, portions, mindfulness)    Keep up the hard work!    Long-term goal: 5k     Coping: reading, planning garden, sewing/quilting     High-iron foods:  100% iron-fortified ready-to-eat cereal   cup, 18 mg  Grits, instant   cup, 7.1   Cream of wheat   cup, 5.2   Oatmeal, instant   cup, 5 mg  Soybeans, cooked   cup, 4.4 mg  White beans, canned   cup, 3.9 mg   Lentils   cup, 3.3 mg  White rice 1/3 cup, 3 mg  Spinach   cup cooked, 1 cup raw, 3 mg   Beef tenderloin 3 oz, 3 mg  Baked beans 1/3 cup, 3 mg  Vegetable or soy burger 1 sarah, 2.9 mg  Soy milk 1 cup (8 oz), 2.7 mg   Chickpeas   cup, 2.5 mg  Kidney beans   cup, 2.5 mg  Sardines 3 oz, 2.5 mg   Nuts: almonds or pistachios   cup, 1.3 mg   [place] sprouts, cooked   cup, 1 mg   Egg 1 whole, 1 mg    Some good sources on nut comparison:   https://www.K [employer]ine/sites/default/files/2020-04/nutrient_comparison_chart_for_tree_nuts_redesign.pdf     https://www.Coveroo/Images/nutrition/Tree_Nut%20Nutrient_Comparison_Chart.pdf        The Plate Method:  https://www.cdc.gov/diabetes/images/managing/Diabetes-Manage-Eat-Well-Plate-Graphic_600px.jpg     Starchy vegetables  Potatoes   Sweet potatoes  Green peas  Chickpeas   Corn  Lentils  Beans (black, domingo, etc)     Types of fats  https://www.[facility].[place].edu/nutritionsource/what-should-you-eat/fats-and-cholesterol/types-of-fat/      Good resources on inflammation    https://www.health.[place].edu/staying-healthy/foods-that-fight-inflammation     https://www.heart.org/en/healthy-living/healthy-eating/eat-smart/nutrition-basics/mediterranean-diet     https://oldMitrionics.org/traditional-diets/mediterranean-diet      https://.[facility].org/health/articles/72531-tffpkwfhwrfbk-jvwa     Good article on label reading   https://www.fda.gov/food/new-nutrition-facts-label/how-understand-and-use-nutrition-facts-label     Additional resources:     Protein Sources   http://1000museums.com/038995.pdf     Carbohydrates  http://fvfiles.com/602505.pdf     Mindful Eating  http://1000museums.com/403160.pdf     Summary of Volumetrics Eating Plan  http://fvfiles.com/900063.pdf     Follow-Up:  Friday, November 3rd at 9:30 am    Time spent with patient: 36 minutes.  AARON Tom, RD, LD

## 2023-08-22 ENCOUNTER — MYC MEDICAL ADVICE (OUTPATIENT)
Dept: PALLIATIVE MEDICINE | Facility: CLINIC | Age: 53
End: 2023-08-22
Payer: COMMERCIAL

## 2023-08-22 NOTE — TELEPHONE ENCOUNTER
Will leave encounter open for patient response/chart review by nursing.       ----------------Mychart Below from pt----------------  Dr. Garrido,   Attached is an MRI of my hip.  I have been have extreme pain for sometime now (since Nov 2022) and have already completed PT, and an injection to the hip.  In good news, I have been losing weight but that is not lessening the pain in the hip.  I don't understand much of what the report says but think it is saying there is some thing going on.  The only hip provider I have been able to get scheduled with is Dr. Vipul Hu at [facility].  Not sure if he is the best provider to see, I don't have a very good trust of [facility] right now so wondering if you know if [facility] has a hip specialist or anyone else you might suggest.     Thanks.     Shira Fajardo    --------------Mychart below response to pt----------------  Iglesia Gibson,     You have not been seen Dr Garrido since Nov 2022 and she did not order the MRI. Dr Garrido would not be the appropriate provider to review and provide recommendation, especially without an appointment to discuss. You would need to have whomever ordered the MRI review your imaging with you. You can inquire with your primary care of there is a recommendation within the [facility] system aside from the provider that you are currently seeing. Alternatively, if you want a second opinion within the [facility] system, your primary care would need to order that.       Ana M JUSTICE, RN Care Coordinator  [facility]  Pain Management    When responding to this message, please allow 24-48 business hours for a reply.  If you need sooner assistance please call: [facility] at [phone] between the hours of 8:00AM and 4:30PM Monday through Friday.    Note that [facility] is not intended for emergencies, detailed provider communication, or in lieu of an office visit. Medication is not typically adjusted over [facility] communication.

## 2023-08-23 NOTE — TELEPHONE ENCOUNTER
Hip Pain  Message [id number]  From  Ana M Boswell, NATALI To  Paige Fajardo Sent and Delivered  8/22/2023 12:44 PM   Last Read in MyChart  8/22/2023 12:45 PM by Paige NINO RN Care Coordinator  [facility]

## 2023-08-24 ENCOUNTER — VIRTUAL VISIT (OUTPATIENT)
Dept: PHARMACY | Facility: CLINIC | Age: 53
End: 2023-08-24
Payer: COMMERCIAL

## 2023-08-24 VITALS — WEIGHT: 259 LBS | BODY MASS INDEX: 44.22 KG/M2 | HEIGHT: 64 IN

## 2023-08-24 DIAGNOSIS — E66.01 MORBID OBESITY (H): Primary | ICD-10-CM

## 2023-08-24 DIAGNOSIS — J45.40 MODERATE PERSISTENT ASTHMA WITHOUT COMPLICATION: ICD-10-CM

## 2023-08-24 PROCEDURE — 99605 MTMS BY PHARM NP 15 MIN: CPT | Mod: VID | Performed by: PHARMACIST

## 2023-08-24 PROCEDURE — 99607 MTMS BY PHARM ADDL 15 MIN: CPT | Performed by: PHARMACIST

## 2023-08-24 ASSESSMENT — PAIN SCALES - GENERAL: PAINLEVEL: SEVERE PAIN (7)

## 2023-08-24 NOTE — Clinical Note
Danyeldelroy Reed - patient has successfully transitioned from Ozempic to Wegovy and will be going up to 2.4 mg weekly. Continues to lose weight. Will be seeing Sofie Blanton PA-C in 6 weeks since you were booked out. CC'd Sofie Yoon as FYI. Ruthie SCHWARTZ

## 2023-08-24 NOTE — PROGRESS NOTES
Virtual Visit Details    Type of service:  Video Visit     Originating Location (pt. Location): Home    Distant Location (provider location):  Off-site  Platform used for Video Visit: Malathi

## 2023-08-24 NOTE — PATIENT INSTRUCTIONS
"Recommendations from today's MTM visit:                                                       Increase Wegovy to 2.4 mg weekly. Patient to call [facility] to fill as alreay sent in and likely on hold.   [facility]: [phone]       Follow-up: Return in about 3 months (around 11/24/2023) for Medication Therapy Management Pharmacist Visit. Comprehensive review of medications at that time - book 60 minutes.     It was great speaking with you today.  I value your experience and would be very thankful for your time in providing feedback in our clinic survey. In the next few days, you may receive an email or text message from [employer] with a link to a survey related to your  clinical pharmacist.\"     To schedule another appointment with your [place] pharmacist, please call [facility] Comprehensive Weight Management Scheduling at [phone].     My Clinical Pharmacist's contact information:                                                      Please feel free to contact me with any questions or concerns you have.      Lauren Bloch, PharmD  Medication Therapy Management Pharmacist   [facility]             "
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
## 2023-08-24 NOTE — NURSING NOTE
Is the patient currently in the state of [place]? YES    Visit mode:VIDEO    If the visit is dropped, the patient can be reconnected by: VIDEO VISIT: Text to cell phone:   Telephone Information:   Mobile [phone]       Will anyone else be joining the visit? NO  (If patient encounters technical issues they should call [phone])    How would you like to obtain your AVS? MyChart    Are changes needed to the allergy or medication list? No    Reason for visit: RECHECK    Luis WILCOX

## 2023-09-01 ENCOUNTER — TELEPHONE (OUTPATIENT)
Dept: ENDOCRINOLOGY | Facility: CLINIC | Age: 53
End: 2023-09-01
Payer: COMMERCIAL

## 2023-09-01 NOTE — TELEPHONE ENCOUNTER
General Call    Contacts         Type Contact Phone/Fax    09/01/2023 02:55 PM CDT Phone (Incoming) Paige Fajardo (Self) [phone] (M)     Side effects from wegovy - nausea // vomiting // lost 8-lbs in 48 hrs          Reason for Call:  Side effects from wegovy - nausea // vomiting // lost 8-lbs in 48 hrs    Could we send this information to you in [employer] or would you prefer to receive a phone call?:   Patient would prefer a phone call   Okay to leave a detailed message?: Yes at Cell number on file:    Telephone Information:   Mobile [phone]

## 2023-09-05 NOTE — TELEPHONE ENCOUNTER
Patient also sent in [email or web address] message in regards to her Wegovy and side effects. Communicating via [email or web address].

## 2023-09-28 ENCOUNTER — VIRTUAL VISIT (OUTPATIENT)
Dept: ENDOCRINOLOGY | Facility: CLINIC | Age: 53
End: 2023-09-28
Payer: COMMERCIAL

## 2023-09-28 VITALS — BODY MASS INDEX: 42.68 KG/M2 | WEIGHT: 250 LBS | HEIGHT: 64 IN

## 2023-09-28 DIAGNOSIS — E66.01 MORBID OBESITY (H): ICD-10-CM

## 2023-09-28 PROCEDURE — 99215 OFFICE O/P EST HI 40 MIN: CPT | Mod: VID

## 2023-09-28 ASSESSMENT — PAIN SCALES - GENERAL: PAINLEVEL: SEVERE PAIN (6)

## 2023-09-28 NOTE — LETTER
2023       RE: Paige Fajardo  [address]  [place] [address]     Dear Colleague,    Thank you for referring your patient, Paige Fajardo, to the [facility] WEIGHT MANAGEMENT CLINIC [place] at [facility]. Please see a copy of my visit note below.    Virtual Visit Details    Type of service:  Video Visit     Originating Location (pt. Location): Home    Distant Location (provider location):  Off-site  Platform used for Video Visit: [facility] Medical Weight Management Note     Paige Fajardo  MRN:  [id number]  :  1970  VIRI:  2023    Dear Francesca Samson MD,    I had the pleasure of seeing your patient Paige Fajardo. She is a 52 year old female who I am continuing to see for treatment of obesity related to:        2022    10:09 AM   --   I have the following health issues associated with obesity Sleep Apnea    Asthma    Stress Incontinence   I have the following symptoms associated with obesity Knee Pain    Depression    Lower Extremity Swelling    Back Pain    Fatigue    Hip Pain       Assessment & Plan  Problem List Items Addressed This Visit          Digestive    Morbid obesity (H)     Previously seen by Dr. Reed. Last seen by Lauren Bloch, MeeraD and transitioned to Wegovy 2.4mg.     With transition to Wegovy 2.4mg has side effects of diarrhea, nausea, and vomiting, for 3 days after injection. No improved with second dose. Decreased to Wegovy 1.7mg. Currently has no side effects. Has been helpful with hunger and weight loss. Will continue at Wegovy 1.7mg at this sun.     Is having hip surgery on 10/27/2023. Discussed dose timing pre and post op - will stop 1 week before surgery and restart after surgery. Will reach out if misses more then 2 weeks in a row.            Continue with Wegovy 1.7mg once weekly. No refills needed    Follow up with Dr. Reed in 3-4 months    INTERVAL HISTORY:  New MWM with Dr. Reed -  8/2/22  Started on Ozempic   Last seen by Lauren Bloch, MTM pharmacist - transitioned to Wegovy 2.4mg   Side effects and decreased Wegovy 1.7mg    Anti-obesity medications:     Current:   Wegovy 1.7mg - Completed 4 weeks, and then went up to the 2.4mg. At the 2.4mg had side effects of diarrhea, nausea, and vomiting for 3 days after injection. Completed 2 injections with no symptom relief. Reached out via [employer]ivo[employer] and decreased back to 1.7mg. Since being on the 1.7mg for the past 2 weeks has had no side effects. Helpful with hunger and feeling full.       Recent diet changes: Eating 3 meals a day, minimal snacking. Drinking 6 glass of water daily. Decrease in portion sizes. Avoiding very rich, sweet or fatty foods, does not feel good. Trying to freeze and preserve food from garden. Made zero sugar apple butter. Has been really helpful working with Christina to find nutrient rich foods.     Recent exercise/activity changes: Limited to hip pain. Will be going through hip surgery on 10/27. Already scheduled with 12 weeks PT post op. Fibromyalgia has improved.     Recent stressors: Stable, some stress with taking time off for hip surgery.     Recent sleep changes: No concerns       CURRENT WEIGHT:   250 lbs 0 oz    Initial Weight (lbs): 304 lbs  Last Visits Weight: 117.5 kg (259 lb)  Cumulative weight loss (lbs): 54  Weight Loss Percentage: 17.76%    Wt Readings from Last 5 Encounters:   09/28/23 113.4 kg (250 lb)   08/24/23 117.5 kg (259 lb)   08/04/23 117.9 kg (260 lb)   04/04/23 122.5 kg (270 lb)   03/31/23 124.1 kg (273 lb 11.2 oz)             9/27/2023    12:24 PM   Changes and Difficulties   I have made the following changes to my diet since my last visit: Eat better and much less   With regards to my diet, I am still struggling with: [place]   I have made the following changes to my activity/exercise since my last visit: Need hip surgery   With regards to my activity/exercise, I am still struggling with: Hip          MEDICATIONS:   Current Outpatient Medications   Medication Sig Dispense Refill    albuterol (PROAIR HFA/PROVENTIL HFA/VENTOLIN HFA) 108 (90 Base) MCG/ACT inhaler Inhale 2 puffs into the lungs every 6 hours as needed for shortness of breath or wheezing 18 g 3    B Complex Vitamins (VITAMIN B COMPLEX PO) 3 drops per day      beclomethasone HFA (QVAR REDIHALER) 80 MCG/ACT inhaler Inhale 2 puffs into the lungs 2 times daily 10.6 g 11    benzonatate (TESSALON) 200 MG capsule Take 1 capsule (200 mg) by mouth 3 times daily as needed for cough (Patient not taking: Reported on 3/31/2023) 20 capsule 0    cetirizine-psuedoePHEDrine (ZYRTEC-D) 5-120 MG per tablet Take 1 tablet by mouth 2 times daily 90 tablet 3    cholecalciferol (VITAMIN D) 1000 UNIT tablet 3 drops per day      colchicine (COLCYRS) 0.6 MG tablet Take 2 tablets orally at onset of symptoms and one more tablet 1 hour later, and then one tablet twice a day until flare resolves.      COMPOUNDED NON-CONTROLLED SUBSTANCE (CMPD RX) - PHARMACY TO MIX COMPOUNDED MEDICATION LOW DOSE NALTREXONE 6MG one tablet qhs 90 capsule 4    FLUoxetine (PROZAC) 20 MG capsule Take 20 mg by mouth      gabapentin (NEURONTIN) 300 MG capsule Take 2 capsules  (600mg) in the morning and 2 capsules (600mg) at bedtime. 90day supply (Patient taking differently: Take 300 mg by mouth 2 times daily) 360 capsule 1    ipratropium - albuterol 0.5 mg/2.5 mg/3 mL (DUONEB) 0.5-2.5 (3) MG/3ML neb solution Take 1 vial (3 mLs) by nebulization every 6 hours as needed for shortness of breath or wheezing 960 mL 4    levonorgestrel (MIRENA) 20 MCG/24HR IUD 1 each by Intrauterine route once      MAGNESIUM CITRATE PO 3 drops per day      Naproxen Sodium (ALEVE PO) Take 220 mg by mouth 2 times daily as needed       ondansetron (ZOFRAN ODT) 4 MG ODT tab Take 1 tablet (4 mg) by mouth every 8 hours as needed for nausea 20 tablet 0    order for DME Equipment being ordered: Nebulizer 1 Device 0    rizatriptan  "(MAXALT-MLT) 10 MG ODT tab Take 1 tablet (10 mg) by mouth at onset of headache for migraine May repeat in 2 hours. Max 3 tablets/24 hours. 18 tablet 3    Semaglutide-Weight Management (WEGOVY) 1.7 MG/0.75ML pen Inject 1.7 mg Subcutaneous once a week 3 mL 2    Turmeric 450 MG CAPS 1 scoop per day             9/27/2023    12:24 PM   Weight Loss Medication History Reviewed With Patient   Which weight loss medications are you currently taking on a regular basis? Wegovy   Are you having any side effects from the weight loss medication that we have prescribed you? Yes   If you are having side effects please describe: manny Montero       Objective   Ht 1.626 m (5' 4.02\")   Wt 113.4 kg (250 lb)   BMI 42.89 kg/m             PHYSICAL EXAM:    GENERAL: Healthy, alert and no distress  EYES: Eyes grossly normal to inspection.  No discharge or erythema, or obvious scleral/conjunctival abnormalities.  RESP: No audible wheeze, cough, or visible cyanosis.  No visible retractions or increased work of breathing.    SKIN: Visible skin clear. No significant rash, abnormal pigmentation or lesions.  NEURO: Cranial nerves grossly intact.  Mentation and speech appropriate for age.  PSYCH: Mentation appears normal, affect normal/bright, judgement and insight intact, normal speech and appearance well-groomed.      Sincerely,    EDWINA BAIRES PA-C      41 minutes spent by me on the date of the encounter doing chart review, history and exam, documentation and further activities per the note    "

## 2023-09-28 NOTE — NURSING NOTE
Is the patient currently in the state of [place]? YES    Visit mode:VIDEO    If the visit is dropped, the patient can be reconnected by: VIDEO VISIT: Text to cell phone:   Telephone Information:   Mobile [phone]       Will anyone else be joining the visit? NO  (If patient encounters technical issues they should call [phone]    How would you like to obtain your AVS? MyChart    Are changes needed to the allergy or medication list? Pt stated no changes to allergies and Pt stated no med changes    Reason for visit: Follow Up    Gayla WILCOX

## 2023-09-28 NOTE — PROGRESS NOTES
Virtual Visit Details    Type of service:  Video Visit     Originating Location (pt. Location): Home    Distant Location (provider location):  Off-site  Platform used for Video Visit: [facility] Medical Weight Management Note     Paige Fajadro  MRN:  [id number]  :  1970  VIRI:  2023    Dear Francesca Samson MD,    I had the pleasure of seeing your patient Paige Fajardo. She is a 52 year old female who I am continuing to see for treatment of obesity related to:        2022    10:09 AM   --   I have the following health issues associated with obesity Sleep Apnea    Asthma    Stress Incontinence   I have the following symptoms associated with obesity Knee Pain    Depression    Lower Extremity Swelling    Back Pain    Fatigue    Hip Pain       Assessment & Plan   Problem List Items Addressed This Visit          Digestive    Morbid obesity (H)     Previously seen by Dr. Reed. Last seen by Lauren Bloch, Avni and transitioned to Wegovy 2.4mg.     With transition to Wegovy 2.4mg has side effects of diarrhea, nausea, and vomiting, for 3 days after injection. No improved with second dose. Decreased to Wegovy 1.7mg. Currently has no side effects. Has been helpful with hunger and weight loss. Will continue at Wegovy 1.7mg at this sun.     Is having hip surgery on 10/27/2023. Discussed dose timing pre and post op - will stop 1 week before surgery and restart after surgery. Will reach out if misses more then 2 weeks in a row.            Continue with Wegovy 1.7mg once weekly. No refills needed    Follow up with Dr. Reed in 3-4 months    INTERVAL HISTORY:  New MWM with Dr. Reed - 22  Started on Ozempic   Last seen by Lauren Bloch, MTM pharmacist - transitioned to Wegovy 2.4mg   Side effects and decreased Wegovy 1.7mg    Anti-obesity medications:     Current:   Wegovy 1.7mg - Completed 4 weeks, and then went up to the 2.4mg. At the 2.4mg had side effects of diarrhea, nausea, and vomiting for 3  days after injection. Completed 2 injections with no symptom relief. Reached out via [employer] and decreased back to 1.7mg. Since being on the 1.7mg for the past 2 weeks has had no side effects. Helpful with hunger and feeling full.       Recent diet changes: Eating 3 meals a day, minimal snacking. Drinking 6 glass of water daily. Decrease in portion sizes. Avoiding very rich, sweet or fatty foods, does not feel good. Trying to freeze and preserve food from garden. Made zero sugar apple butter. Has been really helpful working with Christina to find nutrient rich foods.     Recent exercise/activity changes: Limited to hip pain. Will be going through hip surgery on 10/27. Already scheduled with 12 weeks PT post op. Fibromyalgia has improved.     Recent stressors: Stable, some stress with taking time off for hip surgery.     Recent sleep changes: No concerns       CURRENT WEIGHT:   250 lbs 0 oz    Initial Weight (lbs): 304 lbs  Last Visits Weight: 117.5 kg (259 lb)  Cumulative weight loss (lbs): 54  Weight Loss Percentage: 17.76%    Wt Readings from Last 5 Encounters:   09/28/23 113.4 kg (250 lb)   08/24/23 117.5 kg (259 lb)   08/04/23 117.9 kg (260 lb)   04/04/23 122.5 kg (270 lb)   03/31/23 124.1 kg (273 lb 11.2 oz)             9/27/2023    12:24 PM   Changes and Difficulties   I have made the following changes to my diet since my last visit: Eat better and much less   With regards to my diet, I am still struggling with: [place]   I have made the following changes to my activity/exercise since my last visit: Need hip surgery   With regards to my activity/exercise, I am still struggling with: Hip         MEDICATIONS:   Current Outpatient Medications   Medication Sig Dispense Refill    albuterol (PROAIR HFA/PROVENTIL HFA/VENTOLIN HFA) 108 (90 Base) MCG/ACT inhaler Inhale 2 puffs into the lungs every 6 hours as needed for shortness of breath or wheezing 18 g 3    B Complex Vitamins (VITAMIN B COMPLEX PO) 3 drops per day       beclomethasone HFA (QVAR REDIHALER) 80 MCG/ACT inhaler Inhale 2 puffs into the lungs 2 times daily 10.6 g 11    benzonatate (TESSALON) 200 MG capsule Take 1 capsule (200 mg) by mouth 3 times daily as needed for cough (Patient not taking: Reported on 3/31/2023) 20 capsule 0    cetirizine-psuedoePHEDrine (ZYRTEC-D) 5-120 MG per tablet Take 1 tablet by mouth 2 times daily 90 tablet 3    cholecalciferol (VITAMIN D) 1000 UNIT tablet 3 drops per day      colchicine (COLCYRS) 0.6 MG tablet Take 2 tablets orally at onset of symptoms and one more tablet 1 hour later, and then one tablet twice a day until flare resolves.      COMPOUNDED NON-CONTROLLED SUBSTANCE (CMPD RX) - PHARMACY TO MIX COMPOUNDED MEDICATION LOW DOSE NALTREXONE 6MG one tablet qhs 90 capsule 4    FLUoxetine (PROZAC) 20 MG capsule Take 20 mg by mouth      gabapentin (NEURONTIN) 300 MG capsule Take 2 capsules  (600mg) in the morning and 2 capsules (600mg) at bedtime. 90day supply (Patient taking differently: Take 300 mg by mouth 2 times daily) 360 capsule 1    ipratropium - albuterol 0.5 mg/2.5 mg/3 mL (DUONEB) 0.5-2.5 (3) MG/3ML neb solution Take 1 vial (3 mLs) by nebulization every 6 hours as needed for shortness of breath or wheezing 960 mL 4    levonorgestrel (MIRENA) 20 MCG/24HR IUD 1 each by Intrauterine route once      MAGNESIUM CITRATE PO 3 drops per day      Naproxen Sodium (ALEVE PO) Take 220 mg by mouth 2 times daily as needed       ondansetron (ZOFRAN ODT) 4 MG ODT tab Take 1 tablet (4 mg) by mouth every 8 hours as needed for nausea 20 tablet 0    order for DME Equipment being ordered: Nebulizer 1 Device 0    rizatriptan (MAXALT-MLT) 10 MG ODT tab Take 1 tablet (10 mg) by mouth at onset of headache for migraine May repeat in 2 hours. Max 3 tablets/24 hours. 18 tablet 3    Semaglutide-Weight Management (WEGOVY) 1.7 MG/0.75ML pen Inject 1.7 mg Subcutaneous once a week 3 mL 2    Turmeric 450 MG CAPS 1 scoop per day             9/27/2023    12:24 PM  "  Weight Loss Medication History Reviewed With Patient   Which weight loss medications are you currently taking on a regular basis? Wegovy   Are you having any side effects from the weight loss medication that we have prescribed you? Yes   If you are having side effects please describe: manny Montero       Objective    Ht 1.626 m (5' 4.02\")   Wt 113.4 kg (250 lb)   BMI 42.89 kg/m             PHYSICAL EXAM:    GENERAL: Healthy, alert and no distress  EYES: Eyes grossly normal to inspection.  No discharge or erythema, or obvious scleral/conjunctival abnormalities.  RESP: No audible wheeze, cough, or visible cyanosis.  No visible retractions or increased work of breathing.    SKIN: Visible skin clear. No significant rash, abnormal pigmentation or lesions.  NEURO: Cranial nerves grossly intact.  Mentation and speech appropriate for age.  PSYCH: Mentation appears normal, affect normal/bright, judgement and insight intact, normal speech and appearance well-groomed.        Sincerely,    EDWINA BAIRES PA-C      41 minutes spent by me on the date of the encounter doing chart review, history and exam, documentation and further activities per the note    "

## 2023-10-02 ENCOUNTER — TELEPHONE (OUTPATIENT)
Dept: ENDOCRINOLOGY | Facility: CLINIC | Age: 53
End: 2023-10-02
Payer: COMMERCIAL

## 2023-10-02 NOTE — TELEPHONE ENCOUNTER
GIANNAM and sent Mychart regarding the following:     Dr. Reed in 3 months for return [facility]

## 2023-10-03 ENCOUNTER — TELEPHONE (OUTPATIENT)
Dept: ENDOCRINOLOGY | Facility: CLINIC | Age: 53
End: 2023-10-03

## 2023-10-03 NOTE — PATIENT INSTRUCTIONS
"Nicolas Gibson, it was nice to meet you today!  Thank you for allowing us the privilege of caring for you. We hope we provided you with the excellent service you deserve.   Please let us know if there is anything else we can do for you so that we can be sure you are completely satisfied with your care experience.    To ensure the quality of our services you may be receiving a patient satisfaction survey from an independent patient satisfaction monitoring company.    The greatest compliment you can give is a \"Likely to Recommend\"    Your visit was with EDWINA BAIRES PA-C today.    Instructions per today's visit:     Continue with Wegovy 1.7mg once weekly. No refills needed    Follow up with Dr. Reed in 3-4 months    ___________________________________________________________________________  Important contact and scheduling information:  Please call our contact center at [phone] to schedule your next appointments.  For any nursing questions or concerns call Michelle Cano LPN at [phone] or Evelyn Wasserman RN at [phone]  Please call during clinic hours Monday through Friday 8:00a - 4:00p if you have questions or you can contact us via [employer] at anytime and we will reply during clinic hours.    Lab results will be communicated through My Chart or letter (if My Chart not used). Please call the clinic if you have not received communication after 1 week or if you have any questions.?  Clinic Fax: [phone]  __________________________________________________________________________    If labs were ordered today:    Please make an appointment to have them drawn at your convenience.     To schedule the Lab Appointment using [employer]:  Select \"Schedule an Appointment\"  Select \"Lab Only\"  For \"A couple of questions\", select \"Other\"  For \"Which locations work for you?, select the location and set up the appointment    To schedule by phone call [phone] to schedule a lab only appointment at [facility] " lab.  ___________________________________________________________________________  Work with A Health !  Virtual Sessions are Available through [facility] Weight Management Clinics    To learn more, call to schedule a free, Health  Q&A appointment: [phone]     What is Health Coaching?  Do you know what you are supposed to do, but you just aren't doing it?  Then, HEALTH COACHING may help you!   Get unstuck and move forward with the support of a professionally trained NBC-HWC (National Board-Certified Health and ) who uses evidence-based approaches to help you move forward with healthy lifestyle changes in the areas of weight loss, stress management and overall well-being.    Health Coaches help you identify goals that will work best for you. Health Coaches provide support and encouragement with overcoming barriers and help you to find inspiration and motivation to lead a healthy lifestyle.    Option one:  Health Coaching 3-Pack; Three, 30-minute Health Coaching Visits, for $99  Visits are done virtually (phone or video)  This is a self pay service; we do not accept insurance for dorothy coaching.    Option two:   The 24 week Plan; 11 Health Coaching Visits, and a 7 months subscription to [employer]-- on-demand fitness, nutrition and mindfulness classes, for $499 (employee discounts may be available). Participants will also meet regularly with a weight management Medical Provider and a Registered/Licensed Dietician.  This is a self-pay service; we do not accept insurance for health coaching.    To Schedule a free Health  Q&A appointment to learn more,  call [phone].  ____________________________________________________________________    [facility]   Healthy Lifestyle Virtual Support Group    Healthy Lifestyle Virtual Support Group?  This is 60 minutes of small group guided discussion, support and resources. All are welcome who  want a healthy lifestyle.  WHEN: Starting in July 2023, this group meets the 1st Friday of the month from 12:30 PM - 1:30 PM virtually using Microsoft Teams.    FACILITATOR: Led by National Board Certified Health and , Sarah Silva [facility].   TO REGISTER: Please send an email to Sarah [email or web address] to receive monthly invites to the group or if you have any questions about having a health .  Prior to the meeting, a link with directions on how to join the meeting will be sent to you.    2023 Meetings  May 19: Let's Talk  June 9: Create Your Coaching Toolkit: Learn How to  Yourself  July 7: Let's Talk  August 4: Benefits of Fiber with KRISTINA Bravo  September 1: Show and Tell (share your aps, podcasts, recipes, hacks, books)  October 6 :Let's Talk  November 3: Introduction to Mindfulness   December 1: Let's Talk    If you would like bariatric surgery specific support group info please let your care team know.         Thank you,   [facility] Comprehensive Weight Management Team

## 2023-10-03 NOTE — ASSESSMENT & PLAN NOTE
Previously seen by Dr. Reed. Last seen by Lauren Bloch, MeeraD and transitioned to Wegovy 2.4mg.     With transition to Wegovy 2.4mg has side effects of diarrhea, nausea, and vomiting, for 3 days after injection. No improved with second dose. Decreased to Wegovy 1.7mg. Currently has no side effects. Has been helpful with hunger and weight loss. Will continue at Wegovy 1.7mg at this sun.     Is having hip surgery on 10/27/2023. Discussed dose timing pre and post op - will stop 1 week before surgery and restart after surgery. Will reach out if misses more then 2 weeks in a row.

## 2023-10-03 NOTE — TELEPHONE ENCOUNTER
Left VM / sent myc 10/3    Please schedule with Dr. Reed in 3 months for return [facility]

## 2023-10-10 ENCOUNTER — MYC MEDICAL ADVICE (OUTPATIENT)
Dept: PULMONOLOGY | Facility: CLINIC | Age: 53
End: 2023-10-10
Payer: COMMERCIAL

## 2023-10-10 DIAGNOSIS — J45.40 MODERATE PERSISTENT ASTHMA WITHOUT COMPLICATION: Primary | ICD-10-CM

## 2023-10-10 RX ORDER — PREDNISONE 20 MG/1
TABLET ORAL
Qty: 14 TABLET | Refills: 0 | Status: SHIPPED | OUTPATIENT
Start: 2023-10-10 | End: 2024-01-15

## 2023-10-10 RX ORDER — BENZONATATE 100 MG/1
100 CAPSULE ORAL 3 TIMES DAILY PRN
Qty: 90 CAPSULE | Refills: 0 | Status: SHIPPED | OUTPATIENT
Start: 2023-10-10

## 2023-11-09 ENCOUNTER — VIRTUAL VISIT (OUTPATIENT)
Dept: ENDOCRINOLOGY | Facility: CLINIC | Age: 53
End: 2023-11-09
Payer: COMMERCIAL

## 2023-11-09 VITALS — BODY MASS INDEX: 42.51 KG/M2 | HEIGHT: 64 IN | WEIGHT: 249 LBS

## 2023-11-09 DIAGNOSIS — Z71.3 NUTRITIONAL COUNSELING: Primary | ICD-10-CM

## 2023-11-09 DIAGNOSIS — E66.9 OBESITY: ICD-10-CM

## 2023-11-09 PROCEDURE — 97803 MED NUTRITION INDIV SUBSEQ: CPT | Mod: VID | Performed by: DIETITIAN, REGISTERED

## 2023-11-09 PROCEDURE — 99207 PR NO CHARGE LOS: CPT | Mod: VID | Performed by: DIETITIAN, REGISTERED

## 2023-11-09 ASSESSMENT — PAIN SCALES - GENERAL: PAINLEVEL: MILD PAIN (2)

## 2023-11-09 NOTE — PATIENT INSTRUCTIONS
Nutrition Goals  Aim to get more fresh fruits/vegetables when able  Aim to re-stock nutritious snack choices  Aim to keep water bottle on hand to help with hydration    Keep up the hard work!    Fermented foods      Yogurt with live bacterial cultures   Mary   Apple cider Vinegar   Miso   Natto   Kvass   Pickles   Olives   Kefir   Catarino Henriquez   Probiotics: lactobacillus or acidophilus    Long-term goal: 5k     Coping: reading, planning garden, sewing/quilting     High-iron foods:  100% iron-fortified ready-to-eat cereal   cup, 18 mg  Grits, instant   cup, 7.1   Cream of wheat   cup, 5.2   Oatmeal, instant   cup, 5 mg  Soybeans, cooked   cup, 4.4 mg  White beans, canned   cup, 3.9 mg   Lentils   cup, 3.3 mg  White rice 1/3 cup, 3 mg  Spinach   cup cooked, 1 cup raw, 3 mg   Beef tenderloin 3 oz, 3 mg  Baked beans 1/3 cup, 3 mg  Vegetable or soy burger 1 sarah, 2.9 mg  Soy milk 1 cup (8 oz), 2.7 mg   Chickpeas   cup, 2.5 mg  Kidney beans   cup, 2.5 mg  Sardines 3 oz, 2.5 mg   Nuts: almonds or pistachios   cup, 1.3 mg   [place] sprouts, cooked   cup, 1 mg   Egg 1 whole, 1 mg    Some good sources on nut comparison:   https://www.[employer]/sites/default/files/2020-04/nutrient_comparison_chart_for_tree_nuts_redesign.pdf     https://www.[employer]/Images/nutrition/Tree_Nut%20Nutrient_Comparison_Chart.pdf        The Plate Method:  https://www.cdc.gov/diabetes/images/managing/Diabetes-Manage-Eat-Well-Plate-Graphic_600px.jpg     Starchy vegetables  Potatoes   Sweet potatoes  Green peas  Chickpeas   Corn  Lentils  Beans (black, domingo, etc)     Types of fats  https://www.[facility].[place].edu/nutritionsource/what-should-you-eat/fats-and-cholesterol/types-of-fat/      Good resources on inflammation    https://www.health.[place].edu/staying-healthy/foods-that-fight-inflammation     https://www.heart.org/en/healthy-living/healthy-eating/eat-smart/nutrition-basics/mediterranean-diet      https://oldDomos Labspt.org/traditional-diets/mediterranean-diet     https://my.[facility].org/health/articles/31195-hmkuerudzydnc-iges     Good article on label reading   https://www.fda.gov/food/new-nutrition-facts-label/how-understand-and-use-nutrition-facts-label     Additional resources:     Protein Sources   http://[employer] Distribution/784390.pdf     Carbohydrates  http://fvfiles.com/719423.pdf     Mindful Eating  http://[employer] Distribution/621522.pdf     Summary of Volumetrics Eating Plan  http://fvfiles.[email or web address].pdf     Follow-Up:  Thursday, February 8th at 9:30 am    ADRIENNE Dunne (Duncan)D, RD, LD  Clinic #: [phone]     Admission

## 2023-11-09 NOTE — LETTER
"11/9/2023       RE: Paige Fajardo  [address]  [place] [address]     Dear Colleague,    Thank you for referring your patient, Paige Fajardo, to the [facility] [place] at [facility]. Please see a copy of my visit note below.    Video-Visit Details    Type of service:  Video Visit    Video Start Time: 9:20 am   Video End Time: 9:46 am    Originating Location (pt. Location): Home    Distant Location (provider location):  Offsite (providers home)     Platform used for Video Visit: [employer]         Weight Management Nutrition Consultation    Paige Fajardo is a 53 year old female presents today for weight management nutrition consultation.  Patient referred by Dr. Reed on August 2, 2022.    Patient with Co-morbidities of obesity including:  Type II DM no  Renal Failure no  Sleep apnea yes  Hypertension no   Dyslipidemia no  Joint pain no  Back pain yes  GERD no     PMH:   Depression  Swelling  Fatigue  Gout   Asthma  Fibromyalgia   Covid at least 2x  Weight gain associated with prednisone (was on for covid and gout)      Hx of alcohol abuse 20 years ago    Anthropometrics:  Highest weight per pt: 338 lbs  Weight 8/2/22: 304 lbs with BMI 49.07    Estimated body mass index is 42.74 kg/m  as calculated from the following:    Height as of this encounter: 1.626 m (5' 4\").    Weight as of this encounter: 112.9 kg (249 lb).     Current weight: 249 lbs, pt report  - Down 11 lbs since last visit, 89 from highest weight  - Down 4+ sizes now per pt  - Feeling a lot better physically/pain wise    Medications for Weight Loss:  Wegovy     Supplements:  Vit D 3,000 IUs/day - Usually decreases as weather gets nice  Turmeric   B-complex  Calcium carbonate - only if stomach is upset   Mg Citrate     Hx of high cholesterol     Labs 3/10/23 (when in ED for dehydration)  Calcium slightly elevated at 10.6  eGFR 81    NUTRITION " "HISTORY    NKFA  Limits dairy - notices clogged sinuses and worsening asthma symptoms per pt.   Does use dairy alternatives.     Has not met with a RD before.    Pt goals: lose weight, learn about food choices (what is more nutritious), feel better    Please see previous RD notes for more information.     August 2023    Was on mission trip last week - no control over food. Lots of carbohydrates and \"kid foods\" per pt. Tried to eat fruits/vegetables - made a lettuce sandwich some days over bread.    Just switched to Wegovy - noticing increased fullness already.     Doing well with balance overall. Feels she might not be getting enough protein some days.   Lots of vegetables from her garden - making vegetable soups and freezing for winter.    Hydration: lots of water     PA: continues with bad hip pain, doing Happiness yoga class on Tuesdays  -  working on strengthening hip, seeing Dr. Hu in future.  - Feeling a lot better overall, less SOB    Nov 2023:    Had hip surgery since last seen (10/27/23). Had to go off Wegovy for a short time due to this.     Feels she is getting enough protein.   Carbs - enjoying oatmeal for breakfast    Keeping nutritious snacks on hand but out of currently.     Doing well with hydration overall. Got constipated with pain meds post surgery.    Previous goals:  Check in on Hip (Seeing Dr. Hu 8/28/23) - Had surgery 10/27/23.   Continue with previous goals (hydration, food choices, portions, mindfulness) - Discussed. Had been doing a relaxing yoga for mindfulness. 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   has diabetes     Nutrition Prescription  Recommended energy/nutrient modification.    Nutrition Diagnosis  Food and nutrition related knowledge deficit r/t lack of " prior exposure to nutrition education aeb pt report and interest in learning     Obesity r/t long history of positive energy balance aeb BMI >30.    Nutrition Intervention  Reviewed and modified previous goals  Answered pt questions  Coordination of care   Nutrition education - Plate method, types of vegetables, types of fat, label reading, dietary sources of iron, ways to incorporate PA ([employer] ami)  AVS and handouts via [employer]    Patient demonstrates understanding.    Expected Engagement: good    Nutrition Goals  Aim to get more fresh fruits/vegetables when able  Aim to re-stock nutritious snack choices  Aim to keep water bottle on hand to help with hydration    Keep up the hard work!    Fermented foods      Yogurt with live bacterial cultures   Liviaichi   Apple cider Vinegar   Miso   Natto   Kvass   Pickles   Olives   Kefir   Kombucha   Sauerkraut   Probiotics: lactobacillus or acidophilus    Long-term goal: 5k     Coping: reading, planning garden, sewing/quilting     High-iron foods:  100% iron-fortified ready-to-eat cereal   cup, 18 mg  Grits, instant   cup, 7.1   Cream of wheat   cup, 5.2   Oatmeal, instant   cup, 5 mg  Soybeans, cooked   cup, 4.4 mg  White beans, canned   cup, 3.9 mg   Lentils   cup, 3.3 mg  White rice 1/3 cup, 3 mg  Spinach   cup cooked, 1 cup raw, 3 mg   Beef tenderloin 3 oz, 3 mg  Baked beans 1/3 cup, 3 mg  Vegetable or soy burger 1 sarah, 2.9 mg  Soy milk 1 cup (8 oz), 2.7 mg   Chickpeas   cup, 2.5 mg  Kidney beans   cup, 2.5 mg  Sardines 3 oz, 2.5 mg   Nuts: almonds or pistachios   cup, 1.3 mg   [place] sprouts, cooked   cup, 1 mg   Egg 1 whole, 1 mg    Some good sources on nut comparison:   https://www.Multigig.Pidgon/sites/default/files/2020-04/nutrient_comparison_chart_for_tree_nuts_redesign.pdf     https://www.[employer].Pidgon/Images/nutrition/Tree_Nut%20Nutrient_Comparison_Chart.pdf        The Plate  Method:  https://www.cdc.gov/diabetes/images/managing/Diabetes-Manage-Eat-Well-Plate-Graphic_600px.jpg     Starchy vegetables  Potatoes   Sweet potatoes  Green peas  Chickpeas   Corn  Lentils  Beans (black, domingo, etc)     Types of fats  https://www.\[facility]\"".[place].[facility]/nutritionsource/what-should-you-eat/fats-and-cholesterol/types-of-fat/      Good resources on inflammation    https://www.health.[place].edu/staying-healthy/foods-that-fight-inflammation     https://www.heart.org/en/healthy-living/healthy-eating/eat-smart/nutrition-basics/mediterranean-diet     https://Networker.org/traditional-diets/mediterranean-diet     https://my.[facility].org/health/articles/40607-kkoejvdconlme-nlhc     Good article on label reading   https://www.fda.gov/food/new-nutrition-facts-label/how-understand-and-use-nutrition-facts-label     Additional resources:     Protein Sources   http://United Preference/240205.pdf     Carbohydrates  http://[email or web address]     Mindful Eating  http://United [email or web address].pdf     Summary of Volumetrics Eating Plan  [email or web address]     Follow-Up:  Thursday, February 8th at 9:30 am    Time spent with patient: 26 minutes.  AARON Tom, RD, LD

## 2023-11-09 NOTE — PROGRESS NOTES
"Video-Visit Details    Type of service:  Video Visit    Video Start Time: 9:20 am   Video End Time: 9:46 am    Originating Location (pt. Location): Home    Distant Location (provider location):  Offsite (providers home)     Platform used for Video Visit: [employer]         Weight Management Nutrition Consultation    Paige Fajardo is a 53 year old female presents today for weight management nutrition consultation.  Patient referred by Dr. Reed on August 2, 2022.    Patient with Co-morbidities of obesity including:  Type II DM no  Renal Failure no  Sleep apnea yes  Hypertension no   Dyslipidemia no  Joint pain no  Back pain yes  GERD no     PMH:   Depression  Swelling  Fatigue  Gout   Asthma  Fibromyalgia   Covid at least 2x  Weight gain associated with prednisone (was on for covid and gout)      Hx of alcohol abuse 20 years ago    Anthropometrics:  Highest weight per pt: 338 lbs  Weight 8/2/22: 304 lbs with BMI 49.07    Estimated body mass index is 42.74 kg/m  as calculated from the following:    Height as of this encounter: 1.626 m (5' 4\").    Weight as of this encounter: 112.9 kg (249 lb).     Current weight: 249 lbs, pt report  - Down 11 lbs since last visit, 89 from highest weight  - Down 4+ sizes now per pt  - Feeling a lot better physically/pain wise    Medications for Weight Loss:  Wegovy     Supplements:  Vit D 3,000 IUs/day - Usually decreases as weather gets nice  Turmeric   B-complex  Calcium carbonate - only if stomach is upset   Mg Citrate     Hx of high cholesterol     Labs 3/10/23 (when in ED for dehydration)  Calcium slightly elevated at 10.6  eGFR 81    NUTRITION HISTORY    NKFA  Limits dairy - notices clogged sinuses and worsening asthma symptoms per pt.   Does use dairy alternatives.     Has not met with a RD before.    Pt goals: lose weight, learn about food choices (what is more nutritious), feel better    Please see previous RD notes for more information.     August 2023    Was on mission trip " "last week - no control over food. Lots of carbohydrates and \"kid foods\" per pt. Tried to eat fruits/vegetables - made a lettuce sandwich some days over bread.    Just switched to Wegovy - noticing increased fullness already.     Doing well with balance overall. Feels she might not be getting enough protein some days.   Lots of vegetables from her garden - making vegetable soups and freezing for winter.    Hydration: lots of water     PA: continues with bad hip pain, doing Happiness yoga class on Tuesdays  -  working on strengthening hip, seeing Dr. Hu in future.  - Feeling a lot better overall, less SOB    Nov 2023:    Had hip surgery since last seen (10/27/23). Had to go off Wegovy for a short time due to this.     Feels she is getting enough protein.   Carbs - enjoying oatmeal for breakfast    Keeping nutritious snacks on hand but out of currently.     Doing well with hydration overall. Got constipated with pain meds post surgery.    Previous goals:  Check in on Hip (Seeing Dr. Hu 8/28/23) - Had surgery 10/27/23.   Continue with previous goals (hydration, food choices, portions, mindfulness) - Discussed. Had been doing a relaxing yoga for mindfulness. 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   has diabetes     Nutrition Prescription  Recommended energy/nutrient modification.    Nutrition Diagnosis  Food and nutrition related knowledge deficit r/t lack of prior exposure to nutrition education aeb pt report and interest in learning     Obesity r/t long history of positive energy balance aeb BMI >30.    Nutrition Intervention  Reviewed and modified previous goals  Answered pt questions  Coordination of care   Nutrition education - Plate method, types of vegetables, types of fat, label reading, " dietary sources of iron, ways to incorporate PA ([employer] ami)  AVS and handouts via [employer]    Patient demonstrates understanding.    Expected Engagement: good    Nutrition Goals  Aim to get more fresh fruits/vegetables when able  Aim to re-stock nutritious snack choices  Aim to keep water bottle on hand to help with hydration    Keep up the hard work!    Fermented foods      Yogurt with live bacterial cultures   Liviaichi   Apple cider Vinegar   Miso   Natto   Kvass   Pickles   Olives   Kefir   Kombucha   Sauerkraut   Probiotics: lactobacillus or acidophilus    Long-term goal: 5k     Coping: reading, planning garden, sewing/quilting     High-iron foods:  100% iron-fortified ready-to-eat cereal   cup, 18 mg  Grits, instant   cup, 7.1   Cream of wheat   cup, 5.2   Oatmeal, instant   cup, 5 mg  Soybeans, cooked   cup, 4.4 mg  White beans, canned   cup, 3.9 mg   Lentils   cup, 3.3 mg  White rice 1/3 cup, 3 mg  Spinach   cup cooked, 1 cup raw, 3 mg   Beef tenderloin 3 oz, 3 mg  Baked beans 1/3 cup, 3 mg  Vegetable or soy burger 1 sarah, 2.9 mg  Soy milk 1 cup (8 oz), 2.7 mg   Chickpeas   cup, 2.5 mg  Kidney beans   cup, 2.5 mg  Sardines 3 oz, 2.5 mg   Nuts: almonds or pistachios   cup, 1.3 mg   [place] sprouts, cooked   cup, 1 mg   Egg 1 whole, 1 mg    Some good sources on nut comparison:   https://www.[employer].Rollbar/sites/default/files/2020-04/nutrient_comparison_chart_for_tree_nuts_redesign.pdf     https://www.[employer]ations.Rollbar/Images/nutrition/Tree_Nut%20Nutrient_Comparison_Chart.pdf        The Plate Method:  https://www.cdc.gov/diabetes/images/managing/Diabetes-Manage-Eat-Well-Plate-Graphic_600px.jpg     Starchy vegetables  Potatoes   Sweet potatoes  Green peas  Chickpeas   Corn  Lentils  Beans (black, domingo, etc)     Types of fats  https://www.[facility].[place].edu/nutritionsource/what-should-you-eat/fats-and-cholesterol/types-of-fat/      Good resources on inflammation     https://www.health.harvard.edu/staying-healthy/foods-that-fight-inflammation     https://www.heart.org/en/healthy-living/healthy-eating/eat-smart/nutrition-basics/mediterranean-diet     https://oldwayspt.org/traditional-diets/mediterranean-diet     https://my.[facility].org/health/articles/33619-hhrccklbhbskx-iffs     Good article on label reading   https://www.fda.gov/food/new-nutrition-facts-label/how-understand-and-use-nutrition-facts-label     Additional resources:     Protein Sources   http://HealthEdge/058036.pdf     Carbohydrates  http://fvfiles.[email or web address].pdf     Mindful Eating  http://HealthEdge/795869.pdf     Summary of Volumetrics Eating Plan  http://[email or web address]     Follow-Up:  Thursday, February 8th at 9:30 am    Time spent with patient: 26 minutes.  AARON Tom, RD, LD

## 2023-11-09 NOTE — NURSING NOTE
Is the patient currently in the state of [place]? YES    Visit mode:VIDEO    If the visit is dropped, the patient can be reconnected by: VIDEO VISIT: Text to cell phone:   Telephone Information:   Mobile [phone]       Will anyone else be joining the visit? NO  (If patient encounters technical issues they should call [phone]    How would you like to obtain your AVS? MyChart    Are changes needed to the allergy or medication list? No    Reason for visit: RECHECK    Marychuy WILCOX

## 2023-11-30 DIAGNOSIS — E66.01 MORBID OBESITY (H): ICD-10-CM

## 2023-12-05 RX ORDER — SEMAGLUTIDE 1.7 MG/.75ML
INJECTION, SOLUTION SUBCUTANEOUS
Qty: 3 ML | Refills: 3 | Status: SHIPPED | OUTPATIENT
Start: 2023-12-05 | End: 2024-01-10

## 2023-12-06 NOTE — TELEPHONE ENCOUNTER
WEGOVY 1.7MG/0.75ML SOAJ      Last Written Prescription Date:  9/7/23  Last Fill Quantity: 3 ml ,   # refills: 2  Last Office Visit : 9/28/23        Future Office visit:  3/26/23    Rf per protocol

## 2023-12-28 ENCOUNTER — TELEPHONE (OUTPATIENT)
Dept: ENDOCRINOLOGY | Facility: CLINIC | Age: 53
End: 2023-12-28
Payer: COMMERCIAL

## 2023-12-28 NOTE — TELEPHONE ENCOUNTER
PA Renewal Initiation    Medication: WEGOVY 1.7 MG/0.75ML SC SOAJ  Insurance Company: [employer] - Phone [phone] Fax [phone]  Pharmacy Filling the Rx: [facility] - [place], [place] - [facility] [address]  Filling Pharmacy Phone: [phone]  Filling Pharmacy Fax: [phone]  Start Date: 12/28/2023

## 2024-01-03 NOTE — TELEPHONE ENCOUNTER
There is now no appeal process for GLP1 agonists for [employer]. When we send in, as long as we have confirmed eligibility should go through without issues. She just needs to schedule with one of the [facility] pharmacists. She will Call [phone] to schedule.

## 2024-01-03 NOTE — TELEPHONE ENCOUNTER
PRIOR AUTHORIZATION DENIED    Medication: WEGOVY 1.7 MG/0.75ML SC SOAJ  Insurance Company: [employer] - Phone [phone] Fax [phone]  Denial Date:    Denial Reason(s):         Appeal Information:         Patient Notified: Y

## 2024-01-10 ENCOUNTER — TELEPHONE (OUTPATIENT)
Dept: SURGERY | Facility: CLINIC | Age: 54
End: 2024-01-10
Payer: COMMERCIAL

## 2024-01-10 ENCOUNTER — VIRTUAL VISIT (OUTPATIENT)
Dept: CARDIOLOGY | Facility: CLINIC | Age: 54
End: 2024-01-10
Attending: PHYSICIAN ASSISTANT
Payer: COMMERCIAL

## 2024-01-10 VITALS — WEIGHT: 240 LBS | BODY MASS INDEX: 41.2 KG/M2

## 2024-01-10 DIAGNOSIS — E66.01 MORBID OBESITY (H): Primary | ICD-10-CM

## 2024-01-10 ASSESSMENT — PAIN SCALES - GENERAL: PAINLEVEL: NO PAIN (0)

## 2024-01-10 NOTE — Clinical Note
Hello team,  I spoke with Paige who is established with comprehensive weight management.  Due to shortage issues with Wegovy at 1.7 mg weekly and intolerance to 2.4 mg weekly due to nausea despite use of ondansetron, I am recommending she transition to Zepbound starting with 5 mg weekly and titrating upwards.  Please let me know if you would prefer for her to try Wegovy 1 mg weekly instead.  Otherwise, I will plan to follow-up in March as scheduled.  Please let me know if you have any questions.  Thank you, Pillo

## 2024-01-10 NOTE — PROGRESS NOTES
Medication Therapy Management (MTM) Encounter    ASSESSMENT:                            Medication Adherence/Access: No issues identified    Weight management: Persistent, ongoing weight loss progress with use of weekly GLP-1 agonist.  Unfortunately, persistent, daily nausea associated with Wegovy 1.7 mg weekly.  Intolerant to 2.4 mg strength due to vomiting.  Given ongoing difficulties with nausea coupled with inability to continue 1.7 mg strength of Wegovy due to shortages, would be good candidate to transition to tirzepatide given its favorable nausea profile and favorable efficacy profile.  Will check with Dr. Derek Schwab and Sofie Blanton PA-C on this transition.  Pretreatment BMI greater than 40 kg/m . Negative history of pancreatitis, medullary thyroid cancer and multiple endocrine neoplasia type 2.      For patients that are under [employer] insurance coverage, it is mandated by insurance that each qualifying patient meet with hospital based Weight Management Medication Therapy Management pharmacist to continue therapy coverage. The following patient meets the below coverage criteria and can therefore continue GLP-1/GIP agonist therapy for Weight Management:    Adult  BMI >40 with or without comorbidities   OR   BMI >30 + NAFLD*   at time of initiating GLP-1/GIP agonist therapy Approved for 29 weeks  Met Updated Initial Criteria   At least 5% weight loss of baseline body weight  Approved for 12 months        PLAN:                            Stop Wegovy.    Begin Zepbound 5 mg once weekly for 4 weeks, then increase to Zepbound to 7.5 mg once weekly for 4 weeks.    Follow-up with me as scheduled in March.    If you are unable to get Zepbound before next Wednesday, please contact me and we will consider use of 2.5 mg once weekly to avoid titration adverse effects.    SUBJECTIVE/OBJECTIVE:                          Paige Fajardo is a 53 year old female contacted via secure video for an  initial visit. She was referred to me from [facility] Insurance Requirement.      Reason for visit: GLP-1/GIP agonist consult.    Allergies/ADRs: Reviewed in chart  Past Medical History: Reviewed in chart  Tobacco: She reports that she has never smoked. She has never used smokeless tobacco.    Medication Adherence/Access: no issues reported    Weight management:  Wegovy 1.7 mg weekly     Established with comprehensive weight management team, Dr. Schwab, Sofie Blanton PA-C and  Lauren Bloch, PharmD.  She notes ongoing positive response to Wegovy 1.7 mg weekly. Usually takes doses on Wednesdays, is due for a shot today. When she did try 2.4 mg strength Wegovy, did have persistent nausea and vomiting, even with use of Ondansetron. Even at 1.7 mg weekly has nausea, stomach is 'right on the edge' on a daily basis. Weight has continued to improve. Was able to lose weight to have hip surgery, had physical therapy which has enabled physical activity, walking on a regular basis. Goal weight under 200 lbs. Has made significant changes to diet, working with nutrition.     Physical activity had recent hip surgery, followed by physical therapy, which has now enabled regular walking. Pool therapy now.     Medical History:  MEN2/Medullary Thyroid Cancer: Negative  Pancreatitis: Negative     Current weight: 240 lbs   Baseline Weight prior to GLP-1 agonist: 340 lbs     Wt Readings from Last 4 Encounters:   01/10/24 108.9 kg (240 lb)   11/09/23 112.9 kg (249 lb)   09/28/23 113.4 kg (250 lb)   08/24/23 117.5 kg (259 lb)     Body Mass Index (BMI) Body mass index is 41.2 kg/m .    Today's Vitals: Wt 108.9 kg (240 lb)   BMI 41.20 kg/m      Lab Results   Component Value Date    A1C 5.1 05/17/2017     Lab Results   Component Value Date    CHOL 183 04/04/2023    CHOL 156 01/03/2019     Lab Results   Component Value Date    HDL 75 04/04/2023    HDL 63 01/03/2019     Lab Results   Component Value Date    LDL 89 04/04/2023    LDL 75 01/03/2019      Lab Results   Component Value Date    TRIG 93 04/04/2023    TRIG 89 01/03/2019     Lab Results   Component Value Date    KYLEHDANA 2.8 03/02/2015     ----------------      I spent 18 minutes with this patient today. All changes were made via collaborative practice agreement with Sarita Palma PA-C . A copy of the visit note was provided to the patient's provider(s).    A summary of these recommendations was sent via [email or web address].    Meera GarzaD, BCACP  Medication Therapy Management Pharmacist  [facility]     Telemedicine Visit Details  Type of service:  Video Conference via [employer]  Joined the call at 1/10/2024, 9:07:02 am.  Left the call at 1/10/2024, 9:25:50 am.  You were on the call for 18 minutes 47 seconds .     Medication Therapy Recommendations  No medication therapy recommendations to display

## 2024-01-10 NOTE — Clinical Note
"1/10/2024      RE: Paige Fajardo  [address]  [place] [address]       Dear Colleague,    Thank you for the opportunity to participate in the care of your patient, Paige Fajardo, at the [facility] HEART CLINIC [place] at [facility]. Please see a copy of my visit note below.    Virtual Visit Details    Type of service:  Video Visit     Originating Location (pt. Location): {video visit patient location:416217::\"Home\"}  {PROVIDER LOCATION On-site should be selected for visits conducted from your clinic location or adjoining [facility] hospital, academic office, or other nearby [facility] building. Off-site should be selected for all other provider locations, including home:[address]9667}  Distant Location (provider location):  {virtual location provider:444697}  Platform used for Video Visit: {Virtual Visit Platforms:900017::\"Ad Knights\"}    Medication Therapy Management (MTM) Encounter    ASSESSMENT:                            Medication Adherence/Access: No issues identified    Weight management: Persistent, ongoing weight loss progress with use of weekly GLP-1 agonist.  Unfortunately, persistent, daily nausea associated with Wegovy 1.7 mg weekly.  Intolerant to 2.4 mg strength due to vomiting.  Given ongoing difficulties with nausea coupled with inability to continue 1.7 mg strength of Wegovy due to shortages, would be good candidate to transition to tirzepatide given its favorable nausea profile and favorable efficacy profile.  Will check with Dr. Derek Schwab and Sofie Blanton PA-C on this transition.  Pretreatment BMI greater than 40 kg/m . Negative history of pancreatitis, medullary thyroid cancer and multiple endocrine neoplasia type 2.      For patients that are under [employer] insurance coverage, it is mandated by insurance that each qualifying patient meet with hospital based Weight Management Medication Therapy Management pharmacist to " continue therapy coverage. The following patient meets the below coverage criteria and can therefore continue GLP-1/GIP agonist therapy for Weight Management:    Adult  BMI >40 with or without comorbidities   OR   BMI >30 + NAFLD*   at time of initiating GLP-1/GIP agonist therapy Approved for 29 weeks  Met Updated Initial Criteria   At least 5% weight loss of baseline body weight  Approved for 12 months        PLAN:                            Stop Wegovy.    Begin Zepbound 5 mg once weekly for 4 weeks, then increase to Zepbound to 7.5 mg once weekly for 4 weeks.    Follow-up with me as scheduled in March.    If you are unable to get Zepbound before next Wednesday, please contact me and we will consider use of 2.5 mg once weekly to avoid titration adverse effects.    SUBJECTIVE/OBJECTIVE:                          Paige Fajardo is a 53 year old female contacted via secure video for an initial visit. She was referred to me from [facility] Insurance Requirement.      Reason for visit: GLP-1/GIP agonist consult.    Allergies/ADRs: Reviewed in chart  Past Medical History: Reviewed in chart  Tobacco: She reports that she has never smoked. She has never used smokeless tobacco.    Medication Adherence/Access: no issues reported    Weight management:  Wegovy 1.7 mg weekly     Established with comprehensive weight management team, Dr. Schwab, Sofie Blanton PA-C and  Lauren Bloch, PharmD.  She notes ongoing positive response to Wegovy 1.7 mg weekly. Usually takes doses on Wednesdays, is due for a shot today. When she did try 2.4 mg strength Wegovy, did have persistent nausea and vomiting, even with use of Ondansetron. Even at 1.7 mg weekly has nausea, stomach is 'right on the edge' on a daily basis. Weight has continued to improve. Was able to lose weight to have hip surgery, had physical therapy which has enabled physical activity, walking on a regular basis. Goal weight under 200 lbs. Has made significant changes to diet,  working with nutrition.     Physical activity had recent hip surgery, followed by physical therapy, which has now enabled regular walking. Pool therapy now.     Medical History:  MEN2/Medullary Thyroid Cancer: Negative  Pancreatitis: Negative     Current weight: 240 lbs   Baseline Weight prior to GLP-1 agonist: 340 lbs     Wt Readings from Last 4 Encounters:   01/10/24 108.9 kg (240 lb)   11/09/23 112.9 kg (249 lb)   09/28/23 113.4 kg (250 lb)   08/24/23 117.5 kg (259 lb)     Body Mass Index (BMI) Body mass index is 41.2 kg/m .    Today's Vitals: Wt 108.9 kg (240 lb)   BMI 41.20 kg/m      Lab Results   Component Value Date    A1C 5.1 05/17/2017     Lab Results   Component Value Date    CHOL 183 04/04/2023    CHOL 156 01/03/2019     Lab Results   Component Value Date    HDL 75 04/04/2023    HDL 63 01/03/2019     Lab Results   Component Value Date    LDL 89 04/04/2023    LDL 75 01/03/2019     Lab Results   Component Value Date    TRIG 93 04/04/2023    TRIG 89 01/03/2019     Lab Results   Component Value Date    CHOLHDLRATIO 2.8 03/02/2015     ----------------      I spent 18 minutes with this patient today. All changes were made via collaborative practice agreement with Sarita Palma PA-C . A copy of the visit note was provided to the patient's provider(s).    A summary of these recommendations was sent via TakeLessons.    Mendoza Ahumada, PharmD, BCACP  Medication Therapy Management Pharmacist  [facility]     Telemedicine Visit Details  Type of service:  Video Conference via CargoSense  Joined the call at 1/10/2024, 9:07:02 am.  Left the call at 1/10/2024, 9:25:50 am.  You were on the call for 18 minutes 47 seconds .     Medication Therapy Recommendations  No medication therapy recommendations to display           Please do not hesitate to contact me if you have any questions/concerns.     Sincerely,     MENDOZA AHUMADA [facility]

## 2024-01-10 NOTE — NURSING NOTE
Is the patient currently in the state of [place]? YES    Visit mode:VIDEO    If the visit is dropped, the patient can be reconnected by: VIDEO VISIT: Text to cell phone:   Telephone Information:   Mobile [phone]       Will anyone else be joining the visit? NO  (If patient encounters technical issues they should call [phone]    How would you like to obtain your AVS? MyChart    Are changes needed to the allergy or medication list? No, Pt stated no changes to allergies, and Pt stated no med changes    Reason for visit: Consult    Joy WILCOX

## 2024-01-10 NOTE — TELEPHONE ENCOUNTER
Prior Authorization Not Needed per Insurance    Medication: ZEPBOUND 5 MG/0.5ML SC SOAJ  Insurance Company: [employer] - Phone [phone] Fax [phone]  Expected CoPay: $ 100  Pharmacy Filling the Rx: [facility] - [place], [place] - [address]  Pharmacy Notified: yes  Patient Notified: pharmacy will notify pt

## 2024-01-15 ENCOUNTER — MYC MEDICAL ADVICE (OUTPATIENT)
Dept: PULMONOLOGY | Facility: CLINIC | Age: 54
End: 2024-01-15
Payer: COMMERCIAL

## 2024-01-15 DIAGNOSIS — J45.40 MODERATE PERSISTENT ASTHMA WITHOUT COMPLICATION: ICD-10-CM

## 2024-01-15 RX ORDER — PREDNISONE 20 MG/1
TABLET ORAL
Qty: 14 TABLET | Refills: 0 | Status: SHIPPED | OUTPATIENT
Start: 2024-01-15

## 2024-02-07 NOTE — PROGRESS NOTES
"Video-Visit Details    Type of service:  Video Visit    Video Start Time: 9:32 am  Video End Time: 10:05 am    Originating Location (pt. Location): Home    Distant Location (provider location):  Offsite (providers home)     Platform used for Video Visit: WAPA     Weight Management Nutrition Consultation    Paige Fajardo is a 53 year old female presents today for weight management nutrition consultation.  Patient referred by Dr. Reed on August 2, 2022.    Patient with Co-morbidities of obesity including:  Type II DM no  Renal Failure no  Sleep apnea yes  Hypertension no   Dyslipidemia no  Joint pain no  Back pain yes  GERD no     PMH:   Depression  Swelling  Fatigue  Gout   Asthma  Fibromyalgia   Covid at least 2x  Weight gain associated with prednisone (was on for covid and gout)      Hx of alcohol abuse 20 years ago    Anthropometrics:  Highest weight per pt: 338 lbs  Weight 8/2/22: 304 lbs with BMI 49.07    Estimated body mass index is 42.05 kg/m  as calculated from the following:    Height as of this encounter: 1.626 m (5' 4\").    Weight as of this encounter: 111.1 kg (245 lb).     Current weight: 245 lbs, pt report  - Gained 12 lbs while on steroids in Jan per pt  - Down 4 lbs since last visit, 93 from highest weight  - Down 5+ sizes now per pt  - Feeling a lot better physically/pain wise    Medications for Weight Loss:  Zepbound (was on Wegovy previously)    Supplements:  Vit D 3,000 IUs/day - Usually decreases as weather gets nice  Turmeric   B-complex  Calcium carbonate - only if stomach is upset   Mg Citrate     Hx of high cholesterol     Labs 3/10/23 (when in ED for dehydration)  Calcium slightly elevated at 10.6  eGFR 81    NUTRITION HISTORY    NKFA  Limits dairy - notices clogged sinuses and worsening asthma symptoms per pt.   Does use dairy alternatives.     Has not met with a RD before.    Pt goals: lose weight, learn about food choices (what is more nutritious), feel better    Please see previous " KRISTINA notes for more information.     Nov 2023:    Had hip surgery since last seen (10/27/23). Had to go off Wegovy for a short time due to this.     Feels she is getting enough protein.   Carbs - enjoying oatmeal for breakfast    Keeping nutritious snacks on hand but out of currently.     Doing well with hydration overall. Got constipated with pain meds post surgery.    Feb 2024:  Had to switch medications per insurance. Feeling hungrier now. Trying to eat nutrient dense (filling foods).     Was sick most of Jan - not sure if medication or virus related but others around her were sick. Lots of nausea, vomiting and diarrhea. Finding when she gets sick it is hard to get over. Got dehydrated and was hard to come back. Went on short-term steroids and gained about 12 lbs per pt.    No vomiting or diarrhea this week.    Starting making aparna pudding with fruit on side for breakfast.     Attending a local support group in her community - has been super helpful.    PA: doing well post hip surgery. worked up to a full mile in the pool, also using treadmill. Doing yoga. Walking dog. Went to [place] since last seen - walked 4 miles/day. Aiming for 3x/week at gym   - Signed up for a 5k (got clearance from team)     Previous goals: (did not discuss in detail today - had other priorities)   Aim to get more fresh fruits/vegetables when able  Aim to re-stock nutritious snack choices  Aim to keep water bottle on hand to help with hydration - Got really dehydrated last month with being sick. Tried some bubbly water - sat okay. Did use electrolytes as well.     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   has diabetes     Nutrition Prescription  Recommended energy/nutrient modification.    Nutrition Diagnosis  Food and nutrition related knowledge deficit r/t lack of prior exposure to nutrition education  aeb pt report and interest in learning     Obesity r/t long history of positive energy balance aeb BMI >30.    Nutrition Intervention  Reviewed and modified previous goals  Answered pt questions  Coordination of care   Nutrition education - Plate method, types of vegetables, types of fat, label reading, dietary sources of iron, ways to incorporate PA (Entreda ami)  AVS and handouts via [employer]    Patient demonstrates understanding.    Expected Engagement: good    Nutrition Goals    Consider Friday support group  Could consider Couch to 5K application   Check in on eating habits/weight status/medications   Aim to work on hydration again    Snack ideas:  - Cucumbers  - Celery (plain or with peanut butter)  - Fruit  - Cottage cheese  - Greek yogurt  - Hard boiled eggs  - String cheese  - Guacamole  - Hummus    Keep up the hard work!    Fermented foods      Yogurt with live bacterial cultures   Kimichi   Apple cider Vinegar   Miso   Natto   Kvass   Pickles   Olives   Kefir   Kombucha   Sauerkraut   Probiotics: lactobacillus or acidophilus    Long-term goal: 5k     Coping: reading, planning garden, sewing/quilting     High-iron foods:  100% iron-fortified ready-to-eat cereal   cup, 18 mg  Grits, instant   cup, 7.1   Cream of wheat   cup, 5.2   Oatmeal, instant   cup, 5 mg  Soybeans, cooked   cup, 4.4 mg  White beans, canned   cup, 3.9 mg   Lentils   cup, 3.3 mg  White rice 1/3 cup, 3 mg  Spinach   cup cooked, 1 cup raw, 3 mg   Beef tenderloin 3 oz, 3 mg  Baked beans 1/3 cup, 3 mg  Vegetable or soy burger 1 sarah, 2.9 mg  Soy milk 1 cup (8 oz), 2.7 mg   Chickpeas   cup, 2.5 mg  Kidney beans   cup, 2.5 mg  Sardines 3 oz, 2.5 mg   Nuts: almonds or pistachios   cup, 1.3 mg   [place]ler sprouts, cooked   cup, 1 mg   Egg 1 whole, 1 mg    Some good sources on nut comparison:   https://www.[employer]/sites/default/files/2020-04/nutrient_comparison_chart_for_tree_nuts_redesign.pdf      https://www.[employer]/Images/nutrition/Tree_Nut%20Nutrient_Comparison_Chart.pdf        The Plate Method:  https://www.cdc.gov/diabetes/images/managing/Diabetes-Manage-Eat-Well-Plate-Graphic_600px.jpg     Starchy vegetables  Potatoes   Sweet potatoes  Green peas  Chickpeas   Corn  Lentils  Beans (black, domingo, etc)     Types of fats  https://www.\[facility]\"".[place].[facility]/nutritionsource/what-should-you-eat/fats-and-cholesterol/types-of-fat/      Good resources on inflammation    https://www.health.[place].edu/staying-healthy/foods-that-fight-inflammation     https://www.heart.org/en/healthy-living/healthy-eating/eat-smart/nutrition-basics/mediterranean-diet     https://[employer].org/traditional-diets/mediterranean-diet     https://my.[facility].org/health/articles/23640-jnqebkjlzxkae-seym     Good article on label reading   https://www.fda.gov/food/new-nutrition-facts-label/how-understand-and-use-nutrition-facts-label     Additional resources:     Protein Sources   http://[employer]/491960.pdf     Carbohydrates  [email or web address]     Mindful Eating  http://[email or web address]     Summary of Volumetrics Eating Plan  [email or web address]     Follow-Up:  Thursday, May 9th at 10:00 am    Time spent with patient: 33 minutes.  AARON Hamilton, RD, LD

## 2024-02-08 ENCOUNTER — VIRTUAL VISIT (OUTPATIENT)
Dept: ENDOCRINOLOGY | Facility: CLINIC | Age: 54
End: 2024-02-08
Payer: COMMERCIAL

## 2024-02-08 VITALS — HEIGHT: 64 IN | WEIGHT: 245 LBS | BODY MASS INDEX: 41.83 KG/M2

## 2024-02-08 DIAGNOSIS — E66.9 OBESITY: ICD-10-CM

## 2024-02-08 DIAGNOSIS — Z71.3 NUTRITIONAL COUNSELING: Primary | ICD-10-CM

## 2024-02-08 PROCEDURE — 99207 PR NO CHARGE LOS: CPT | Mod: 95 | Performed by: DIETITIAN, REGISTERED

## 2024-02-08 PROCEDURE — 97803 MED NUTRITION INDIV SUBSEQ: CPT | Mod: 95 | Performed by: DIETITIAN, REGISTERED

## 2024-02-08 ASSESSMENT — PAIN SCALES - GENERAL: PAINLEVEL: NO PAIN (0)

## 2024-02-08 NOTE — LETTER
"2/8/2024       RE: Paige Fajardo  [address]  [place] [address]     Dear Colleague,    Thank you for referring your patient, Paige Fajardo, to the [facility] WEIGHT MANAGEMENT [facility]INIC [place] at [facility]. Please see a copy of my visit note below.    Video-Visit Details    Type of service:  Video Visit    Video Start Time: 9:32 am  Video End Time: 10:05 am    Originating Location (pt. Location): Home    Distant Location (provider location):  Offsite (providers home)     Platform used for Video Visit: Rewind Me     Weight Management Nutrition Consultation    Paige Fajardo is a 53 year old female presents today for weight management nutrition consultation.  Patient referred by Dr. Reed on August 2, 2022.    Patient with Co-morbidities of obesity including:  Type II DM no  Renal Failure no  Sleep apnea yes  Hypertension no   Dyslipidemia no  Joint pain no  Back pain yes  GERD no     PMH:   Depression  Swelling  Fatigue  Gout   Asthma  Fibromyalgia   Covid at least 2x  Weight gain associated with prednisone (was on for covid and gout)      Hx of alcohol abuse 20 years ago    Anthropometrics:  Highest weight per pt: 338 lbs  Weight 8/2/22: 304 lbs with BMI 49.07    Estimated body mass index is 42.05 kg/m  as calculated from the following:    Height as of this encounter: 1.626 m (5' 4\").    Weight as of this encounter: 111.1 kg (245 lb).     Current weight: 245 lbs, pt report  - Gained 12 lbs while on steroids in Jan per pt  - Down 4 lbs since last visit, 93 from highest weight  - Down 5+ sizes now per pt  - Feeling a lot better physically/pain wise    Medications for Weight Loss:  Zepbound (was on Wegovy previously)    Supplements:  Vit D 3,000 IUs/day - Usually decreases as weather gets nice  Turmeric   B-complex  Calcium carbonate - only if stomach is upset   Mg Citrate     Hx of high cholesterol     Labs 3/10/23 (when in ED for " dehydration)  Calcium slightly elevated at 10.6  eGFR 81    NUTRITION HISTORY    NKFA  Limits dairy - notices clogged sinuses and worsening asthma symptoms per pt.   Does use dairy alternatives.     Has not met with a RD before.    Pt goals: lose weight, learn about food choices (what is more nutritious), feel better    Please see previous RD notes for more information.     Nov 2023:    Had hip surgery since last seen (10/27/23). Had to go off Wegovy for a short time due to this.     Feels she is getting enough protein.   Carbs - enjoying oatmeal for breakfast    Keeping nutritious snacks on hand but out of currently.     Doing well with hydration overall. Got constipated with pain meds post surgery.    Feb 2024:  Had to switch medications per insurance. Feeling hungrier now. Trying to eat nutrient dense (filling foods).     Was sick most of Jan - not sure if medication or virus related but others around her were sick. Lots of nausea, vomiting and diarrhea. Finding when she gets sick it is hard to get over. Got dehydrated and was hard to come back. Went on short-term steroids and gained about 12 lbs per pt.    No vomiting or diarrhea this week.    Starting making aparna pudding with fruit on side for breakfast.     Attending a local support group in her community - has been super helpful.    PA: doing well post hip surgery. worked up to a full mile in the pool, also using treadmill. Doing yoga. Walking dog. Went to [place] since last seen - walked 4 miles/day. Aiming for 3x/week at gym   - Signed up for a 5k (got clearance from team)     Previous goals: (did not discuss in detail today - had other priorities)   Aim to get more fresh fruits/vegetables when able  Aim to re-stock nutritious snack choices  Aim to keep water bottle on hand to help with hydration - Got really dehydrated last month with being sick. Tried some bubbly water - sat okay. Did use electrolytes as well.     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   has diabetes     Nutrition Prescription  Recommended energy/nutrient modification.    Nutrition Diagnosis  Food and nutrition related knowledge deficit r/t lack of prior exposure to nutrition education aeb pt report and interest in learning     Obesity r/t long history of positive energy balance aeb BMI >30.    Nutrition Intervention  Reviewed and modified previous goals  Answered pt questions  Coordination of care   Nutrition education - Plate method, types of vegetables, types of fat, label reading, dietary sources of iron, ways to incorporate PA (Lozo ami)  AVS and handouts via [employer]    Patient demonstrates understanding.    Expected Engagement: good    Nutrition Goals    Consider Friday support group  Could consider Couch to 5K application   Check in on eating habits/weight status/medications   Aim to work on hydration again    Snack ideas:  - Cucumbers  - Celery (plain or with peanut butter)  - Fruit  - Cottage cheese  - Greek yogurt  - Hard boiled eggs  - String cheese  - Guacamole  - Hummus    Keep up the hard work!    Fermented foods      Yogurt with live bacterial cultures   Kimichi   Apple cider Vinegar   Miso   Natto   Kvass   Pickles   Olives   Kefir   Kombucha   S[place]ale   Probiotics: lactobacillus or acidophilus    Long-term goal: 5k     Coping: reading, planning garden, sewing/quilting     High-iron foods:  100% iron-fortified ready-to-eat cereal   cup, 18 mg  Grits, instant   cup, 7.1   Cream of wheat   cup, 5.2   Oatmeal, instant   cup, 5 mg  Soybeans, cooked   cup, 4.4 mg  White beans, canned   cup, 3.9 mg   Lentils   cup, 3.3 mg  White rice 1/3 cup, 3 mg  Spinach   cup cooked, 1 cup raw, 3 mg   Beef tenderloin 3 oz, 3 mg  Baked beans 1/3 cup, 3 mg  Vegetable or soy burger 1 sarah, 2.9 mg  Soy milk 1 cup (8 oz), 2.7 mg   Chickpeas   cup, 2.5  mg  Kidney beans   cup, 2.5 mg  Sardines 3 oz, 2.5 mg   Nuts: almonds or pistachios   cup, 1.3 mg   [place] sprouts, cooked   cup, 1 mg   Egg 1 whole, 1 mg    Some good sources on nut comparison:   https://www.[employer]/sites/default/files/2020-04/nutrient_comparison_chart_for_tree_nuts_redesign.pdf     https://www.[place]/Images/nutrition/Tree_Nut%20Nutrient_Comparison_Chart.pdf        The Plate Method:  https://www.cdc.gov/diabetes/images/managing/Diabetes-Manage-Eat-Well-Plate-Graphic_600px.jpg     Starchy vegetables  Potatoes   Sweet potatoes  Green peas  Chickpeas   Corn  Lentils  Beans (black, domingo, etc)     Types of fats  https://www.[facility].[place].edu/nutritionsource/what-should-you-eat/fats-and-cholesterol/types-of-fat/      Good resources on inflammation    https://www.health.[place].edu/staying-healthy/foods-that-fight-inflammation     https://www.heart.org/en/healthy-living/healthy-eating/eat-smart/nutrition-basics/mediterranean-diet     https://[employer]opharm[employer]pt.org/traditional-diets/mediterranean-diet     https://my.[facility].org/health/articles/02414-okdcsivvwdtwt-zzic     Good article on label reading   https://www.fda.gov/food/new-nutrition-facts-label/how-understand-and-use-nutrition-facts-label     Additional resources:     Protein Sources   http://enavu/417857.pdf     Carbohydrates  [email or web address]://[email or web address]     Mindful Eating  http://enavu/[email or web address]     Summary of Volumetrics Eating Plan  [email or web address]     Follow-Up:  Thursday, May 9th at 10:00 am    Time spent with patient: 33 minutes.  AARON Hamilton, RD, LD

## 2024-02-08 NOTE — PATIENT INSTRUCTIONS
Nutrition Goals    Consider Friday support group  Could consider Couch to 5K application   Check in on eating habits/weight status/medications   Aim to work on hydration again    Snack ideas:  - Cucumbers  - Celery (plain or with peanut butter)  - Fruit  - Cottage cheese  - Greek yogurt  - Hard boiled eggs  - String cheese  - Guacamole  - Hummus    Keep up the hard work!    Fermented foods      Yogurt with live bacterial cultures   Kimichi   Apple cider Vinegar   Miso   Natto   Kvass   Pickles   Olives   Kefir   Kombucha   Sauerkraut   Probiotics: lactobacillus or acidophilus    Long-term goal: 5k     Coping: reading, planning garden, sewing/quilting     High-iron foods:  100% iron-fortified ready-to-eat cereal   cup, 18 mg  Grits, instant   cup, 7.1   Cream of wheat   cup, 5.2   Oatmeal, instant   cup, 5 mg  Soybeans, cooked   cup, 4.4 mg  White beans, canned   cup, 3.9 mg   Lentils   cup, 3.3 mg  White rice 1/3 cup, 3 mg  Spinach   cup cooked, 1 cup raw, 3 mg   Beef tenderloin 3 oz, 3 mg  Baked beans 1/3 cup, 3 mg  Vegetable or soy burger 1 sarah, 2.9 mg  Soy milk 1 cup (8 oz), 2.7 mg   Chickpeas   cup, 2.5 mg  Kidney beans   cup, 2.5 mg  Sardines 3 oz, 2.5 mg   Nuts: almonds or pistachios   cup, 1.3 mg   [place] sprouts, cooked   cup, 1 mg   Egg 1 whole, 1 mg    Some good sources on nut comparison:   https://www.[employer].CDNetworks/sites/default/files/2020-04/nutrient_comparison_chart_for_tree_nuts_redesign.pdf     https://www.[place].CDNetworks/Images/nutrition/Tree_Nut%20Nutrient_Comparison_Chart.pdf        The Plate Method:  https://www.cdc.gov/diabetes/images/managing/Diabetes-Manage-Eat-Well-Plate-Graphic_600px.jpg     Starchy vegetables  Potatoes   Sweet potatoes  Green peas  Chickpeas   Corn  Lentils  Beans (black, odmingo, etc)     Types of fats  https://www.[facility].harvard.edu/nutritionsource/what-should-you-eat/fats-and-cholesterol/types-of-fat/      Good resources on inflammation     https://www.health.harvard.edu/staying-healthy/foods-that-fight-inflammation     https://www.heart.org/en/healthy-living/healthy-eating/eat-smart/nutrition-basics/mediterranean-diet     https://oldwayspt.org/traditional-diets/mediterranean-diet     https://my.[facility].org/health/articles/92045-ratnkxeicdyjw-oplr     Good article on label reading   https://www.fda.gov/food/new-nutrition-facts-label/how-understand-and-use-nutrition-facts-label     Additional resources:     Protein Sources   http://Eye-Q/958086.pdf     Carbohydrates  http://fvfiles.com/078186.pdf     Mindful Eating  http://Eye-Q/814014.pdf     Summary of Volumetrics Eating Plan  http://fvfiles.com/827807.pdf     Follow-Up:  Thursday, May 9th at 10:00 am    AARON Dunne (Duncan), RD, LD  Clinic #: [phone]

## 2024-02-08 NOTE — NURSING NOTE
Is the patient currently in the state of [place]? YES    Visit mode:VIDEO    If the visit is dropped, the patient can be reconnected by: VIDEO VISIT: Send to e-mail at: [email or web address]    Will anyone else be joining the visit? NO  (If patient encounters technical issues they should call [phone]    How would you like to obtain your AVS? MyChart    Are changes needed to the allergy or medication list? N/A    Reason for visit: CÉSAR WILCOX

## 2024-03-06 ENCOUNTER — VIRTUAL VISIT (OUTPATIENT)
Dept: CARDIOLOGY | Facility: CLINIC | Age: 54
End: 2024-03-06
Attending: PHYSICIAN ASSISTANT
Payer: COMMERCIAL

## 2024-03-06 VITALS — HEIGHT: 64 IN | BODY MASS INDEX: 41.83 KG/M2 | WEIGHT: 245 LBS

## 2024-03-06 DIAGNOSIS — E66.01 MORBID OBESITY (H): Primary | ICD-10-CM

## 2024-03-06 ASSESSMENT — PAIN SCALES - GENERAL: PAINLEVEL: NO PAIN (0)

## 2024-03-06 NOTE — PATIENT INSTRUCTIONS
"Recommendations from today's MTM visit:                                                    MTM (medication therapy management) is a service provided by a clinical pharmacist designed to help you get the most of out of your medicines.      Finish your supply of Zepbound at 7.5 mg once weekly, then increase to 10 mg once weekly for at least 4 weeks.  If you are having adequate response from 10 mg without side effects, remain at that dose.     Otherwise if you are not having adequate response at 10 mg, increase to 12.5 mg once weekly.     Follow-up with me as scheduled in June.    It was great speaking with you today.  I value your experience and would be very thankful for your time in providing feedback in our clinic survey. In the next few days, you may receive an email or text message from [employer] with a link to a survey related to your  clinical pharmacist.\"     To schedule another MTM appointment, please call the clinic directly or you may call the MTM scheduling line at [phone].    My Clinical Pharmacist's contact information:                                                      Please feel free to contact me with any questions or concerns you have.      Mendoza Ahumada, PharmD, BCACP  Medication Therapy Management Pharmacist  [facility]    "

## 2024-03-06 NOTE — PROGRESS NOTES
Medication Therapy Management (MTM) Encounter    ASSESSMENT:                            Medication Adherence/Access: No issues identified    Weight management: Transition from Wegovy to Zepbound has yielded essentially resolution of previously noted nausea and vomiting.  Satiety/appetite effects are at this point lesser compared to historically with Wegovy.  For that reason, advise continued titration of Zepbound to 10 mg weekly and subsequently 12.5 mg if inadequate response to 10 mg.  Recent use of prednisone contributing to increased appetite as well as modest weight gain.    Asthma: Improved, recently completed course of prednisone, remains on Qvar daily.      PLAN:                            Finish your supply of Zepbound at 7.5 mg once weekly, then increase to 10 mg once weekly for at least 4 weeks.  If you are having adequate response from 10 mg without side effects, remain at that dose.    Otherwise if you are not having adequate response at 10 mg, increase to 12.5 mg once weekly.    Follow-up with me as scheduled in June.    SUBJECTIVE/OBJECTIVE:                          Paige Fajardo is a 53 year old female contacted via secure video for a follow-up visit from 01/10/2024.       Reason for visit: Zepbound follow up .    Allergies/ADRs: Reviewed in chart  Past Medical History: Reviewed in chart  Tobacco: She reports that she has never smoked. She has never used smokeless tobacco.    Medication Adherence/Access: no issues reported    Weight management:  Zepbound 7.5 mg weekly    Video consult to follow-up transition from Wegovy to Zepbound.  Patient states that she has one shot left, administers on Wednesdays. Notes the response has been relatively modest from a hunger/satiety, notes prednisone use has caused increased appetite. Stopped prednisone yesterday, had flare of asthma. Did increase 7 lbs while on Prednisone, usually gains around 10 lbs. Nausea has completely resolved at this point. No constipation,  "acid reflux, or other GI adverse effects. Does comment that effects are more subtle compared to Wegovy 1.7 mg weekly. Does note she has been able to do a lot more exercise recently, training for a 5k in [employer] in the end of April, \"couch to 5k\" program. Notes weight loss has enabled her ability to start running again.     Wt Readings from Last 4 Encounters:   03/06/24 111.1 kg (245 lb)   02/08/24 111.1 kg (245 lb)   01/10/24 108.9 kg (240 lb)   11/09/23 112.9 kg (249 lb)     Body Mass Index (BMI) Body mass index is 42.05 kg/m .    Today's Vitals: Ht 1.626 m (5' 4\")   Wt 111.1 kg (245 lb)   BMI 42.05 kg/m      Lab Results   Component Value Date    A1C 5.1 05/17/2017     Lab Results   Component Value Date    CHOL 183 04/04/2023    CHOL 156 01/03/2019     Lab Results   Component Value Date    HDL 75 04/04/2023    HDL 63 01/03/2019     Lab Results   Component Value Date    LDL 89 04/04/2023    LDL 75 01/03/2019     Lab Results   Component Value Date    TRIG 93 04/04/2023    TRIG 89 01/03/2019     Lab Results   Component Value Date    CHOLHDLRATIO 2.8 03/02/2015     Asthma:  Albuterol 90 mcg prn   Qvar 80 mcg 1 puff twice daily depending on season     Prednisone was completed yesterday, lungs are 'cleared up'. Had been on for 12 days.  Usually will increase Qvar to use twice daily during seasons where she has increased frequency of symptoms.  ----------------      I spent 12 minutes with this patient today. All changes were made via collaborative practice agreement with Sarita Palma PA-C . A copy of the visit note was provided to the patient's provider(s).    A summary of these recommendations was sent via [employer].    Mendoza Ahumada, PharmD, BCACP  Medication Therapy Management Pharmacist  [facility]     Telemedicine Visit Details  Type of service:  Christian   Joined the call at 3/6/2024, 9:27:34 am.  Left the call at 3/6/2024, 9:40:23 am.  You were on the call for 12 minutes 48 seconds .     " Medication Therapy Recommendations  No medication therapy recommendations to display

## 2024-03-06 NOTE — Clinical Note
3/6/2024      RE: Paige Fajardo  [address]  [place] [address]       Dear Colleague,    Thank you for the opportunity to participate in the care of your patient, Paige Fajardo, at the [facility] [place] [facility]. Please see a copy of my visit note below.    Medication Therapy Management (MTM) Encounter    ASSESSMENT:                            Medication Adherence/Access: No issues identified    Weight management: Transition from Wegovy to Zepbound has yielded essentially resolution of previously noted nausea and vomiting.  Satiety/appetite effects are at this point lesser compared to historically with Wegovy.  For that reason, advise continued titration of Zepbound to 10 mg weekly and subsequently 12.5 mg if inadequate response to 10 mg.  Recent use of prednisone contributing to increased appetite as well as modest weight gain.    Asthma: Improved, recently completed course of prednisone, remains on Qvar daily.      PLAN:                            Finish your supply of Zepbound at 7.5 mg once weekly, then increase to 10 mg once weekly for at least 4 weeks.  If you are having adequate response from 10 mg without side effects, remain at that dose.    Otherwise if you are not having adequate response at 10 mg, increase to 12.5 mg once weekly.    Follow-up with me as scheduled in June.    SUBJECTIVE/OBJECTIVE:                          Paige Fajardo is a 53 year old female contacted via secure video for a follow-up visit from 01/10/2024.       Reason for visit: Zepbound follow up .    Allergies/ADRs: Reviewed in chart  Past Medical History: Reviewed in chart  Tobacco: She reports that she has never smoked. She has never used smokeless tobacco.    Medication Adherence/Access: no issues reported    Weight management:  Zepbound 7.5 mg weekly    Video consult to follow-up transition from Wegovy to Zepbound.  Patient states that she has one  "shot left, administers on Wednesdays. Notes the response has been relatively modest from a hunger/satiety, notes prednisone use has caused increased appetite. Stopped prednisone yesterday, had flare of asthma. Did increase 7 lbs while on Prednisone, usually gains around 10 lbs. Nausea has completely resolved at this point. No constipation, acid reflux, or other GI adverse effects. Does comment that effects are more subtle compared to Wegovy 1.7 mg weekly. Does note she has been able to do a lot more exercise recently, training for a 5k in [employer] in the end of April, \"couch to 5k\" program. Notes weight loss has enabled her ability to start running again.     Wt Readings from Last 4 Encounters:   03/06/24 111.1 kg (245 lb)   02/08/24 111.1 kg (245 lb)   01/10/24 108.9 kg (240 lb)   11/09/23 112.9 kg (249 lb)     Body Mass Index (BMI) Body mass index is 42.05 kg/m .    Today's Vitals: Ht 1.626 m (5' 4\")   Wt 111.1 kg (245 lb)   BMI 42.05 kg/m      Lab Results   Component Value Date    A1C 5.1 05/17/2017     Lab Results   Component Value Date    CHOL 183 04/04/2023    CHOL 156 01/03/2019     Lab Results   Component Value Date    HDL 75 04/04/2023    HDL 63 01/03/2019     Lab Results   Component Value Date    LDL 89 04/04/2023    LDL 75 01/03/2019     Lab Results   Component Value Date    TRIG 93 04/04/2023    TRIG 89 01/03/2019     Lab Results   Component Value Date    CHOLHDLRATIO 2.8 03/02/2015     Asthma:  Albuterol 90 mcg prn   Qvar 80 mcg 1 puff twice daily depending on season     Prednisone was completed yesterday, lungs are 'cleared up'. Had been on for 12 days.  Usually will increase Qvar to use twice daily during seasons where she has increased frequency of symptoms.  ----------------      I spent 12 minutes with this patient today. All changes were made via collaborative practice agreement with Sarita Palma PA-C . A copy of the visit note was provided to the patient's provider(s).    A summary of these " recommendations was sent via [employer].    Mendoza Ahumada, PharmD, BCACP  Medication Therapy Management Pharmacist  [facility]     Telemedicine Visit Details  Type of service:  Christian   Joined the call at 3/6/2024, 9:27:34 am.  Left the call at 3/6/2024, 9:40:23 am.  You were on the call for 12 minutes 48 seconds .     Medication Therapy Recommendations  No medication therapy recommendations to display           Please do not hesitate to contact me if you have any questions/concerns.     Sincerely,     MENDOZA AHUMADA RP

## 2024-03-06 NOTE — NURSING NOTE
Is the patient currently in the state of [place]? YES    Visit mode:VIDEO    If the visit is dropped, the patient can be reconnected by: VIDEO VISIT: Text to cell phone:   Telephone Information:   Mobile [phone]       Will anyone else be joining the visit? NO  (If patient encounters technical issues they should call [phone]    How would you like to obtain your AVS? MyChart    Are changes needed to the allergy or medication list? No    Reason for visit: RECHECK    Luis WILCOX

## 2024-03-06 NOTE — PROGRESS NOTES
"Virtual Visit Details    Type of service:  Video Visit     Originating Location (pt. Location): {video visit patient location:417945::\"Home\"}  {PROVIDER LOCATION On-site should be selected for visits conducted from your clinic location or adjoining [facility] hospital, academic office, or other nearby [facility] building. Off-site should be selected for all other provider locations, including home:521931}  Distant Location (provider location):  {virtual location provider:043692}  Platform used for Video Visit: {Virtual Visit Platforms:299002::\"Klooff\"}  "

## 2024-03-20 DIAGNOSIS — M79.7 FIBROMYALGIA: ICD-10-CM

## 2024-03-20 NOTE — TELEPHONE ENCOUNTER
Script Eprescribed to pharmacy  [place] Prescription Monitoring Program checked      Signed Prescriptions:                        Disp   Refills    COMPOUNDED NON-CONTROLLED SUBSTANCE (CMPD *90 cap*4        Sig: LOW DOSE NALTREXONE 6MG one tablet qhs  Authorizing Provider: NYDIA HAUSER MD

## 2024-03-26 ENCOUNTER — VIRTUAL VISIT (OUTPATIENT)
Dept: ENDOCRINOLOGY | Facility: CLINIC | Age: 54
End: 2024-03-26
Payer: COMMERCIAL

## 2024-03-26 VITALS — WEIGHT: 242 LBS | BODY MASS INDEX: 41.32 KG/M2 | HEIGHT: 64 IN

## 2024-03-26 DIAGNOSIS — E66.01 MORBID OBESITY (H): Primary | ICD-10-CM

## 2024-03-26 PROCEDURE — 99213 OFFICE O/P EST LOW 20 MIN: CPT | Mod: 95 | Performed by: INTERNAL MEDICINE

## 2024-03-26 ASSESSMENT — PAIN SCALES - GENERAL: PAINLEVEL: SEVERE PAIN (7)

## 2024-03-26 NOTE — PROGRESS NOTES
"  Return Medical Weight Management Note     Paige Fajardo  MRN:  [id number]  :  1970  VIRI:  3/26/2024    Dear Francesca Samson MD,    I had the pleasure of seeing your patient Paige Fajardo. She is a 53 year old female who I am continuing to see for treatment of obesity related to:  Gout, DMITRIY, asthma, lumbar radiculopathy, fibromyalgia        2022    10:09 AM   --   I have the following health issues associated with obesity Sleep Apnea    Asthma    Stress Incontinence   I have the following symptoms associated with obesity Knee Pain    Depression    Lower Extremity Swelling    Back Pain    Fatigue    Hip Pain       INTERVAL HISTORY:  Last seen ZAKIYA Marin and Pillo Ahumada MTM pharmacist 3/2024    Transitioned from Ozempic to Wegovy last year. Could not tolerate Wegovy due to persistent nausea.  Switched to Zepbound in 2024 -- currently on Zepbound 10 mg weekly -- has some loose stool. Getting better.  Feels that current dose of Zepbound finally controls her appetite.    Weight is down 62 lbs ~20.4%    Activity: did well on exercise -- will plan to do 5K     CURRENT WEIGHT:   242 lbs 0 oz    Initial Weight (lbs): 304 lbs  Last Visits Weight: 111.1 kg (245 lb)  Cumulative weight loss (lbs): 62  Weight Loss Percentage: 20.39%        2023    12:24 PM   Changes and Difficulties   I have made the following changes to my diet since my last visit: Eat better and much less   With regards to my diet, I am still struggling with: [place]   I have made the following changes to my activity/exercise since my last visit: Need hip surgery   With regards to my activity/exercise, I am still struggling with: Hip       VITALS:  Ht 1.626 m (5' 4.02\")   Wt 109.8 kg (242 lb)   BMI 41.52 kg/m      MEDICATIONS:   Current Outpatient Medications   Medication Sig Dispense Refill    albuterol (PROAIR HFA/PROVENTIL HFA/VENTOLIN HFA) 108 (90 Base) MCG/ACT inhaler Inhale 2 puffs into the lungs every 6 hours as needed for " shortness of breath or wheezing 18 g 3    B Complex Vitamins (VITAMIN B COMPLEX PO) 3 drops per day      beclomethasone HFA (QVAR REDIHALER) 80 MCG/ACT inhaler Inhale 2 puffs into the lungs 2 times daily 10.6 g 11    benzonatate (TESSALON) 100 MG capsule Take 1 capsule (100 mg) by mouth 3 times daily as needed for cough 90 capsule 0    cetirizine-psuedoePHEDrine (ZYRTEC-D) 5-120 MG per tablet Take 1 tablet by mouth 2 times daily 90 tablet 3    cholecalciferol (VITAMIN D) 1000 UNIT tablet 3 drops per day      colchicine (COLCYRS) 0.6 MG tablet Take 2 tablets orally at onset of symptoms and one more tablet 1 hour later, and then one tablet twice a day until flare resolves.      COMPOUNDED NON-CONTROLLED SUBSTANCE (CMPD RX) - PHARMACY TO MIX COMPOUNDED MEDICATION LOW DOSE NALTREXONE 6MG one tablet qhs 90 capsule 4    FLUoxetine (PROZAC) 20 MG capsule Take 20 mg by mouth      gabapentin (NEURONTIN) 300 MG capsule Take 2 capsules  (600mg) in the morning and 2 capsules (600mg) at bedtime. 90day supply (Patient taking differently: Take 300 mg by mouth 2 times daily) 360 capsule 1    ipratropium - albuterol 0.5 mg/2.5 mg/3 mL (DUONEB) 0.5-2.5 (3) MG/3ML neb solution Take 1 vial (3 mLs) by nebulization every 6 hours as needed for shortness of breath or wheezing 960 mL 4    levonorgestrel (MIRENA) 20 MCG/24HR IUD 1 each by Intrauterine route once      MAGNESIUM CITRATE PO 3 drops per day      Naproxen Sodium (ALEVE PO) Take 220 mg by mouth 2 times daily as needed       ondansetron (ZOFRAN ODT) 4 MG ODT tab Take 1 tablet (4 mg) by mouth every 8 hours as needed for nausea 20 tablet 0    order for DME Equipment being ordered: Nebulizer 1 Device 0    predniSONE (DELTASONE) 20 MG tablet Take 2 tabs daily x 7 days 14 tablet 0    rizatriptan (MAXALT-MLT) 10 MG ODT tab Take 1 tablet (10 mg) by mouth at onset of headache for migraine May repeat in 2 hours. Max 3 tablets/24 hours. 18 tablet 3    tirzepatide-Weight Management (ZEPBOUND)  10 MG/0.5ML prefilled pen Inject 0.5 mLs (10 mg) Subcutaneous every 7 days 2 mL 2    tirzepatide-Weight Management (ZEPBOUND) 12.5 MG/0.5ML prefilled pen Inject 0.5 mLs (12.5 mg) Subcutaneous every 7 days 2 mL 2    Turmeric 450 MG CAPS 1 scoop per day             9/27/2023    12:24 PM   Weight Loss Medication History Reviewed With Patient   Which weight loss medications are you currently taking on a regular basis? Wegovy   Are you having any side effects from the weight loss medication that we have prescribed you? Yes   If you are having side effects please describe: manny Montero       ASSESSMENT:   Paige Fajardo is a 53 year old female who I am continuing to see for treatment of obesity related to:  Gout, DMITRIY, asthma, lumbar radiculopathy, fibromyalgia    Was on Ozempic injection and trastioned to Wegovy last year -- could not tolerate GI side effects  Switched to Zepbound in Jan 2024. So far tolerated it well with some loose stool.  Currently on Zepbound 10 mg weekly  Lost 62 lbs since starting GLP-1RA  Tolerable side effects    PLAN:   - continue Zepbound 10 mg for total of 2 months before increasing dose  - continue to work on diet and exercise    FOLLOW-UP:    See MTM PharmD in June   See MD or PA in October/November    Joined the call at 3/26/2024, 4:10:21 pm.  Left the call at 3/26/2024, 4:19:32 pm.  You were on the call for 9 minutes 10 seconds .    External notes/medical records independently reviewed, labs and imaging independently reviewed, medical management and tests to be discussed/communicated to patient.    Time: I spent 21 minutes spent on the date of the encounter preparing to see patient (including chart review and preparation), obtaining and or reviewing additional medical history, performing a physical exam and evaluation, documenting clinical information in the electronic health record, independently interpreting results, communicating results to the patient and coordinating  care.      Sincerely,    Derek Schwab MD

## 2024-03-26 NOTE — LETTER
3/26/2024       RE: Paige Fajardo  [address]  [place] [address]     Dear Colleague,    Thank you for referring your patient, Paige Fajardo, to the [facility]NA[facility] [place] [facility]. Please see a copy of my visit note below.      Return Medical Weight Management Note     Paige Fajardo  MRN:  [id number]  :  1970  VIRI:  3/26/2024    Dear Francesca Samson MD,    I had the pleasure of seeing your patient Paige Fajardo. She is a 53 year old female who I am continuing to see for treatment of obesity related to:  Gout, DMITRIY, asthma, lumbar radiculopathy, fibromyalgia        2022    10:09 AM   --   I have the following health issues associated with obesity Sleep Apnea    Asthma    Stress Incontinence   I have the following symptoms associated with obesity Knee Pain    Depression    Lower Extremity Swelling    Back Pain    Fatigue    Hip Pain       INTERVAL HISTORY:  Last seen ZAKIYA Marin and Pillo Ahumada MTM pharmacist 3/2024    Transitioned from Ozempic to Wegovy last year. Could not tolerate Wegovy due to persistent nausea.  Switched to Zepbound in 2024 -- currently on Zepbound 10 mg weekly -- has some loose stool. Getting better.  Feels that current dose of Zepbound finally controls her appetite.    Weight is down 62 lbs ~20.4%    Activity: did well on exercise -- will plan to do 5K     CURRENT WEIGHT:   242 lbs 0 oz    Initial Weight (lbs): 304 lbs  Last Visits Weight: 111.1 kg (245 lb)  Cumulative weight loss (lbs): 62  Weight Loss Percentage: 20.39%        2023    12:24 PM   Changes and Difficulties   I have made the following changes to my diet since my last visit: Eat better and much less   With regards to my diet, I am still struggling with: [place]   I have made the following changes to my activity/exercise since my last visit: Need hip surgery   With regards to my activity/exercise, I am still  "struggling with: Hip       VITALS:  Ht 1.626 m (5' 4.02\")   Wt 109.8 kg (242 lb)   BMI 41.52 kg/m      MEDICATIONS:   Current Outpatient Medications   Medication Sig Dispense Refill    albuterol (PROAIR HFA/PROVENTIL HFA/VENTOLIN HFA) 108 (90 Base) MCG/ACT inhaler Inhale 2 puffs into the lungs every 6 hours as needed for shortness of breath or wheezing 18 g 3    B Complex Vitamins (VITAMIN B COMPLEX PO) 3 drops per day      beclomethasone HFA (QVAR REDIHALER) 80 MCG/ACT inhaler Inhale 2 puffs into the lungs 2 times daily 10.6 g 11    benzonatate (TESSALON) 100 MG capsule Take 1 capsule (100 mg) by mouth 3 times daily as needed for cough 90 capsule 0    cetirizine-psuedoePHEDrine (ZYRTEC-D) 5-120 MG per tablet Take 1 tablet by mouth 2 times daily 90 tablet 3    cholecalciferol (VITAMIN D) 1000 UNIT tablet 3 drops per day      colchicine (COLCYRS) 0.6 MG tablet Take 2 tablets orally at onset of symptoms and one more tablet 1 hour later, and then one tablet twice a day until flare resolves.      COMPOUNDED NON-CONTROLLED SUBSTANCE (CMPD RX) - PHARMACY TO MIX COMPOUNDED MEDICATION LOW DOSE NALTREXONE 6MG one tablet qhs 90 capsule 4    FLUoxetine (PROZAC) 20 MG capsule Take 20 mg by mouth      gabapentin (NEURONTIN) 300 MG capsule Take 2 capsules  (600mg) in the morning and 2 capsules (600mg) at bedtime. 90day supply (Patient taking differently: Take 300 mg by mouth 2 times daily) 360 capsule 1    ipratropium - albuterol 0.5 mg/2.5 mg/3 mL (DUONEB) 0.5-2.5 (3) MG/3ML neb solution Take 1 vial (3 mLs) by nebulization every 6 hours as needed for shortness of breath or wheezing 960 mL 4    levonorgestrel (MIRENA) 20 MCG/24HR IUD 1 each by Intrauterine route once      MAGNESIUM CITRATE PO 3 drops per day      Naproxen Sodium (ALEVE PO) Take 220 mg by mouth 2 times daily as needed       ondansetron (ZOFRAN ODT) 4 MG ODT tab Take 1 tablet (4 mg) by mouth every 8 hours as needed for nausea 20 tablet 0    order for DME " Equipment being ordered: Nebulizer 1 Device 0    predniSONE (DELTASONE) 20 MG tablet Take 2 tabs daily x 7 days 14 tablet 0    rizatriptan (MAXALT-MLT) 10 MG ODT tab Take 1 tablet (10 mg) by mouth at onset of headache for migraine May repeat in 2 hours. Max 3 tablets/24 hours. 18 tablet 3    tirzepatide-Weight Management (ZEPBOUND) 10 MG/0.5ML prefilled pen Inject 0.5 mLs (10 mg) Subcutaneous every 7 days 2 mL 2    tirzepatide-Weight Management (ZEPBOUND) 12.5 MG/0.5ML prefilled pen Inject 0.5 mLs (12.5 mg) Subcutaneous every 7 days 2 mL 2    Turmeric 450 MG CAPS 1 scoop per day             9/27/2023    12:24 PM   Weight Loss Medication History Reviewed With Patient   Which weight loss medications are you currently taking on a regular basis? Wegovy   Are you having any side effects from the weight loss medication that we have prescribed you? Yes   If you are having side effects please describe: manny Montero       ASSESSMENT:   Paige Fajardo is a 53 year old female who I am continuing to see for treatment of obesity related to:  Gout, DMITRIY, asthma, lumbar radiculopathy, fibromyalgia    Was on Ozempic injection and trastioned to Wegovy last year -- could not tolerate GI side effects  Switched to Zepbound in Jan 2024. So far tolerated it well with some loose stool.  Currently on Zepbound 10 mg weekly  Lost 62 lbs since starting GLP-1RA  Tolerable side effects    PLAN:   - continue Zepbound 10 mg for total of 2 months before increasing dose  - continue to work on diet and exercise    FOLLOW-UP:    See MTM PharmD in June   See MD or PA in October/November    Joined the call at 3/26/2024, 4:10:21 pm.  Left the call at 3/26/2024, 4:19:32 pm.  You were on the call for 9 minutes 10 seconds .    External notes/medical records independently reviewed, labs and imaging independently reviewed, medical management and tests to be discussed/communicated to patient.    Time: I spent 21 minutes spent on the date of the encounter  preparing to see patient (including chart review and preparation), obtaining and or reviewing additional medical history, performing a physical exam and evaluation, documenting clinical information in the electronic health record, independently interpreting results, communicating results to the patient and coordinating care.      Sincerely,    Derek Schwab MD

## 2024-03-26 NOTE — NURSING NOTE
Is the patient currently in the state of [place]? YES    Visit mode:VIDEO    If the visit is dropped, the patient can be reconnected by: VIDEO VISIT: Send to e-mail at: [email or web address]    Will anyone else be joining the visit? NO  (If patient encounters technical issues they should call [phone]    How would you like to obtain your AVS? MyChart    Are changes needed to the allergy or medication list? Pt stated no changes to allergies and Pt stated no med changes    Reason for visit: Follow Up    Gayla WILCOX

## 2024-03-26 NOTE — PATIENT INSTRUCTIONS
PLAN:   - continue Zepbound 10 mg for total of 2 months before increasing dose  - continue to work on diet and exercise    FOLLOW-UP:    See MTM PharmD in June   See MD or PA in October/November    If you have any questions, please do not hesitate to call Weight management clinic at [phone] or [phone].  If you need to fax, please fax to [phone].    Sincerely,    Derek Schwab MD  Endocrinology

## 2024-03-27 ENCOUNTER — TELEPHONE (OUTPATIENT)
Dept: ENDOCRINOLOGY | Facility: CLINIC | Age: 54
End: 2024-03-27
Payer: COMMERCIAL

## 2024-03-27 NOTE — TELEPHONE ENCOUNTER
Patient Contacted for the patient to call back and schedule the following:    Appointment type: JANETT ANN   Provider: Sofie Blanton PA-C  Return date: 11/01/24  Specialty phone number: [phone]  Additional appointment(s) needed: no   Additonal Notes: no

## 2024-04-09 ENCOUNTER — ANCILLARY PROCEDURE (OUTPATIENT)
Dept: MAMMOGRAPHY | Facility: CLINIC | Age: 54
End: 2024-04-09
Attending: FAMILY MEDICINE
Payer: COMMERCIAL

## 2024-04-09 DIAGNOSIS — Z12.31 BREAST CANCER SCREENING BY MAMMOGRAM: ICD-10-CM

## 2024-04-09 PROCEDURE — 77067 SCR MAMMO BI INCL CAD: CPT | Mod: GC | Performed by: STUDENT IN AN ORGANIZED HEALTH CARE EDUCATION/TRAINING PROGRAM

## 2024-04-09 PROCEDURE — 77063 BREAST TOMOSYNTHESIS BI: CPT | Mod: GC | Performed by: STUDENT IN AN ORGANIZED HEALTH CARE EDUCATION/TRAINING PROGRAM

## 2024-04-23 ENCOUNTER — ANCILLARY PROCEDURE (OUTPATIENT)
Dept: MAMMOGRAPHY | Facility: CLINIC | Age: 54
End: 2024-04-23
Attending: FAMILY MEDICINE
Payer: COMMERCIAL

## 2024-04-23 ENCOUNTER — ANCILLARY PROCEDURE (OUTPATIENT)
Dept: ULTRASOUND IMAGING | Facility: CLINIC | Age: 54
End: 2024-04-23
Attending: FAMILY MEDICINE
Payer: COMMERCIAL

## 2024-04-23 DIAGNOSIS — R92.8 ABNORMAL MAMMOGRAM: ICD-10-CM

## 2024-04-23 PROCEDURE — G0279 TOMOSYNTHESIS, MAMMO: HCPCS | Performed by: RADIOLOGY

## 2024-04-23 PROCEDURE — 77065 DX MAMMO INCL CAD UNI: CPT | Mod: LT | Performed by: RADIOLOGY

## 2024-04-23 PROCEDURE — 76642 ULTRASOUND BREAST LIMITED: CPT | Mod: LT | Performed by: RADIOLOGY

## 2024-04-30 ENCOUNTER — TELEPHONE (OUTPATIENT)
Dept: NURSING | Facility: CLINIC | Age: 54
End: 2024-04-30
Payer: COMMERCIAL

## 2024-04-30 ENCOUNTER — NURSE TRIAGE (OUTPATIENT)
Dept: NURSING | Facility: CLINIC | Age: 54
End: 2024-04-30
Payer: COMMERCIAL

## 2024-04-30 NOTE — TELEPHONE ENCOUNTER
Nurse Triage SBAR    Is this a 2nd Level Triage? YES, LICENSED PRACTITIONER REVIEW IS REQUIRED    Situation: Patient calling with questions about symptoms she is having from Zepbound.    Background: Patient states the she has symptoms that she believes are from the Zepbound injections. She gets them about 24 hours after the injection, they are very bad for several days, then feels better until the next round. She said about 24 hours after injection she gets several episodes of diarrhea so bad she needs to stay in the restroom. She feels she cannot drink enough water to stay hydrated. She has sever abdomen cramps and vomiting. She takes immodium for the diarrhea and zofran for nausea. She is currently taking the 10 mg of zepbound every Wed night.     Assessment: Possible side effects from medication    Protocol Recommended Disposition:   Callback by PCP Today    Recommendation: Please call patient. She would like to stop the medication but does not know if she needs to taper the dose down or just stop it.      Routed to provider/team  [facility] NURSES-    Shelia Mendez RN  P Red Flag Triage/MRT         Reason for Disposition   Caller has NON-URGENT medicine question about med that PCP or specialist prescribed and triager unable to answer question    Additional Information   Negative: Intentional drug overdose and suicidal thoughts or ideas   Negative: Drug overdose and triager unable to answer question   Negative: Caller requesting a renewal or refill of a medicine patient is currently taking   Negative: Caller requesting information unrelated to medicine   Negative: Caller requesting information about COVID-19 Vaccine   Negative: Caller requesting information about Emergency Contraception   Negative: Caller requesting information about Combined Birth Control Pills   Negative: Caller requesting information about Progestin Birth Control Pills   Negative: Caller requesting information about Post-Op pain  "or medicines   Negative: Caller requesting a prescription antibiotic (such as penicillin) for Strep throat and has a positive culture result   Negative: Caller requesting a prescription anti-viral med (such as Tamiflu) and has influenza (flu) symptoms   Negative: Immunization reaction suspected   Negative: Rash while taking a medicine or within 3 days of stopping it   Negative: Asthma and having symptoms of asthma (cough, wheezing, etc.)   Negative: Symptom of illness (e.g., headache, abdominal pain, earache, vomiting) that are more than mild   Negative: Breastfeeding questions about mother's medicines and diet   Negative: MORE THAN A DOUBLE DOSE of a prescription or over-the-counter (OTC) drug   Negative: DOUBLE DOSE (an extra dose or lesser amount) of prescription drug and any symptoms (e.g., dizziness, nausea, pain, sleepiness)   Negative: DOUBLE DOSE (an extra dose or lesser amount) of over-the-counter (OTC) drug and any symptoms (e.g., dizziness, nausea, pain, sleepiness)   Negative: Took another person's prescription drug   Negative: DOUBLE DOSE (an extra dose or lesser amount) of prescription drug and NO symptoms  (Exception: A double dose of antibiotics.)   Negative: Diabetes drug error or overdose (e.g., took wrong type of insulin or took extra dose)   Negative: Caller has medication question about med NOT prescribed by PCP and triager unable to answer question (e.g., compatibility with other med, storage)   Negative: Prescription not at pharmacy and was prescribed by PCP recently  (Exception: triager has access to EMR and prescription is recorded there. Go to Home Care and confirm for pharmacy.)   Negative: Pharmacy calling with prescription question and triager unable to answer question   Negative: Caller has URGENT medicine question about med that PCP or specialist prescribed and triager unable to answer question    Answer Assessment - Initial Assessment Questions  1. NAME of MEDICINE: \"What medicine(s) " "are you calling about?\"      zepbound  2. QUESTION: \"What is your question?\" (e.g., double dose of medicine, side effect)      Wondering if side effects are from zepbound  3. PRESCRIBER: \"Who prescribed the medicine?\" Reason: if prescribed by specialist, call should be referred to that group.      Weight management  4. SYMPTOMS: \"Do you have any symptoms?\" If Yes, ask: \"What symptoms are you having?\"  \"How bad are the symptoms (e.g., mild, moderate, severe)      Diarrhea   Dehydrated  Vomiting  Severe abdomen cramps, worse than menstrual cramps  5. PREGNANCY:  \"Is there any chance that you are pregnant?\" \"When was your last menstrual period?\"      no    Protocols used: Medication Question Call-A-OH    "

## 2024-04-30 NOTE — TELEPHONE ENCOUNTER
Attempted to contact patient.  Left voicemail.  Sent [facility] msg.  Sent patient message to Dr. Reed for review.

## 2024-04-30 NOTE — TELEPHONE ENCOUNTER
A user error has taken place: encounter opened in error, closed for administrative reasons    DUPLICATE    Shelia Mendez RN  [facility] Red Flag Triage/MRT   .

## 2024-05-01 NOTE — TELEPHONE ENCOUNTER
Received a message from patient that she would like to stop the medication.  Dr. Reed instructed for patient to stop the medication for now.  She can schedule an [facility] pharmacy appointment if she would like to restart the medication at a lower dose.  Patient notified.

## 2024-05-06 NOTE — PROGRESS NOTES
Pulmonary Clinic Follow-Up          Assessment/Plan:     Paige Fajardo is a 53 year old never smoker with history of asthma and allergic rhinitis, presenting today for annual follow-up.    Moderate persistent asthma  Pulmonary function testing in 2022 with normal spirometry and lung volumes.  She has specific triggers/times of year when her asthma becomes an issue and does well the rest of the year without medications or respiratory limitations.  Did well with Qvar intermittently over the past year.  ACT score 24 today.   Plan:  - continue Qvar 80 mcg 2 puffs BID during spring & winter travel ([place] 2-3 times/year) -- her asthma trigger seasons.  - continue albuterol inhaler and duonebs PRN  - she is UTD with COVID booster, annual influenza vaccine, and pneumococcal vaccine.  - Action plan:  Prednisone 40mg x7 days.  I have given her a script for this to keep on hand and start when needed.    DMITRIY  On cpap.  Has not seen sleep medicine since diagnosis, she endorses intermittent issues with mask, humidity, and epistaxis.  She is interested in seeing sleep medicine here.  She was referred last year but never made appt.  She is still interested in seeing them.  Plan:  - referral to sleep medicine placed     Follow-up  - annually    Marlys Francis CNP  Pulmonary Medicine  [facility]  [phone]       CC:     Asthma follow-up     HPI:     Paige Fajardo is a 53 year old never smoker with history of asthma and allergic rhinitis, presenting today for annual follow-up.    Since last visit (3/2023), breathing has been stable.  Two exacerbations requiring her action plan over the past year.  Did well with prednisone 40mg x7 days.  She has lost 100lbs and is running/swimming for work-outs daily.        3/8/2022     9:24 AM 3/7/2023    10:02 PM 5/7/2024     7:00 AM   ACT Total Scores   ACT TOTAL SCORE (Goal Greater than or Equal to 20) 22 22 24   In the past 12 months, how many  times did you visit the emergency room for your asthma without being admitted to the hospital? 0 0 0   In the past 12 months, how many times were you hospitalized overnight because of your asthma? 0 0 0            ROS:     6-point ROS performed and is negative aside from those listed in HPI     Physical Exam:       /62 (BP Location: Left arm, Patient Position: Sitting, Cuff Size: Adult Regular)   Pulse 93   Wt 110.8 kg (244 lb 3.2 oz)   SpO2 96%   BMI 41.90 kg/m    Gen: adult female, appears in NAD  HEENT:  clear conjunctivae  CV: RRR, no M/G/R  Resp: CTA bilaterally, respirations even and unlabored, on RA, no cough.  Skin: no apparent rashes on visible skin  Ext: no cyanosis, no clubbing, no BLE edema  Neuro: alert, nonfocal     Data:       Imaging studies:   #. CT chest, 1/6/22:  LUNGS AND PLEURA: Lungs are clear. No pleural effusion. No interstitial disease/fibrosis or bronchiectasis. Couple tiny likely incidental 2-3 mm lung nodules, largest fissural nodule right midlung.   MEDIASTINUM/AXILLAE: No lymphadenopathy. No thoracic aortic aneurysm. Normal heart size without pericardial effusion.   CORONARY ARTERY CALCIFICATION: None.   UPPER ABDOMEN: No significant finding.   MUSCULOSKELETAL: Unremarkable.       PFT:  #. 12/7/2016: on personal review -- normal spirometry that is not significantly changed since last evaluation 08/03/16.    #. 3/8/2022: normal spirometry without clinically significant bronchodilator response, normal lung volumes, and supra-normal diffusion capacity. Consistent w/ prior diagnosis of asthma. There is clinically notable decrease in FEV1 compared to testing in 08/2016.      PMH:  Patient Active Problem List    Diagnosis Date Noted    Morbid obesity (H) 05/17/2017     Priority: High    Posterior tibial tendinitis of right lower extremity 12/03/2019     Priority: Medium    Trochanteric bursitis of left hip 05/23/2019     Priority: Medium    IUD (intrauterine device) in place  01/15/2019     Priority: Medium     Mirena Due for removal in 6/2022      DMITRIY (obstructive sleep apnea) 06/29/2018     Priority: Medium    Moderate persistent asthma 02/14/2018     Priority: Medium    History of alcoholism (H) 07/28/2016     Priority: Medium     In remission for 16 years      Grieving 11/24/2014     Priority: Medium    Insomnia 11/24/2014     Priority: Medium    Obesity 11/01/2013     Priority: Medium    Migraine 04/22/2012     Priority: Medium    Family history of aneurysm 04/28/2011     Priority: Medium    Patellofemoral disorder      Priority: Medium    Fibromyalgia 04/15/2010     Priority: Medium    Mild major depression (H) 04/06/2010     Priority: Medium    Cystic Fibrosis Screening negative 05/01/2009     Priority: Medium     [facility]      Dysmenorrhea 10/12/2008     Priority: Medium    Anxiety state 03/08/2005     Priority: Medium     Problem list name updated by automated process. Provider to review      Insomnia 03/08/2005     Priority: Medium     Problem list name updated by automated process. Provider to review      Allergic rhinitis 03/08/2005     Priority: Medium     Problem list name updated by automated process. Provider to review         PSH:  Past Surgical History:   Procedure Laterality Date    ARTHROSCOPY KNEE RT/LT  1997    Left     DILATION AND CURETTAGE N/A 6/14/2017    Procedure: DILATION AND CURETTAGE;;  Surgeon: Livia Barnett DO;  Location:  OR    EXAM UNDER ANESTHESIA, ULTRASOUND N/A 6/14/2017    Procedure: EXAM UNDER ANESTHESIA, ULTRASOUND;  Ultrasound guided cervical dilation, IUD placement, Endometrial biopsy, repair of unavoidable cervical laceration;  Surgeon: Livia Barnett DO;  Location:  OR    HC TOOTH EXTRACTION W/FORCEP  1998    wisdom teeth     INSERT INTRAUTERINE DEVICE N/A 6/14/2017    Procedure: INSERT INTRAUTERINE DEVICE;;  Surgeon: Livia Barnett DO;  Location: [facility] OR       Family HX: family history includes Aneurysm  in her sister; Cancer in her father, maternal grandfather, and paternal grandmother; Cerebrovascular Disease in her mother; Coronary Artery Disease in her paternal grandfather; Depression/Anxiety in her father, mother, and paternal grandfather; Diabetes in her maternal grandmother and paternal grandfather; Eye Disorder in her maternal grandmother; Gastrointestinal Disease in her father; Gynecology in her paternal aunt, paternal grandmother, and sister; Heart Disease in her paternal grandfather; Hypertension in her mother; Macular Degeneration in her maternal grandmother and mother; Musculoskeletal Disorder in her father; Neurologic Disorder in her mother; Obesity in her father, maternal grandmother, mother, and paternal grandfather; Other Cancer in her father; Ovarian Cancer in her paternal grandmother.    Social Hx:  reports that she has never smoked. She has never used smokeless tobacco. She reports that she does not drink alcohol and does not use drugs.     Current Meds:  Current Outpatient Medications   Medication Sig Dispense Refill    albuterol (PROAIR HFA/PROVENTIL HFA/VENTOLIN HFA) 108 (90 Base) MCG/ACT inhaler Inhale 2 puffs into the lungs every 6 hours as needed for shortness of breath or wheezing 18 g 3    B Complex Vitamins (VITAMIN B COMPLEX PO) 3 drops per day      beclomethasone HFA (QVAR REDIHALER) 80 MCG/ACT inhaler Inhale 2 puffs into the lungs 2 times daily 10.6 g 11    benzonatate (TESSALON) 100 MG capsule Take 1 capsule (100 mg) by mouth 3 times daily as needed for cough 90 capsule 0    cetirizine-psuedoePHEDrine (ZYRTEC-D) 5-120 MG per tablet Take 1 tablet by mouth 2 times daily 90 tablet 3    cholecalciferol (VITAMIN D) 1000 UNIT tablet 3 drops per day      colchicine (COLCYRS) 0.6 MG tablet Take 2 tablets orally at onset of symptoms and one more tablet 1 hour later, and then one tablet twice a day until flare resolves.      COMPOUNDED NON-CONTROLLED SUBSTANCE (CMPD RX) - PHARMACY TO MIX  COMPOUNDED MEDICATION LOW DOSE NALTREXONE 6MG one tablet qhs 90 capsule 4    FLUoxetine (PROZAC) 20 MG capsule Take 20 mg by mouth      gabapentin (NEURONTIN) 300 MG capsule Take 2 capsules  (600mg) in the morning and 2 capsules (600mg) at bedtime. 90day supply (Patient taking differently: Take 300 mg by mouth 2 times daily) 360 capsule 1    ipratropium - albuterol 0.5 mg/2.5 mg/3 mL (DUONEB) 0.5-2.5 (3) MG/3ML neb solution Take 1 vial (3 mLs) by nebulization every 6 hours as needed for shortness of breath or wheezing 960 mL 4    levonorgestrel (MIRENA) 20 MCG/24HR IUD 1 each by Intrauterine route once      MAGNESIUM CITRATE PO 3 drops per day      Naproxen Sodium (ALEVE PO) Take 220 mg by mouth 2 times daily as needed       ondansetron (ZOFRAN ODT) 4 MG ODT tab Take 1 tablet (4 mg) by mouth every 8 hours as needed for nausea 20 tablet 0    order for DME Equipment being ordered: Nebulizer 1 Device 0    predniSONE (DELTASONE) 20 MG tablet Take 2 tabs daily x 7 days 14 tablet 0    rizatriptan (MAXALT-MLT) 10 MG ODT tab Take 1 tablet (10 mg) by mouth at onset of headache for migraine May repeat in 2 hours. Max 3 tablets/24 hours. 18 tablet 3    tirzepatide-Weight Management (ZEPBOUND) 10 MG/0.5ML prefilled pen Inject 0.5 mLs (10 mg) Subcutaneous every 7 days 2 mL 2    tirzepatide-Weight Management (ZEPBOUND) 12.5 MG/0.5ML prefilled pen Inject 0.5 mLs (12.5 mg) Subcutaneous every 7 days 2 mL 2    Turmeric 450 MG CAPS 1 scoop per day         Allergies:  Allergies   Allergen Reactions    Amoxicillin      Other reaction(s): Stomach Upset    Amoxicillin-Pot Clavulanate      reacted to Augmentin--  stomach upset -- can take penicillin    Dairy Products [Milk Protein] Difficulty breathing     Products with milk cause congestion, sinus mucous, difficulty breathing    Morphine And Related Swelling

## 2024-05-07 ENCOUNTER — OFFICE VISIT (OUTPATIENT)
Dept: PULMONOLOGY | Facility: CLINIC | Age: 54
End: 2024-05-07
Payer: COMMERCIAL

## 2024-05-07 VITALS
HEART RATE: 93 BPM | OXYGEN SATURATION: 96 % | BODY MASS INDEX: 41.9 KG/M2 | WEIGHT: 244.2 LBS | SYSTOLIC BLOOD PRESSURE: 110 MMHG | DIASTOLIC BLOOD PRESSURE: 62 MMHG

## 2024-05-07 DIAGNOSIS — G47.33 OSA (OBSTRUCTIVE SLEEP APNEA): ICD-10-CM

## 2024-05-07 DIAGNOSIS — J45.40 MODERATE PERSISTENT ASTHMA WITHOUT COMPLICATION: Primary | ICD-10-CM

## 2024-05-07 PROCEDURE — 99214 OFFICE O/P EST MOD 30 MIN: CPT | Performed by: NURSE PRACTITIONER

## 2024-05-07 RX ORDER — PREDNISONE 20 MG/1
40 TABLET ORAL DAILY
Qty: 14 TABLET | Refills: 1 | Status: SHIPPED | OUTPATIENT
Start: 2024-05-07 | End: 2024-05-14

## 2024-05-07 RX ORDER — ALBUTEROL SULFATE 90 UG/1
2 AEROSOL, METERED RESPIRATORY (INHALATION) EVERY 6 HOURS PRN
Qty: 18 G | Refills: 5 | Status: SHIPPED | OUTPATIENT
Start: 2024-05-07

## 2024-05-07 ASSESSMENT — ASTHMA QUESTIONNAIRES
QUESTION_1 LAST FOUR WEEKS HOW MUCH OF THE TIME DID YOUR ASTHMA KEEP YOU FROM GETTING AS MUCH DONE AT WORK, SCHOOL OR AT HOME: NONE OF THE TIME
QUESTION_2 LAST FOUR WEEKS HOW OFTEN HAVE YOU HAD SHORTNESS OF BREATH: NOT AT ALL
QUESTION_3 LAST FOUR WEEKS HOW OFTEN DID YOUR ASTHMA SYMPTOMS (WHEEZING, COUGHING, SHORTNESS OF BREATH, CHEST TIGHTNESS OR PAIN) WAKE YOU UP AT NIGHT OR EARLIER THAN USUAL IN THE MORNING: NOT AT ALL
QUESTION_5 LAST FOUR WEEKS HOW WOULD YOU RATE YOUR ASTHMA CONTROL: COMPLETELY CONTROLLED
QUESTION_4 LAST FOUR WEEKS HOW OFTEN HAVE YOU USED YOUR RESCUE INHALER OR NEBULIZER MEDICATION (SUCH AS ALBUTEROL): ONCE A WEEK OR LESS
ACT_TOTALSCORE: 24
ACT_TOTALSCORE: 24

## 2024-05-07 NOTE — PATIENT INSTRUCTIONS
It was a pleasure to see you in clinic today.   Here is what we discussed:    Continue Qvar two puffs twice daily during asthma trigger seasons.  Continue Albuterol inhaler or Duonebs every 4-6 hours as needed for shortness of breath or wheezing.  Call my nurse, Geovanni ([phone]) with any change or worsening of your breathing.  Follow-up in one year.     Marlys Francis CNP  Pulmonary Medicine  [facility]  [phone]

## 2024-05-07 NOTE — PROGRESS NOTES
"Video-Visit Details    Type of service:  Video Visit    Video Start Time: 10:04 am  Video End Time: 10:29 am    Originating Location (pt. Location): Home    Distant Location (provider location):  Offsite (providers home)     Platform used for Video Visit: CanDiag     Weight Management Nutrition Consultation    Paige Fajardo is a 53 year old female presents today for weight management nutrition consultation.  Patient referred by Dr. Reed on August 2, 2022.    Patient with Co-morbidities of obesity including:  Type II DM no  Renal Failure no  Sleep apnea yes  Hypertension no   Dyslipidemia no  Joint pain no  Back pain yes  GERD no     PMH:   Depression  Swelling  Fatigue  Gout   Asthma  Fibromyalgia   Covid at least 2x  Weight gain associated with prednisone (was on for covid and gout)      Hx of alcohol abuse 20 years ago    Anthropometrics:  Highest weight per pt: 338 lbs  Weight 8/2/22: 304 lbs with BMI 49.07    Estimated body mass index is 39.48 kg/m  as calculated from the following:    Height as of this encounter: 1.626 m (5' 4\").    Weight as of this encounter: 104.3 kg (230 lb).     Current weight: 230 lbs, pt report  - Gained 12 lbs while on steroids in Jan per pt  - Down 15 lbs since last visit, 108 from highest weight  - Down 5+ sizes now per pt  - Feeling a lot better physically/pain wise    Medications for Weight Loss:  Zepbound (was on Wegovy previously) - Stopped, was having significant side effects.     Supplements:  Vit D 3,000 IUs/day - Usually decreases as weather gets nice  Turmeric   B-complex  Calcium carbonate - only if stomach is upset   Mg Citrate     Hx of high cholesterol     Labs 3/10/23 (when in ED for dehydration)  Calcium slightly elevated at 10.6  eGFR 81    NUTRITION HISTORY    NKFA  Limits dairy - notices clogged sinuses and worsening asthma symptoms per pt.   Does use dairy alternatives.     Has not met with a RD before.    Pt goals: lose weight, learn about food choices (what is " more nutritious), feel better    Please see previous RD notes for more information.     Nov 2023:    Had hip surgery since last seen (10/27/23). Had to go off Wegovy for a short time due to this.     Feels she is getting enough protein.   Carbs - enjoying oatmeal for breakfast    Keeping nutritious snacks on hand but out of currently.     Doing well with hydration overall. Got constipated with pain meds post surgery.    Feb 2024:  Had to switch medications per insurance. Feeling hungrier now. Trying to eat nutrient dense (filling foods).     Was sick most of Jan - not sure if medication or virus related but others around her were sick. Lots of nausea, vomiting and diarrhea. Finding when she gets sick it is hard to get over. Got dehydrated and was hard to come back. Went on short-term steroids and gained about 12 lbs per pt.    No vomiting or diarrhea this week.    Starting making aparna pudding with fruit on side for breakfast.     Attending a local support group in her community - has been super helpful.    PA: doing well post hip surgery. worked up to a full mile in the pool, also using treadmill. Doing yoga. Walking dog. Went to [place] since last seen - walked 4 miles/day. Aiming for 3x/week at gym   - Signed up for a 5k (got clearance from team)     May 2024:  Stopped medication due to significant side effects. Last shot 2 weeks ago. Has stayed stable so far.   Really wants to focus on nutrition and exercise now to help with preventing weight gain.    Trying to be mindful of food choices. Buffet this morning - skipped the cinnamon rolls and bagels.   made a chocolate mousse made with cottage cheese as a base. Loved it.    Use air fryer and grilling often.   Substituting soy sauce with liquid aminos.   Keeping nuts stocked at home.    PA:  - yoga weekly  - did a 5k with her dog, utilized couch to 5k program. Didn't run whole thing but felt really good. Was not sore or in pain. 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   has diabetes     Nutrition Prescription  Recommended energy/nutrient modification.    Nutrition Diagnosis  Food and nutrition related knowledge deficit r/t lack of prior exposure to nutrition education aeb pt report and interest in learning     Obesity r/t long history of positive energy balance aeb BMI >30.    Nutrition Intervention  Reviewed and modified previous goals  Answered pt questions  Coordination of care   Nutrition education - Plate method, types of vegetables, types of fat, label reading, dietary sources of iron, ways to incorporate PA (Paratek ami)  AVS and handouts via Occasion    Patient demonstrates understanding.    Expected Engagement: good    Nutrition Goals  Recommend considering a hunger/satiety scale (lots available online - one of my favorites is by [facility])   Exercise resources noted below. One free application: Acopia Networks    Fruit/ Vegetables by season  https://Swag Of The Month.BetTech Gamings."Splashtop, Inc".gov/seasonal-produce-guide    - Gives ideas that you may not think of trying   - If you click on the vegetable, it brings up more information such as nutrition, recipes, similar vegetables, etc.     Seasonal recipes:  https://Swag Of The Month.BetTech Gamings.usda.gov/nutrition-education/snap-ed-recipes     Homemade Seasonings  https://blog.Solaiemes.Cartoon Doll Emporium/9-gcqpapgx-jvpokzowsq-that-will-help-you-eat-more-veggies/    Vegetable seasoning ideas:  Dill   Lemon  Orange  Parmesan seasoning  Lemon pepper (some have sodium in them as FYI)   Garlic  Basil  Thyme  Oregano   Rosio      More Nutritious homemade dessert recipes:    Greek Yogurt Chocolate Mousse    https://www.diabetesfoodhub.org/recipes/greek-yogurt-chocolate-mousse.html?home-category_id=23     No Bake [place] Butter Cookies  https://www.Deerpath Energy/pg-bgfq-yhvkmp-butter-cookies/     Aparna Seed Chocolate Pudding  https://www.prolliellSpowit.Teranetics.au/chocolate-aparna-pudding/#recipe     Snickerdoodle hummus  https://Preisbock/xkexpmwyldzub-qfmskys-gyrwtv/#recipe     Peanut Butter Dip  https://www.diabetesfoodhub.org/recipes/sweet-peanut-buttery-dip.html?home-category_id=23     Double Chocolate Zucchini Cake (can adapt recipe and use applesauce instead of butter/oil)  https://TactoTek/2021/05/double-chocolate-zucchini-loaf-cake.html     Banana Oat Muffins   https://HomeUnion Services/blog/healthy-desserts/#ps-eacrejxl-551     Chocolate hummus   https://Tracky/chocolate-hummus/#tasty-recipes-82868-jump-target     Peanut butter cookie dough hummus   https://S4 Worldwide.Teranetics/2018/05/15/peanut-butter-cookie-dough-dessert-hummus/     Banana Peanut Butter Oat Bars  https://Rivulet Communications.Teranetics/banana-peanut-butter-oat-protein-bars/     At Home Exercise Resources  ACE Fitness Library - Exercises by Muscle Group  https://www.LiquidCompassness.org/resources/everyone/exercise-library/    Channels with Exercise Modifications:  Coach Brantley (strengthening) https://www.youFeedzaiube.com/@Weston BROCKfit (strengthening): https://www.youFeedzaiube.com/channel/WSMZA0-HAPKq06YD-q1CWmcF  Yoga with Zelinda: https://www.VeriFoneube.com/@yogawithzelinda  Ese3Lrta (strengthening for any age, examples of exercise for seniors): https://www.VeriFoneube.com/@urk9hupq    Size-Inclusive Fitness Routines:   Beginner Workout - Standing (low impact)  https://www.VeriFoneube.com/watch?v=4OoRVFe6TZU      Yoga  https://www.VeriFoneube.com/watch?v=VdIX8auOH_M&t=36s  https://www.VeriFoneube.com/watch?v=zUnjJdJitPw    Pilates  https://www.VeriFoneube.com/watch?v=xBEuwqJC6Wl    Full Body  Strengthening:  https://www.AdInnovationube.com/watch?v=7ItYJ3k-qa1   https://www.AdInnovationube.com/watch?v=Q0h11vQiorW    Other Fitness Channels:  Dance Workouts: [employer] Chin   https://www.evOLED.Pandabus/user/Shawn    HIIT Workouts: [employer]  https://www.evOLED.Pandabus/user/popInsightpooltvfit    Pilates: Blogilates  https://www.evOLED.Pandabus/user/blogilates    Yoga: Yoga with Kimberley   https://www.evOLED.com/user/yogawithadriene/featured      Handouts Relating to Exercise :    1.     Learning About Being Physically Active     2.      Muscle Conditionin Exercises    3.     Resistance Training with Free Weights: 3 Exercises    4.      Resistance Training with Surgical Tubin Exercises    5.     Stretchin Exercises    6.     Seated Exercises for Arms and Legs: 11 Exercises     Snack ideas:  - Cucumbers  - Celery (plain or with peanut butter)  - Fruit  - Cottage cheese  - Greek yogurt  - Hard boiled eggs  - String cheese  - Guacamole  - Hummus    Keep up the hard work!    Fermented foods      Yogurt with live bacterial cultures   Kimichi   Apple cider Vinegar   Miso   Natto   Kvass   Pickles   Olives   Kefir   Catarino Henriquez   Probiotics: lactobacillus or acidophilus    Long-term goal: 5k     Coping: reading, planning garden, sewing/quilting     High-iron foods:  100% iron-fortified ready-to-eat cereal   cup, 18 mg  Grits, instant   cup, 7.1   Cream of wheat   cup, 5.2   Oatmeal, instant   cup, 5 mg  Soybeans, cooked   cup, 4.4 mg  White beans, canned   cup, 3.9 mg   Lentils   cup, 3.3 mg  White rice 1/3 cup, 3 mg  Spinach   cup cooked, 1 cup raw, 3 mg   Beef tenderloin 3 oz, 3 mg  Baked beans 1/3 cup, 3 mg  Vegetable or soy burger 1 sarah, 2.9 mg  Soy milk 1 cup (8 oz), 2.7 mg   Chickpeas   cup, 2.5 mg  Kidney beans   cup, 2.5 mg  Sardines 3 oz, 2.5 mg   Nuts: almonds or pistachios   cup, 1.3 mg   [place] sprouts, cooked   cup, 1 mg   Egg 1 whole, 1 mg    Some good sources on nut comparison:    https://www."Healthy Stove[employer] Inc.".Lodestone Social Media/sites/default/files/2020-04/nutrient_comparison_chart_for_tree_nuts_redesign.pdf     https://www.Tune Clout.Lodestone Social Media/Images/nutrition/Tree_Nut%20Nutrient_Comparison_Chart.pdf        The Plate Method:  https://www.cdc.gov/diabetes/images/managing/Diabetes-Manage-Eat-Well-Plate-Graphic_600px.jpg     Starchy vegetables  Potatoes   Sweet potatoes  Green peas  Chickpeas   Corn  Lentils  Beans (black, domingo, etc)     Types of fats  https://www.[facility].[place].edu/nutritionsource/what-should-you-eat/fats-and-cholesterol/types-of-fat/      Good resources on inflammation    https://www.health.[place].edu/staying-healthy/foods-that-fight-inflammation     https://www.heart.org/en/healthy-living/healthy-eating/eat-smart/nutrition-basics/mediterranean-diet     https://oldRSI Content Solutions.pt.org/traditional-diets/mediterranean-diet     https://my.[facility].org/health/articles/51236-nsehldomjngty-kcbc     Good article on label reading   https://www.fda.gov/food/new-nutrition-facts-label/how-understand-and-use-nutrition-facts-label     Additional resources:     Protein Sources   http://BioHorizons/226777.pdf     Carbohydrates  http://fvfiles.com/578918.pdf     Mindful Eating  http://BioHorizons/733349.pdf     Summary of Volumetrics Eating Plan  http://fvfiles.com/511796.pdf     Follow-Up:  Monday, June 10th at 9:30 am  Plan to work with Susana during my leave    Time spent with patient: 25 minutes.  AARON Hamilton, RD, LD

## 2024-05-09 ENCOUNTER — VIRTUAL VISIT (OUTPATIENT)
Dept: ENDOCRINOLOGY | Facility: CLINIC | Age: 54
End: 2024-05-09
Payer: COMMERCIAL

## 2024-05-09 ENCOUNTER — TELEPHONE (OUTPATIENT)
Dept: PULMONOLOGY | Facility: CLINIC | Age: 54
End: 2024-05-09

## 2024-05-09 VITALS — WEIGHT: 230 LBS | HEIGHT: 64 IN | BODY MASS INDEX: 39.27 KG/M2

## 2024-05-09 DIAGNOSIS — E66.9 OBESITY: ICD-10-CM

## 2024-05-09 DIAGNOSIS — Z71.3 NUTRITIONAL COUNSELING: Primary | ICD-10-CM

## 2024-05-09 PROCEDURE — 97803 MED NUTRITION INDIV SUBSEQ: CPT | Mod: 95 | Performed by: DIETITIAN, REGISTERED

## 2024-05-09 PROCEDURE — 99207 PR NO CHARGE LOS: CPT | Mod: 95 | Performed by: DIETITIAN, REGISTERED

## 2024-05-09 ASSESSMENT — PAIN SCALES - GENERAL: PAINLEVEL: NO PAIN (0)

## 2024-05-09 NOTE — NURSING NOTE
Is the patient currently in the state of [place]? YES    Visit mode:VIDEO    If the visit is dropped, the patient can be reconnected by: VIDEO VISIT: Text to cell phone:   Telephone Information:   Mobile [phone]       Will anyone else be joining the visit? NO  (If patient encounters technical issues they should call [phone]    How would you like to obtain your AVS? MyChart    Are changes needed to the allergy or medication list? N/A    Are refills needed on medications prescribed by this physician? NO    Reason for visit: CÉSAR WILCOX

## 2024-05-09 NOTE — TELEPHONE ENCOUNTER
Pt is requesting new rx for    Arnuity ellipta 100mcg/act aepb    Did not see on active med list please verify and send new rx. Thank you!    Anabel spec/mail pharmacy  [phone]

## 2024-05-09 NOTE — PATIENT INSTRUCTIONS
Nutrition Goals  Recommend considering a hunger/satiety scale (lots available online - one of my favorites is by [facility])   Exercise resources noted below. One free application: FitOn    Fruit/ Vegetables by season  https://snaped.Personetas.usda.gov/seasonal-produce-guide    - Gives ideas that you may not think of trying   - If you click on the vegetable, it brings up more information such as nutrition, recipes, similar vegetables, etc.     Seasonal recipes:  https://snaped.Personetas.usda.gov/nutrition-education/snap-ed-recipes     Homemade Seasonings  https://blog.Amtec/6-gthgemku-syijrzucpj-that-will-help-you-eat-more-veggies/    Vegetable seasoning ideas:  Dill   Lemon  Orange  Parmesan seasoning  Lemon pepper (some have sodium in them as FYI)   Garlic  Basil  Thyme  Oregano   Rosio      More Nutritious homemade dessert recipes:    Greek Yogurt Chocolate Mousse   https://www.diabetesfoodhub.org/recipes/greek-yogurt-chocolate-mousse.html?home-category_id=23     No Bake [place] Butter Cookies  https://www.Victor/uo-ytxl-dpsbsx-butter-cookies/     Anupam Seed Chocolate Pudding  https://www.LearnZillionllective.Bolt.au/chocolate-anupam-pudding/#recipe     Snickerdoodle hummus  https://theClassifEye.Bolt/gykgldsikldwl-dabsvtr-esqhec/#recipe     Peanut Butter Dip  https://www.diabetesfoodhub.org/recipes/sweet-peanut-buttery-dip.html?home-category_id=23     Double Chocolate Zucchini Cake (can adapt recipe and use applesauce instead of butter/oil)  https://Accendo Technologies/2021/05/double-chocolate-zucchini-loaf-cake.html     Banana Oat Muffins   https://G.ho.st/blog/healthy-desserts/#jg-vlvxeqsp-414     Chocolate hummus   https://RayV.Bolt/chocolate-hummus/#tasty-recipes-82868-jump-target     Peanut butter cookie dough hummus   https://Godigex.Bolt/2018/05/15/peanut-butter-cookie-dough-dessert-hummus/     Banana Peanut Butter Oat  Bars  https://Aegis Lightwave/banana-peanut-butter-oat-protein-bars/     At Home Exercise Resources  Veodin Library - Exercises by Muscle Group  https://www.Movaya.org/resources/everyone/exercise-library/    Channels with Exercise Modifications:  Coach Brantley (strengthening) https://www.AdEspressoube.com/@Weston  HASfit (strengthening): https://www.AdEspressoube.com/channel/FICME6-BKPSo78TL-y3SIxvY  Yoga with Zelinda: https://www.AdEspressoube.goDog Fetch/@yogawithzelinda  Xty1Nmqj (strengthening for any age, examples of exercise for seniors): https://www.AdEspressoube.com/@hdi5qwqy    Size-Inclusive Fitness Routines:   Beginner Workout - Standing (low impact)  https://www.AdEspressoube.com/watch?v=2GnBTPo2HTK      Yoga  https://www.AdEspressoube.com/watch?v=VdIX8auOH_M&t=36s  https://www.AdEspressoube.com/watch?v=zUnjJdJitPw    Pilates  https://www.AdEspressoube.com/watch?v=yYAlgrQH1Dk    Full Body Strengthening:  https://www.AdEspressoube.com/watch?v=1ZrJV9w-wy5   https://www.AdEspressoube.com/watch?v=S8y24bRagaP    Other Fitness Channels:  Dance Workouts: Avantha Chin   https://www.Netsket.goDog Fetch/user/TheShriners Hospitals for ChildrenChin    HIIT Workouts: PopBonafide Fitness  https://www.AdEspressoube.com/user/popsugartvfit    Pilates: Blogilates  https://www.AdEspressoube.goDog Fetch/user/blogilates    Yoga: Yoga with Kimberley   https://www.AdEspressoube.goDog Fetch/user/yogawithadriene/featured      Handouts Relating to Exercise :    1.     Learning About Being Physically Active     2.      Muscle Conditionin Exercises    3.     Resistance Training with Free Weights: 3 Exercises    4.      Resistance Training with Surgical Tubin Exercises    5.     Stretchin Exercises    6.     Seated Exercises for Arms and Legs: 11 Exercises     Snack ideas:  - Cucumbers  - Celery (plain or with peanut butter)  - Fruit  - Cottage cheese  - Greek yogurt  - Hard boiled eggs  - String cheese  - Guacamole  - Hummus    Keep up the hard work!    Fermented foods      Yogurt with live bacterial cultures   Mary Lee  cider Vinegar   Miso   Natto   Kvass   Pickles   Olives   Kefir   Catarino Henriquez   Probiotics: lactobacillus or acidophilus    Long-term goal: 5k     Coping: reading, planning garden, sewing/quilting     High-iron foods:  100% iron-fortified ready-to-eat cereal   cup, 18 mg  Grits, instant   cup, 7.1   Cream of wheat   cup, 5.2   Oatmeal, instant   cup, 5 mg  Soybeans, cooked   cup, 4.4 mg  White beans, canned   cup, 3.9 mg   Lentils   cup, 3.3 mg  White rice 1/3 cup, 3 mg  Spinach   cup cooked, 1 cup raw, 3 mg   Beef tenderloin 3 oz, 3 mg  Baked beans 1/3 cup, 3 mg  Vegetable or soy burger 1 sarah, 2.9 mg  Soy milk 1 cup (8 oz), 2.7 mg   Chickpeas   cup, 2.5 mg  Kidney beans   cup, 2.5 mg  Sardines 3 oz, 2.5 mg   Nuts: almonds or pistachios   cup, 1.3 mg   [place] sprouts, cooked   cup, 1 mg   Egg 1 whole, 1 mg    Some good sources on nut comparison:   https://www.Neo PLM/sites/default/files/2020-04/nutrient_comparison_chart_for_tree_nuts_redesign.pdf     https://www.Pikimal/Images/nutrition/Tree_Nut%20Nutrient_Comparison_Chart.pdf        The Plate Method:  https://www.cdc.gov/diabetes/images/managing/Diabetes-Manage-Eat-Well-Plate-Graphic_600px.jpg     Starchy vegetables  Potatoes   Sweet potatoes  Green peas  Chickpeas   Corn  Lentils  Beans (black, domingo, etc)     Types of fats  https://www.[facility].harvard.edu/nutritionsource/what-should-you-eat/fats-and-cholesterol/types-of-fat/      Good resources on inflammation    https://www.health.[place].edu/staying-healthy/foods-that-fight-inflammation     https://www.heart.org/en/healthy-living/healthy-eating/eat-smart/nutrition-basics/mediterranean-diet     https://oldwayspt.org/traditional-diets/mediterranean-diet     https://my.[facility].org/health/articles/38890-ocmblexgmfsch-bowa     Good article on label reading   https://www.fda.gov/food/new-nutrition-facts-label/how-understand-and-use-nutrition-facts-label     Additional resources:      Protein Sources   http://Outernet/607476.pdf     Carbohydrates  http://fvfiles.com/525249.pdf     Mindful Eating  http://Outernet/716927.pdf     Summary of Volumetrics Eating Plan  http://fvfiles.com/142871.pdf     Follow-Up:  Monday, June 10th at 9:30 am  Plan to work with Susana during my leave    Christina Pelaez ([place]), MPP-D, RD, LD  Clinic #: [phone]

## 2024-05-09 NOTE — LETTER
"5/9/2024       RE: Paige Fajardo  [address]  [place] [address]     Dear Colleague,    Thank you for referring your patient, Paige Fajardo, to the [facility] [place] at [facility]. Please see a copy of my visit note below.    Video-Visit Details    Type of service:  Video Visit    Video Start Time: 10:04 am  Video End Time: 10:29 am    Originating Location (pt. Location): Home    Distant Location (provider location):  Offsite (providers home)     Platform used for Video Visit: [employer]     Weight Management Nutrition Consultation    Paige Fajardo is a 53 year old female presents today for weight management nutrition consultation.  Patient referred by Dr. Reed on August 2, 2022.    Patient with Co-morbidities of obesity including:  Type II DM no  Renal Failure no  Sleep apnea yes  Hypertension no   Dyslipidemia no  Joint pain no  Back pain yes  GERD no     PMH:   Depression  Swelling  Fatigue  Gout   Asthma  Fibromyalgia   Covid at least 2x  Weight gain associated with prednisone (was on for covid and gout)      Hx of alcohol abuse 20 years ago    Anthropometrics:  Highest weight per pt: 338 lbs  Weight 8/2/22: 304 lbs with BMI 49.07    Estimated body mass index is 39.48 kg/m  as calculated from the following:    Height as of this encounter: 1.626 m (5' 4\").    Weight as of this encounter: 104.3 kg (230 lb).     Current weight: 230 lbs, pt report  - Gained 12 lbs while on steroids in Jan per pt  - Down 15 lbs since last visit, 108 from highest weight  - Down 5+ sizes now per pt  - Feeling a lot better physically/pain wise    Medications for Weight Loss:  Zepbound (was on Wegovy previously) - Stopped, was having significant side effects.     Supplements:  Vit D 3,000 IUs/day - Usually decreases as weather gets nice  Turmeric   B-complex  Calcium carbonate - only if stomach is upset   Mg Citrate     Hx of high cholesterol "     Labs 3/10/23 (when in ED for dehydration)  Calcium slightly elevated at 10.6  eGFR 81    NUTRITION HISTORY    NKFA  Limits dairy - notices clogged sinuses and worsening asthma symptoms per pt.   Does use dairy alternatives.     Has not met with a RD before.    Pt goals: lose weight, learn about food choices (what is more nutritious), feel better    Please see previous RD notes for more information.     Nov 2023:    Had hip surgery since last seen (10/27/23). Had to go off Wegovy for a short time due to this.     Feels she is getting enough protein.   Carbs - enjoying oatmeal for breakfast    Keeping nutritious snacks on hand but out of currently.     Doing well with hydration overall. Got constipated with pain meds post surgery.    Feb 2024:  Had to switch medications per insurance. Feeling hungrier now. Trying to eat nutrient dense (filling foods).     Was sick most of Jan - not sure if medication or virus related but others around her were sick. Lots of nausea, vomiting and diarrhea. Finding when she gets sick it is hard to get over. Got dehydrated and was hard to come back. Went on short-term steroids and gained about 12 lbs per pt.    No vomiting or diarrhea this week.    Starting making aparna pudding with fruit on side for breakfast.     Attending a local support group in her community - has been super helpful.    PA: doing well post hip surgery. worked up to a full mile in the pool, also using treadmill. Doing yoga. Walking dog. Went to [place] since last seen - walked 4 miles/day. Aiming for 3x/week at gym   - Signed up for a 5k (got clearance from team)     May 2024:  Stopped medication due to significant side effects. Last shot 2 weeks ago. Has stayed stable so far.   Really wants to focus on nutrition and exercise now to help with preventing weight gain.    Trying to be mindful of food choices. Buffet this morning - skipped the cinnamon rolls and bagels.   made a chocolate mousse made with  cottage cheese as a base. Loved it.    Use air fryer and grilling often.   Substituting soy sauce with liquid aminos.   Keeping nuts stocked at home.    PA:  - yoga weekly  - did a 5k with her dog, utilized couch to 5k program. Didn't run whole thing but felt really good. Was not sore or in pain. 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   has diabetes     Nutrition Prescription  Recommended energy/nutrient modification.    Nutrition Diagnosis  Food and nutrition related knowledge deficit r/t lack of prior exposure to nutrition education aeb pt report and interest in learning     Obesity r/t long history of positive energy balance aeb BMI >30.    Nutrition Intervention  Reviewed and modified previous goals  Answered pt questions  Coordination of care   Nutrition education - Plate method, types of vegetables, types of fat, label reading, dietary sources of iron, ways to incorporate PA (fiton ami)  AVS and handouts via Tk20    Patient demonstrates understanding.    Expected Engagement: good    Nutrition Goals  Recommend considering a hunger/satiety scale (lots available online - one of my favorites is by [facility])   Exercise resources noted below. One free application: FitOn    Fruit/ Vegetables by season  https://PassbeeMedia.Event Farms.usda.gov/seasonal-produce-guide    - Gives ideas that you may not think of trying   - If you click on the vegetable, it brings up more information such as nutrition, recipes, similar vegetables, etc.     Seasonal recipes:  https://PassbeeMedia.Event Farms.usda.gov/nutrition-education/snap-ed-recipes     Homemade  Seasonings  https://blog.Eventifier/3-wtvzgxwk-bokzjrohtw-that-will-help-you-eat-more-veggies/    Vegetable seasoning ideas:  Dill   Lemon  Orange  Parmesan seasoning  Lemon pepper (some have sodium in them as FYI)   Garlic  Basil  Thyme  Oregano   Rosio      More Nutritious homemade dessert recipes:    Greek Yogurt Chocolate Mousse   https://www.diabetesfoodhub.org/recipes/greek-yogurt-chocolate-mousse.html?home-category_id=23     No Bake [place] Butter Cookies  https://www.Sazze/dq-csjn-ovxayp-butter-cookies/     Anupam Seed Chocolate Pudding  https://www.elmenusllMicrobio Pharma.Triangulate.au/chocolate-anupam-pudding/#recipe     Snickerdoodle hummus  https://Buxfer/ybbvnorvbktuw-kqbsibb-epmjaq/#recipe     Peanut Butter Dip  https://www.diabetesfoodhub.org/recipes/sweet-peanut-buttery-dip.html?home-category_id=23     Double Chocolate Zucchini Cake (can adapt recipe and use applesauce instead of butter/oil)  https://SmartDrive Systems/2021/05/double-chocolate-zucchini-loaf-cake.html     Banana Oat Muffins   https://Nomis Solutions/blog/healthy-desserts/#cc-pckjcsps-545     Chocolate hummus   https://Eayun.Triangulate/chocolate-hummus/#tasty-recipes-82868-jump-target     Peanut butter cookie dough hummus   https://Primorigen Biosciences.Triangulate/2018/05/15/peanut-butter-cookie-dough-dessert-hummus/     Banana Peanut Butter Oat Bars  https://Libratone.Triangulate/banana-peanut-butter-oat-protein-bars/     At Home Exercise Resources  ACE Fitness Library - Exercises by Muscle Group  https://www.Cashflowtuna.com.org/resources/everyone/exercise-library/    Channels with Exercise Modifications:  Coach Brantley (strengthening) https://www.youSierra Monolithicsube.com/@Weston Santos (strengthening): https://www.Xeroundube.com/channel/XXHTC4-HYCAn76MV-i9NXifC  Yoga with Zelinda: https://www.youSierra Monolithicsube.com/@yogawithzelinda  Fkn1Qvlg (strengthening for any age, examples of exercise for seniors):  https://www.NakedRoomube.com/@oic9isim    Size-Inclusive Fitness Routines:   Beginner Workout - Standing (low impact)  https://www.NakedRoomube.com/watch?v=9SvHLCj2HBK      Yoga  https://www.NakedRoomube.com/watch?v=VdIX8auOH_M&t=36s  https://www.NakedRoomube.com/watch?v=zUnjJdJitPw    Pilates  https://www.NakedRoomube.com/watch?v=kHAuadSJ1Ni    Full Body Strengthening:  https://www.NakedRoomube.com/watch?v=1QtTM0c-bx0   https://www.NakedRoomube.com/watch?v=E8c23uJkluF    Other Fitness Channels:  Dance Workouts: SigmaQuest   https://www.TrustedCompany.com.Moogi/user/TheQuincyMarall    HIIT Workouts: Intacct  https://www.TrustedCompany.com.Moogi/user/popsuOrthopaedic Synergytvfit    Pilates: Blogilates  https://www.TrustedCompany.com.Moogi/user/blogilates    Yoga: Yoga with Kimberley   https://www.TrustedCompany.com.Moogi/user/yogawithadriene/featured      Handouts Relating to Exercise :    1.     Learning About Being Physically Active     2.      Muscle Conditionin Exercises    3.     Resistance Training with Free Weights: 3 Exercises    4.      Resistance Training with Surgical Tubin Exercises    5.     Stretchin Exercises    6.     Seated Exercises for Arms and Legs: 11 Exercises     Snack ideas:  - Cucumbers  - Celery (plain or with peanut butter)  - Fruit  - Cottage cheese  - Greek yogurt  - Hard boiled eggs  - String cheese  - Guacamole  - Hummus    Keep up the hard work!    Fermented foods      Yogurt with live bacterial cultures   Kimichi   Apple cider Vinegar   Miso   Natto   Kvass   Pickles   Olives   Kefir   Catarino Henriquez   Probiotics: lactobacillus or acidophilus    Long-term goal: 5k     Coping: reading, planning garden, sewing/quilting     High-iron foods:  100% iron-fortified ready-to-eat cereal   cup, 18 mg  Grits, instant   cup, 7.1   Cream of wheat   cup, 5.2   Oatmeal, instant   cup, 5 mg  Soybeans, cooked   cup, 4.4 mg  White beans, canned   cup, 3.9 mg   Lentils   cup, 3.3 mg  White rice 1/3 cup, 3 mg  Spinach   cup cooked, 1 cup raw, 3 mg   Beef tenderloin  3 oz, 3 mg  Baked beans 1/3 cup, 3 mg  Vegetable or soy burger 1 sarah, 2.9 mg  Soy milk 1 cup (8 oz), 2.7 mg   Chickpeas   cup, 2.5 mg  Kidney beans   cup, 2.5 mg  Sardines 3 oz, 2.5 mg   Nuts: almonds or pistachios   cup, 1.3 mg   [place] sprouts, cooked   cup, 1 mg   Egg 1 whole, 1 mg    Some good sources on nut comparison:   https://www.Wild Brain/sites/default/files/2020-04/nutrient_comparison_chart_for_tree_nuts_redesign.pdf     https://www.Sportskeeda/Images/nutrition/Tree_Nut%20Nutrient_Comparison_Chart.pdf        The Plate Method:  https://www.cdc.gov/diabetes/images/managing/Diabetes-Manage-Eat-Well-Plate-Graphic_600px.jpg     Starchy vegetables  Potatoes   Sweet potatoes  Green peas  Chickpeas   Corn  Lentils  Beans (black, domingo, etc)     Types of fats  https://www.[facility] Hospital[facility].Mabank.edu/nutritionsource/what-should-you-eat/fats-and-cholesterol/types-of-fat/      Good resources on inflammation    https://www.health.Mabank.edu/staying-healthy/foods-that-fight-inflammation     https://www.heart.org/en/healthy-living/healthy-eating/eat-smart/nutrition-basics/mediterranean-diet     https://oldwayspt.org/traditional-diets/mediterranean-diet     https://my.clevelandinic.org/health/articles/76331-suijhwyrjxcrw-dbpn     Good article on label reading   https://www.fda.gov/food/new-nutrition-facts-label/how-understand-and-use-nutrition-facts-label     Additional resources:     Protein Sources   http://Commissioner/457514.pdf     Carbohydrates  http://fvfiles.com/049576.pdf     Mindful Eating  http://Commissioner/038622.pdf     Summary of Volumetrics Eating Plan  http://fvfiles.com/913981.pdf     Follow-Up:  Monday, June 10th at 9:30 am  Plan to work with Susana during my leave  Time spent with patient: 25 minutes.  AARON Hamilton, RD, LD

## 2024-05-13 ENCOUNTER — OFFICE VISIT (OUTPATIENT)
Dept: OBGYN | Facility: CLINIC | Age: 54
End: 2024-05-13
Payer: COMMERCIAL

## 2024-05-13 ENCOUNTER — TELEPHONE (OUTPATIENT)
Dept: PULMONOLOGY | Facility: CLINIC | Age: 54
End: 2024-05-13

## 2024-05-13 VITALS — HEART RATE: 61 BPM | OXYGEN SATURATION: 98 % | DIASTOLIC BLOOD PRESSURE: 73 MMHG | SYSTOLIC BLOOD PRESSURE: 123 MMHG

## 2024-05-13 DIAGNOSIS — Z00.00 ANNUAL PHYSICAL EXAM: Primary | ICD-10-CM

## 2024-05-13 DIAGNOSIS — E28.39 ESTROGEN DEFICIENCY: ICD-10-CM

## 2024-05-13 DIAGNOSIS — Z12.4 CERVICAL CANCER SCREENING: ICD-10-CM

## 2024-05-13 PROCEDURE — G0145 SCR C/V CYTO,THINLAYER,RESCR: HCPCS | Performed by: OBSTETRICS & GYNECOLOGY

## 2024-05-13 PROCEDURE — 87624 HPV HI-RISK TYP POOLED RSLT: CPT | Performed by: OBSTETRICS & GYNECOLOGY

## 2024-05-13 PROCEDURE — 99396 PREV VISIT EST AGE 40-64: CPT | Performed by: OBSTETRICS & GYNECOLOGY

## 2024-05-13 NOTE — PATIENT INSTRUCTIONS
If you have labs or imaging done, the results will automatically release in TapTalents without an interpretation.  Your health care professional will review those results and send an interpretation with recommendations as soon as possible, but this may be 1-3 business days.    If you have any questions regarding your visit, please contact your care team.     [facility]: [phone]  [facility] CLINIC HOURS TELEPHONE NUMBER   DO. Livia Taveras -Surgery Scheduler  [place] -     NATALI Hager RN Kylie, RN [place]    Monday 8:30 am-5:00 pm  Wednesday 8:30 am-5:00 pm  Friday 8:30 am-5:00 pm    Typical Surgery day: Tuesday [facility]  [address] [place] [address]  Phone:  [phone]  Fax: [phone]   Appointment [phone]    Imaging Scheduling-All Locations [phone]    [facility] Labor and Delivery  [address] [place] [address] [place] [address] [phone]   **Surgeries** Our Surgery Schedulers will contact you to schedule. If you do not receive a call within 3 business days, please call [phone].  Urgent Care locations:  [facility] Monday-Friday   10 am - 8 pm  Saturday and    9 am - 5 pm [phone]   If you need a medication refill, please contact your pharmacy. Please allow 3 business days for your refill to be completed.  As always, Thank you for trusting us with your healthcare needs!  see additional instructions from your care team below

## 2024-05-13 NOTE — TELEPHONE ENCOUNTER
Hello pt is requesting the arnuity ellipta 100 I do not see this on the med list can we please get this sent over please and thank you

## 2024-05-13 NOTE — PROGRESS NOTES
Paige is a 53 year old female, , who is here for her annual exam.  She had her current Mirena IUD inserted on 2017 so it is due for removal in 2025 since she uses it both for contraception and menstrual control.  She has not experienced hot flushes yet and her mother went through menopause at age 60 so she prefers to continue use of the IUD until due for removal.  She has been experiencing insomnia, unwanted facial hair, and dry skin so we discussed treatment for these issues.  She already has had her mammogram and labwork updated this year but is overdue for a pap and DEXA scan.  Her mother, MGM, and other relatives on her maternal side have been diagnosed with osteopenia or sustained hip fractures.  She lost 100 lbs with medication through [facility] so is currently off the med and working with a dietician to maintain her weight.  She states that her colonoscopy is not due this year since was done a couple of years ago by [facility] in [place].    ROS: Ten point review of systems was reviewed and negative except the above.    Health Maintenance   Topic Date Due    ADVANCE CARE PLANNING  Never done    HEPATITIS C SCREENING  Never done    PHQ-9  2019    ASTHMA ACTION PLAN  2023    HPV TEST  2024    PAP  2024    ASTHMA CONTROL TEST  2024    YEARLY PREVENTIVE VISIT  2025    GLUCOSE  2026    MAMMO SCREENING  2026    DTAP/TDAP/TD IMMUNIZATION (3 - Td or Tdap) 2027    LIPID  2028    COLORECTAL CANCER SCREENING  2032    HIV SCREENING  Completed    DEPRESSION ACTION PLAN  Completed    MIGRAINE ACTION PLAN  Completed    INFLUENZA VACCINE  Completed    Pneumococcal Vaccine: Pediatrics (0 to 5 Years) and At-Risk Patients (6 to 64 Years)  Completed    ZOSTER IMMUNIZATION  Completed    HEPATITIS B IMMUNIZATION  Completed    COVID-19 Vaccine  Completed    IPV IMMUNIZATION  Aged Out    HPV IMMUNIZATION  Aged Out    MENINGITIS  IMMUNIZATION  Aged Out    RSV MONOCLONAL ANTIBODY  Aged Out      Last pap: due  Last Mammogram: not due  Last Dexa: due  Last Colonoscopy: not due  Lab Results   Component Value Date    CHOL 183 04/04/2023    CHOL 156 01/03/2019     Lab Results   Component Value Date    HDL 75 04/04/2023    HDL 63 01/03/2019     Lab Results   Component Value Date    LDL 89 04/04/2023    LDL 75 01/03/2019     Lab Results   Component Value Date    TRIG 93 04/04/2023    TRIG 89 01/03/2019     Lab Results   Component Value Date    CHOLHDLRATIO 2.8 03/02/2015     OBHX:      PSH:   Past Surgical History:   Procedure Laterality Date    ARTHROSCOPY KNEE RT/LT  1997    Left     DILATION AND CURETTAGE N/A 6/14/2017    Procedure: DILATION AND CURETTAGE;;  Surgeon: Livia Barnett DO;  Location: [facility] OR    EXAM UNDER ANESTHESIA, ULTRASOUND N/A 6/14/2017    Procedure: EXAM UNDER ANESTHESIA, ULTRASOUND;  Ultrasound guided cervical dilation, IUD placement, Endometrial biopsy, repair of unavoidable cervical laceration;  Surgeon: Livia Barnett DO;  Location:  OR     TOOTH EXTRACTION W/FORCEP  1998    wisdom teeth     INSERT INTRAUTERINE DEVICE N/A 6/14/2017    Procedure: INSERT INTRAUTERINE DEVICE;;  Surgeon: Livia Barnett DO;  Location: [facility] OR     PMH: Her past medical, surgical, and obstetric histories were reviewed and are documented in their appropriate chart areas.    ALL/Meds: Her medication and allergy histories were reviewed and are documented in their appropriate chart areas.    SH/FMH: Her social and family history was reviewed and documented in its appropriate chart area.    PE: /73 (BP Location: Right arm, Patient Position: Chair, Cuff Size: Adult Large)   Pulse 61   SpO2 98%   Breastfeeding No   There is no height or weight on file to calculate BMI.    General Appearance:  healthy, alert, active, no distress  Cardiovascular:  Regular rate and Rhythm without murmur  Neck: Supple, no adenopathy, and thyroid  normal  Lungs:  Clear, without wheeze, rale or rhonchi  Breast: normal breast exam  Abdomen: Benign, Soft, non-tender, No masses, organomegaly, No inguinal nodes, and Bowel sounds normoactive.   Pelvic:       - Ext: Vulva and perineum are normal without lesion, mass or discharge        - Urethra: normal without discharge        - Urethral Meatus: normal appearance       - Bladder: no tenderness, no masses       - Vagina: Normal mucosa, no discharge        - Cervix: normal and nulliparous, the 2 IUD strings are visible and of the correct length       - Uterus:Normal shape, position and consistency        - Adnexa: Normal without masses or tenderness       - Rectal: deferred    A/P:  Annual Exam, IUD Check, Strong Family History of Osteopenia/Hip Fractures     -  I discussed the new pap recommendations regarding screening.  Explained the rationale for increased intervals between paps.  Questions asked and answered.  She does agree to this regiment.  Pap was collected and submitted to lab   -  BC: IUD (Mirena) due for removal in 6/2025 so at her next annual exam   -  Overdue for a DEXA scan     Orders Placed This Encounter   Procedures    DX Bone Density    Pap Screen with HPV - recommended age 30 - 65 years      -  Encouraged self-breast exam   -  Encouraged low fat diet, regular exercise, and adequate calcium intake.    Krystal Zuñiga, DO  FACOG, FACS

## 2024-05-14 ENCOUNTER — TELEPHONE (OUTPATIENT)
Dept: PULMONOLOGY | Facility: CLINIC | Age: 54
End: 2024-05-14
Payer: COMMERCIAL

## 2024-05-14 NOTE — TELEPHONE ENCOUNTER
Prior Authorization Retail Medication Request    Medication/Dose: beclomethasone HFA (QVAR REDIHALER) 80 MCG/ACT inhaler  Diagnosis and ICD code (if different than what is on RX):    New/renewal/insurance change PA/secondary ins. PA:  Previously Tried and Failed:    Rationale:  patient had been stable on this medication for years. She can not take medication with powders as she has a milk/dairy products allergy. Qvar is the best choice for her. To change to another inhaler could cause her to be hospitalized again.     Insurance   Primary: [facility] CHOICE   Insurance ID:  [id number]     Secondary (if applicable):  Insurance ID:      Pharmacy Information (if different than what is on RX)  Name:  [facility]  Phone:  [phone]  Fax: [phone]

## 2024-05-15 LAB
BKR LAB AP GYN ADEQUACY: NORMAL
BKR LAB AP GYN INTERPRETATION: NORMAL
BKR LAB AP HPV REFLEX: NORMAL
BKR LAB AP PREVIOUS ABNORMAL: NORMAL
PATH REPORT.COMMENTS IMP SPEC: NORMAL
PATH REPORT.COMMENTS IMP SPEC: NORMAL
PATH REPORT.RELEVANT HX SPEC: NORMAL

## 2024-05-15 NOTE — TELEPHONE ENCOUNTER
Fax from pharmacy asking for Arnuity ellipta.  Qvar is not formulary, but Arnuity is preferred by insurance.      Ok for Arnuity?  When I try placing comes up with contraindication due to patient's milk protein allergy

## 2024-05-15 NOTE — TELEPHONE ENCOUNTER
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  PA already in process.  Will not order Arnuity and inform pharmacy that PA is in process.  Will await response form insurance.

## 2024-05-22 LAB
HPV HR 12 DNA CVX QL NAA+PROBE: NEGATIVE
HPV16 DNA CVX QL NAA+PROBE: NEGATIVE
HPV18 DNA CVX QL NAA+PROBE: NEGATIVE
HUMAN PAPILLOMA VIRUS FINAL DIAGNOSIS: NORMAL

## 2024-05-27 NOTE — TELEPHONE ENCOUNTER
PA Initiation    Medication: QVAR REDIHALER 80 MCG/ACT IN AERB  Insurance Company: [employer] - Phone [phone] Fax [phone]  Pharmacy Filling the Rx: [facility] - [place], [place] - [facility] [address]  Filling Pharmacy Phone:    Filling Pharmacy Fax:    Start Date: 5/27/2024

## 2024-05-28 NOTE — TELEPHONE ENCOUNTER
Prior Authorization Approval    Medication: QVAR REDIHALER 80 MCG/ACT IN AERB  Authorization Effective Date: 5/28/2024  Authorization Expiration Date: 5/27/2025  Approved Dose/Quantity: 1/30  Reference #: [id number]   Insurance Company: [employer] Phone [phone] Fax [phone]  Expected CoPay: $    CoPay Card Available:      Financial Assistance Needed:   Which Pharmacy is filling the prescription: [facility] - [place] [address]  Pharmacy Notified: Yes  Patient Notified: Yes

## 2024-06-10 ENCOUNTER — TELEPHONE (OUTPATIENT)
Dept: ENDOCRINOLOGY | Facility: CLINIC | Age: 54
End: 2024-06-10

## 2024-06-10 NOTE — TELEPHONE ENCOUNTER
Left Voicemail (1st Attempt), sent myc for the patient to call back and reschedule the following:    Appointment type: Ret Med Wt Mgmt Nutrition  Provider: Christina Martell  Return date: Reschedule 6/10 Appt due to the provider being out  Specialty phone number: [phone]  Additional appointment(s) needed: n/a  Additonal Notes: Reschedule with Susana Beckman or any other RD (Cherry Reis or Hina Parr)    Left CC#

## 2024-06-12 ENCOUNTER — VIRTUAL VISIT (OUTPATIENT)
Dept: SLEEP MEDICINE | Facility: CLINIC | Age: 54
End: 2024-06-12
Payer: COMMERCIAL

## 2024-06-12 VITALS — HEIGHT: 64 IN | BODY MASS INDEX: 44.22 KG/M2 | WEIGHT: 259 LBS

## 2024-06-12 DIAGNOSIS — R35.1 NOCTURIA: ICD-10-CM

## 2024-06-12 DIAGNOSIS — F41.9 ANXIETY AND DEPRESSION: ICD-10-CM

## 2024-06-12 DIAGNOSIS — R23.2 HOT FLASHES: ICD-10-CM

## 2024-06-12 DIAGNOSIS — G47.8 UNREFRESHED BY SLEEP: ICD-10-CM

## 2024-06-12 DIAGNOSIS — R53.83 FATIGUE, UNSPECIFIED TYPE: ICD-10-CM

## 2024-06-12 DIAGNOSIS — E66.01 MORBID OBESITY (H): ICD-10-CM

## 2024-06-12 DIAGNOSIS — G47.33 OSA (OBSTRUCTIVE SLEEP APNEA): Primary | ICD-10-CM

## 2024-06-12 DIAGNOSIS — G47.19 EXCESSIVE DAYTIME SLEEPINESS: ICD-10-CM

## 2024-06-12 DIAGNOSIS — M79.7 FIBROMYALGIA: ICD-10-CM

## 2024-06-12 DIAGNOSIS — F32.A ANXIETY AND DEPRESSION: ICD-10-CM

## 2024-06-12 DIAGNOSIS — F51.04 CHRONIC INSOMNIA: ICD-10-CM

## 2024-06-12 PROCEDURE — 99205 OFFICE O/P NEW HI 60 MIN: CPT | Mod: 95 | Performed by: INTERNAL MEDICINE

## 2024-06-12 ASSESSMENT — SLEEP AND FATIGUE QUESTIONNAIRES
HOW LIKELY ARE YOU TO NOD OFF OR FALL ASLEEP WHILE SITTING AND TALKING TO SOMEONE: SLIGHT CHANCE OF DOZING
HOW LIKELY ARE YOU TO NOD OFF OR FALL ASLEEP WHEN YOU ARE A PASSENGER IN A CAR FOR AN HOUR WITHOUT A BREAK: HIGH CHANCE OF DOZING
HOW LIKELY ARE YOU TO NOD OFF OR FALL ASLEEP WHILE SITTING AND READING: HIGH CHANCE OF DOZING
HOW LIKELY ARE YOU TO NOD OFF OR FALL ASLEEP WHILE WATCHING TV: MODERATE CHANCE OF DOZING
HOW LIKELY ARE YOU TO NOD OFF OR FALL ASLEEP WHILE LYING DOWN TO REST IN THE AFTERNOON WHEN CIRCUMSTANCES PERMIT: HIGH CHANCE OF DOZING
HOW LIKELY ARE YOU TO NOD OFF OR FALL ASLEEP WHILE SITTING QUIETLY AFTER LUNCH WITHOUT ALCOHOL: HIGH CHANCE OF DOZING
HOW LIKELY ARE YOU TO NOD OFF OR FALL ASLEEP WHILE SITTING INACTIVE IN A PUBLIC PLACE: SLIGHT CHANCE OF DOZING

## 2024-06-12 ASSESSMENT — PAIN SCALES - GENERAL: PAINLEVEL: SEVERE PAIN (6)

## 2024-06-12 NOTE — PATIENT INSTRUCTIONS
Prescription has been provided for replacement auto titrating CPAP with pressure settings 5-15 cmH2O and PAP supplies. [facility] medical DME will contact you once the device becomes available  Prescription was also provided for travel auto CPAP with pressure settings 5-15 cmH2O per your request. (Please follow up with [facility] medical -[phone])  Recommend continue using CPAP regularly during sleep and getting the supplies for the device replaced on a regular basis.  Our clinic staff will call you to schedule a home sleep study which will be done while you are using your CPAP device to check the oxygen saturations during sleep-Plan to communicate test results via Latio.  Please limit consumption of fluids including water before bed.  Please follow-up with your endocrinologist regarding the frequent urination nocturia that you have  reported since discontinuing the GLP-1 supplement.  Recommend follow-up with your primary care provider for management of depression and fibromyalgia.  Gabapentin can make you feel sleepy. Reducing the dose of the gabapentin during the daytime may be helpful in decreasing the sleepiness.  Recommend  obtaining lab work to evaluate for other possible causes of fatigue and daytime sleepiness  including serum TSH, vitamin D levels through your primary care provider  If the home sleep study shows that your oxygen levels are normal, please follow-up with sleep clinic in 1 year.  However, after obtaining the replacement CPAP device, if you need to meet compliance, please  call the sleep clinic to schedule a follow-up video visit in approximately 2 months to review the compliance measures.  Please follow healthy diet and exercise regularly .  Please avoid driving, operating any heavy machinery or other hazardous situations while drowsy or sleepy.  Patient was counseled on the importance of driving while alert, to pull over if drowsy, or nap before getting into the vehicle if  sleepy.

## 2024-06-12 NOTE — PROGRESS NOTES
Virtual Visit Details    Type of service:  Video Visit     Originating Location (pt. Location): Home    Distant Location (provider location):  Off-site  Platform used for Video Visit: [facility]    Outpatient Sleep Medicine Consultation:      Name: Paige Fajardo MRN# [id number]   Age: 53 year old YOB: 1970     Date of Consultation: June 12, 2024  Consultation is requested by: Marlys Francis, NP  [address] [place] [address] Marlys Francis  Primary care provider: Francesca Samson       Reason for Sleep Consult:     Paige Fajardo is sent by Marlys Francis for a sleep consultation regarding management of previously diagnosed DMITRIY managed with CPAP therapy. Patient wants to obtain a replacement CPAP device since her current CPAP is more than 5 years old and she also wants a travel CPAP device         Assessment and Plan:     Summary Sleep Diagnoses:  Previously diagnosed obstructive sleep apnea managed with CPAP therapy.  (Patient reports positive benefits with CPAP therapy and DMITRIY is optimally treated with CPAP at the current pressure settings based on compliance download)  Chronic insomnia multifactorial-nocturia,-psychophysiological,  fibromyalgia, symptoms related to menopausal transition, depression  Nonrestorative sleep, Fatigue and Excessive daytime sleepiness-could be due to medications such as gabapentin. R/o possible hypoxemia    Comorbid Diagnoses:  Morbid obesity, moderate persistent asthma, previous history of alcoholism (sober for 24 years), fibromyalgia, anxiety, depression    Summary Recommendations:  Prescription provided for replacement auto titrating CPAP with pressure settings 5-15 cmH2O and PAP supplies.  Prescription was also provided for travel auto CPAP with pressure settings 5-15 cmH2O per patient's request.    Recommended to continue using CPAP regularly during sleep and getting the supplies for the device replaced on a regular basis.  Recommended  obtaining WatchPAT HST with the CPAP device to check for possible hypoxemia -which could be a potential contributing factor for the symptoms of nonrestorative sleep fatigue and EDS.  Plan to communicate test results via "[employer]t.  Patient was instructed to limit consumption of fluids including water before bed.  Patient was instructed to follow-up with her endocrinologist regarding the nocturia that she reports since discontinuing the GLP-1 supplement.  Patient was recommended to follow-up with primary care provider for management of depression and fibromyalgia.  Reducing the dose of the gabapentin during the daytime may be helpful.  Recommended obtaining lab work to evaluate for other possible causes of fatigue and EDS including serum TSH, vitamin D levels through her primary care provider  We discussed weight management with diet and exercise.  If the WatchPAT HST does not show evidence of hypoxemia, she will follow-up with sleep clinic in 1 year.  However, after obtaining the replacement CPAP device, if she needs to meet compliance she was instructed to call the sleep clinic to schedule a follow-up visit in approximately 2 months to review the compliance measures.  We discussed weight management with diet and exercise.  Patient was strongly advised to avoid driving, operating any heavy machinery or other hazardous situations while drowsy or sleepy.  Patient was counseled on the importance of driving while alert, to pull over if drowsy, or nap before getting into the vehicle if sleepy.       Orders Placed This Encounter   Procedures    HST - Home Sleep Apnea Test  - WatchPat NonReturnable    Comprehensive DME    Miscellaneous DME       Summary Counseling:    Sleep Testing Reviewed  Obstructive Sleep Apnea Reviewed  Complications of Untreated Sleep Apnea Reviewed      Medical Decision-making:   Educational materials provided in instructions    CC: Marlys Francis NP       The above note was dictated using  "voice recognition software. Although reviewed after completion, some word and grammatical error may remain . Please contact the author for any clarifications.     \" Total time spent was 70 minutes for this appointment on this date of service which include time spent before, during and after the visit for chart review, patient care, counseling and coordination of care including documentation.\"      Elisabeth Humphries MD  [facility]  [address] , [place] [address]            History of Present Illness:     Paige Fajardo is a 53 yr old female who presents to sleep clinic regarding  previously diagnosed DMITRIY managed with CPAP therapy. Her CPAP is  >5 years old. She wants to obtain replacement CPAP device and also wants a travel CPAP device.      Past Sleep Evaluations:  Home sleep study   Date: 6/28/2018 at the [facility]    Weight: 259 lbs  Respiratory events: The home study revealed a presence of 40 obstructive apneas and 1 mixed and 7 central apneas. There were 111 hypopneas resulting in a combined apnea/hypopnea index [AHI] of 18.1 events per hour.  AHI was 28.9 per hour supine, 0 per hour prone, 6.8 per hour on left side, and 1.8 per hour on right side.   Pattern: Excluding events noted above, respiratory rate and pattern was Normal.  Position: Percent of time spent: supine - 55.7%, prone - 0.1%, on left - 23.4%, on right - 19%.  Sleep Associated Hypoxemia: sustained hypoxemia was not present. Baseline oxygen saturation was 95.4%.  Time with saturation less than or equal to 88% was 0 minutes. The lowest oxygen saturation was 88%.   Snoring: Snoring was present.  Heart Rate: By pulse oximetry normal rate was noted.   Assessment:   Moderate obstructive sleep apnea.  Sleep associated hypoxemia was not present.      Paige Le was set up at [place] on July 13, 2018. Patient received a Resmed AirSense 10 Auto. Pressures were set at 5-15 cm H2O.   Patient s " ramp is 5 cm H2O for Auto and FLEX/EPR is 2.  Patient received a Pao Respironics Mask name: julia  Pillow mask Size Small, heated tubing and heated humidifier.     Her CPAP is  >5 years old. She wants to obtain replacement CPAP device and also wants a travel CPAP device.  She reports using the CPAP device regularly during sleep  Pressure settings on CPAP ranged between 5 to 15 cmH2O  Patient reports using the CPAP regularly during sleep. She uses nasal pillow mask.   There are no reports of snoring, apneas or awakenings due to gasping  with the device use.   Pressure settings feel comfortable. Patient reports good sleep quality with the device use.    DOWNLOADABLE COMPLIANCE DATA:   ResMed   Auto-PAP 5.0 - 15.0 cmH2O 30 day usage data:    100% of days with > 4 hours of use. 0/30 days with no use.   Average use 480 minutes per day.   95%ile Leak 21.25 L/min.   CPAP 95% pressure 12.1 cm.   AHI 2.48 events per hour.       SLEEP SCHEDULE:  She follows a similar sleep schedule daily.   Bedtime time: 9:30 pm. It takes 30 minutes to fall asleep  Wakes up to use bathroom 3-4 times (drinks water close to bed). Couple times a week, she reports trouble falling back asleep-active mind related.  Other factor affecting sleep include: Fibromyalgia affects sleep. Body temp fluctuates hot to cold and cold to hot-hot flashes-menopausal transition related.  Yoga,meditation helps.   Wake time: 7:30 am (no alarm)    Patient gets  7 hours  sleep on average   Patient thinks she needs about  8 hours of sleep.    Patient does not always feel rested in the morning. She reports fatigue and excessive daytime sleepiness. She does not think it is anything to do with the sleep apnea because she does use the CPAP regularly and reports positive benefits with the treatment.   Patient initially endorsed ESS at 17 out of 24 and I personally went over the ESS with the patient and she endorsed ESS at 11 /24. She naps 5 days/week- naps are  usually  for 1 hour and she feels rested after the nap.  She does not doze off unintentionally.  There are occasional reports of drowsiness while driving. She denies close calls or accidents.    SLEEP DISRUPTIONS:     Movement:  Denies RLS symptoms    Behaviors in Sleep:  Paige Fajardo has experienced the following behaviors while sleeping: Sleep talking   There are no reports of sleepwalking, sleep eating or dream enactment behavior.  She has experienced sudden muscle weakness during the day: No      Is there anything else you would like your sleep provider to know:        CAFFEINE AND OTHER SUBSTANCES:    Patient consumes caffeinated beverages per day:  2 cups  Last caffeine use is usually:  noon  List of any prescribed or over the counter stimulants that patient takes:  no  List of any prescribed or over the counter sleep medication patient takes:  no  List of previous sleep medications that patient has tried:  trazodone.She tried trazodone 20 years ago, discontinued 15 years ago. Does not want to rely on medications.  Patient drinks alcohol to help them sleep:  no (sober for 24 years)  Patient drinks alcohol near bedtime:  no    Family History:  Patient has a family member been diagnosed with a sleep disorder:  dad had DMITRIY          SCALES:      EPWORTH SLEEPINESS SCALE   I personally went over the ESS with the patient and she endorsed ESS at 11 /24.         6/12/2024     9:10 AM    [place] Sleepiness Scale ( SONU Cervantes  7004-6755<br>ESS - [place]/English - Final version - 21 Nov 07 - [facility] [place].)   Sitting and reading High chance of dozing   Watching TV Moderate chance of dozing   Sitting, inactive in a public place (e.g. a theatre or a meeting) Slight chance of dozing   As a passenger in a car for an hour without a break High chance of dozing   Lying down to rest in the afternoon when circumstances permit High chance of dozing   Sitting and talking to someone Slight chance of dozing   Sitting quietly  "after a lunch without alcohol High chance of dozing   In a car, while stopped for a few minutes in traffic Slight chance of dozing   [place] Score (MC) 17   [place] Score (Sleep) 17         INSOMNIA SEVERITY INDEX (LONI)          6/12/2024     9:09 AM   Insomnia Severity Index (LONI)   Difficulty falling asleep 1   Difficulty staying asleep 3   Problems waking up too early 3   How SATISFIED/DISSATISFIED are you with your CURRENT sleep pattern? 3   How NOTICEABLE to others do you think your sleep problem is in terms of impairing the quality of your life? 2   How WORRIED/DISTRESSED are you about your current sleep problem? 2   To what extent do you consider your sleep problem to INTERFERE with your daily functioning (e.g. daytime fatigue, mood, ability to function at work/daily chores, concentration, memory, mood, etc.) CURRENTLY? 3   LONI Total Score 17       Guidelines for Scoring/Interpretation:  Total score categories:  0-7 = No clinically significant insomnia   8-14 = Subthreshold insomnia   15-21 = Clinical insomnia (moderate severity)  22-28 = Clinical insomnia (severe)  Used via courtesy of www.[employer].va.gov with permission from Tate Lozano PhD., [facility]        GAD7        3/12/2019     4:05 PM   KACEY-7    1. Feeling nervous, anxious, or on edge 1   2. Not being able to stop or control worrying 0   3. Worrying too much about different things 0   4. Trouble relaxing 0   5. Being so restless that it is hard to sit still 0   6. Becoming easily annoyed or irritable 0   7. Feeling afraid, as if something awful might happen 0   KACEY-7 Total Score 1   If you checked any problems, how difficult have they made it for you to do your work, take care of things at home, or get along with other people? Not difficult at all         CAGE-AID         No data to display                CAGE-AID reprinted with permission from the [place] Medical Journal, RADHA John. and KAVON Yeboah, \"Conjoint screening questionnaires " "for alcohol and drug abuse\" [place] Entrena[place] Journal 94: 135-140, 1995.      PATIENT HEALTH QUESTIONNAIRE-9 (PHQ - 9)        3/12/2019     4:05 PM   PHQ-9 ([employer])   1.  Little interest or pleasure in doing things 1   2.  Feeling down, depressed, or hopeless 1   3.  Trouble falling or staying asleep, or sleeping too much 0   4.  Feeling tired or having little energy 1   5.  Poor appetite or overeating 1   6.  Feeling bad about yourself - or that you are a failure or have let yourself or your family down 0   7.  Trouble concentrating on things, such as reading the newspaper or watching television 0   8.  Moving or speaking so slowly that other people could have noticed. Or the opposite - being so fidgety or restless that you have been moving around a lot more than usual 0   9.  Thoughts that you would be better off dead, or of hurting yourself in some way 0   PHQ-9 Total Score 4   If you checked off any problems, how difficult have these problems made it for you to do your work, take care of things at home, or get along with other people? Not difficult at all   6.  Feeling bad about yourself 0   7.  Trouble concentrating 0   8.  Moving slowly or restless 0   9.  Suicidal or self-harm thoughts 0   Difficulty at work, home, or with people Not difficult at all       Developed by Connor Kamara, Makeda Cage, Trevor Hernandez and colleagues, with an educational irasema from [employer] No permission required to reproduce, translate, display or distribute.        Allergies:    Allergies   Allergen Reactions    Amoxicillin      Other reaction(s): Stomach Upset    Amoxicillin-Pot Clavulanate      reacted to Augmentin--  stomach upset -- can take penicillin    Dairy Products [Milk Protein] Difficulty breathing     Products with milk cause congestion, sinus mucous, difficulty breathing    Morphine And Codeine Swelling       Medications:    Current Outpatient Medications   Medication Sig Dispense Refill    albuterol " (PROAIR HFA/PROVENTIL HFA/VENTOLIN HFA) 108 (90 Base) MCG/ACT inhaler Inhale 2 puffs into the lungs every 6 hours as needed for shortness of breath or wheezing 18 g 5    B Complex Vitamins (VITAMIN B COMPLEX PO) 3 drops per day      benzonatate (TESSALON) 100 MG capsule Take 1 capsule (100 mg) by mouth 3 times daily as needed for cough 90 capsule 0    cetirizine-psuedoePHEDrine (ZYRTEC-D) 5-120 MG per tablet Take 1 tablet by mouth 2 times daily 90 tablet 3    cholecalciferol (VITAMIN D) 1000 UNIT tablet 3 drops per day      colchicine (COLCYRS) 0.6 MG tablet Take 2 tablets orally at onset of symptoms and one more tablet 1 hour later, and then one tablet twice a day until flare resolves.      COMPOUNDED NON-CONTROLLED SUBSTANCE (CMPD RX) - PHARMACY TO MIX COMPOUNDED MEDICATION LOW DOSE NALTREXONE 6MG one tablet qhs 90 capsule 4    FLUoxetine (PROZAC) 20 MG capsule Take 20 mg by mouth      gabapentin (NEURONTIN) 300 MG capsule Take 2 capsules  (600mg) in the morning and 2 capsules (600mg) at bedtime. 90day supply (Patient taking differently: Take 300 mg by mouth 2 times daily) 360 capsule 1    ipratropium - albuterol 0.5 mg/2.5 mg/3 mL (DUONEB) 0.5-2.5 (3) MG/3ML neb solution Take 1 vial (3 mLs) by nebulization every 6 hours as needed for shortness of breath or wheezing 960 mL 4    levonorgestrel (MIRENA) 20 MCG/24HR IUD 1 each by Intrauterine route once      MAGNESIUM CITRATE PO 3 drops per day      Naproxen Sodium (ALEVE PO) Take 220 mg by mouth 2 times daily as needed       ondansetron (ZOFRAN ODT) 4 MG ODT tab Take 1 tablet (4 mg) by mouth every 8 hours as needed for nausea 20 tablet 0    order for DME Equipment being ordered: Nebulizer 1 Device 0    predniSONE (DELTASONE) 20 MG tablet Take 2 tabs daily x 7 days 14 tablet 0    rizatriptan (MAXALT-MLT) 10 MG ODT tab Take 1 tablet (10 mg) by mouth at onset of headache for migraine May repeat in 2 hours. Max 3 tablets/24 hours. 18 tablet 3    Turmeric 450 MG CAPS 1  scoop per day      tirzepatide-Weight Management (ZEPBOUND) 10 MG/0.5ML prefilled pen Inject 0.5 mLs (10 mg) Subcutaneous every 7 days (Patient not taking: Reported on 5/13/2024) 2 mL 2    tirzepatide-Weight Management (ZEPBOUND) 12.5 MG/0.5ML prefilled pen Inject 0.5 mLs (12.5 mg) Subcutaneous every 7 days (Patient not taking: Reported on 5/13/2024) 2 mL 2          Problem List:  Patient Active Problem List    Diagnosis Date Noted    Morbid obesity (H) 05/17/2017     Priority: High    Posterior tibial tendinitis of right lower extremity 12/03/2019     Priority: Medium    Trochanteric bursitis of left hip 05/23/2019     Priority: Medium    IUD (intrauterine device) in place 01/15/2019     Priority: Medium     Mirena Due for removal in 6/2022      DMITRIY (obstructive sleep apnea) 06/29/2018     Priority: Medium    Moderate persistent asthma 02/14/2018     Priority: Medium    History of alcoholism (H) 07/28/2016     Priority: Medium     In remission for 16 years      Grieving 11/24/2014     Priority: Medium    Insomnia 11/24/2014     Priority: Medium    Obesity 11/01/2013     Priority: Medium    Migraine 04/22/2012     Priority: Medium    Family history of aneurysm 04/28/2011     Priority: Medium    Patellofemoral disorder      Priority: Medium    Fibromyalgia 04/15/2010     Priority: Medium    Mild major depression (H) 04/06/2010     Priority: Medium    Cystic Fibrosis Screening negative 05/01/2009     Priority: Medium     [facility]      Dysmenorrhea 10/12/2008     Priority: Medium    Anxiety state 03/08/2005     Priority: Medium     Problem list name updated by automated process. Provider to review      Insomnia 03/08/2005     Priority: Medium     Problem list name updated by automated process. Provider to review      Allergic rhinitis 03/08/2005     Priority: Medium     Problem list name updated by automated process. Provider to review          Past Medical/Surgical History:  Past Medical History:    Diagnosis Date    Allergic rhinitis     ALLERGIC RHINITIS NOS 3/8/2005    ANXIETY STATE NOS 3/8/2005    Complication of anesthesia     Cystic Fibrosis Screening negative 5/1/2009    [facility]    Dysmenorrhea 10/12/2008    Family history of aneurysm 4/28/2011    Fibromyalgia     Dx by head/neck/pain & Rheumatology    Fibromyalgia 4/15/2010    [facility]    Hyperlipidemia LDL goal <160 11/1/2013    INSOMNIA NEC 3/8/2005    Migraine     Migraine 4/22/2012    [facility]     Mild major depression (H24)     Obesity 11/1/2013    Other and unspecified alcohol dependence, unspecified drinking behavior     Sober since 7/00    Other anxiety states     Other chronic pain     from fibromyalgia    Patellofemoral disorder     bilateral    Uncomplicated asthma     Not considered Asthma but I have breathing problems     Past Surgical History:   Procedure Laterality Date    ARTHROSCOPY KNEE RT/LT  1997    Left     DILATION AND CURETTAGE N/A 6/14/2017    Procedure: DILATION AND CURETTAGE;;  Surgeon: Livia Barnett DO;  Location: [facility] OR    EXAM UNDER ANESTHESIA, ULTRASOUND N/A 6/14/2017    Procedure: EXAM UNDER ANESTHESIA, ULTRASOUND;  Ultrasound guided cervical dilation, IUD placement, Endometrial biopsy, repair of unavoidable cervical laceration;  Surgeon: Livia Barnett DO;  Location: [facility] OR     TOOTH EXTRACTION W/FORCEP  1998    wisdom teeth     INSERT INTRAUTERINE DEVICE N/A 6/14/2017    Procedure: INSERT INTRAUTERINE DEVICE;;  Surgeon: Livia Barnett DO;  Location: [facility] OR       Social History:  Social History     Socioeconomic History    Marital status:      Spouse name: Not on file    Number of children: 0    Years of education: Not on file    Highest education level: Not on file   Occupational History    Occupation: [employer]     Employer: [employer]     Comment: [employer]    Tobacco Use    Smoking status: Never     Passive exposure: Past    Smokeless tobacco: Never    Vaping Use    Vaping status: Never Used   Substance and Sexual Activity    Alcohol use: No     Alcohol/week: 0.0 standard drinks of alcohol     Comment: recovering    Drug use: No    Sexual activity: Yes     Partners: Male     Birth control/protection: I.U.D.   Other Topics Concern     Service Not Asked    Blood Transfusions No    Caffeine Concern Not Asked    Occupational Exposure Not Asked    Hobby Hazards Not Asked    Sleep Concern Not Asked    Stress Concern Not Asked    Weight Concern Not Asked    Special Diet Not Asked    Back Care Not Asked    Exercise Yes     Comment: walk three days/week    Bike Helmet Not Asked    Seat Belt Yes    Self-Exams Yes    Parent/sibling w/ CABG, MI or angioplasty before 65F 55M? No   Social History Narrative    Living arrangements - the patient lives alone.     Social Determinants of Health     Financial Resource Strain: Low Risk  (2/23/2024)    [facility]    Financial Resource Strain     Difficulty of Paying Living Expenses: 3     Difficulty of Paying Living Expenses: Not on file   Food Insecurity: No Food Insecurity (2/23/2024)    [facility], [facility]    Food Insecurity     Worried About Running Out of Food in the Last Year: 1   Transportation Needs: No Transportation Needs (2/23/2024)    Received from [facility], [facility]    Transportation Needs     Lack of Transportation (Medical): 1   Physical Activity: Not on file   Stress: Not on file   Social Connections: Socially Integrated (2/23/2024)    Received from [facility], [facility]    Social Connections     Frequency of Communication with Friends and Family: 0   Interpersonal Safety: Not on file   Housing  "Stability: Low Risk  (2024)    [facility]    Housing Stability     Unable to Pay for Housing in the Last Year: 1       Family History:  Family History   Problem Relation Age of Onset    Neurologic Disorder Mother         ruptured cerebral aneurysm age 60     Hypertension Mother     Depression/Anxiety Mother     Cerebrovascular Disease Mother     Obesity Mother     Macular Degeneration Mother     Musculoskeletal Disorder Father         OA     Gastrointestinal Disease Father         colon polyps age 60    Other Cancer Father         Brain Cancer-neuroblastoma    Depression/Anxiety Father     Obesity Father     Cancer Father     Gynecology Sister         Rachel. irregular menses.  pre-eclampsia    Aneurysm Sister         cerebral    Heart Disease Paternal Grandfather         Multiple MI's (first MI age 60)    Diabetes Paternal Grandfather         IDDM dx age 60  Severe/brittle/complications    Coronary Artery Disease Paternal Grandfather     Depression/Anxiety Paternal Grandfather     Obesity Paternal Grandfather     Gynecology Paternal Grandmother          of uterine CA in her 40's    Ovarian Cancer Paternal Grandmother          at age 40 from cancer    Cancer Paternal Grandmother     Eye Disorder Maternal Grandmother         glacucoma    Diabetes Maternal Grandmother         IDDM-onset age 70    Obesity Maternal Grandmother     Macular Degeneration Maternal Grandmother     Cancer Maternal Grandfather     Gynecology Paternal Aunt         uterine CA diagnosed @ 40yrs       Review of Systems:  A complete review of systems reviewed by me is negative with the exeption of what has been mentioned in the history of present illness.        Physical Examination:  Vitals: Ht 1.626 m (5' 4\")   Wt 117.5 kg (259 lb)   BMI 44.46 kg/m    BMI= Body mass index is 44.46 kg/m .  General: No apparent distress, appropriately " "groomed  Head: Normocephalic, atraumatic  Chest: No cough, no audible wheezing, able to talk in full sentences. \  Psych: Her mentation appears normal and affect normal/bright  Neuro:  Mental status: Alert and  Oriented X 3  Speech: normal            Data: All pertinent previous laboratory data reviewed     Recent Labs   Lab Test 04/04/23  0740 01/03/19  1031 05/17/17  1216   NA  --  141 144   POTASSIUM  --  3.9 4.1   CHLORIDE  --  109 109   CO2  --  25 28   ANIONGAP  --  7 7   GLC 88 87 77   BUN  --  9 6*   CR  --  0.83 0.83   DON  --  8.7 8.8       Recent Labs   Lab Test 04/04/23  0740   WBC 3.7*   RBC 4.90   HGB 15.0   HCT 45.5   MCV 93   MCH 30.6   MCHC 33.0   RDW 12.7          Recent Labs   Lab Test 05/17/17  1216   PROTTOTAL 7.0   ALBUMIN 3.6   BILITOTAL 0.2   ALKPHOS 40   AST 12   ALT 20       TSH (mU/L)   Date Value   01/03/2019 1.57   05/04/2016 2.50       No results found for: \"UAMP\", \"UBARB\", \"BENZODIAZEUR\", \"UCANN\", \"UCOC\", \"OPIT\", \"UPCP\"    Ferritin   Date/Time Value Ref Range Status   04/04/2023 07:40  8 - 252 ng/mL Final         Echocardiography: No results found for this or any previous visit (from the past 4320 hour(s)).    Chest x-ray:   XR CHEST B READ 2 VIEWS 02/23/2024    Narrative  For Patients: As a result of the 21st Century Cures Act, medical imaging exams and procedure reports are released immediately into your electronic medical record. You may view this report before your referring provider. If you have questions, please contact your health care provider.    EXAM: XR CHEST 2 VIEWS PA AND LATERAL  LOCATION: [facility]  DATE: 2/23/2024    INDICATION: Cough, unspecified type.  COMPARISON: 10/23/2023.    Impression  Heart size and pulmonary vascularity within normal limits. The lungs are clear. Hypertrophic changes thoracic spine.      Chest CT:   No results found for this or any previous visit from the past 365 days.      PFT: Most Recent Breeze Pulmonary Function " "Testing    FVC-Pred   Date Value Ref Range Status   03/08/2022 3.45 L      FVC-Pre   Date Value Ref Range Status   03/08/2022 3.69 L      FVC-%Pred-Pre   Date Value Ref Range Status   03/08/2022 106 %      FEV1-Pre   Date Value Ref Range Status   03/08/2022 3.04 L      FEV1-%Pred-Pre   Date Value Ref Range Status   03/08/2022 109 %      FEV1FVC-Pred   Date Value Ref Range Status   03/08/2022 81 %      FEV1FVC-Pre   Date Value Ref Range Status   03/08/2022 82 %      No results found for: \"20029\"  FEFMax-Pred   Date Value Ref Range Status   03/08/2022 6.73 L/sec      FEFMax-Pre   Date Value Ref Range Status   03/08/2022 8.12 L/sec      FEFMax-%Pred-Pre   Date Value Ref Range Status   03/08/2022 120 %      ExpTime-Pre   Date Value Ref Range Status   03/08/2022 7.61 sec      FIFMax-Pre   Date Value Ref Range Status   03/08/2022 6.80 L/sec      FEV1FEV6-Pred   Date Value Ref Range Status   03/08/2022 82 %      FEV1FEV6-Pre   Date Value Ref Range Status   03/08/2022 82 %           Elisabeth Humphries MD 6/12/2024           "

## 2024-06-12 NOTE — NURSING NOTE
Has patient had flu shot for current/most recent flu season? If so, when? Yes: 9/26/23        Is the patient currently in the state of [place]? YES    Visit mode:VIDEO    If the visit is dropped, the patient can be reconnected by: VIDEO VISIT: Send to e-mail at: [email or web address]    Will anyone else be joining the visit? NO  (If patient encounters technical issues they should call [phone]    How would you like to obtain your AVS? MyChart    Are changes needed to the allergy or medication list? No    Are refills needed on medications prescribed by this physician? NO    Reason for visit: Consult    Nina WILCOX

## 2024-06-12 NOTE — PROGRESS NOTES
"Video-Visit Details    Type of service:  Video Visit    Video Start Time:  9:30 AM  Video End Time:  10:05 AM     Originating Location (pt. Location): Home    Distant Location (provider location):  Offsite (providers home)     Platform used for Video Visit: Zero Locus     Weight Management Nutrition Consultation    Paige Fajardo is a 53 year old female presents today for weight management nutrition consultation.  Patient referred by Dr. Reed on August 2, 2022.    Patient with Co-morbidities of obesity including:  Type II DM no  Renal Failure no  Sleep apnea yes  Hypertension no   Dyslipidemia no  Joint pain no  Back pain yes  GERD no     PMH:   Depression  Swelling  Fatigue  Gout   Asthma  Fibromyalgia   Covid at least 2x  Weight gain associated with prednisone (was on for covid and gout)      Hx of alcohol abuse 20+ years ago    Anthropometrics:  Highest weight per pt: 338 lbs  Weight 8/2/22: 304 lbs with BMI 49.07    Estimated body mass index is 44.46 kg/m  as calculated from the following:    Height as of 6/12/24: 1.626 m (5' 4\").    Weight as of 6/12/24: 117.5 kg (259 lb).     Current weight: 259 lbs, pt report    Medications for Weight Loss:  Zepbound (was on Wegovy previously) - Stopped, was having significant side effects.     **Is interested in possibly hearing about pill medications she could take to help with weight loss. Plans to schedule with provider as soon as possible.     Supplements:  Vit D 3,000 IUs/day - Usually decreases as weather gets nice  Turmeric   B-complex  Calcium carbonate - only if stomach is upset   Mg Citrate     Hx of high cholesterol     Labs 3/10/23 (when in ED for dehydration)  Calcium slightly elevated at 10.6  eGFR 81    NUTRITION HISTORY    NKFA  Limits dairy - notices clogged sinuses and worsening asthma symptoms per pt.   Does use dairy alternatives.     Has not met with a RD before.    Pt goals: lose weight, learn about food choices (what is more nutritious), feel " better    Please see previous RD notes for more information.     Feb 2024:  Had to switch medications per insurance. Feeling hungrier now. Trying to eat nutrient dense (filling foods).     Was sick most of Jan - not sure if medication or virus related but others around her were sick. Lots of nausea, vomiting and diarrhea. Finding when she gets sick it is hard to get over. Got dehydrated and was hard to come back. Went on short-term steroids and gained about 12 lbs per pt.    No vomiting or diarrhea this week.    Starting making aparna pudding with fruit on side for breakfast.     Attending a local support group in her community - has been super helpful.    PA: doing well post hip surgery. worked up to a full mile in the pool, also using treadmill. Doing yoga. Walking dog. Went to [place] since last seen - walked 4 miles/day. Aiming for 3x/week at gym   - Signed up for a 5k (got clearance from team)     May 2024:  Stopped medication due to significant side effects. Last shot 2 weeks ago. Has stayed stable so far.   Really wants to focus on nutrition and exercise now to help with preventing weight gain.    Trying to be mindful of food choices. Buffet this morning - skipped the cinnamon rolls and bagels.   made a chocolate mousse made with cottage cheese as a base. Loved it.    Use air fryer and grilling often.   Substituting soy sauce with liquid aminos.   Keeping nuts stocked at home.    PA:  - yoga weekly  - did a 5k with her dog, utilized couch to 5k program. Didn't run whole thing but felt really good. Was not sore or in pain. Huge Victory per pt    June 2024:  Finding it hard to balance all the things and focus on nutrition and exercise. Reports her hip pain has gotten worse and she is worried it is torn. Has an appointment to get hip looked at today. Patient is able to still go on walks and do her outside chores. She has been doing well with drinking a lot of water. Has a gym membership and wants to start  "swimming. Thinking about purchasing e-bikes.  is the main cook. She is trying to find ways to make more healthful ingredient swaps. Finds it hard sometimes to navigate the summer and all the events that are centered around food.     Previous goals: - didn't discuss much  Recommend considering a hunger/satiety scale (lots available online - one of my favorites is by [employer])   Exercise resources noted below. 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   has diabetes     Nutrition Prescription  Recommended energy/nutrient modification.    Nutrition Diagnosis  Food and nutrition related knowledge deficit r/t lack of prior exposure to nutrition education aeb pt report and interest in learning     Obesity r/t long history of positive energy balance aeb BMI >30.    Nutrition Intervention  Reviewed and modified previous goals  Answered pt questions  Coordination of care   Nutrition education - Prioritizing protein at meals to help with keeping campbell longer and curbing cravings. Incorporating a lot of fruits and vegetables. Getting the movement in within limitations. Continue to find healthful ingredient swaps.   AVS and handouts via [employer]    Patient demonstrates understanding.    Expected Engagement: good    Nutrition Goals  1) Have a protein source at all meals. Aim for 60-70 grams of protein daily.   2) Boost fiber in your diet to help with fullness   3) Continue with physical activity as able  4) Schedule appointment with provider to potentially talk about pill form medications for weight loss.     10 Tips for Healthy Holidays and eating at gatherings:   1. Continue to eat 3 meals daily and avoid snacks. Do not skip meals to \"save\" calories for holiday meal, you will likely overeat.     2. Remember the basic guidelines, eat slowly take 20-30 minutes to enjoy your meal. Stop as " soon as you are satisfied.     3. Stay away from the buffet table or appetizers prior to the meal. This leads to snacking between meals or filling up on snacks prior to the meal, which can lead to overeating.     4. During the meal eat your protein first, then the vegetables and last the starchy dishes. Again remember to stop as soon as you feel satisfied; it is okay to leave food on the plate.     5. Alcohol is not recommended. If choose to have an alcoholic beverage go easy on the alcoholic drinks, it is better to eat you calories instead of drinking them. Try water, non-alcoholic wine (does have calories), crystal light, ray and other low caloric beverage, put them in a fancy glass with a slice of lemon or lime to make them look nice. Remember alcohol will dehydrate you.     6. Drink plenty of water between meals.    7. Bring a dish to share, therefore you know that there will be a healthier option to eat.     8. Continue your current exercise routine, it is easier to fall out of your exercise routine than to get back into the habit of exercising after the holidays. Try to do a family activity outside or ask if anyone wants to take a walk pre or post meal.     9. Be kind to yourself, if you over indulged that's okay, all is not lost. Re-commit to healthy eating habits, forgive yourself and move on.     10. Try new traditions like adding in another vegetable dish or trying a healthier version of family favorites.     Check out The Real Food Dietitians and Skinny Taste websites for recipes  https://"Spaciety ([employer])"ians.Woisio/   https://www.Commonplace Ventures.Woisio/    Smoothie  "Recipes  https://Genecure.fashionandyou.com/build-your-own-smoothie/  https://www.theAudience/recipe-index/?_search=smoothie  https://Ginger.io/go-to-green-smoothie/  https://Style for Hire.com/?s=smoothies  https://Ginger.io/anti-aging-banana-berry-smoothie/  https://Ginger.io/strawberry-cheesecake-smoothie/    Additional resources:   Protein Sources   http://Airborne Technology/522572.pdf     Quick/Easy Protein Sources:  Hard boiled eggs  Part-skim cheese sticks  Baby Bell cheese rounds  Low-fat/low-sugar Greek yogurt  Low-fat cottage cheese  Lean deli meat (chicken/turkey/ham)  Roasted chickpeas or lentils  Nuts   Turkey meat stick  Protein shake/bar  \"P3\" snack (cheese, nuts, deli meat)  Aldi's \"Protein Bread\"   \"Egglife\" egg white wrap    Tuna/salmon can/packet      Non-meat protein Ideas  Quinoa  Eggs  Dairy (Cottage cheese, low fat cheese, greek yogurt)   Nuts  Beans  Lentils  Protein pasta   Nutritional yeast  Garden of life raw meal powder  Liquid aminos  Homemade meats - a taco meat could be made with chopped cauliflower/mushroom as an example   Hummus (could do homemade if preferred)  Hemp hearts   Tofu  Seitan     Complex Carbohydrates high in protein  Quinoa  Brown Rice  Chick Pea  Buckwheat  Sweet Potatoes  Lentils  Legumes  Lau Beans  Black Beans   Farro   Kidney Beans     Follow-Up: 1-2 months or as needed.     Time spent with patient: 35 minutes.    "

## 2024-06-13 ENCOUNTER — VIRTUAL VISIT (OUTPATIENT)
Dept: ENDOCRINOLOGY | Facility: CLINIC | Age: 54
End: 2024-06-13
Payer: COMMERCIAL

## 2024-06-13 VITALS — HEIGHT: 64 IN | WEIGHT: 259 LBS | BODY MASS INDEX: 44.22 KG/M2

## 2024-06-13 DIAGNOSIS — E66.9 OBESITY: ICD-10-CM

## 2024-06-13 DIAGNOSIS — Z71.3 NUTRITIONAL COUNSELING: Primary | ICD-10-CM

## 2024-06-13 PROCEDURE — 97803 MED NUTRITION INDIV SUBSEQ: CPT | Mod: 95

## 2024-06-13 PROCEDURE — 99207 PR NO CHARGE LOS: CPT | Mod: 95

## 2024-06-13 ASSESSMENT — PAIN SCALES - GENERAL: PAINLEVEL: MODERATE PAIN (4)

## 2024-06-13 NOTE — LETTER
"6/13/2024       RE: Paige Fajardo  [address]  [place] [address]     Dear Colleague,    Thank you for referring your patient, Paige Fajardo, to the [facility] [place] [facility]. Please see a copy of my visit note below.    Video-Visit Details    Type of service:  Video Visit    Video Start Time:  9:30 AM  Video End Time:  10:05 AM     Originating Location (pt. Location): Home    Distant Location (provider location):  Offsite (providers home)     Platform used for Video Visit: Interactive Mobile Advertising     Weight Management Nutrition Consultation    Paige Fajardo is a 53 year old female presents today for weight management nutrition consultation.  Patient referred by Dr. Reed on August 2, 2022.    Patient with Co-morbidities of obesity including:  Type II DM no  Renal Failure no  Sleep apnea yes  Hypertension no   Dyslipidemia no  Joint pain no  Back pain yes  GERD no     PMH:   Depression  Swelling  Fatigue  Gout   Asthma  Fibromyalgia   Covid at least 2x  Weight gain associated with prednisone (was on for covid and gout)      Hx of alcohol abuse 20+ years ago    Anthropometrics:  Highest weight per pt: 338 lbs  Weight 8/2/22: 304 lbs with BMI 49.07    Estimated body mass index is 44.46 kg/m  as calculated from the following:    Height as of 6/12/24: 1.626 m (5' 4\").    Weight as of 6/12/24: 117.5 kg (259 lb).     Current weight: 259 lbs, pt report    Medications for Weight Loss:  Zepbound (was on Wegovy previously) - Stopped, was having significant side effects.     **Is interested in possibly hearing about pill medications she could take to help with weight loss. Plans to schedule with provider as soon as possible.     Supplements:  Vit D 3,000 IUs/day - Usually decreases as weather gets nice  Turmeric   B-complex  Calcium carbonate - only if stomach is upset   Mg Citrate     Hx of high cholesterol     Labs 3/10/23 (when in ED for " dehydration)  Calcium slightly elevated at 10.6  eGFR 81    NUTRITION HISTORY    NKFA  Limits dairy - notices clogged sinuses and worsening asthma symptoms per pt.   Does use dairy alternatives.     Has not met with a RD before.    Pt goals: lose weight, learn about food choices (what is more nutritious), feel better    Please see previous RD notes for more information.     Feb 2024:  Had to switch medications per insurance. Feeling hungrier now. Trying to eat nutrient dense (filling foods).     Was sick most of Jan - not sure if medication or virus related but others around her were sick. Lots of nausea, vomiting and diarrhea. Finding when she gets sick it is hard to get over. Got dehydrated and was hard to come back. Went on short-term steroids and gained about 12 lbs per pt.    No vomiting or diarrhea this week.    Starting making aparna pudding with fruit on side for breakfast.     Attending a local support group in her community - has been super helpful.    PA: doing well post hip surgery. worked up to a full mile in the pool, also using treadmill. Doing yoga. Walking dog. Went to [place] since last seen - walked 4 miles/day. Aiming for 3x/week at gym   - Signed up for a 5k (got clearance from team)     May 2024:  Stopped medication due to significant side effects. Last shot 2 weeks ago. Has stayed stable so far.   Really wants to focus on nutrition and exercise now to help with preventing weight gain.    Trying to be mindful of food choices. Buffet this morning - skipped the cinnamon rolls and bagels.   made a chocolate mousse made with cottage cheese as a base. Loved it.    Use air fryer and grilling often.   Substituting soy sauce with liquid aminos.   Keeping nuts stocked at home.    PA:  - yoga weekly  - did a 5k with her dog, utilized couch to 5k program. Didn't run whole thing but felt really good. Was not sore or in pain. Huge Victory per pt    June 2024:  Finding it hard to balance all the things  and focus on nutrition and exercise. Reports her hip pain has gotten worse and she is worried it is torn. Has an appointment to get hip looked at today. Patient is able to still go on walks and do her outside chores. She has been doing well with drinking a lot of water. Has a gym membership and wants to start swimming. Thinking about purchasing e-bikes.  is the main cook. She is trying to find ways to make more healthful ingredient swaps. Finds it hard sometimes to navigate the summer and all the events that are centered around food.     Previous goals: - didn't discuss much  Recommend considering a hunger/satiety scale (lots available online - one of my favorites is by [employer]WE [employer]UDI[employer])   Exercise resources noted below. 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   has diabetes     Nutrition Prescription  Recommended energy/nutrient modification.    Nutrition Diagnosis  Food and nutrition related knowledge deficit r/t lack of prior exposure to nutrition education aeb pt report and interest in learning     Obesity r/t long history of positive energy balance aeb BMI >30.    Nutrition Intervention  Reviewed and modified previous goals  Answered pt questions  Coordination of care   Nutrition education - Prioritizing protein at meals to help with keeping campbell longer and curbing cravings. Incorporating a lot of fruits and vegetables. Getting the movement in within limitations. Continue to find healthful ingredient swaps.   AVS and handouts via Mobstats    Patient demonstrates understanding.    Expected Engagement: good    Nutrition Goals  1) Have a protein source at all meals. Aim for 60-70 grams of protein daily.   2) Boost fiber in your diet to help with fullness   3) Continue with physical activity as able  4) Schedule appointment with provider to potentially talk about pill form  "medications for weight loss.     10 Tips for Healthy Holidays and eating at gatherings:   1. Continue to eat 3 meals daily and avoid snacks. Do not skip meals to \"save\" calories for holiday meal, you will likely overeat.     2. Remember the basic guidelines, eat slowly take 20-30 minutes to enjoy your meal. Stop as soon as you are satisfied.     3. Stay away from the buffet table or appetizers prior to the meal. This leads to snacking between meals or filling up on snacks prior to the meal, which can lead to overeating.     4. During the meal eat your protein first, then the vegetables and last the starchy dishes. Again remember to stop as soon as you feel satisfied; it is okay to leave food on the plate.     5. Alcohol is not recommended. If choose to have an alcoholic beverage go easy on the alcoholic drinks, it is better to eat you calories instead of drinking them. Try water, non-alcoholic wine (does have calories), crystal light, ray and other low caloric beverage, put them in a fancy glass with a slice of lemon or lime to make them look nice. Remember alcohol will dehydrate you.     6. Drink plenty of water between meals.    7. Bring a dish to share, therefore you know that there will be a healthier option to eat.     8. Continue your current exercise routine, it is easier to fall out of your exercise routine than to get back into the habit of exercising after the holidays. Try to do a family activity outside or ask if anyone wants to take a walk pre or post meal.     9. Be kind to yourself, if you over indulged that's okay, all is not lost. Re-commit to healthy eating habits, forgive yourself and move on.     10. Try new traditions like adding in another vegetable dish or trying a healthier version of family favorites.     Check out The Real Food Dietitians and Skinny Taste websites for recipes  https://June Blackbox.Popcorn5/   https://www.Dachis Group/    Smoothie " "Recipes  https://Smith & Tinker.Honeycomb Security Solutions/build-your-own-smoothie/  https://www.Site9/recipe-index/?_search=smoothie  https://logolineup/go-to-green-smoothie/  https://MASS-ACTIVE Techgroup.com/?s=smoothies  https://logolineup/anti-aging-banana-berry-smoothie/  https://logolineup/strawberry-cheesecake-smoothie/    Additional resources:   Protein Sources   http://[employer]anghai Ulti[employer]en Games Information Technology/278256.pdf     Quick/Easy Protein Sources:  Hard boiled eggs  Part-skim cheese sticks  Baby Bell cheese rounds  Low-fat/low-sugar Greek yogurt  Low-fat cottage cheese  Lean deli meat (chicken/turkey/ham)  Roasted chickpeas or lentils  Nuts   Turkey meat stick  Protein shake/bar  \"P3\" snack (cheese, nuts, deli meat)  Aldi's \"Protein Bread\"   \"Egglife\" egg white wrap    Tuna/salmon can/packet      Non-meat protein Ideas  Quinoa  Eggs  Dairy (Cottage cheese, low fat cheese, greek yogurt)   Nuts  Beans  Lentils  Protein pasta   Nutritional yeast  Garden of life raw meal powder  Liquid aminos  Homemade meats - a taco meat could be made with chopped cauliflower/mushroom as an example   Hummus (could do homemade if preferred)  Hemp hearts   Tofu  Seitan     Complex Carbohydrates high in protein  Quinoa  Brown Rice  Chick Pea  Buckwheat  Sweet Potatoes  Lentils  Legumes  Lau Beans  Black Beans   Farro   Kidney Beans     Follow-Up: 1-2 months or as needed.     Time spent with patient: 35 minutes.    Cherry Reis RD    "

## 2024-06-13 NOTE — NURSING NOTE
Is the patient currently in the state of [place]? YES    Visit mode:VIDEO    If the visit is dropped, the patient can be reconnected by: VIDEO VISIT: Send to e-mail at: [email or web address]    Will anyone else be joining the visit? NO  (If patient encounters technical issues they should call [phone]    How would you like to obtain your AVS? MyChart    Are changes needed to the allergy or medication list? No    Are refills needed on medications prescribed by this physician? NO    Reason for visit: RECHECK    Pt states 4/10 body pain chronic from fibromyalgia.    Luis WILCOX

## 2024-06-13 NOTE — PATIENT INSTRUCTIONS
"Nicolas Gibson,     It was nice to meet you today.     Follow-up with RD in 1-2 months or as needed.     Thank you,    Cherry Reis, KRISTINA, LD  If you would like to schedule or reschedule an appointment with the RD, please call [phone]    Nutrition Goals  1) Have a protein source at all meals. Aim for 60-70 grams of protein daily.   2) Boost fiber in your diet to help with fullness   3) Continue with physical activity as able  4) Schedule appointment with provider to potentially talk about pill form medications for weight loss.     10 Tips for Healthy Holidays and eating at gatherings:   1. Continue to eat 3 meals daily and avoid snacks. Do not skip meals to \"save\" calories for holiday meal, you will likely overeat.     2. Remember the basic guidelines, eat slowly take 20-30 minutes to enjoy your meal. Stop as soon as you are satisfied.     3. Stay away from the buffet table or appetizers prior to the meal. This leads to snacking between meals or filling up on snacks prior to the meal, which can lead to overeating.     4. During the meal eat your protein first, then the vegetables and last the starchy dishes. Again remember to stop as soon as you feel satisfied; it is okay to leave food on the plate.     5. Alcohol is not recommended. If choose to have an alcoholic beverage go easy on the alcoholic drinks, it is better to eat you calories instead of drinking them. Try water, non-alcoholic wine (does have calories), crystal light, ray and other low caloric beverage, put them in a fancy glass with a slice of lemon or lime to make them look nice. Remember alcohol will dehydrate you.     6. Drink plenty of water between meals.    7. Bring a dish to share, therefore you know that there will be a healthier option to eat.     8. Continue your current exercise routine, it is easier to fall out of your exercise routine than to get back into the habit of exercising after the holidays. Try to do a family activity outside or ask " "if anyone wants to take a walk pre or post meal.     9. Be kind to yourself, if you over indulged that's okay, all is not lost. Re-commit to healthy eating habits, forgive yourself and move on.     10. Try new traditions like adding in another vegetable dish or trying a healthier version of family favorites.     Check out The Real Food Dietitians and Skinny Taste websites for recipes  https://Dolosys/   https://[employer] Choices Entertainment.Ecelles Carson/    Smoothie Recipes  https://[employer]CD [employer]e/build-your-own-smoothie/  https://[employer] Cho[employer].Ecelles Carson/recipe-index/?_search=smoothie  https://Dolosys/go-to-green-smoothie/  https://Dolosys/?s=smoothies  https://Dolosys/anti-aging-banana-berry-smoothie/  https://Dolosys/strawberry-cheesecake-smoothie/    Additional resources:   Protein Sources   http://High-[employer] Bridge/139782.pdf     Quick/Easy Protein Sources:  Hard boiled eggs  Part-skim cheese sticks  Baby Bell cheese rounds  Low-fat/low-sugar Greek yogurt  Low-fat cottage cheese  Lean deli meat (chicken/turkey/ham)  Roasted chickpeas or lentils  Nuts   Turkey meat stick  Protein shake/bar  \"P3\" snack (cheese, nuts, deli meat)  Aldi's \"Protein Bread\"   \"Egglife\" egg white wrap    Tuna/salmon can/packet      Non-meat protein Ideas  Quinoa  Eggs  Dairy (Cottage cheese, low fat cheese, greek yogurt)   Nuts  Beans  Lentils  Protein pasta   Nutritional yeast  Garden of life raw meal powder  Liquid aminos  Homemade meats - a taco meat could be made with chopped cauliflower/mushroom as an example   Hummus (could do homemade if preferred)  Hemp hearts   Tofu  Seitan     Complex Carbohydrates high in protein  Quinoa  Brown Rice  Chick Pea  Buckwheat  Sweet Potatoes  Lentils  Legumes  Lau Beans  Black Beans   Farro   Kidney Beans     COMPREHENSIVE WEIGHT MANAGEMENT PROGRAM  VIRTUAL SUPPORT GROUPS    At [facility], our Comprehensive Weight " Management program offers on-line support groups for patients who are working on weight loss and considering, preparing for, or have had weight loss surgery.     There is no cost for this opportunity.  You are invited to attend the?Virtual Support Groups?provided by any of the following locations:    [facility] via Microsoft Teams with Carmelina Herrera RN  2.   [place] via [place] Teams with Socrates Anne, PhD, LP  3.   [place] via TrafficCast with Sarah Borrego RN  4.   [facility] via a Zoom Meeting with GILDA Robledo    The following Support Group information can also be found on our website:  [email or web address]-loss-[email or web address]-groups      [facility] Weight Loss Surgery Support Group  The support group is a patient-lead forum that meets monthly to share experiences, encouragement and education. It is open to those who have had weight loss surgery, are scheduled for surgery, or are considering surgery.   WHEN: This group meets on the 3rd Wednesday of each month from 5:00PM - 6:00PM virtually using Microsoft Teams.   FACILITATOR: Led by Carmelina Savage RD, LD, RN, the program's Clinical Coordinator.   TO REGISTER: Please contact the clinic via DocsInk or call the nurse line directly at [phone] to inform our staff that you would like an invite sent to you and to let us know the email you would like the invite sent to. Prior to the meeting, a link with directions on how to join the meeting will be sent to you.    2024 Meetings   January 17  February 21  March 20  April 17  May 15  [place] 19      [facility] and [facility] Bariatric Care Support Group?  This is open to all pre- and post- operative bariatric surgery patients as well as their support system.   WHEN: This group meets the 3rd Tuesday of each month from 6:30 PM - 8:00 PM virtually using Microsoft Teams.  "  FACILITATOR: Led by Socrates Anne, Ph.D who is a Licensed Psychologist with the [facility] Comprehensive Weight Management Program.   TO REGISTER: Please send an email to Socrates Anne, Ph.D.,  at?[email or web address]?if you would like an invitation to the group. Prior to the meeting, a link with directions on how to join the meeting will be sent to you.    2024 Meetings January 16: \"Medication Management and Bariatric Surgery\", Swapna Seymour, PharmD, Pharmacy Resident at [facility], [facility]  February 20: \"A Bariatric Surgery Patient's Perspective\", HELENA Oviedo, [facility], Behavioral Health Clinician at [facility]  March 19  April 16  May 21  [place] 18: \"Nutritional Labeling\", Dietitian speaker to be announced.  November 19: \"Holiday Eating\", Dietitian speaker to be announced.    [facility] and [facility] Post-Operative Bariatric Surgery Support Group  This is a support group for [facility] bariatric patients (and those external to [facility]) who have had bariatric surgery and are at least 3 months post-surgery.  WHEN: This support group meets the 4th Wednesday of the month from 11:00 AM - 12:00 PM virtually using Microsoft Teams.   FACILITATOR: Led by Certified Bariatric Nurse, Sarah Borrego RN.   TO REGISTER: Please send an email to Sarah at [email or web address] if you would like an invitation to the group.  Prior to the meeting, a link with directions on how to join the meeting will be sent to you.    2024 Meetings  January 24  February 28  March 27  April 24  May 22  [place] 26    [facility] Healthy Lifestyle Group?  This is a 60 minute virtual coaching group for those who want to lead a healthier lifestyle. Come together to set goals and overcome barriers in a supportive group environment. We will address the four pillars of health: nutrition, exercise, sleep " "and emotional well-being.  This group is highly recommended for those who are participating in the 24 week Healthy Lifestyle Plan and our Health Coaching sessions.  WHEN: This group meets the 1st Friday of the month, 12:30 PM - 1:30 PM online, via a zoom meeting.    FACILITATOR: Led by National Board Certified Health and , Sarah Silva [facility].   TO REGISTER: Please call the Call Center at [phone] to register. You will get an appointment to attend in [place]. Fifteen minutes prior to the meeting, complete the e-check in and you will get the link to join the meeting.    There is no charge to attend this group and space is limited.     2024 Meetings  January 5: \"New Years Vision: Manifest your Best 2024!\" (guided imagery,  journaling and discussion)  February 2: \"Let's Talk\"  March 1: \"10 Percent Happier\" by Zac Ruiz (Book Bites - a guided discussion on the nuggets of wisdom from favorite wellness books, no need to read the book but highly encouraged)  April 5: \"Let's Talk\"  May 3: \"Essentialism: The Disciplined Pursuit of Less\" by Lambert Jaramillo (Book Bites discussion)  June 7: \"Let's Talk\"  July 5: NO MEETING, off for the 4th of July Holiday  August 2: \"The Blue Zones, Secrets for Living a Longer Life\" by Zac Ortiz (Book Bites discussion)        "

## 2024-07-02 ENCOUNTER — TRANSFERRED RECORDS (OUTPATIENT)
Dept: HEALTH INFORMATION MANAGEMENT | Facility: CLINIC | Age: 54
End: 2024-07-02

## 2024-07-02 ENCOUNTER — OFFICE VISIT (OUTPATIENT)
Dept: ALLERGY | Facility: OTHER | Age: 54
End: 2024-07-02
Payer: COMMERCIAL

## 2024-07-02 VITALS
HEIGHT: 64 IN | OXYGEN SATURATION: 98 % | WEIGHT: 259 LBS | DIASTOLIC BLOOD PRESSURE: 77 MMHG | SYSTOLIC BLOOD PRESSURE: 125 MMHG | BODY MASS INDEX: 44.22 KG/M2 | HEART RATE: 64 BPM

## 2024-07-02 DIAGNOSIS — H10.89 OTHER CONJUNCTIVITIS OF BOTH EYES: ICD-10-CM

## 2024-07-02 DIAGNOSIS — J30.0 CHRONIC VASOMOTOR RHINITIS: ICD-10-CM

## 2024-07-02 DIAGNOSIS — J45.40 MODERATE PERSISTENT ASTHMA WITHOUT COMPLICATION: Primary | ICD-10-CM

## 2024-07-02 LAB
FEF 25/75: NORMAL
FEV-1: NORMAL
FEV1/FVC: NORMAL
FVC: NORMAL

## 2024-07-02 PROCEDURE — 86003 ALLG SPEC IGE CRUDE XTRC EA: CPT | Performed by: ALLERGY & IMMUNOLOGY

## 2024-07-02 PROCEDURE — 99204 OFFICE O/P NEW MOD 45 MIN: CPT | Mod: 25 | Performed by: ALLERGY & IMMUNOLOGY

## 2024-07-02 PROCEDURE — 36415 COLL VENOUS BLD VENIPUNCTURE: CPT | Performed by: ALLERGY & IMMUNOLOGY

## 2024-07-02 PROCEDURE — 94010 BREATHING CAPACITY TEST: CPT | Performed by: ALLERGY & IMMUNOLOGY

## 2024-07-02 PROCEDURE — 82785 ASSAY OF IGE: CPT | Performed by: ALLERGY & IMMUNOLOGY

## 2024-07-02 RX ORDER — FEXOFENADINE HCL 180 MG/1
180 TABLET ORAL DAILY
COMMUNITY
End: 2024-09-05

## 2024-07-02 RX ORDER — FLUTICASONE PROPIONATE 50 MCG
1-2 SPRAY, SUSPENSION (ML) NASAL DAILY
Qty: 16 G | Refills: 3 | Status: SHIPPED | OUTPATIENT
Start: 2024-07-02

## 2024-07-02 RX ORDER — AZELASTINE HYDROCHLORIDE 0.5 MG/ML
1 SOLUTION/ DROPS OPHTHALMIC 2 TIMES DAILY PRN
Qty: 6 ML | Refills: 3 | Status: SHIPPED | OUTPATIENT
Start: 2024-07-02

## 2024-07-02 RX ORDER — BUDESONIDE AND FORMOTEROL FUMARATE DIHYDRATE 80; 4.5 UG/1; UG/1
2 AEROSOL RESPIRATORY (INHALATION) EVERY 4 HOURS PRN
Qty: 10.2 G | Refills: 3 | Status: SHIPPED | OUTPATIENT
Start: 2024-07-02

## 2024-07-02 RX ORDER — AZELASTINE 1 MG/ML
2 SPRAY, METERED NASAL 2 TIMES DAILY PRN
Qty: 30 ML | Refills: 3 | Status: SHIPPED | OUTPATIENT
Start: 2024-07-02

## 2024-07-02 RX ORDER — MELOXICAM 15 MG/1
15 TABLET ORAL DAILY
COMMUNITY
Start: 2024-06-13 | End: 2024-07-13

## 2024-07-02 ASSESSMENT — ASTHMA QUESTIONNAIRES: ACT_TOTALSCORE: 20

## 2024-07-02 NOTE — LETTER
7/2/2024      Paige Fajardo  [address]  [place] [address]      Dear Colleague,    Thank you for referring your patient, Paige Fajardo, to the [facility]. Please see a copy of my visit note below.    SUBJECTIVE:                                                                   Paige Fajardo  presents today to our Allergy Clinic at [facility] for a new patient visit. She is a 53 year old female with a history of asthma who is concerned about environmental/seasonal allergies..    Asthma:    Paige has a history of asthma since her 20s. Her asthma symptoms include chest tightness and cough. She uses albuterol as her rescue medication, which typically provides relief within 10 minutes. Her asthma triggers include smoke, respiratory infections, humidity, and cold weather. She has never been hospitalized for asthma but has had ER visits. In the past 12 months, she has not had any ER visits for asthma. She has been on a couple of courses of prednisone within the past year. Paige uses QVAR intermittently, especially when she anticipates her seasonal allergies flaring up or when she expects to be in situations where her asthma might worsen. She has experienced issues obtaining QVAR due to insurance problems.    Rhinoconjunctivitis:    Paige has a history of allergies since her 20s. Her allergy symptoms include rhinorrhea, itchy nose, nasal congestion, postnasal drainage, nosebleeds, sinus headaches, and itchy, watery, red eyes. She has dry eye syndrome and uses Systane for it. Her allergies are perennial, with exacerbations in the spring, summer, and fall. She has tried Zyrtec, Allegra, and Claritin, which have provided partial relief. Paige has used nasal sprays in the past, but only for short periods when her symptoms were severe, never exceeding five days. She uses Opcon-A for ocular symptoms. She feels worse around pets and when doing yard work. Although she  has been tested for allergies in the past, she does not remember the results. She has no history of sinus surgeries or nasal polyps.    The last pulmonary function test was conducted in March 2022. I personally reviewed and interpreted the results. The pulmonary function test did not suggest an obstruction, and no post-bronchodilator response was noted.      Patient Active Problem List   Diagnosis     Anxiety state     Insomnia     Allergic rhinitis     Dysmenorrhea     Cystic Fibrosis Screening negative     Mild major depression (H)     Fibromyalgia     Patellofemoral disorder     Family history of aneurysm     Migraine     Obesity     Grieving     Insomnia     History of alcoholism (H)     Morbid obesity (H)     Moderate persistent asthma     DMITRIY (obstructive sleep apnea)     IUD (intrauterine device) in place     Trochanteric bursitis of left hip     Posterior tibial tendinitis of right lower extremity       Past Medical History:   Diagnosis Date     Allergic rhinitis      ALLERGIC RHINITIS NOS 3/8/2005     ANXIETY STATE NOS 3/8/2005     Complication of anesthesia      Cystic Fibrosis Screening negative 5/1/2009    [facility]     Dysmenorrhea 10/12/2008     Family history of aneurysm 4/28/2011     Fibromyalgia     Dx by head/neck/pain & Rheumatology     Fibromyalgia 4/15/2010    [facility]     Hyperlipidemia LDL goal <160 11/1/2013     INSOMNIA NEC 3/8/2005     Migraine      Migraine 4/22/2012    [facility] Clinic      Mild major depression (H24)      Obesity 11/1/2013     Other and unspecified alcohol dependence, unspecified drinking behavior     Sober since 7/00     Other anxiety states      Other chronic pain     from fibromyalgia     Patellofemoral disorder     bilateral     Uncomplicated asthma     Not considered Asthma but I have breathing problems      Problem (# of Occurrences) Relation (Name,Age of Onset)    Cancer (3) Father, Paternal Grandmother, Maternal Grandfather    Diabetes (2) Paternal  Grandfather: IDDM dx age 60  Severe/brittle/complications, Maternal Grandmother: IDDM-onset age 70    Eye Disorder (1) Maternal Grandmother: glacucoma    Gastrointestinal Disease (1) Father: colon polyps age 60    Gynecology (3) Sister: Rachel. irregular menses.  pre-eclampsia, Paternal Grandmother:  of uterine CA in her 40's, Paternal Aunt: uterine CA diagnosed @ 40yrs    Heart Disease (1) Paternal Grandfather: Multiple MI's (first MI age 60)    Hypertension (1) Mother    Musculoskeletal Disorder (1) Father: OA     Neurologic Disorder (1) Mother: ruptured cerebral aneurysm age 60     Cerebrovascular Disease (1) Mother    Obesity (4) Mother, Father, Paternal Grandfather, Maternal Grandmother    Macular Degeneration (2) Mother, Maternal Grandmother    Aneurysm (1) Sister: cerebral    Other Cancer (1) Father: Brain Cancer-neuroblastoma    Depression/Anxiety (3) Mother, Father, Paternal Grandfather    Coronary Artery Disease (1) Paternal Grandfather    Ovarian Cancer (1) Paternal Grandmother:  at age 40 from cancer          Past Surgical History:   Procedure Laterality Date     ARTHROSCOPY KNEE RT/LT      Left      DILATION AND CURETTAGE N/A 2017    Procedure: DILATION AND CURETTAGE;;  Surgeon: Livia Barnett DO;  Location: [facility] OR     EXAM UNDER ANESTHESIA, ULTRASOUND N/A 2017    Procedure: EXAM UNDER ANESTHESIA, ULTRASOUND;  Ultrasound guided cervical dilation, IUD placement, Endometrial biopsy, repair of unavoidable cervical laceration;  Surgeon: Livia Barnett DO;  Location:  OR      TOOTH EXTRACTION W/FORCEP      wisdom teeth      INSERT INTRAUTERINE DEVICE N/A 2017    Procedure: INSERT INTRAUTERINE DEVICE;;  Surgeon: Livia Barnett DO;  Location: [facility] OR     Social History     Socioeconomic History     Marital status:      Spouse name: None     Number of children: 0     Years of education: None     Highest education level: None   Occupational History      Occupation: [place]     Employer: [employer]     Comment: [place]    Tobacco Use     Smoking status: Never     Passive exposure: Past     Smokeless tobacco: Never   Vaping Use     Vaping status: Never Used   Substance and Sexual Activity     Alcohol use: No     Alcohol/week: 0.0 standard drinks of alcohol     Comment: recovering     Drug use: No     Sexual activity: Yes     Partners: Male     Birth control/protection: I.U.D.   Other Topics Concern     Blood Transfusions No     Exercise Yes     Comment: walk three days/week     Seat Belt Yes     Self-Exams Yes     Parent/sibling w/ CABG, MI or angioplasty before 65F 55M? No   Social History Narrative    July 2, 2024    ENVIRONMENTAL HISTORY: [place] The home is heated with a forced air. They does have central air conditioning. The patient's bedroom is furnished with feather/wool bedding or pillows.  Pets inside the house include 1 dog(s). There is no history of cockroach or mice infestation. There is/are 0 smokers in the house.  The house does not have a damp basement.             Living arrangements - the patient lives alone.     Social Determinants of Health     Financial Resource Strain: Low Risk  (2/23/2024)    [facility]    Financial Resource Strain      Difficulty of Paying Living Expenses: 3   Food Insecurity: No Food Insecurity (2/23/2024)    Received from [facility], [facility]    Food Insecurity      Worried About Running Out of Food in the Last Year: 1   Transportation Needs: No Transportation Needs (2/23/2024)    [facility]    Transportation Needs      Lack of Transportation (Medical): 1   Social Connections: Socially Integrated  (2/23/2024)    [facility]    Social Connections      Frequency of Communication with Friends and Family: 0   Housing Stability: Low Risk  (2/23/2024)    [facility]    Housing Stability      Unable to Pay for Housing in the Last Year: 1               Current Outpatient Medications:      albuterol (PROAIR HFA/PROVENTIL HFA/VENTOLIN HFA) 108 (90 Base) MCG/ACT inhaler, Inhale 2 puffs into the lungs every 6 hours as needed for shortness of breath or wheezing, Disp: 18 g, Rfl: 5     azelastine (ASTELIN) 0.1 % nasal spray, Spray 2 sprays into both nostrils 2 times daily as needed for rhinitis, Disp: 30 mL, Rfl: 3     azelastine (OPTIVAR) 0.05 % ophthalmic solution, Apply 1 drop to eye 2 times daily as needed (itchy/watery eyes), Disp: 6 mL, Rfl: 3     B Complex Vitamins (VITAMIN B COMPLEX PO), 3 drops per day, Disp: , Rfl:      beclomethasone HFA (QVAR REDIHALER) 80 MCG/ACT inhaler, Inhale 1 puff into the lungs 2 times daily, Disp: , Rfl:      budesonide-formoterol (SYMBICORT) 80-4.5 MCG/ACT Inhaler, Inhale 2 puffs into the lungs every 4 hours as needed (Wheezing, chest tightness, shortness of breath, or persistent cough), Disp: 10.2 g, Rfl: 3     cholecalciferol (VITAMIN D) 1000 UNIT tablet, 3 drops per day, Disp: , Rfl:      colchicine (COLCYRS) 0.6 MG tablet, Take 2 tablets orally at onset of symptoms and one more tablet 1 hour later, and then one tablet twice a day until flare resolves., Disp: , Rfl:      COMPOUNDED NON-CONTROLLED SUBSTANCE (CMPD RX) - PHARMACY TO MIX COMPOUNDED MEDICATION, LOW DOSE NALTREXONE 6MG one tablet qhs, Disp: 90 capsule, Rfl: 4     fexofenadine (ALLEGRA) 180 MG tablet, Take 180 mg by mouth daily, Disp: , Rfl:      FLUoxetine (PROZAC) 20 MG capsule, Take 20 mg by mouth, Disp: , Rfl:      fluticasone (FLONASE)  50 MCG/ACT nasal spray, Spray 1-2 sprays into both nostrils daily, Disp: 16 g, Rfl: 3     gabapentin (NEURONTIN) 300 MG capsule, Take 2 capsules  (600mg) in the morning and 2 capsules (600mg) at bedtime. 90day supply (Patient taking differently: Take 300 mg by mouth 2 times daily), Disp: 360 capsule, Rfl: 1     ipratropium - albuterol 0.5 mg/2.5 mg/3 mL (DUONEB) 0.5-2.5 (3) MG/3ML neb solution, Take 1 vial (3 mLs) by nebulization every 6 hours as needed for shortness of breath or wheezing, Disp: 960 mL, Rfl: 4     levonorgestrel (MIRENA) 20 MCG/24HR IUD, 1 each by Intrauterine route once, Disp: , Rfl:      MAGNESIUM CITRATE PO, 3 drops per day, Disp: , Rfl:      meloxicam (MOBIC) 15 MG tablet, Take 15 mg by mouth daily, Disp: , Rfl:      Naproxen Sodium (ALEVE PO), Take 220 mg by mouth 2 times daily as needed , Disp: , Rfl:      rizatriptan (MAXALT-MLT) 10 MG ODT tab, Take 1 tablet (10 mg) by mouth at onset of headache for migraine May repeat in 2 hours. Max 3 tablets/24 hours., Disp: 18 tablet, Rfl: 3     Turmeric 450 MG CAPS, 1 scoop per day, Disp: , Rfl:      benzonatate (TESSALON) 100 MG capsule, Take 1 capsule (100 mg) by mouth 3 times daily as needed for cough (Patient not taking: Reported on 7/2/2024), Disp: 90 capsule, Rfl: 0     cetirizine-psuedoePHEDrine (ZYRTEC-D) 5-120 MG per tablet, Take 1 tablet by mouth 2 times daily (Patient not taking: Reported on 7/2/2024), Disp: 90 tablet, Rfl: 3     ondansetron (ZOFRAN ODT) 4 MG ODT tab, Take 1 tablet (4 mg) by mouth every 8 hours as needed for nausea (Patient not taking: Reported on 7/2/2024), Disp: 20 tablet, Rfl: 0     order for DME, Equipment being ordered: Nebulizer, Disp: 1 Device, Rfl: 0     predniSONE (DELTASONE) 20 MG tablet, Take 2 tabs daily x 7 days (Patient not taking: Reported on 7/2/2024), Disp: 14 tablet, Rfl: 0     tirzepatide-Weight Management (ZEPBOUND) 10 MG/0.5ML prefilled pen, Inject 0.5 mLs (10 mg) Subcutaneous every 7 days (Patient not  "taking: Reported on 5/13/2024), Disp: 2 mL, Rfl: 2     tirzepatide-Weight Management (ZEPBOUND) 12.5 MG/0.5ML prefilled pen, Inject 0.5 mLs (12.5 mg) Subcutaneous every 7 days (Patient not taking: Reported on 5/13/2024), Disp: 2 mL, Rfl: 2  Immunization History   Administered Date(s) Administered     COVID-19 12+ (2023-24) (Pfizer) 10/03/2023     COVID-19 Bivalent 12+ (Pfizer) 10/11/2022     COVID-19 MONOVALENT 12+ (Pfizer) 01/27/2021, 02/16/2021, 10/05/2021     HepB 01/18/2010, 04/28/2011     Hepatitis A (ADULT 19+) 02/27/2018, 09/13/2018     Hepatitis B, Adult 01/18/2010, 04/28/2011, 09/13/2018     Influenza (IIV3) PF 11/11/2003, 10/13/2013     Influenza Vaccine 18-64 (Flublok) 09/27/2022, 09/26/2023     Influenza Vaccine >6 months,quad, PF 11/04/2019, 10/05/2021     Influenza Vaccine, 6+MO IM (QUADRIVALENT W/PRESERVATIVES) 10/01/2017     Mantoux Tuberculin Skin Test 01/18/2010, 07/24/2016     Nasal Influenza Vaccine 2-49 (FluMist) 10/01/2015     Pneumococcal 20 valent Conjugate (Prevnar 20) 10/18/2022     TDAP Vaccine (Adacel) 04/03/2007, 05/17/2017     Typhoid IM 02/23/2018, 03/13/2021     Zoster recombinant adjuvanted (SHINGRIX) 10/18/2022, 09/26/2023     Allergies   Allergen Reactions     Amoxicillin      Other reaction(s): Stomach Upset     Amoxicillin-Pot Clavulanate      reacted to Augmentin--  stomach upset -- can take penicillin     Dairy Products [Milk Protein] Difficulty breathing     Products with milk cause congestion, sinus mucous, difficulty breathing     Morphine And Codeine Swelling     OBJECTIVE:                                                                 /77 (BP Location: Left arm, Patient Position: Sitting)   Pulse 64   Ht 1.626 m (5' 4\")   Wt 117.5 kg (258 lb 15.9 oz)   SpO2 98%   BMI 44.46 kg/m              Physical Exam  Vitals and nursing note reviewed.   Constitutional:       General: She is not in acute distress.     Appearance: She is not ill-appearing, toxic-appearing or " diaphoretic.   HENT:      Head: Normocephalic and atraumatic.      Right Ear: Tympanic membrane, ear canal and external ear normal.      Left Ear: Tympanic membrane, ear canal and external ear normal.      Nose: No mucosal edema, congestion or rhinorrhea.      Right Turbinates: Not enlarged, swollen or pale.      Left Turbinates: Not enlarged, swollen or pale.      Mouth/Throat:      Lips: Pink.      Mouth: Mucous membranes are moist.      Pharynx: Oropharynx is clear. No pharyngeal swelling, oropharyngeal exudate, posterior oropharyngeal erythema or uvula swelling.   Eyes:      General:         Right eye: No discharge.         Left eye: No discharge.      Conjunctiva/sclera: Conjunctivae normal.   Cardiovascular:      Rate and Rhythm: Normal rate and regular rhythm.      Heart sounds: Normal heart sounds. No murmur heard.  Pulmonary:      Effort: Pulmonary effort is normal. No respiratory distress.      Breath sounds: Normal breath sounds and air entry. No stridor, decreased air movement or transmitted upper airway sounds. No decreased breath sounds, wheezing, rhonchi or rales.   Neurological:      Mental Status: She is alert and oriented to person, place, and time.   Psychiatric:         Mood and Affect: Mood normal.         Behavior: Behavior normal.           WORKUP:   SPIROMETRY       FVC 3.56L (102% of predicted).     FEV1 2.88L (104% of predicted).     FEV1/FVC 81%      I have reviewed and interpreted these results.  The office spirometry performed today does not suggest an obstruction.    Asthma Control Test (ACT) total score: 20          ASSESSMENT/PLAN:         Moderate persistent asthma without complication  While she is currently asymptomatic, her symptoms seem to be suboptimally controlled, considering several courses of systemic steroids within the past 12 months.  She has had issues with Qvar coverage recently.  To optimize asthma control, instead of albuterol, per new [place] guidelines, use Symbicort  80/4.5 mcg inhaler 2 puffs every 4 hours as needed for chest tightness/wheezing/shortness of breath/persistent cough. Rinse/gargle/spit water after use.  -  With asthma flare, use Symbicort 80/4.5 mcg 2 puffs twice daily plus 1-2 puffs every 4 hours as needed, maximum 12 puffs/day.  I hope her pulmonologist would not mind this change.    - SPIROMETRY, BREATHING CAPACITY  - budesonide-formoterol (SYMBICORT) 80-4.5 MCG/ACT Inhaler  Dispense: 10.2 g; Refill: 3    Rhinoconjunctivitis    Symptoms are partially controlled with oral antihistamines.  I am unable to perform SPT for aeroallergens because the patient took an oral antihistamine yesterday.  -Ordered serum IgE for regional aeroallergen panel.  - Use intranasal fluticasone (Flonase) 1-2 sprays in each nostril once daily.  - Add azelastine nasal spray, 2 sprays in each nostril twice daily as needed.  - Use Optivar 1 drop in each eye twice daily as needed.    - Allergen cat epithellium IgE  - Allergen dog epithelium IgE  - Allergen Camron grass IgE  - Allergen orchard grass IgE  - Allergen donavon IgE  - Allergen D farinae IgE  - Allergen D pteronyssinus IgE  - Allergen alternaria alternata IgE  - Allergen aspergillus fumigatus IgE  - Allergen cladosporium herbarum IgE  - Allergen Epicoccum purpurascens IgE  - Allergen penicillium notatum IgE  - Allergen alf white IgE  - Allergen Cedar IgE  - Allergen cottonwood IgE  - Allergen elm IgE  - Allergen maple box elder IgE  - Allergen oak white IgE  - Allergen Red [place] IgE  - Allergen silver  birch IgE  - Allergen Tree White [place] IgE  - Allergen white pine IgE  - Allergen English plantain IgE  - Allergen giant ragweed IgE  - Allergen lamb's quarter IgE  - Allergen Mugwort IgE  - Allergen ragweed short IgE  - Allergen Sagebrush [place]ormwood IgE  - Allergen Sheep Sorrel IgE  - Allergen thistle Russian IgE  - Allergen Weed Nettle IgE  - Allergen, Kochia/Firebush  - IgE  - Allergen [place] Tree  - fluticasone (FLONASE) 50  MCG/ACT nasal spray  Dispense: 16 g; Refill: 3  - azelastine (ASTELIN) 0.1 % nasal spray  Dispense: 30 mL; Refill: 3  - azelastine (OPTIVAR) 0.05 % ophthalmic solution  Dispense: 6 mL; Refill: 3       Follow-up in 2 months or sooner if needed.    Thank you for allowing us to participate in the care of this patient. Please feel free to contact us if there are any questions or concerns about the patient.    Disclaimer: This note consists of symbols derived from keyboarding, dictation and/or voice recognition software. As a result, there may be errors in the script that have gone undetected. Please consider this when interpreting information found in this chart.    Consent was obtained from the patient to use an AI documentation tool in the creation of this note.     Arley Hill MD, FAAAAI, FACAAI  Allergy and Asthma     [facility]         Again, thank you for allowing me to participate in the care of your patient.        Sincerely,        Arley Hill MD

## 2024-07-02 NOTE — PROGRESS NOTES
SUBJECTIVE:                                                                   Paige Fajardo  presents today to our Allergy Clinic at [facility] for a new patient visit. She is a 53 year old female with a history of asthma who is concerned about environmental/seasonal allergies..    Asthma:    Paige has a history of asthma since her 20s. Her asthma symptoms include chest tightness and cough. She uses albuterol as her rescue medication, which typically provides relief within 10 minutes. Her asthma triggers include smoke, respiratory infections, humidity, and cold weather. She has never been hospitalized for asthma but has had ER visits. In the past 12 months, she has not had any ER visits for asthma. She has been on a couple of courses of prednisone within the past year. Paige uses QVAR intermittently, especially when she anticipates her seasonal allergies flaring up or when she expects to be in situations where her asthma might worsen. She has experienced issues obtaining QVAR due to insurance problems.    Rhinoconjunctivitis:    Paige has a history of allergies since her 20s. Her allergy symptoms include rhinorrhea, itchy nose, nasal congestion, postnasal drainage, nosebleeds, sinus headaches, and itchy, watery, red eyes. She has dry eye syndrome and uses Systane for it. Her allergies are perennial, with exacerbations in the spring, summer, and fall. She has tried Zyrtec, Allegra, and Claritin, which have provided partial relief. Paige has used nasal sprays in the past, but only for short periods when her symptoms were severe, never exceeding five days. She uses Opcon-A for ocular symptoms. She feels worse around pets and when doing yard work. Although she has been tested for allergies in the past, she does not remember the results. She has no history of sinus surgeries or nasal polyps.    The last pulmonary function test was conducted in March 2022. I personally reviewed and interpreted  the results. The pulmonary function test did not suggest an obstruction, and no post-bronchodilator response was noted.      Patient Active Problem List   Diagnosis    Anxiety state    Insomnia    Allergic rhinitis    Dysmenorrhea    Cystic Fibrosis Screening negative    Mild major depression (H)    Fibromyalgia    Patellofemoral disorder    Family history of aneurysm    Migraine    Obesity    Grieving    Insomnia    History of alcoholism (H)    Morbid obesity (H)    Moderate persistent asthma    DMITRIY (obstructive sleep apnea)    IUD (intrauterine device) in place    Trochanteric bursitis of left hip    Posterior tibial tendinitis of right lower extremity       Past Medical History:   Diagnosis Date    Allergic rhinitis     ALLERGIC RHINITIS NOS 3/8/2005    ANXIETY STATE NOS 3/8/2005    Complication of anesthesia     [facility]    Dysmenorrhea 10/12/2008    Family history of aneurysm 4/28/2011    Fibromyalgia     Dx by head/neck/pain & Rheumatology    Fibromyalgia [facility]15[facility]    Hyperlipidemia LDL goal <160 11/1/2013    INSOMNIA NEC 3/8/2005    Migraine     Migraine 4/22/2012    [facility]     Mild major depression (H24)     Obesity 11/1/2013    Other and unspecified alcohol dependence, unspecified drinking behavior     Sober since 7/00    Other anxiety states     Other chronic pain     from fibromyalgia    Patellofemoral disorder     bilateral    Uncomplicated asthma     Not considered Asthma but I have breathing problems      Problem (# of Occurrences) Relation (Name,Age of Onset)    Cancer (3) Father, Paternal Grandmother, Maternal Grandfather    Diabetes (2) Paternal Grandfather: IDDM dx age 60  Severe/brittle/complications, Maternal Grandmother: IDDM-onset age 70    Eye Disorder (1) Maternal Grandmother: glacucoma    Gastrointestinal Disease (1) Father: colon polyps age 60    Gynecology (3) Sister: Rachel. irregular menses.  pre-eclampsia,  Paternal Grandmother:  of uterine CA in her 40's, Paternal Aunt: uterine CA diagnosed @ 40yrs    Heart Disease (1) Paternal Grandfather: Multiple MI's (first MI age 60)    Hypertension (1) Mother    Musculoskeletal Disorder (1) Father: OA     Neurologic Disorder (1) Mother: ruptured cerebral aneurysm age 60     Cerebrovascular Disease (1) Mother    Obesity (4) Mother, Father, Paternal Grandfather, Maternal Grandmother    Macular Degeneration (2) Mother, Maternal Grandmother    Aneurysm (1) Sister: cerebral    Other Cancer (1) Father: Brain Cancer-neuroblastoma    Depression/Anxiety (3) Mother, Father, Paternal Grandfather    Coronary Artery Disease (1) Paternal Grandfather    Ovarian Cancer (1) Paternal Grandmother:  at age 40 from cancer          Past Surgical History:   Procedure Laterality Date    ARTHROSCOPY KNEE RT/LT      Left     DILATION AND CURETTAGE N/A 2017    Procedure: DILATION AND CURETTAGE;;  Surgeon: Livia Barnett DO;  Location: [facility] OR    EXAM UNDER ANESTHESIA, ULTRASOUND N/A 2017    Procedure: EXAM UNDER ANESTHESIA, ULTRASOUND;  Ultrasound guided cervical dilation, IUD placement, Endometrial biopsy, repair of unavoidable cervical laceration;  Surgeon: Livia Barnett DO;  Location: [facility] OR     TOOTH EXTRACTION W/FORCEP      wisdom teeth     INSERT INTRAUTERINE DEVICE N/A 2017    Procedure: INSERT INTRAUTERINE DEVICE;;  Surgeon: Livia Barnett DO;  Location: [facility] OR     Social History     Socioeconomic History    Marital status:      Spouse name: None    Number of children: 0    Years of education: None    Highest education level: None   Occupational History    Occupation: [employer]     Employer: [employer]     Comment: [employer]    Tobacco Use    Smoking status: Never     Passive exposure: Past    Smokeless tobacco: Never   Vaping Use    Vaping status: Never Used   Substance and Sexual Activity    Alcohol use: No      Alcohol/week: 0.0 standard drinks of alcohol     Comment: recovering    Drug use: No    Sexual activity: Yes     Partners: Male     Birth control/protection: I.U.D.   Other Topics Concern    Blood Transfusions No    Exercise Yes     Comment: walk three days/week    Seat Belt Yes    Self-Exams Yes    Parent/sibling w/ CABG, MI or angioplasty before 65F 55M? No   Social History Narrative    July 2, 2024    ENVIRONMENTAL HISTORY: [place] The home is heated with a forced air. They does have central air conditioning. The patient's bedroom is furnished with feather/wool bedding or pillows.  Pets inside the house include 1 dog(s). There is no history of cockroach or mice infestation. There is/are 0 smokers in the house.  The house does not have a damp basement.             Living arrangements - the patient lives alone.     Social Determinants of Health     Financial Resource Strain: Low Risk  (2/23/2024)    [facility]    Financial Resource Strain     Difficulty of Paying Living Expenses: 3   Food Insecurity: No Food Insecurity (2/23/2024)    [facility]    Food Insecurity     Worried About Running Out of Food in the Last Year: 1   Transportation Needs: No Transportation Needs (2/23/2024)    [facility]    Transportation Needs     Lack of Transportation (Medical): 1   Social Connections: Socially Integrated (2/23/2024)    [facility]    Social Connections     Frequency of Communication with Friends and Family: 0   Housing Stability: Low Risk  (2/23/2024)    [facility]    Housing Stability     Unable to Pay for Housing in the Last Year: 1               Current Outpatient Medications:     albuterol (PROAIR HFA/PROVENTIL HFA/VENTOLIN HFA) 108 (90 Base) MCG/ACT inhaler, Inhale 2 puffs into the lungs every 6 hours as needed for shortness of breath or wheezing, Disp: 18 g, Rfl: 5    azelastine (ASTELIN) 0.1 % nasal spray, Spray 2 sprays into both nostrils 2 times daily as needed for rhinitis, Disp: 30 mL, Rfl: 3    azelastine (OPTIVAR) 0.05 % ophthalmic solution, Apply 1 drop to eye 2 times daily as needed (itchy/watery eyes), Disp: 6 mL, Rfl: 3    B Complex Vitamins (VITAMIN B COMPLEX PO), 3 drops per day, Disp: , Rfl:     beclomethasone HFA (QVAR REDIHALER) 80 MCG/ACT inhaler, Inhale 1 puff into the lungs 2 times daily, Disp: , Rfl:     budesonide-formoterol (SYMBICORT) 80-4.5 MCG/ACT Inhaler, Inhale 2 puffs into the lungs every 4 hours as needed (Wheezing, chest tightness, shortness of breath, or persistent cough), Disp: 10.2 g, Rfl: 3    cholecalciferol (VITAMIN D) 1000 UNIT tablet, 3 drops per day, Disp: , Rfl:     colchicine (COLCYRS) 0.6 MG tablet, Take 2 tablets orally at onset of symptoms and one more tablet 1 hour later, and then one tablet twice a day until flare resolves., Disp: , Rfl:     COMPOUNDED NON-CONTROLLED SUBSTANCE (CMPD RX) - PHARMACY TO MIX COMPOUNDED MEDICATION, LOW DOSE NALTREXONE 6MG one tablet qhs, Disp: 90 capsule, Rfl: 4    fexofenadine (ALLEGRA) 180 MG tablet, Take 180 mg by mouth daily, Disp: , Rfl:     FLUoxetine (PROZAC) 20 MG capsule, Take 20 mg by mouth, Disp: , Rfl:     fluticasone (FLONASE) 50 MCG/ACT nasal spray, Spray 1-2 sprays into both nostrils daily, Disp: 16 g, Rfl: 3    gabapentin (NEURONTIN) 300 MG capsule, Take 2 capsules  (600mg) in the morning and 2 capsules (600mg) at bedtime. 90day supply (Patient taking differently: Take 300 mg by mouth 2 times daily), Disp: 360 capsule, Rfl:  1    ipratropium - albuterol 0.5 mg/2.5 mg/3 mL (DUONEB) 0.5-2.5 (3) MG/3ML neb solution, Take 1 vial (3 mLs) by nebulization every 6 hours as needed for shortness of breath or wheezing, Disp: 960 mL, Rfl: 4    levonorgestrel (MIRENA) 20 MCG/24HR IUD, 1 each by Intrauterine route once, Disp: , Rfl:     MAGNESIUM CITRATE PO, 3 drops per day, Disp: , Rfl:     meloxicam (MOBIC) 15 MG tablet, Take 15 mg by mouth daily, Disp: , Rfl:     Naproxen Sodium (ALEVE PO), Take 220 mg by mouth 2 times daily as needed , Disp: , Rfl:     rizatriptan (MAXALT-MLT) 10 MG ODT tab, Take 1 tablet (10 mg) by mouth at onset of headache for migraine May repeat in 2 hours. Max 3 tablets/24 hours., Disp: 18 tablet, Rfl: 3    Turmeric 450 MG CAPS, 1 scoop per day, Disp: , Rfl:     benzonatate (TESSALON) 100 MG capsule, Take 1 capsule (100 mg) by mouth 3 times daily as needed for cough (Patient not taking: Reported on 7/2/2024), Disp: 90 capsule, Rfl: 0    cetirizine-psuedoePHEDrine (ZYRTEC-D) 5-120 MG per tablet, Take 1 tablet by mouth 2 times daily (Patient not taking: Reported on 7/2/2024), Disp: 90 tablet, Rfl: 3    ondansetron (ZOFRAN ODT) 4 MG ODT tab, Take 1 tablet (4 mg) by mouth every 8 hours as needed for nausea (Patient not taking: Reported on 7/2/2024), Disp: 20 tablet, Rfl: 0    order for DME, Equipment being ordered: Nebulizer, Disp: 1 Device, Rfl: 0    predniSONE (DELTASONE) 20 MG tablet, Take 2 tabs daily x 7 days (Patient not taking: Reported on 7/2/2024), Disp: 14 tablet, Rfl: 0    tirzepatide-Weight Management (ZEPBOUND) 10 MG/0.5ML prefilled pen, Inject 0.5 mLs (10 mg) Subcutaneous every 7 days (Patient not taking: Reported on 5/13/2024), Disp: 2 mL, Rfl: 2    tirzepatide-Weight Management (ZEPBOUND) 12.5 MG/0.5ML prefilled pen, Inject 0.5 mLs (12.5 mg) Subcutaneous every 7 days (Patient not taking: Reported on 5/13/2024), Disp: 2 mL, Rfl: 2  Immunization History   Administered Date(s) Administered    COVID-19 12+ (2023-24)  "(Pfizer) 10/03/2023    COVID-19 Bivalent 12+ (Pfizer) 10/11/2022    COVID-19 MONOVALENT 12+ (Pfizer) 01/27/2021, 02/16/2021, 10/05/2021    HepB 01/18/2010, 04/28/2011    Hepatitis A (ADULT 19+) 02/27/2018, 09/13/2018    Hepatitis B, Adult 01/18/2010, 04/28/2011, 09/13/2018    Influenza (IIV3) PF 11/11/2003, 10/13/2013    Influenza Vaccine 18-64 (Flublok) 09/27/2022, 09/26/2023    Influenza Vaccine >6 months,quad, PF 11/04/2019, 10/05/2021    Influenza Vaccine, 6+MO IM (QUADRIVALENT W/PRESERVATIVES) 10/01/2017    Mantoux Tuberculin Skin Test 01/18/2010, 07/24/2016    Nasal Influenza Vaccine 2-49 (FluMist) 10/01/2015    Pneumococcal 20 valent Conjugate (Prevnar 20) 10/18/2022    TDAP Vaccine (Adacel) 04/03/2007, 05/17/2017    Typhoid IM 02/23/2018, 03/13/2021    Zoster recombinant adjuvanted (SHINGRIX) 10/18/2022, 09/26/2023     Allergies   Allergen Reactions    Amoxicillin      Other reaction(s): Stomach Upset    Amoxicillin-Pot Clavulanate      reacted to Augmentin--  stomach upset -- can take penicillin    Dairy Products [Milk Protein] Difficulty breathing     Products with milk cause congestion, sinus mucous, difficulty breathing    Morphine And Codeine Swelling     OBJECTIVE:                                                                 /77 (BP Location: Left arm, Patient Position: Sitting)   Pulse 64   Ht 1.626 m (5' 4\")   Wt 117.5 kg (258 lb 15.9 oz)   SpO2 98%   BMI 44.46 kg/m              Physical Exam  Vitals and nursing note reviewed.   Constitutional:       General: She is not in acute distress.     Appearance: She is not ill-appearing, toxic-appearing or diaphoretic.   HENT:      Head: Normocephalic and atraumatic.      Right Ear: Tympanic membrane, ear canal and external ear normal.      Left Ear: Tympanic membrane, ear canal and external ear normal.      Nose: No mucosal edema, congestion or rhinorrhea.      Right Turbinates: Not enlarged, swollen or pale.      Left Turbinates: Not " enlarged, swollen or pale.      Mouth/Throat:      Lips: Pink.      Mouth: Mucous membranes are moist.      Pharynx: Oropharynx is clear. No pharyngeal swelling, oropharyngeal exudate, posterior oropharyngeal erythema or uvula swelling.   Eyes:      General:         Right eye: No discharge.         Left eye: No discharge.      Conjunctiva/sclera: Conjunctivae normal.   Cardiovascular:      Rate and Rhythm: Normal rate and regular rhythm.      Heart sounds: Normal heart sounds. No murmur heard.  Pulmonary:      Effort: Pulmonary effort is normal. No respiratory distress.      Breath sounds: Normal breath sounds and air entry. No stridor, decreased air movement or transmitted upper airway sounds. No decreased breath sounds, wheezing, rhonchi or rales.   Neurological:      Mental Status: She is alert and oriented to person, place, and time.   Psychiatric:         Mood and Affect: Mood normal.         Behavior: Behavior normal.           WORKUP:   SPIROMETRY       FVC 3.56L (102% of predicted).     FEV1 2.88L (104% of predicted).     FEV1/FVC 81%      I have reviewed and interpreted these results.  The office spirometry performed today does not suggest an obstruction.    Asthma Control Test (ACT) total score: 20          ASSESSMENT/PLAN:         Moderate persistent asthma without complication  While she is currently asymptomatic, her symptoms seem to be suboptimally controlled, considering several courses of systemic steroids within the past 12 months.  She has had issues with Qvar coverage recently.  To optimize asthma control, instead of albuterol, per new Ashley guidelines, use Symbicort 80/4.5 mcg inhaler 2 puffs every 4 hours as needed for chest tightness/wheezing/shortness of breath/persistent cough. Rinse/gargle/spit water after use.  -  With asthma flare, use Symbicort 80/4.5 mcg 2 puffs twice daily plus 1-2 puffs every 4 hours as needed, maximum 12 puffs/day.  I hope her pulmonologist would not mind this  change.    - SPIROMETRY, BREATHING CAPACITY  - budesonide-formoterol (SYMBICORT) 80-4.5 MCG/ACT Inhaler  Dispense: 10.2 g; Refill: 3    Rhinoconjunctivitis    Symptoms are partially controlled with oral antihistamines.  I am unable to perform SPT for aeroallergens because the patient took an oral antihistamine yesterday.  -Ordered serum IgE for regional aeroallergen panel.  - Use intranasal fluticasone (Flonase) 1-2 sprays in each nostril once daily.  - Add azelastine nasal spray, 2 sprays in each nostril twice daily as needed.  - Use Optivar 1 drop in each eye twice daily as needed.    - Allergen cat epithellium IgE  - Allergen dog epithelium IgE  - Allergen Camron grass IgE  - Allergen orchard grass IgE  - Allergen donavon IgE  - Allergen D farinae IgE  - Allergen D pteronyssinus IgE  - Allergen alternaria alternata IgE  - Allergen aspergillus fumigatus IgE  - Allergen cladosporium herbarum IgE  - Allergen Epicoccum purpurascens IgE  - Allergen penicillium notatum IgE  - Allergen alf white IgE  - Allergen Cedar IgE  - Allergen cottonwood IgE  - Allergen elm IgE  - Allergen maple box elder IgE  - Allergen oak white IgE  - Allergen Red [place] IgE  - Allergen silver  birch IgE  - Allergen Tree White [place] IgE  - Allergen white pine IgE  - Allergen English plantain IgE  - Allergen giant ragweed IgE  - Allergen lamb's quarter IgE  - Allergen Mugwort IgE  - Allergen ragweed short IgE  - Allergen Sagebrush Wormwood IgE  - Allergen Sheep Sorrel IgE  - Allergen thistle Russian IgE  - Allergen Weed Nettle IgE  - Allergen, Kochia/Firebush  - IgE  - Allergen [place] Tree  - fluticasone (FLONASE) 50 MCG/ACT nasal spray  Dispense: 16 g; Refill: 3  - azelastine (ASTELIN) 0.1 % nasal spray  Dispense: 30 mL; Refill: 3  - azelastine (OPTIVAR) 0.05 % ophthalmic solution  Dispense: 6 mL; Refill: 3       Follow-up in 2 months or sooner if needed.    Thank you for allowing us to participate in the care of this patient. Please feel  free to contact us if there are any questions or concerns about the patient.    Disclaimer: This note consists of symbols derived from keyboarding, dictation and/or voice recognition software. As a result, there may be errors in the script that have gone undetected. Please consider this when interpreting information found in this chart.    Consent was obtained from the patient to use an AI documentation tool in the creation of this note.     Arley Hill MD, FAAAAI, FACAAI  Allergy and Asthma     [facility]

## 2024-07-02 NOTE — PATIENT INSTRUCTIONS
- Use Symbicort 80/4.5 mcg inhaler 2 puffs every 4 hours as needed for chest tightness/wheezing/shortness of breath/persistent cough. Use it with a spacer/chamber device. Rinse/gargle/spit water after use.     With asthma flares, use Symbicort 80/4.5 mcg 2 puffs twice daily plus 1-2 puffs every 4 hours as needed, maximum 12 puffs/day.       Get the bloodwork done.     -Start intranasal fluticasone (Flonase) 1-2 sprays in each nostril once daily. You may want to try Flonase Sensimist over the counter if you develop nose bleeds. Point the tip of the nasal spray to the same side eye   -Start azelastine nasal spray, 2 sprays in each nostril twice daily as needed.  Start azelastine nasal spray, 2 sprays in each nostril twice daily as needed. -Optivar 1 drop in each eye twice daily as needed.     Follow up in 2 months or sooner if needed.

## 2024-07-03 LAB
A ALTERNATA IGE QN: <0.1 KU(A)/L
A FUMIGATUS IGE QN: <0.1 KU(A)/L
C HERBARUM IGE QN: <0.1 KU(A)/L
CALIF WALNUT POLN IGE QN: <0.1 KU(A)/L
CAT DANDER IGG QN: <0.1 KU(A)/L
CEDAR IGE QN: <0.1 KU(A)/L
COCKSFOOT IGE QN: <0.1 KU(A)/L
COMMON RAGWEED IGE QN: <0.1 KU(A)/L
COTTONWOOD IGE QN: <0.1 KU(A)/L
D FARINAE IGE QN: 0.14 KU(A)/L
D PTERONYSS IGE QN: 0.12 KU(A)/L
DOG DANDER+EPITH IGE QN: <0.1 KU(A)/L
E PURPURASCENS IGE QN: <0.1 KU(A)/L
EAST WHITE PINE IGE QN: <0.1 KU(A)/L
ENGL PLANTAIN IGE QN: <0.1 KU(A)/L
FIREBUSH IGE QN: <0.1 KU(A)/L
GIANT RAGWEED IGE QN: <0.1 KU(A)/L
GOOSEFOOT IGE QN: <0.1 KU(A)/L
IGE SERPL-ACNC: 27 KU/L (ref 0–114)
JOHNSON GRASS IGE QN: <0.1 KU(A)/L
MAPLE IGE QN: <0.1 KU(A)/L
MUGWORT IGE QN: <0.1 KU(A)/L
NETTLE IGE QN: <0.1 KU(A)/L
P NOTATUM IGE QN: <0.1 KU(A)/L
RED MULBERRY IGE QN: <0.1 KU(A)/L
SALTWORT IGE QN: <0.1 KU(A)/L
SHEEP SORREL IGE QN: <0.1 KU(A)/L
SILVER BIRCH IGE QN: <0.1 KU(A)/L
TIMOTHY IGE QN: <0.1 KU(A)/L
WHITE ASH IGE QN: <0.1 KU(A)/L
WHITE ELM IGE QN: <0.1 KU(A)/L
WHITE MULBERRY IGE QN: <0.1 KU(A)/L
WHITE OAK IGE QN: <0.1 KU(A)/L
WORMWOOD IGE QN: <0.1 KU(A)/L

## 2024-07-08 NOTE — RESULT ENCOUNTER NOTE
[employer] message sent:   Total serum IgE is within normal limits.  Slight sensitivity to dust mites.  The rest was negative.  Most likely the majority of the symptoms nonallergic.  - No changes in the previously discussed treatment plan.  It can be effective for nonallergic rhinitis as well.

## 2024-07-08 NOTE — RESULT ENCOUNTER NOTE
I have reviewed and interpreted these results.  The office spirometry performed today does not suggest an obstruction.

## 2024-07-09 ENCOUNTER — MYC MEDICAL ADVICE (OUTPATIENT)
Dept: ORTHOPEDICS | Facility: CLINIC | Age: 54
End: 2024-07-09
Payer: COMMERCIAL

## 2024-07-09 NOTE — TELEPHONE ENCOUNTER
Left Voicemail (1st Attempt) and Sent Mychart (1st Attempt) for the patient to call back and schedule the following:    Appointment type: Return Weight Management  Provider: Sofie Blanton  Specialty phone number: [phone]

## 2024-07-11 ENCOUNTER — TELEPHONE (OUTPATIENT)
Dept: ENDOCRINOLOGY | Facility: CLINIC | Age: 54
End: 2024-07-11
Payer: COMMERCIAL

## 2024-07-11 NOTE — TELEPHONE ENCOUNTER
Pt. Would like to explore possibly taking weight loss medication in pill form as she had too many side effects with the injectable drugs. Pt. Made an appointment with Sofie Blanton for December and another practitioner on September 5th, however, would like to speak to the care team about the pills before the September appointment. Please contact Pt. At [phone]. Okay to leave a detailed message. Okay to communicate through [employer].

## 2024-07-11 NOTE — TELEPHONE ENCOUNTER
Left Voicemail (2nd Attempt) and Sent Mychart (2nd Attempt) , sent letter for the patient to call back and schedule the following:    Appointment type: [facility]  Provider: Sofie Blanton or other PA  Return date: Reschedule 11/1/24 appt as provider is no longer available  Specialty phone number: [phone]  Additional appointment(s) needed: n/a  Additonal Notes: n/a

## 2024-09-05 ENCOUNTER — VIRTUAL VISIT (OUTPATIENT)
Dept: ENDOCRINOLOGY | Facility: CLINIC | Age: 54
End: 2024-09-05
Payer: COMMERCIAL

## 2024-09-05 VITALS — HEIGHT: 64 IN | WEIGHT: 267 LBS | BODY MASS INDEX: 45.58 KG/M2

## 2024-09-05 DIAGNOSIS — E66.01 CLASS 3 SEVERE OBESITY WITH SERIOUS COMORBIDITY AND BODY MASS INDEX (BMI) OF 45.0 TO 49.9 IN ADULT, UNSPECIFIED OBESITY TYPE (H): Primary | ICD-10-CM

## 2024-09-05 DIAGNOSIS — E66.813 CLASS 3 SEVERE OBESITY WITH SERIOUS COMORBIDITY AND BODY MASS INDEX (BMI) OF 45.0 TO 49.9 IN ADULT, UNSPECIFIED OBESITY TYPE (H): Primary | ICD-10-CM

## 2024-09-05 DIAGNOSIS — E66.813 CLASS 3 DRUG-INDUCED OBESITY WITH SERIOUS COMORBIDITY AND BODY MASS INDEX (BMI) OF 45.0 TO 49.9 IN ADULT (H): ICD-10-CM

## 2024-09-05 DIAGNOSIS — E66.1 CLASS 3 DRUG-INDUCED OBESITY WITH SERIOUS COMORBIDITY AND BODY MASS INDEX (BMI) OF 45.0 TO 49.9 IN ADULT (H): ICD-10-CM

## 2024-09-05 PROCEDURE — 99215 OFFICE O/P EST HI 40 MIN: CPT | Mod: 95 | Performed by: NURSE PRACTITIONER

## 2024-09-05 RX ORDER — TOPIRAMATE 25 MG/1
TABLET, FILM COATED ORAL
Qty: 90 TABLET | Refills: 1 | Status: SHIPPED | OUTPATIENT
Start: 2024-09-05

## 2024-09-05 ASSESSMENT — PAIN SCALES - GENERAL: PAINLEVEL: MODERATE PAIN (4)

## 2024-09-05 NOTE — NURSING NOTE
Current patient location: [address]   [place] [address]    Is the patient currently in the state of [place]? YES    Visit mode:VIDEO    If the visit is dropped, the patient can be reconnected by: VIDEO VISIT: Text to cell phone:   Telephone Information:   Mobile [phone]       Will anyone else be joining the visit? NO  (If patient encounters technical issues they should call [phone]    How would you like to obtain your AVS? MyChart    Are changes needed to the allergy or medication list? No    Are refills needed on medications prescribed by this physician? YES Pt states won't be refill, but probably something different due to Zepbound side effects.    Rooming Documentation:  Questionnaire(s) completed      Reason for visit: RECHECK    Pt states 4/10 pain in lungs getting over COVID and feels asthma related.    Luis VACAF

## 2024-09-05 NOTE — PATIENT INSTRUCTIONS
Start topiramate- take in the evenings  Start working on fluids again   Goal to get protein at every eating episode  Meet with dietitian   Meet with pharmacist to discuss restarting zepbound   Update labs when able   Follow up with Sofie Blanton PA-C  12/13/2024    MEDICATION STARTED AT THIS APPOINTMENT  We are starting topiramate at bedtime.  Start one tab, 25 mg, for a week. Go up to 50 mg (2 tabs) for the next week. At the third week, take   3 tabs (75 mg).  Stay at 3 tabs until you are seen again. Contact the nurse via [facility] or call [phone] if you have any questions or concerns. (Do not stop taking it if you don't think it's working. For some people it works even though they do not feel much different.)    Topiramate (Topamax) is a medication that is used most often to treat migraine headaches or for seizures. It has also been found to help with weight loss. Although it's not currently FDA approved for weight loss, it has been used safely for a number of years to help people who are carrying extra weight.     Just how topiramate helps with weight loss has not been exactly determined. However it seems to work on areas of the brain to quiet down signals related to eating.      Topiramate may make you:    >feel less interest in eating in between meals   >think less about food and eating   >find it easier to push the plate away   >find giving up pop easier    >have an easier time eating less    For some of our patients, the pills work right away. They feel and think quite differently about food. Other patients don't feel much of a change but find in fact they have lost weight! Like all weight loss medications, topiramate works best when you help it work.  This means:    1) Have less tempting high calorie (fattening) food around the house or office    2) Have lower calorie food (fruits, vegetables,low fat meats and dairy) for snacks    3) Eat out only one time or less each week.   4) Eat your meals at a  table with the TV or computer off.    Side-effects. Topiramate is generally well tolerated. The main side-effects we see are:   Tingling in hands,feet, or face (usually not very troublesome)   Mental confusion and word finding trouble (about 10% of patients have this.)     Feeling sleepy or a bit dopey- this goes away very soon after starting.    One of the dangers of topiramate is the possibility of birth defects--if you get pregnant when you are on it, there is the risk that your baby will be born with a cleft lip or palate.  If you are on topiramate and of child bearing age, you need to be on a reliable form of birth control or refrain from sexual intercourse.     Please refer to the pharmacy insert for more information on side-effects. Since many pharmacists are not familiar with the use of topiramate in weight loss, calling the clinic will get you the most accurate information on the use of this medication for weight loss.     In order to get refills of this or any medication we prescribe you must be seen in the medical weight mgmt clinic every 2-3 months.

## 2024-09-05 NOTE — Clinical Note
FADUMO for net visit in Dec- very slow taper up. Lots of vomiting at 10mg. Suspect not eating enough. Didn't follow up just quit taking.reviewed what she'll do this time.

## 2024-09-05 NOTE — PROGRESS NOTES
Return Medical Weight Management Note     Paige Fajardo  MRN:  [id number]  :  1970  VIRI:  2024    Dear Francesca Samson MD,    I had the pleasure of seeing your patient Paige Fajardo. She is a 53 year old female who I am continuing to see for treatment of obesity related to:        2022    10:09 AM   --   I have the following health issues associated with obesity Sleep Apnea    Asthma    Stress Incontinence   I have the following symptoms associated with obesity Knee Pain    Depression    Lower Extremity Swelling    Back Pain    Fatigue    Hip Pain       Assessment & Plan   Problem List Items Addressed This Visit          Digestive    Class 3 severe obesity with serious comorbidity and body mass index (BMI) of 45.0 to 49.9 in adult (H)     Stopped zepbound 10mg in May due to persistent nausea and vomiting over weeks of time. Felt the effect built up over time. Had too much going on this summer to deal with adverse side effects or follow up to figure out what was going on. Since stopping zepbound has experienced weight gain of 25lb. Life is less chaotic right now and she'd like to discuss  antiobesity medications again. Benefits of zepbound included earlier satiety, less joint pain, less inflammation, improved sleep, less cravings. Unlikely to get similar benefits from other meds. Did not previously have benefit from topiramate when taking for migraines but open to trying this. Already taking naltrexone. Would not recommend phentermine. She is open to retrying zepbound.     Suspect patient dose escalated too quickly and was not able to keep up with nutrition/ hydration. Discussed dose escalation in the future if similar symptoms occur. She did have benefits at 5mg so I would strongly encourage her to stay at lower doses as long as she can given her response to escalation thus far.     Stressed importance of protein, regular meals, and hydration while taking glp1.     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 In the meantime, we will trial topiramate as she is motivated to get back on track.    Start topiramate- take in the evenings  Start working on fluids again   Goal to get protein at every eating episode  Meet with dietitian   Meet with pharmacist to discuss restarting zepbound   Update labs when able   Follow up with Sofie Blanton PA-C  12/13/2024         Relevant Orders    Comprehensive metabolic panel    Med Therapy Management Referral    Obesity - Primary    Relevant Medications    topiramate (TOPAMAX) 25 MG tablet    Other Relevant Orders    Comprehensive metabolic panel    Med Therapy Management Referral          INTERVAL HISTORY:  New MWM with Dr. Reed 8/2022- starting ozempic. Followed by Lauren Bloch [facility] Pharmacist  and Sofie Blanton PA-C  also. last seen with Dr. Reed 3/2024. Switched from wegovy to zepbound 1/2024 due to persistent nausea- at 10mg, finally feeling some appetite control. New to me.       Anti-obesity medication history    Current:   none    Past/Failed/contraindicated:   Zepbound - stopped end of May 2024 - lots of nausea and vomiting for days a a time, couldn't even keep water down. Getting dehydrated with diarrhea.   -topiramate- took for migraines- never saw weight loss     Recent diet changes:   Since being off zepbound has noted less satiety, never feels full, more cravings   Some eating because of habit- meal time   Tried to keep some of the good eating habits this summer but didn't have as much available     Recent exercise/activity changes:   Hip pain had improved with weight loss after hip surgery and now pain getting worse again     Recent stressors:   Lots of travel this summer   Sick this summer and needed prednisone twice in recent months   Work has been stressful     PHQ-2 Score:         9/5/2024     1:46 PM 6/13/2024     9:27 AM   PHQ-2 ( 1999 [employer])   Q1: Little interest or pleasure in doing things 1 1   Q2: Feeling down, depressed or hopeless 1 1    PHQ-2 Score 2 2       Recent sleep changes:   Had improved with weight loss but now worsening again     Vitamins/Labs: cmp due     Wt Readings from Last 5 Encounters:   09/05/24 121.1 kg (267 lb)   07/02/24 117.5 kg (258 lb 15.9 oz)   06/13/24 117.5 kg (259 lb)   06/12/24 117.5 kg (259 lb)   05/09/24 104.3 kg (230 lb)        CURRENT WEIGHT:   267 lbs 0 oz    Initial Weight (lbs): 304 lbs  Last Visits Weight: 109.8 kg (242 lb)  Cumulative weight loss (lbs): 37  Weight Loss Percentage: 12.17%        9/27/2023    12:24 PM   Changes and Difficulties   I have made the following changes to my diet since my last visit: Eat better and much less   With regards to my diet, I am still struggling with: [place]   I have made the following changes to my activity/exercise since my last visit: Need hip surgery   With regards to my activity/exercise, I am still struggling with: Hip         MEDICATIONS:   Current Outpatient Medications   Medication Sig Dispense Refill    topiramate (TOPAMAX) 25 MG tablet 25mg at bedtime for week 1, 50mg at bedtime for 1 week, and 75mg at bedtime thereafter 90 tablet 1    albuterol (PROAIR HFA/PROVENTIL HFA/VENTOLIN HFA) 108 (90 Base) MCG/ACT inhaler Inhale 2 puffs into the lungs every 6 hours as needed for shortness of breath or wheezing 18 g 5    azelastine (ASTELIN) 0.1 % nasal spray Spray 2 sprays into both nostrils 2 times daily as needed for rhinitis 30 mL 3    azelastine (OPTIVAR) 0.05 % ophthalmic solution Apply 1 drop to eye 2 times daily as needed (itchy/watery eyes) 6 mL 3    B Complex Vitamins (VITAMIN B COMPLEX PO) 3 drops per day      benzonatate (TESSALON) 100 MG capsule Take 1 capsule (100 mg) by mouth 3 times daily as needed for cough (Patient not taking: Reported on 7/2/2024) 90 capsule 0    budesonide-formoterol (SYMBICORT) 80-4.5 MCG/ACT Inhaler Inhale 2 puffs into the lungs every 4 hours as needed (Wheezing, chest tightness, shortness of breath, or persistent cough) 10.2 g 3     cetirizine-psuedoePHEDrine (ZYRTEC-D) 5-120 MG per tablet Take 1 tablet by mouth 2 times daily (Patient not taking: Reported on 7/2/2024) 90 tablet 3    cholecalciferol (VITAMIN D) 1000 UNIT tablet 3 drops per day      colchicine (COLCYRS) 0.6 MG tablet Take 2 tablets orally at onset of symptoms and one more tablet 1 hour later, and then one tablet twice a day until flare resolves.      COMPOUNDED NON-CONTROLLED SUBSTANCE (CMPD RX) - PHARMACY TO MIX COMPOUNDED MEDICATION LOW DOSE NALTREXONE 6MG one tablet qhs 90 capsule 4    FLUoxetine (PROZAC) 20 MG capsule Take 20 mg by mouth      fluticasone (FLONASE) 50 MCG/ACT nasal spray Spray 1-2 sprays into both nostrils daily 16 g 3    gabapentin (NEURONTIN) 300 MG capsule Take 2 capsules  (600mg) in the morning and 2 capsules (600mg) at bedtime. 90day supply (Patient taking differently: Take 300 mg by mouth 2 times daily) 360 capsule 1    ipratropium - albuterol 0.5 mg/2.5 mg/3 mL (DUONEB) 0.5-2.5 (3) MG/3ML neb solution Take 1 vial (3 mLs) by nebulization every 6 hours as needed for shortness of breath or wheezing 960 mL 4    levonorgestrel (MIRENA) 20 MCG/24HR IUD 1 each by Intrauterine route once      MAGNESIUM CITRATE PO 3 drops per day      meloxicam (MOBIC) 15 MG tablet Take 15 mg by mouth daily      Naproxen Sodium (ALEVE PO) Take 220 mg by mouth 2 times daily as needed       ondansetron (ZOFRAN ODT) 4 MG ODT tab Take 1 tablet (4 mg) by mouth every 8 hours as needed for nausea (Patient not taking: Reported on 7/2/2024) 20 tablet 0    order for DME Equipment being ordered: Nebulizer 1 Device 0    predniSONE (DELTASONE) 20 MG tablet Take 2 tabs daily x 7 days (Patient not taking: Reported on 7/2/2024) 14 tablet 0    rizatriptan (MAXALT-MLT) 10 MG ODT tab Take 1 tablet (10 mg) by mouth at onset of headache for migraine May repeat in 2 hours. Max 3 tablets/24 hours. 18 tablet 3    Turmeric 450 MG CAPS 1 scoop per day             9/4/2024    12:34 AM   Weight Loss  Medication History Reviewed With Patient   If you are not taking a weight loss medication that was prescribed to you, please indicate why: I did not like the side effects       Office Visit on 07/02/2024   Component Date Value Ref Range Status    Cat Dander IgE 07/02/2024 <0.10  <0.10 KU(A)/L Final    Interpretation: None Detected    Dog Dander IgE 07/02/2024 <0.10  <0.10 KU(A)/L Final    Interpretation: None Detected    [place] IgE 07/02/2024 <0.10  <0.10 KU(A)/L Final    Interpretation: None Detected    [place] ([place]) IgE 07/02/2024 <0.10  <0.10 KU(A)/L Final    Interpretation: None Detected    [place] Grass IgE 07/02/2024 <0.10  <0.10 KU(A)/L Final    Interpretation: None Detected    Dermatophagoides farinae IgE 07/02/2024 0.14 (H)  <0.10 KU(A)/L Final    Interpretation: Low    Dermatophagoide pteronyssinus IgE 07/02/2024 0.12 (H)  <0.10 KU(A)/L Final    Interpretation: Low    Alternaria alternata, Mold IgE 07/02/2024 <0.10  <0.10 KU(A)/L Final    Interpretation: None Detected    Aspergillis fumigatus IgE 07/02/2024 <0.10  <0.10 KU(A)/L Final    Interpretation: None Detected    Cladosporium herbarum IgE 07/02/2024 <0.10  <0.10 KU(A)/L Final    Interpretation: None Detected    Epicoccum purpurascens IgE 07/02/2024 <0.10  <0.10 KU(A)/L Final    Interpretation: None Detected    Penicillium notatum IgE 07/02/2024 <0.10  <0.10 KU(A)/L Final    Interpretation: None Detected    [place], Tree IgE 07/02/2024 <0.10  <0.10 KU(A)/L Final    Interpretation: None Detected    [place], Tree IgE 07/02/2024 <0.10  <0.10 KU(A)/L Final    Interpretation: None Detected    [place]de IgE 07/02/2024 <0.10  <0.10 KU(A)/L Final    Interpretation: None Detected    Elm Tree IgE 07/02/2024 <0.10  <0.10 KU(A)/L Final    Interpretation: None Detected    Maple Tree IgE 07/02/2024 <0.10  <0.10 KU(A)/L Final    Interpretation: None Detected    Oak (White) IgE 07/02/2024 <0.10  <0.10 KU(A)/L Final    Interpretation: None Detected  "   Red [place] IgE 07/02/2024 <0.10  <0.10 KU(A)/L Final    Interpretation: None Detected    Silver [place]ch IgE 07/02/2024 <0.10  <0.10 KU(A)/L Final    Interpretation: None Detected    White [place] IgE 07/02/2024 <0.10  <0.10 KU(A)/L Final    Interpretation: None Detected    [place] IgE 07/02/2024 <0.10  <0.10 KU(A)/L Final    Interpretation: None Detected    English Plantain IgE 07/02/2024 <0.10  <0.10 KU(A)/L Final    Interpretation: None Detected    Giant Ragweed IgE 07/02/2024 <0.10  <0.10 KU(A)/L Final    Interpretation: None Detected    Lamb's Quarter's IgE 07/02/2024 <0.10  <0.10 KU(A)/L Final    Interpretation: None Detected    Mugwort IgE 07/02/2024 <0.10  <0.10 KU(A)/L Final    Interpretation: None Detected    Ragweed Short IgE 07/02/2024 <0.10  <0.10 KU(A)/L Final    Interpretation: None Detected    Sagebrush [place] IgE 07/02/2024 <0.10  <0.10 KU(A)/L Final    Interpretation: None Detected    [place]ep [place] IgE 07/02/2024 <0.10  <0.10 KU(A)/L Final    Interpretation: None Detected    Russian Thistle IgE 07/02/2024 <0.10  <0.10 KU(A)/L Final    Interpretation: None Detected    Weed Nettle IgE 07/02/2024 <0.10  <0.10 KU(A)/L Final    Interpretation: None Detected    [place]/Firebush IgE 07/02/2024 <0.10  <0.10 KU(A)/L Final    Interpretation: None Detected    Immunoglobulin E 07/02/2024 27  0 - 114 kU/L Final    [place] Tree IgE 07/02/2024 <0.10  <0.10 KU(A)/L Final    Interpretation: None Detected           4/28/2011    10:00 AM   SAMMY Score (Last Two)   SAMMY Raw Score 43   Activation Score 68.5   SAMMY Level 4         PHYSICAL EXAM:  Objective    Ht 1.626 m (5' 4\")   Wt 121.1 kg (267 lb)   BMI 45.83 kg/m      Vitals - Patient Reported  Pain Score: Moderate Pain (4)  Pain Loc:  (lungs-asthma related)        GENERAL: alert and no distress  EYES: Eyes grossly normal to inspection.  No discharge or erythema, or obvious scleral/conjunctival abnormalities.  RESP: No audible wheeze, cough, or visible cyanosis.  "   SKIN: Visible skin clear. No significant rash, abnormal pigmentation or lesions.  NEURO: Cranial nerves grossly intact.  Mentation and speech appropriate for age.  PSYCH: Appropriate affect, tone, and pace of words        Sincerely,    Sury Perry NP      40 minutes spent by me on the date of the encounter doing chart review, history and exam, documentation and further activities per the note    Virtual Visit Details    Type of service:  Video Visit     Originating Location (pt. Location): Home    Distant Location (provider location):  Off-site  Platform used for Video Visit: [email or web address]

## 2024-09-05 NOTE — LETTER
2024       RE: Paige Fajardo  [address]  [place] [address]     Dear Colleague,    Thank you for referring your patient, Paige Fajardo, to the [facility] [place] [facility]. Please see a copy of my visit note below.      Return Medical Weight Management Note     Paige Fajardo  MRN:  [id number]  :  1970  VIRI:  2024    Dear Francesca Samson MD,    I had the pleasure of seeing your patient Paige Fajardo. She is a 53 year old female who I am continuing to see for treatment of obesity related to:        2022    10:09 AM   --   I have the following health issues associated with obesity Sleep Apnea    Asthma    Stress Incontinence   I have the following symptoms associated with obesity Knee Pain    Depression    Lower Extremity Swelling    Back Pain    Fatigue    Hip Pain       Assessment & Plan  Problem List Items Addressed This Visit          Digestive    Class 3 severe obesity with serious comorbidity and body mass index (BMI) of 45.0 to 49.9 in adult (H)     Stopped zepbound 10mg in May due to persistent nausea and vomiting over weeks of time. Felt the effect built up over time. Had too much going on this summer to deal with adverse side effects or follow up to figure out what was going on. Since stopping zepbound has experienced weight gain of 25lb. Life is less chaotic right now and she'd like to discuss  antiobesity medications again. Benefits of zepbound included earlier satiety, less joint pain, less inflammation, improved sleep, less cravings. Unlikely to get similar benefits from other meds. Did not previously have benefit from topiramate when taking for migraines but open to trying this. Already taking naltrexone. Would not recommend phentermine. She is open to retrying zepbound.     Suspect patient dose escalated too quickly and was not able to keep up with nutrition/ hydration. Discussed  dose escalation in the future if similar symptoms occur. She did have benefits at 5mg so I would strongly encourage her to stay at lower doses as long as she can given her response to escalation thus far.     Stressed importance of protein, regular meals, and hydration while taking glp1.     FV emp so she will need to work with MTM. In the meantime, we will trial topiramate as she is motivated to get back on track.    Start topiramate- take in the evenings  Start working on fluids again   Goal to get protein at every eating episode  Meet with dietitian   Meet with pharmacist to discuss restarting zepbound   Update labs when able   Follow up with Sofie Blanton PA-C  12/13/2024         Relevant Orders    Comprehensive metabolic panel    Med Therapy Management Referral    Obesity - Primary    Relevant Medications    topiramate (TOPAMAX) 25 MG tablet    Other Relevant Orders    Comprehensive metabolic panel    Med Therapy Management Referral          INTERVAL HISTORY:  New MWM with Dr. Reed 8/2022- starting ozempic. Followed by Lauren Bloch MTM Pharmacist  and Sofie Blanton PA-C  also. last seen with Dr. Reed 3/2024. Switched from wegovy to zepbound 1/2024 due to persistent nausea- at 10mg, finally feeling some appetite control. New to me.       Anti-obesity medication history    Current:   none    Past/Failed/contraindicated:   Zepbound - stopped end of May 2024 - lots of nausea and vomiting for days a a time, couldn't even keep water down. Getting dehydrated with diarrhea.   -topiramate- took for migraines- never saw weight loss     Recent diet changes:   Since being off zepbound has noted less satiety, never feels full, more cravings   Some eating because of habit- meal time   Tried to keep some of the good eating habits this summer but didn't have as much available     Recent exercise/activity changes:   Hip pain had improved with weight loss after hip surgery and now pain getting worse again     Recent  stressors:   Lots of travel this summer   Sick this summer and needed prednisone twice in recent months   Work has been stressful     PHQ-2 Score:         9/5/2024     1:46 PM 6/13/2024     9:27 AM   PHQ-2 ( 1999 [employer])   Q1: Little interest or pleasure in doing things 1 1   Q2: Feeling down, depressed or hopeless 1 1   PHQ-2 Score 2 2       Recent sleep changes:   Had improved with weight loss but now worsening again     Vitamins/Labs: cmp due     Wt Readings from Last 5 Encounters:   09/05/24 121.1 kg (267 lb)   07/02/24 117.5 kg (258 lb 15.9 oz)   06/13/24 117.5 kg (259 lb)   06/12/24 117.5 kg (259 lb)   05/09/24 104.3 kg (230 lb)        CURRENT WEIGHT:   267 lbs 0 oz    Initial Weight (lbs): 304 lbs  Last Visits Weight: 109.8 kg (242 lb)  Cumulative weight loss (lbs): 37  Weight Loss Percentage: 12.17%        9/27/2023    12:24 PM   Changes and Difficulties   I have made the following changes to my diet since my last visit: Eat better and much less   With regards to my diet, I am still struggling with: [place]   I have made the following changes to my activity/exercise since my last visit: Need hip surgery   With regards to my activity/exercise, I am still struggling with: Hip         MEDICATIONS:   Current Outpatient Medications   Medication Sig Dispense Refill     topiramate (TOPAMAX) 25 MG tablet 25mg at bedtime for week 1, 50mg at bedtime for 1 week, and 75mg at bedtime thereafter 90 tablet 1     albuterol (PROAIR HFA/PROVENTIL HFA/VENTOLIN HFA) 108 (90 Base) MCG/ACT inhaler Inhale 2 puffs into the lungs every 6 hours as needed for shortness of breath or wheezing 18 g 5     azelastine (ASTELIN) 0.1 % nasal spray Spray 2 sprays into both nostrils 2 times daily as needed for rhinitis 30 mL 3     azelastine (OPTIVAR) 0.05 % ophthalmic solution Apply 1 drop to eye 2 times daily as needed (itchy/watery eyes) 6 mL 3     B Complex Vitamins (VITAMIN B COMPLEX PO) 3 drops per day       benzonatate (TESSALON) 100 MG  capsule Take 1 capsule (100 mg) by mouth 3 times daily as needed for cough (Patient not taking: Reported on 7/2/2024) 90 capsule 0     budesonide-formoterol (SYMBICORT) 80-4.5 MCG/ACT Inhaler Inhale 2 puffs into the lungs every 4 hours as needed (Wheezing, chest tightness, shortness of breath, or persistent cough) 10.2 g 3     cetirizine-psuedoePHEDrine (ZYRTEC-D) 5-120 MG per tablet Take 1 tablet by mouth 2 times daily (Patient not taking: Reported on 7/2/2024) 90 tablet 3     cholecalciferol (VITAMIN D) 1000 UNIT tablet 3 drops per day       colchicine (COLCYRS) 0.6 MG tablet Take 2 tablets orally at onset of symptoms and one more tablet 1 hour later, and then one tablet twice a day until flare resolves.       COMPOUNDED NON-CONTROLLED SUBSTANCE (CMPD RX) - PHARMACY TO MIX COMPOUNDED MEDICATION LOW DOSE NALTREXONE 6MG one tablet qhs 90 capsule 4     FLUoxetine (PROZAC) 20 MG capsule Take 20 mg by mouth       fluticasone (FLONASE) 50 MCG/ACT nasal spray Spray 1-2 sprays into both nostrils daily 16 g 3     gabapentin (NEURONTIN) 300 MG capsule Take 2 capsules  (600mg) in the morning and 2 capsules (600mg) at bedtime. 90day supply (Patient taking differently: Take 300 mg by mouth 2 times daily) 360 capsule 1     ipratropium - albuterol 0.5 mg/2.5 mg/3 mL (DUONEB) 0.5-2.5 (3) MG/3ML neb solution Take 1 vial (3 mLs) by nebulization every 6 hours as needed for shortness of breath or wheezing 960 mL 4     levonorgestrel (MIRENA) 20 MCG/24HR IUD 1 each by Intrauterine route once       MAGNESIUM CITRATE PO 3 drops per day       meloxicam (MOBIC) 15 MG tablet Take 15 mg by mouth daily       Naproxen Sodium (ALEVE PO) Take 220 mg by mouth 2 times daily as needed        ondansetron (ZOFRAN ODT) 4 MG ODT tab Take 1 tablet (4 mg) by mouth every 8 hours as needed for nausea (Patient not taking: Reported on 7/2/2024) 20 tablet 0     order for DME Equipment being ordered: Nebulizer 1 Device 0     predniSONE (DELTASONE) 20 MG  tablet Take 2 tabs daily x 7 days (Patient not taking: Reported on 7/2/2024) 14 tablet 0     rizatriptan (MAXALT-MLT) 10 MG ODT tab Take 1 tablet (10 mg) by mouth at onset of headache for migraine May repeat in 2 hours. Max 3 tablets/24 hours. 18 tablet 3     Turmeric 450 MG CAPS 1 scoop per day             9/4/2024    12:34 AM   Weight Loss Medication History Reviewed With Patient   If you are not taking a weight loss medication that was prescribed to you, please indicate why: I did not like the side effects       Office Visit on 07/02/2024   Component Date Value Ref Range Status     Cat Dander IgE 07/02/2024 <0.10  <0.10 KU(A)/L Final    Interpretation: None Detected     Dog Dander IgE 07/02/2024 <0.10  <0.10 KU(A)/L Final    Interpretation: None Detected     [place] Grass IgE 07/02/2024 <0.10  <0.10 KU(A)/L Final    Interpretation: None Detected     [place] ([place] Grass) IgE 07/02/2024 <0.10  <0.10 KU(A)/L Final    Interpretation: None Detected     Steven Grass IgE 07/02/2024 <0.10  <0.10 KU(A)/L Final    Interpretation: None Detected     Dermatophagoides farinae IgE 07/02/2024 0.14 (H)  <0.10 KU(A)/L Final    Interpretation: Low     Dermatophagoide pteronyssinus IgE 07/02/2024 0.12 (H)  <0.10 KU(A)/L Final    Interpretation: Low     Alternaria alternata, Mold IgE 07/02/2024 <0.10  <0.10 KU(A)/L Final    Interpretation: None Detected     Aspergillis fumigatus IgE 07/02/2024 <0.10  <0.10 KU(A)/L Final    Interpretation: None Detected     Cladosporium herbarum IgE 07/02/2024 <0.10  <0.10 KU(A)/L Final    Interpretation: None Detected     Epicoccum purpurascens IgE 07/02/2024 <0.10  <0.10 KU(A)/L Final    Interpretation: None Detected     Penicillium notatum IgE 07/02/2024 <0.10  <0.10 KU(A)/L Final    Interpretation: None Detected     White [place], Tree IgE 07/02/2024 <0.10  <0.10 KU(A)/L Final    Interpretation: None Detected     [place], Tree IgE 07/02/2024 <0.10  <0.10 KU(A)/L Final    Interpretation: None  Detected     [place] IgE 07/02/2024 <0.10  <0.10 KU(A)/L Final    Interpretation: None Detected     Elm Tree IgE 07/02/2024 <0.10  <0.10 KU(A)/L Final    Interpretation: None Detected     Maple Tree IgE 07/02/2024 <0.10  <0.10 KU(A)/L Final    Interpretation: None Detected     [place] (White) IgE 07/02/2024 <0.10  <0.10 KU(A)/L Final    Interpretation: None Detected     Red [place] IgE 07/02/2024 <0.10  <0.10 KU(A)/L Final    Interpretation: None Detected     Silver Birch IgE 07/02/2024 <0.10  <0.10 KU(A)/L Final    Interpretation: None Detected     White [place] IgE 07/02/2024 <0.10  <0.10 KU(A)/L Final    Interpretation: None Detected     [place] IgE 07/02/2024 <0.10  <0.10 KU(A)/L Final    Interpretation: None Detected     English Plantain IgE 07/02/2024 <0.10  <0.10 KU(A)/L Final    Interpretation: None Detected     Giant Ragweed IgE 07/02/2024 <0.10  <0.10 KU(A)/L Final    Interpretation: None Detected     Lamb's Quarter's IgE 07/02/2024 <0.10  <0.10 KU(A)/L Final    Interpretation: None Detected     Mugwort IgE 07/02/2024 <0.10  <0.10 KU(A)/L Final    Interpretation: None Detected     Ragweed Short IgE 07/02/2024 <0.10  <0.10 KU(A)/L Final    Interpretation: None Detected     Sagebrush [place] IgE 07/02/2024 <0.10  <0.10 KU(A)/L Final    Interpretation: None Detected     Sheep [place] IgE 07/02/2024 <0.10  <0.10 KU(A)/L Final    Interpretation: None Detected     Russian Thistle IgE 07/02/2024 <0.10  <0.10 KU(A)/L Final    Interpretation: None Detected     Weed [place]ettle IgE 07/02/2024 <0.10  <0.10 KU(A)/L Final    Interpretation: None Detected     [place]/Firebush IgE 07/02/2024 <0.10  <0.10 KU(A)/L Final    Interpretation: None Detected     Immunoglobulin E 07/02/2024 27  0 - 114 kU/L Final     Cypress Tree IgE 07/02/2024 <0.10  <0.10 KU(A)/L Final    Interpretation: None Detected           4/28/2011    10:00 AM   SAMMY Score (Last Two)   SAMMY Raw Score 43   Activation Score 68.5   SAMMY Level 4         PHYSICAL  "EXAM:  Objective   Ht 1.626 m (5' 4\")   Wt 121.1 kg (267 lb)   BMI 45.83 kg/m      Vitals - Patient Reported  Pain Score: Moderate Pain (4)  Pain Loc:  (lungs-asthma related)        GENERAL: alert and no distress  EYES: Eyes grossly normal to inspection.  No discharge or erythema, or obvious scleral/conjunctival abnormalities.  RESP: No audible wheeze, cough, or visible cyanosis.    SKIN: Visible skin clear. No significant rash, abnormal pigmentation or lesions.  NEURO: Cranial nerves grossly intact.  Mentation and speech appropriate for age.  PSYCH: Appropriate affect, tone, and pace of words        Sincerely,    Sury Perry NP      40 minutes spent by me on the date of the encounter doing chart review, history and exam, documentation and further activities per the note    Virtual Visit Details    Type of service:  Video Visit     Originating Location (pt. Location): Home    Distant Location (provider location):  Off-site  Platform used for Video Visit: Malathi      Again, thank you for allowing me to participate in the care of your patient.      Sincerely,    Sury Perry NP    "

## 2024-09-06 NOTE — ASSESSMENT & PLAN NOTE
Stopped zepbound 10mg in May due to persistent nausea and vomiting over weeks of time. Felt the effect built up over time. Had too much going on this summer to deal with adverse side effects or follow up to figure out what was going on. Since stopping zepbound has experienced weight gain of 25lb. Life is less chaotic right now and she'd like to discuss  antiobesity medications again. Benefits of zepbound included earlier satiety, less joint pain, less inflammation, improved sleep, less cravings. Unlikely to get similar benefits from other meds. Did not previously have benefit from topiramate when taking for migraines but open to trying this. Already taking naltrexone. Would not recommend phentermine. She is open to retrying zepbound.     Suspect patient dose escalated too quickly and was not able to keep up with nutrition/ hydration. Discussed dose escalation in the future if similar symptoms occur. She did have benefits at 5mg so I would strongly encourage her to stay at lower doses as long as she can given her response to escalation thus far.     Stressed importance of protein, regular meals, and hydration while taking glp1.     FV emp so she will need to work with MTM. In the meantime, we will trial topiramate as she is motivated to get back on track.    Start topiramate- take in the evenings  Start working on fluids again   Goal to get protein at every eating episode  Meet with dietitian   Meet with pharmacist to discuss restarting zepbound   Update labs when able   Follow up with Sofie Blanton PA-C  12/13/2024

## 2024-09-12 ENCOUNTER — VIRTUAL VISIT (OUTPATIENT)
Dept: CARDIOLOGY | Facility: CLINIC | Age: 54
End: 2024-09-12
Attending: NURSE PRACTITIONER
Payer: COMMERCIAL

## 2024-09-12 ENCOUNTER — TELEPHONE (OUTPATIENT)
Dept: ENDOCRINOLOGY | Facility: CLINIC | Age: 54
End: 2024-09-12

## 2024-09-12 VITALS — WEIGHT: 267 LBS | BODY MASS INDEX: 45.58 KG/M2 | HEIGHT: 64 IN

## 2024-09-12 DIAGNOSIS — E66.813 CLASS 3 SEVERE OBESITY WITH SERIOUS COMORBIDITY AND BODY MASS INDEX (BMI) OF 45.0 TO 49.9 IN ADULT, UNSPECIFIED OBESITY TYPE (H): Primary | ICD-10-CM

## 2024-09-12 DIAGNOSIS — E66.01 CLASS 3 SEVERE OBESITY WITH SERIOUS COMORBIDITY AND BODY MASS INDEX (BMI) OF 45.0 TO 49.9 IN ADULT, UNSPECIFIED OBESITY TYPE (H): Primary | ICD-10-CM

## 2024-09-12 ASSESSMENT — PAIN SCALES - GENERAL: PAINLEVEL: MODERATE PAIN (4)

## 2024-09-12 NOTE — TELEPHONE ENCOUNTER
Prior Authorization Retail Medication Request    Medication/Dose: [facility] insurance requirements Zepbound 2.5mg - 15 mg  (all doses) as indicated by supply, safety, and efficacy.    Diagnosis and ICD code (if different than what is on RX):    New/renewal/insurance change PA/secondary ins. PA:  Previously Tried and Failed:  diet and exercise for at least 3 months without clinically effective sustainable weight loss, Topiramate: no effect on weight loss , and Wegovy: side effects  Rationale: Paige Fajardo is a 53 year old female with a diagnosis of Class III Obesity (BMI at least 40 kg/m2). Patient met with [facility] Medication Therapy Management Pharmacist 09/12/2024 per [facility] insurance requirements. Patient denies personal or family history of MEN Type2, MTC, Pancreatitis.     Patient would benefit from additional pharmacotherapy for weight management. Given class III obesity, recommend GLP1/GIP therapy as data supports most significant weight loss. Patient also likely to benefit from reduction in food noise and increased satiety. Negative GI symptomatology at baseline. Negative history of pancreatitis, medullary thyroid cancer and multiple endocrine neoplasia type 2.      For patients that are under [employer] insurance coverage, it is mandated by insurance that each qualifying patient meet with hospital based Weight Management Medication Therapy Management pharmacist to continue therapy coverage. The following patient meets the below coverage criteria and can therefore continue GLP-1/GIP agonist therapy for Weight Management:    Adult  BMI >40 with or without comorbidities   OR   BMI >30 + NAFLD*   at time of initiating GLP-1/GIP agonist therapy Approved for 29 weeks  Met Updated Initial Criteria   At least 5% weight loss of baseline body weight  Approved for 12 months      Even though patient has been on medication within the calendar year, prior authorization  "was not initiated due to patient continuing therapy from 2023. Will restart prior authorization to ensure coverage.     Estimated body mass index is 45.83 kg/m  as calculated from the following:    Height as of an earlier encounter on 9/12/24: 1.626 m (5' 4\").    Weight as of an earlier encounter on 9/12/24: 121.1 kg (267 lb).       Insurance       Pharmacy Information (if different than what is on RX)  Name:    Phone:    Fax:    "

## 2024-09-12 NOTE — Clinical Note
FADUMO - getting her restarted! Scheduling follow-up for end October/early Nov. Scheduled with you in December.   Pam

## 2024-09-12 NOTE — PROGRESS NOTES
69M w/ recent CVA and residual dysphagia s/p PEG tube presents from OSH for retained gallstone in ampulla, s/p lap cholecystectomy in which the gallbladder was found necrotic and friable. His only symptom is abdominal pain. Labs show leukocytosis. He is transferred for AES eval and possible ERCP.     Monitoring 2 right abdominal drain output daily for 2 days of output < 10mL     -MRCP[02/06/24] noted fluid collection in the gallbladder fossa, concerning for  abscess vs.biloma. Also noted choledocholithiasis with gallstones posterior to the liver.  -AES performed ERCP 02/06/24 with removal of choledocolithiasis by biliary sphincterotomy and balloon extraction.   -IR attempt at drainage of fluid collection [2/7/2024] unsuccessful 2/2 episode of N/V with associated tachycardia and hypertension  -IR reattempt at fluid drainage and drain placement [2/9/24] successful without complication. Drain in place with serosanguinous fluid.  -General Surgery consult noted no indication for surgical intervention at the time  -CT[2/16/24] with decreased size of gallbladder fossa fluid collection and no new focal fluid collections   -Per ID recs, antibiotics changed to cefepime and metronidazole. EOT 2/24/24   -Following cultures. Blood cultures NGTD. Diarrhea resolved and no c diff sample sent  -Fluid studies and cultures with no evidence of bacterial growth thus far  -Antiemetics PRN       Medication Therapy Management (MTM) Encounter    ASSESSMENT:                            Medication Adherence/Access: No issues identified    Weight management:   Patient would benefit from additional pharmacotherapy for weight management. Given class III obesity, recommend GLP1/GIP therapy as data supports most significant weight loss. Patient also likely to benefit from reduction in food noise and increased satiety. Negative GI symptomatology at baseline. Negative history of pancreatitis, medullary thyroid cancer and multiple endocrine neoplasia type 2.      For patients that are under [employer] insurance coverage, it is mandated by insurance that each qualifying patient meet with hospital based Weight Management Medication Therapy Management pharmacist to continue therapy coverage. The following patient meets the below coverage criteria and can therefore continue GLP-1/GIP agonist therapy for Weight Management:    Adult  BMI >40 with or without comorbidities   OR   BMI >30 + NAFLD*   at time of initiating GLP-1/GIP agonist therapy Approved for 29 weeks  Met Updated Initial Criteria   At least 5% weight loss of baseline body weight  Approved for 12 months      Even though patient has been on medication within the calendar year, prior authorization was not initiated due to patient continuing therapy from 2023. Will restart prior authorization to ensure coverage.       PLAN:                            Pharmacist to start Prior Authorization on Zepbound. Once approved, to start Zepbound 2.5 mg subcutaneous once weekly for 4 weeks, then if tolerating increase to 5 mg weekly thereafter.  RX will be sent to [facility] for both 2.5 mg and 5 mg doses. The 2.5 mg dose will be filled. The 5 mg dose will be put on hold. Call the pharmacy when ready to fill the 5 mg box.   Educated on mechanism, adverse effects, monitoring, safety, administration with use of Zepbound.     To help  with tolerability and effectiveness of Zepbound:  Eat small meals/snacks throughout the day (about every 2-4 hours)  Focus on getting protein in first with each meal and snack.   A good starting goal is 60 g protein daily (track this, especially if at weight loss plateau). Once you consistently are getting 60g daily, try getting 90 g protein daily.  Drink plenty of water - goal 64 oz throughout the day  You may try Metamucil, Benefiber, or Citrucel to help feel more full (less nausea) and have softer, more consistent bowel movements.  To optimize weight management - work on incorporating resistance training/weight lifting to build muscle and improve overall metabolism of adipose tissue.    Zepbound Dosing:   Start Zepbound: It is a subcutaneous injection that you inject once weekly and titrate the dose slowly over time. Make sure inject the same time each day of the week, for example Monday evenings.  Each pen is single use.  In each prescription, you will get 4 pens in each box.  You will need a new prescription for each strength of the medication.  Week 1-4: Inject 2.5 mg once weekly  Week 5-8: If tolerating, can increase to 5 mg once weekly if necessary  Week 9-12: If tolerating, can increase to 7.5 mg once weekly if necessary  Week 13-16: If tolerating, can increase to 10 mg once weekly if necessary  Week 17-20: If tolerating, can increase to 12.5 mg once weekly if necessary  Week 21 and on: if tolerating, can increase to 15 mg once weekly if necessary    The goal is to get to a dose that is well tolerated and effective for you. You do not have to go up on the dose each month or get to maximum dose if getting an effective response with minimal side effects.     *If you are having some nausea or other side effects to where you are hesitant to move up to the next dose, stay at the same dose you are on for an additional week to see if side effect(s) improves/resolves. Make sure to take this time to hydrate and  "ensure you are drinking at least 64 oz water per day. If you are wanting to stay at the same dose and do not have additional refills on that prescription please reach out to the clinic.    Zepbound Administration Video: Video that was created by the .  https://www.zepbound.[facility]/how-to-use    Zepbound Copay Card:  https://www.zepbound.[facility]/coverage-savings    Zepbound Storage and Stability:   Make sure that when you get the prescription that you store the prescription in the refrigerator until it is time to use the Zepbound pen.  Once it is time to use the Zepbound pen, you can keep the pen at room temperature and it is good for up to 21 days at room temperature.     Zepbound Common Side Effects:   Nausea, diarrhea, constipation, headache, tiredness (fatigue), dizziness, stomach upset/pain. Less commonly, Zepbound can cause low blood sugar (symptoms: shaky, dizzy, sweaty, agitation). Please reach out to the care team should you feel like this is occurring. It is important to ensure that you are eating consistent meals and not skipping meals. Ensure you are getting at least 64 oz water daily.         [employer] employees with [employer] insurance ([facility]) are restricted to using the [facility] for Saxenda, Wegovy, and Zepbound    [facility]  [place] [address]  Phone: [phone]    Next steps:  After processing the prescription and saving to your profile, the pharmacy will give you an automated call to inform you that they have your prescription on file. This typically occurs within 1-2 days after the prescription is sent but may take 3-5 business days during high volume times.  You will need to call the pharmacy back to set up the first delivery of every new prescription or new medication dose.   Once you are on a stable dose, you can inquire with the pharmacy about signing up for \"Text to Order through " "[employer]\", \"Auto Fill\", and/or using the \"My [facility] [place] [facility]\" ami.     Follow-up: 6-8 weeks after starting Zepbound, link to schedule sent via [employer].       SUBJECTIVE/OBJECTIVE:                          Paige Fajardo is a 53 year old female called for an initial visit. She was referred to me from Sury Perry.      Reason for visit: [facility] Insurance requirements.    Allergies/ADRs: Reviewed in chart  Past Medical History: Reviewed in chart  Tobacco: She reports that she has never smoked. She has been exposed to tobacco smoke. She has never used smokeless tobacco.  Alcohol: history of alcohol dependence      Medication Adherence/Access: no issues reported    Medical History:  MEN2/Medullary Thyroid Cancer: none  Pancreatitis: none  Baseline GI symptomatology: none, see below for previous side effects       Weight Management   Topiramate 25 mg once daily evening, recently started  Patient reports no current medication side effects.  Nutrition/Eating Habits: has noticed changes in appetite. fullness.    Curbed hunger, not eating as   Exercise/Activity: hip pain, wanting to get more .   Medications Tried/Failed:  Topiramate: used for migraines, ineffective for weight loss in past  Wegovy: nausea, vomiting  Zepbound: significant nausea, vomiting, dehydration with 10 mg dose     Per Sury Perry 9/5/2024:   Stopped zepbound 10mg in May due to persistent nausea and vomiting over weeks of time. Felt the effect built up over time. Had too much going on this summer to deal with adverse side effects or follow up to figure out what was going on. Since stopping zepbound has experienced weight gain of 25lb. Need prednisone x2 this summer due to illness.   Suspect patient dose escalated too quickly and was not able to keep up with nutrition/ hydration. Discussed dose escalation in the future if similar symptoms occur. She did have benefits at 5mg so I would strongly encourage her to stay at lower doses as long as she can given her " "response to escalation thus far.     Current goals:   <200 lbs, feels this is comfortable weight     Initial Consult Weight: 267 lbs (9/12/2024)       Wt Readings from Last 4 Encounters:   09/12/24 121.1 kg (267 lb)   09/05/24 121.1 kg (267 lb)   07/02/24 117.5 kg (258 lb 15.9 oz)   06/13/24 117.5 kg (259 lb)     Estimated body mass index is 45.83 kg/m  as calculated from the following:    Height as of this encounter: 1.626 m (5' 4\").    Weight as of this encounter: 121.1 kg (267 lb).    ----------------    I spent 19 minutes with this patient today. All changes were made via collaborative practice agreement with Sury Perry as one of the credentialed prescriber of the [employer] [facility] MTM Weight Mgmt Program.   A copy of the visit note was provided to the patient's provider(s).    A summary of these recommendations was sent via [employer].    Romi Almendarez, PharmD, BCACP  Medication Therapy Management (MTM) Pharmacist   [facility]    Telemedicine Visit Details  Type of service:  Telephone visit  Start Time:  2:25 pm  End Time: 2:44 PM     Medication Therapy Recommendations  Obesity    Rationale: Untreated condition - Needs additional medication therapy - Indication   Recommendation: Start Medication   Status: Accepted per CPA              "

## 2024-09-12 NOTE — PATIENT INSTRUCTIONS
Recommendations from today's MT visit:                                                    Pharmacist to start Prior Authorization on Zepbound. Once approved, to start Zepbound 2.5 mg subcutaneous once weekly for 4 weeks, then if tolerating increase to 5 mg weekly thereafter.  RX will be sent to [facility] for both 2.5 mg and 5 mg doses. The 2.5 mg dose will be filled. The 5 mg dose will be put on hold. Call the pharmacy when ready to fill the 5 mg box.   Educated on mechanism, adverse effects, monitoring, safety, administration with use of Zepbound.     To help with tolerability and effectiveness of Zepbound:  Eat small meals/snacks throughout the day (about every 2-4 hours)  Focus on getting protein in first with each meal and snack.   A good starting goal is 60 g protein daily (track this, especially if at weight loss plateau). Once you consistently are getting 60g daily, try getting 90 g protein daily.  Drink plenty of water - goal 64 oz throughout the day  You may try Metamucil, Benefiber, or Citrucel to help feel more full (less nausea) and have softer, more consistent bowel movements.  To optimize weight management - work on incorporating resistance training/weight lifting to build muscle and improve overall metabolism of adipose tissue.    Zepbound Dosing:   Start Zepbound: It is a subcutaneous injection that you inject once weekly and titrate the dose slowly over time. Make sure inject the same time each day of the week, for example Monday evenings.  Each pen is single use.  In each prescription, you will get 4 pens in each box.  You will need a new prescription for each strength of the medication.  Week 1-4: Inject 2.5 mg once weekly  Week 5-8: If tolerating, can increase to 5 mg once weekly if necessary  Week 9-12: If tolerating, can increase to 7.5 mg once weekly if necessary  Week 13-16: If tolerating, can increase to 10 mg once weekly if necessary  Week 17-20: If tolerating,  can increase to 12.5 mg once weekly if necessary  Week 21 and on: if tolerating, can increase to 15 mg once weekly if necessary    The goal is to get to a dose that is well tolerated and effective for you. You do not have to go up on the dose each month or get to maximum dose if getting an effective response with minimal side effects.     *If you are having some nausea or other side effects to where you are hesitant to move up to the next dose, stay at the same dose you are on for an additional week to see if side effect(s) improves/resolves. Make sure to take this time to hydrate and ensure you are drinking at least 64 oz water per day. If you are wanting to stay at the same dose and do not have additional refills on that prescription please reach out to the clinic.    Zepbound Administration Video: Video that was created by the .  https://www.[facility]/how-to-use    Zepbound Copay Card:  https://www.[employer]/coverage-savings    Zepbound Storage and Stability:   Make sure that when you get the prescription that you store the prescription in the refrigerator until it is time to use the Zepbound pen.  Once it is time to use the Zepbound pen, you can keep the pen at room temperature and it is good for up to 21 days at room temperature.     Zepbound Common Side Effects:   Nausea, diarrhea, constipation, headache, tiredness (fatigue), dizziness, stomach upset/pain. Less commonly, Zepbound can cause low blood sugar (symptoms: shaky, dizzy, sweaty, agitation). Please reach out to the care team should you feel like this is occurring. It is important to ensure that you are eating consistent meals and not skipping meals. Ensure you are getting at least 64 oz water daily.         [employer] employees with [employer] insurance ([facility]) are restricted to using the [facility] for Saxenda, Wegovy, and Zepbound    [facility]  [place], [place] - [address]  Phone: [phone]    Next steps:  After processing the prescription and saving to your profile, the pharmacy will give you an automated call to inform you that they have your prescription on file. This typically occurs within 1-2 days after the prescription is sent but may take 3-5 business days during high volume times.  You will need to call the pharmacy back to set up the first delivery of every new prescription or new medication dose.   Once you are on a stable dose, you can inquire with the pharmacy about signing up for \"Text to Order through [employer]\", \"Auto Fill\", and/or using the \"[email or web address] Rx\" ami.     Follow-up: 6-8 weeks after starting Zepbound, link to schedule sent via [employer].     It was great speaking with you today.  I value your experience and would be very thankful for your time in providing feedback in our clinic survey. In the next few days, you may receive an email or text message from [employer] with a link to a survey related to your  clinical pharmacist.\"     To schedule another MTM appointment, please call the clinic directly or you may call the MTM scheduling line at [phone] or toll-free at [phone].     My Clinical Pharmacist's contact information:                                                      Please feel free to contact me with any questions or concerns you have.      Romi Almendarez, PharmD, BCACP  Medication Therapy Management (MTM) Pharmacist   [facility]     "

## 2024-09-12 NOTE — LETTER
9/12/2024      RE: Paige Fajardo  [address]  [place] [address]       Dear Colleague,    Thank you for the opportunity to participate in the care of your patient, Paige Fajardo, at the [facility]. Please see a copy of my visit note below.    Medication Therapy Management (MTM) Encounter    ASSESSMENT:                            Medication Adherence/Access: No issues identified    Weight management:   Patient would benefit from additional pharmacotherapy for weight management. Given class III obesity, recommend GLP1/GIP therapy as data supports most significant weight loss. Patient also likely to benefit from reduction in food noise and increased satiety. Negative GI symptomatology at baseline. Negative history of pancreatitis, medullary thyroid cancer and multiple endocrine neoplasia type 2.      For patients that are under [employer] insurance coverage, it is mandated by insurance that each qualifying patient meet with hospital based Weight Management Medication Therapy Management pharmacist to continue therapy coverage. The following patient meets the below coverage criteria and can therefore continue GLP-1/GIP agonist therapy for Weight Management:    Adult  BMI >40 with or without comorbidities   OR   BMI >30 + NAFLD*   at time of initiating GLP-1/GIP agonist therapy Approved for 29 weeks  Met Updated Initial Criteria   At least 5% weight loss of baseline body weight  Approved for 12 months      Even though patient has been on medication within the calendar year, prior authorization was not initiated due to patient continuing therapy from 2023. Will restart prior authorization to ensure coverage.       PLAN:                            Pharmacist to start Prior Authorization on Zepbound. Once approved, to start Zepbound 2.5 mg subcutaneous once weekly for 4 weeks, then if tolerating increase to  5 mg weekly thereafter.  RX will be sent to [facility] for both 2.5 mg and 5 mg doses. The 2.5 mg dose will be filled. The 5 mg dose will be put on hold. Call the pharmacy when ready to fill the 5 mg box.   Educated on mechanism, adverse effects, monitoring, safety, administration with use of Zepbound.     To help with tolerability and effectiveness of Zepbound:  Eat small meals/snacks throughout the day (about every 2-4 hours)  Focus on getting protein in first with each meal and snack.   A good starting goal is 60 g protein daily (track this, especially if at weight loss plateau). Once you consistently are getting 60g daily, try getting 90 g protein daily.  Drink plenty of water - goal 64 oz throughout the day  You may try Metamucil, Benefiber, or Citrucel to help feel more full (less nausea) and have softer, more consistent bowel movements.  To optimize weight management - work on incorporating resistance training/weight lifting to build muscle and improve overall metabolism of adipose tissue.    Zepbound Dosing:   Start Zepbound: It is a subcutaneous injection that you inject once weekly and titrate the dose slowly over time. Make sure inject the same time each day of the week, for example Monday evenings.  Each pen is single use.  In each prescription, you will get 4 pens in each box.  You will need a new prescription for each strength of the medication.  Week 1-4: Inject 2.5 mg once weekly  Week 5-8: If tolerating, can increase to 5 mg once weekly if necessary  Week 9-12: If tolerating, can increase to 7.5 mg once weekly if necessary  Week 13-16: If tolerating, can increase to 10 mg once weekly if necessary  Week 17-20: If tolerating, can increase to 12.5 mg once weekly if necessary  Week 21 and on: if tolerating, can increase to 15 mg once weekly if necessary    The goal is to get to a dose that is well tolerated and effective for you. You do not have to go up on the dose each  month or get to maximum dose if getting an effective response with minimal side effects.     *If you are having some nausea or other side effects to where you are hesitant to move up to the next dose, stay at the same dose you are on for an additional week to see if side effect(s) improves/resolves. Make sure to take this time to hydrate and ensure you are drinking at least 64 oz water per day. If you are wanting to stay at the same dose and do not have additional refills on that prescription please reach out to the clinic.    Zepbound Administration Video: Video that was created by the .  https://www.zepboundIntroNiche/how-to-use    Zepbound Copay Card:  https://www.zepbIndustry Dive/coverage-savings    Zepbound Storage and Stability:   Make sure that when you get the prescription that you store the prescription in the refrigerator until it is time to use the Zepbound pen.  Once it is time to use the Zepbound pen, you can keep the pen at room temperature and it is good for up to 21 days at room temperature.     Zepbound Common Side Effects:   Nausea, diarrhea, constipation, headache, tiredness (fatigue), dizziness, stomach upset/pain. Less commonly, Zepbound can cause low blood sugar (symptoms: shaky, dizzy, sweaty, agitation). Please reach out to the care team should you feel like this is occurring. It is important to ensure that you are eating consistent meals and not skipping meals. Ensure you are getting at least 64 oz water daily.         [employer] employees with [employer] insurance ([facility]) are restricted to using the [facility] for Saxenda, Wegovy, and Zepbound    [facility]  [place], [place] - [address]  Phone: [phone]    Next steps:  After processing the prescription and saving to your profile, the pharmacy will give you an automated call to inform you that they have your prescription on file. This typically  "occurs within 1-2 days after the prescription is sent but may take 3-5 business days during high volume times.  You will need to call the pharmacy back to set up the first delivery of every new prescription or new medication dose.   Once you are on a stable dose, you can inquire with the pharmacy about signing up for \"Text to Order through MScripts\", \"Auto Fill\", and/or using the \"demandmart Rx\" ami.     Follow-up: 6-8 weeks after starting Zepbound, link to schedule sent via Piiku.       SUBJECTIVE/OBJECTIVE:                          Paige Fajardo is a 53 year old female called for an initial visit. She was referred to me from Sury Perry.      Reason for visit: [facility] Insurance requirements.    Allergies/ADRs: Reviewed in chart  Past Medical History: Reviewed in chart  Tobacco: She reports that she has never smoked. She has been exposed to tobacco smoke. She has never used smokeless tobacco.  Alcohol: history of alcohol dependence      Medication Adherence/Access: no issues reported    Medical History:  MEN2/Medullary Thyroid Cancer: none  Pancreatitis: none  Baseline GI symptomatology: none, see below for previous side effects       Weight Management  Topiramate 25 mg once daily evening, recently started  Patient reports no current medication side effects.  Nutrition/Eating Habits: has noticed changes in appetite. fullness.    Curbed hunger, not eating as   Exercise/Activity: hip pain, wanting to get more .   Medications Tried/Failed:  Topiramate: used for migraines, ineffective for weight loss in past  Wegovy: nausea, vomiting  Zepbound: significant nausea, vomiting, dehydration with 10 mg dose     Per Sury Perry 9/5/2024:   Stopped zepbound 10mg in May due to persistent nausea and vomiting over weeks of time. Felt the effect built up over time. Had too much going on this summer to deal with adverse side effects or follow up to figure out what was going on. Since stopping zepbound has experienced weight gain " "of 25lb. Need prednisone x2 this summer due to illness.   Suspect patient dose escalated too quickly and was not able to keep up with nutrition/ hydration. Discussed dose escalation in the future if similar symptoms occur. She did have benefits at 5mg so I would strongly encourage her to stay at lower doses as long as she can given her response to escalation thus far.     Current goals:   <200 lbs, feels this is comfortable weight     Initial Consult Weight: 267 lbs (9/12/2024)       Wt Readings from Last 4 Encounters:   09/12/24 121.1 kg (267 lb)   09/05/24 121.1 kg (267 lb)   07/02/24 117.5 kg (258 lb 15.9 oz)   06/13/24 117.5 kg (259 lb)     Estimated body mass index is 45.83 kg/m  as calculated from the following:    Height as of this encounter: 1.626 m (5' 4\").    Weight as of this encounter: 121.1 kg (267 lb).    ----------------    I spent 19 minutes with this patient today. All changes were made via collaborative practice agreement with Sury Perry as one of the credentialed prescriber of the [employer] [facility] MTM Weight Mgmt Program.   A copy of the visit note was provided to the patient's provider(s).    A summary of these recommendations was sent via Ling[employer].    Romi Almendarez, PharmD, BCACP  Medication Therapy Management (MTM) Pharmacist   [facility]    Telemedicine Visit Details  Type of service:  Telephone visit  Start Time:  2:25 pm  End Time: 2:44 PM     Medication Therapy Recommendations  Obesity    Rationale: Untreated condition - Needs additional medication therapy - Indication   Recommendation: Start Medication   Status: Accepted per CPA                Please do not hesitate to contact me if you have any questions/concerns.     Sincerely,     Romi Almendarez formerly [facility]  "

## 2024-09-12 NOTE — NURSING NOTE
Current patient location:  Pt is passenger in vehicle    Is the patient currently in the state of [place]? YES    Visit mode:TELEPHONE    If the visit is dropped, the patient can be reconnected by: TELEPHONE VISIT: Phone number:   Telephone Information:   Mobile [phone]       Will anyone else be joining the visit? NO  (If patient encounters technical issues they should call [phone] :[phone])    How would you like to obtain your AVS? MyChart    Are changes needed to the allergy or medication list? No    Are refills needed on medications prescribed by this physician? YES Pt would like to discuss with Pharmacist.    Rooming Documentation:  Questionnaire(s) not pre-assigned      Reason for visit: Medication Therapy Management    Pt states 4/10 lower back/hip pain today chronic.  Pt believes it is fibro stuff coming back.    Luis VACAF

## 2024-09-13 NOTE — TELEPHONE ENCOUNTER
PA Initiation    Medication: ZEPBOUND 2.5 MG/0.5ML SC SOAJ  Insurance Company: [employer] - Phone [phone] Fax [phone]  Pharmacy Filling the Rx: [facility] - [place], [place] - [facility] [address]  Filling Pharmacy Phone: [phone]  Filling Pharmacy Fax: [phone]  Start Date: 9/13/2024

## 2024-09-16 NOTE — TELEPHONE ENCOUNTER
Prior Authorization Approval    Medication: ZEPBOUND 2.5 MG/0.5ML SC SOAJ  Authorization Effective Date: 9/14/2024  Authorization Expiration Date: 12/31/2024  Approved Dose/Quantity: 1 month  Reference #: [id number]   Insurance Company: [employer] - Phone [phone] Fax [phone]  Expected CoPay: $    CoPay Card Available:      Financial Assistance Needed:    Which Pharmacy is filling the prescription: [facility] - [place] [address]  Pharmacy Notified: order sent to pharmacy  Patient Notified: [employer] message sent to patient.

## 2024-09-16 NOTE — TELEPHONE ENCOUNTER
PA Approved for [facility]. Order sent to pharmacy and [employer] message sent to patient. Closing encounter

## 2024-09-25 ENCOUNTER — TELEPHONE (OUTPATIENT)
Dept: ENDOCRINOLOGY | Facility: CLINIC | Age: 54
End: 2024-09-25
Payer: COMMERCIAL

## 2024-09-25 NOTE — TELEPHONE ENCOUNTER
Patient confirmed scheduled appointment:  Date: 10/23/24  Time: 9:30 am  Visit type: RET MWM Nutrition  Provider: Christina Martell  Location: virtual  Testing/imaging: n/a  Additional notes: n/a

## 2024-10-21 ENCOUNTER — TELEPHONE (OUTPATIENT)
Dept: ENDOCRINOLOGY | Facility: CLINIC | Age: 54
End: 2024-10-21
Payer: COMMERCIAL

## 2024-10-21 NOTE — TELEPHONE ENCOUNTER
Patient confirmed scheduled appointment:  Date: 11/4/24  Time: 2:30 pm  Visit type: RET MWM Nutrition  Provider: Cherry Reis  Location: virtual  Testing/imaging: n/a  Additional notes: n/a

## 2024-11-04 ENCOUNTER — VIRTUAL VISIT (OUTPATIENT)
Dept: ENDOCRINOLOGY | Facility: CLINIC | Age: 54
End: 2024-11-04
Payer: COMMERCIAL

## 2024-11-04 VITALS — BODY MASS INDEX: 43.71 KG/M2 | WEIGHT: 256 LBS | HEIGHT: 64 IN

## 2024-11-04 VITALS — BODY MASS INDEX: 43.71 KG/M2 | HEIGHT: 64 IN | WEIGHT: 256 LBS

## 2024-11-04 DIAGNOSIS — Z71.3 NUTRITIONAL COUNSELING: Primary | ICD-10-CM

## 2024-11-04 DIAGNOSIS — E66.01 CLASS 3 SEVERE OBESITY WITH SERIOUS COMORBIDITY AND BODY MASS INDEX (BMI) OF 45.0 TO 49.9 IN ADULT, UNSPECIFIED OBESITY TYPE (H): ICD-10-CM

## 2024-11-04 DIAGNOSIS — E66.813 CLASS 3 SEVERE OBESITY WITH SERIOUS COMORBIDITY AND BODY MASS INDEX (BMI) OF 45.0 TO 49.9 IN ADULT, UNSPECIFIED OBESITY TYPE (H): ICD-10-CM

## 2024-11-04 PROCEDURE — 99207 PR NO CHARGE LOS: CPT | Mod: 95

## 2024-11-04 PROCEDURE — 97803 MED NUTRITION INDIV SUBSEQ: CPT | Mod: 95

## 2024-11-04 PROCEDURE — G2211 COMPLEX E/M VISIT ADD ON: HCPCS | Mod: 95

## 2024-11-04 PROCEDURE — 99214 OFFICE O/P EST MOD 30 MIN: CPT | Mod: 95

## 2024-11-04 RX ORDER — TOPIRAMATE 25 MG/1
75 TABLET, FILM COATED ORAL AT BEDTIME
Qty: 90 TABLET | Refills: 3 | Status: SHIPPED | OUTPATIENT
Start: 2024-11-04

## 2024-11-04 ASSESSMENT — PAIN SCALES - GENERAL
PAINLEVEL_OUTOF10: MODERATE PAIN (5)
PAINLEVEL_OUTOF10: MODERATE PAIN (5)

## 2024-11-04 NOTE — PROGRESS NOTES
"Video-Visit Details    Type of service:  Video Visit    Video Start Time:  2:35 PM  Video End Time: 3:02 PM    Originating Location (pt. Location): Home    Distant Location (provider location):  Offsite (providers home)     Platform used for Video Visit: Bus[employer]os     Weight Management Nutrition Consultation    Paige Fajardo is a 54 year old female presents today for weight management nutrition consultation.  Patient referred by Dr. Reed on August 2, 2022.    Patient with Co-morbidities of obesity including:  Type II DM no  Renal Failure no  Sleep apnea yes  Hypertension no   Dyslipidemia no  Joint pain no  Back pain yes  GERD no     PMH:   Depression  Swelling  Fatigue  Gout   Asthma  Fibromyalgia   Covid at least 2x  Weight gain associated with prednisone (was on for covid and gout)      Hx of alcohol abuse 20+ years ago    Anthropometrics:  Highest weight per pt: 338 lbs  Weight 8/2/22: 304 lbs with BMI 49.07    Estimated body mass index is 43.92 kg/m  as calculated from the following:    Height as of an earlier encounter on 11/4/24: 1.626 m (5' 4.02\").    Weight as of an earlier encounter on 11/4/24: 116.1 kg (256 lb).     Current weight: 259 lbs, pt report    Medications for Weight Loss:  Zepbound (was on Wegovy previously) - Stopped, was having significant side effects. Plan is to stop this med all together. Planning to try to stay on Topiramate.     Supplements:  Vit D 3,000 IUs/day - Usually decreases as weather gets nice  Turmeric   B-complex  Calcium carbonate - only if stomach is upset   Mg Citrate     Hx of high cholesterol     Labs 3/10/23 (when in ED for dehydration)  Calcium slightly elevated at 10.6  eGFR 81    NUTRITION HISTORY    NKFA  Limits dairy - notices clogged sinuses and worsening asthma symptoms per pt.   Does use dairy alternatives.     Has not met with a RD before.    Pt goals: lose weight, learn about food choices (what is more nutritious), feel better    Please see previous RD notes for " more information.     Feb 2024:  Had to switch medications per insurance. Feeling hungrier now. Trying to eat nutrient dense (filling foods).     Was sick most of Jan - not sure if medication or virus related but others around her were sick. Lots of nausea, vomiting and diarrhea. Finding when she gets sick it is hard to get over. Got dehydrated and was hard to come back. Went on short-term steroids and gained about 12 lbs per pt.    No vomiting or diarrhea this week.    Starting making aparna pudding with fruit on side for breakfast.     Attending a local support group in her community - has been super helpful.    PA: doing well post hip surgery. worked up to a full mile in the pool, also using treadmill. Doing yoga. Walking dog. Went to [place] since last seen - walked 4 miles/day. Aiming for 3x/week at gym   - Signed up for a 5k (got clearance from team)     May 2024:  Stopped medication due to significant side effects. Last shot 2 weeks ago. Has stayed stable so far.   Really wants to focus on nutrition and exercise now to help with preventing weight gain.    Trying to be mindful of food choices. Buffet this morning - skipped the cinnamon rolls and bagels.   made a chocolate mousse made with cottage cheese as a base. Loved it.    Use air fryer and grilling often.   Substituting soy sauce with liquid aminos.   Keeping nuts stocked at home.    PA:  - yoga weekly  - did a 5k with her dog, utilized couch to 5k program. Didn't run whole thing but felt really good. Was not sore or in pain. Huge Victory per pt    June 2024:  Finding it hard to balance all the things and focus on nutrition and exercise. Reports her hip pain has gotten worse and she is worried it is torn. Has an appointment to get hip looked at today. Patient is able to still go on walks and do her outside chores. She has been doing well with drinking a lot of water. Has a gym membership and wants to start swimming. Thinking about purchasing e-bikes.   is the main cook. She is trying to find ways to make more healthful ingredient swaps. Finds it hard sometimes to navigate the summer and all the events that are centered around food.     November 2024: Has been having significant side effects while on Zepbound and it was determined it was best to come off of the medication. She is hoping to stay on Topiramate.     Wants to work closely with dietitian team to try to work on lifestyle and diet habits that will help her with her weight loss journey due to this med change.     Currently the Zepbound is still in her system so she is noticing she has good appetite suppression still.  cooks half the time. Wants to try to eat 3 meals a day and focus on getting good amounts of protein and fiber in her diet. Resumed her gym membership and has been going to the pool almost everyday.     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   has diabetes     Nutrition Prescription  Recommended energy/nutrient modification.    Nutrition Diagnosis  Food and nutrition related knowledge deficit r/t lack of prior exposure to nutrition education aeb pt report and interest in learning     Obesity r/t long history of positive energy balance aeb BMI >30.    Nutrition Intervention  Reviewed and modified previous goals  Answered pt questions  Coordination of care   Nutrition education - Prioritizing protein at meals to help with keeping campbell longer and curbing cravings. Incorporating a lot of fruits and vegetables. Getting the movement in within limitations.   AVS and handouts via [employer]    Patient demonstrates understanding.    Expected Engagement: good    Nutrition Goals  1) Have a protein source at all meals. Aim for 60-90 grams of protein daily.   2) Boost fiber in your diet to help with fullness   3) Continue with physical activity as able    Five Food Groups Pantry List  Vegetables:  Keep a variety of canned tomatoes in stock (diced, crushed, whole, stewed). Use them in soups, stews, sauces, casseroles and more! Also,  a jar of your favorite pasta sauce. Dried mushrooms are another great pantry item because they can add depth of flavor to your meals.    Fruits: Dried fruits such as raisins, dried cranberries and dried apricots provide dietary fiber. They also can add a punch of flavor to your morning breakfast, midday salad and dinner grains.    Milk and Dairy Products: Dried milk is a great back-up item to have on stock. You can use it in your coffee or tea. Boxed milk also is available in single-serving packages and is a great item for lunch boxes. Evaporated milk, available in cans in the baking aisle, can be substituted for liquid milk in most recipes.    Protein Foods: Stock up on canned or dried lentils and black, domingo, cannellini, garbanzo and kidney beans. Toss cooked beans in salads, soups, stews and other dishes. Canned tuna, anchovies and sardines.    Grains: Keep a stash of oatmeal, buckwheat and other whole-grain cereals in the pantry. For an extra boost, add nuts and fresh berries to these hot cereals. Barley, farro, quinoa and other grains are staples for healthy meals. Also, keep a variety of rice on hand. Long grain, short grain, basmati and brown rice. Spaghetti, ziti, penne and other pastas are great for an easy, quick and filling meal. Give yourself an extra nutrition boost by buying whole-grain pasta or trying pasta made from legumes.    Also, stock up on:  Condiments: Ketchup, mustard and relish can be stored in the pantry until opened. Once you open them, keep them in the fridge.  Oil and vinegar: Extra-virgin olive oil is a versatile, heart-healthy option. Other oils, such as peanut, walnut and sesame, add a burst of flavor to meals.  different types of vinegar, such as cider, white and balsamic. Each imparts a unique flavor to your recipes.  Stock:  Vegetable, chicken and beef stock are the basics of many recipes. Opt for low-sodium versions or ones with no added salt.  Herbs and spices:  small containers of ground herbs and spices. That way they are as fresh as possible when you use them.  Flax and other seeds: Flax and aparna seeds deliver protein, dietary fiber and omega-3 fatty acids. Add them to cereal, salads, sauces and home-baked goods. If you buy whole flaxseed, make sure you grind it up before eating so your body can absorb the nutrients.    Five Food Groups Freezer List  Vegetables:  some of your favorite frozen veggies. Look for packages without sodium. And, while you are in the produce aisle, grab some fresh herbs. When you get home, fill ice cube trays with chopped herbs, top off the herbs with boiling water, and carefully place in the freezer. Add these herb cubes as you prepare meals for a punch of freshness.    Fruits: Stash frozen berries and other fruits in the freezer.     Milk and Dairy Products: Freeze Parmesan and other pre-shredded cheeses -- toss them into soups, stews and pasta dishes.     Protein Foods: Stock up on salmon and other fatty fishes to ensure you have ready access to healthy fats. Lean meats and poultry also store well in the freezer. One tip: Make sure you move it to the refrigerator one day before cooking to give adequate time for defrosting. Keep a variety of nuts in the freezer. This helps prevent them from spoiling. Add them to cold cereal, salads, hot grains and other dishes.    Grains: Whole-grain corn tortillas freeze well and can be used for quick breakfasts, lunches or dinners. Can t eat that loaf of bread fast enough while it is fresh? Make it a habit to freeze part of the loaf and defrost slices as you need them.    Additional resources:   Protein Sources   http://[employer]/099722.pdf     Quick/Easy Protein Sources:  Hard boiled eggs  Part-skim cheese sticks  Baby Bell cheese  "rounds  Low-fat/low-sugar Greek yogurt  Low-fat cottage cheese  Lean deli meat (chicken/turkey/ham)  Roasted chickpeas or lentils  Nuts   Turkey meat stick  Protein shake/bar  \"P3\" snack (cheese, nuts, deli meat)  [employer] \"Protein Bread\"   \"Egglife\" egg white wrap    Tuna/salmon can/packet      Non-meat protein Ideas  Quinoa  Eggs  Dairy (Cottage cheese, low fat cheese, greek yogurt)   Nuts  Beans  Lentils  Protein pasta   Nutritional yeast  Garden of life raw meal powder  Liquid aminos  Homemade meats - a taco meat could be made with chopped cauliflower/mushroom as an example   Hummus (could do homemade if preferred)  Hemp hearts   Tofu  Seitan     Complex Carbohydrates high in protein  Quinoa  Brown Rice  Chick Pea  Buckwheat  Sweet Potatoes  Lentils  Legumes  Lau Beans  Black Beans   Farro   Kidney Beans     Faster meal component ideas:   Protein   Crockpot chicken  Rotisserie chicken  Deli meat  Ready made meat from store ([employer])  Cottage cheese  Greek yogurt  String cheese  Scrambled/hard boiled eggs  Canned tuna/chicken/salmon (or pouches)  Frozen, already cooked shrimp  Nuts/Seeds (handful)  Nut Butter (2 tbsp)  Grains  Minute grain cups (lentils, rice, quinoa)   90 second grain pouches  Whole grain bread (i.e. Tony bread, etc.)  Carb balance tortillas  Quick oats  Whole wheat crackers  Fruit  Apples  Bananas  Peaches  Pears  Plums  Grapes  Berries (strawberries, blueberries, raspberries, blackberries)  Cherries  Frozen fruit  Fruit canned in 100% fruit juice  Vegetables  Mini cucumbers  Baby carrots  Mini bell peppers  Snap peas  Pre-cut vegetables (i.e. broccoli, brussels sprouts, etc.)  Frozen vegetables (steam in bag)  Canned vegetables (recommend low sodium options)  Other   Hemp hearts, aparna, and other seeds      High protein breakfast recipes  https://www.[employer]/high-protein-breakfast-ideas/  https://[employer]/breakfast-meal-prep-ideas/    Follow-Up: " January 17 with Christina Martell RD    Time spent with patient: 27 minutes.

## 2024-11-04 NOTE — NURSING NOTE
Current patient location: home    Is the patient currently in the state of [place]? YES    Visit mode:VIDEO    If the visit is dropped, the patient can be reconnected by: VIDEO VISIT: Text to cell phone:   Telephone Information:   Mobile [phone]       Will anyone else be joining the visit? NO  (If patient encounters technical issues they should call [phone]    Are changes needed to the allergy or medication list?  N/a     Are refills needed on medications prescribed by this physician? NO    Rooming Documentation:  Not applicable      Reason for visit: RECHECK    Wt other than 24 hrs:    Pain more than one location:  no  Alycia WILCOX

## 2024-11-04 NOTE — NURSING NOTE
Current patient location: [facility] [address] [facility] [address]    Is the patient currently in the state of [place]? YES    Visit mode:VIDEO    If the visit is dropped, the patient can be reconnected by: VIDEO VISIT: Text to cell phone:   Telephone Information:   Mobile [phone]       Will anyone else be joining the visit? NO  (If patient encounters technical issues they should call [phone]    Are changes needed to the allergy or medication list? No, Pt stated no changes to allergies, and Pt stated no med changes    Are refills needed on medications prescribed by this physician? Discuss with provider    Rooming Documentation:  Questionnaire(s) completed    Reason for visit: RECHECK (SETH)    Joy WILCOX       Yes

## 2024-11-04 NOTE — LETTER
2024       RE: Paige Fajardo  [address]  [place] [address]     Dear Colleague,    Thank you for referring your patient, Paige Fajardo, to the [facility] WEIGHT MANAGEMENT CLINIC [place] at [facility]. Please see a copy of my visit note below.    Virtual Visit Details    Type of service:  Video Visit     Originating Location (pt. Location): Home    Distant Location (provider location):  On-site  Platform used for Video Visit: [facility] Medical Weight Management Note     Paige Fajardo  MRN:  [id number]  :  1970  VIRI:  2024    Dear Francesca Samson MD,    I had the pleasure of seeing your patient Paige Fajardo. She is a 54 year old female who I am continuing to see for treatment of obesity related to:        2022    10:09 AM   --   I have the following health issues associated with obesity Sleep Apnea    Asthma    Stress Incontinence   I have the following symptoms associated with obesity Knee Pain    Depression    Lower Extremity Swelling    Back Pain    Fatigue    Hip Pain       Assessment & Plan  Problem List Items Addressed This Visit       Class 3 severe obesity with serious comorbidity and body mass index (BMI) of 45.0 to 49.9 in adult (H)     I last saw Paige 2023. She was on Wegovy 1.7mg and was seeing minimal side effects. She began to see increase side effects and was transitioned to Zepbound. Due to shortages was transitioned to Zepbound 10mg. She then had side effects of nausea and vomiting and discontinued in 2024. She returned to clinic in September and was seen by Sury Perry CNP. Zepbound restarted at lowest dose with a slower ramp up.     She took Zepbound 2.5mg for 4 weeks. Minimal side effects of nausea, but did not see any hunger control. Then dose increased to the 5mg and since then will have 3 days of nausea, upset stomach, and diarrhea. She can have minimal food or drink during this  time. Side effects are not getting better with continued use. Due to this, she will discontinue Zepbound at this time.     Discussed next steps. I do think lower doses of Wegovy, 0.5mg or 1.0mg might be appropriate. Discussed also decreasing back to Zepbound 2.5mg for another month or two and trying to re ramp back up to 5mg. However, her insurance will no longer be covering this medication and she has the option of her husbands, but would be more out of pocket. We discussed cash prices for zepbound and compounded semaglutide, but not price appropriate at this time. With this, I do not think continuing on a GLP-1 at this time would make sense. Discussed options of wellbutrin or Phentermine. However, she has a hx of anxiety and is on medications for this. She does not want to start anything that could influence that. So, today she will continue on Topiramate.          Relevant Medications    topiramate (TOPAMAX) 25 MG tablet    Other Relevant Orders    [facility] Follow-Up Order        Continue Topiramate 75mg at night. Refills sent   Stop Zepbound. Can consider restarting Wegovy and only ramping up to lower doses in the future if needed  Dalia Reis RD today   Sofie Yoon in 3 months     INTERVAL HISTORY:  New [facility] with Dr. Reed - 8/2/22  Started on Ozempic and ramped up to 2.0mg and then transitioned to Wegovy 2.4mg due to insurance coverage  Side effects and decreased Wegovy 1.7mg  Continued to have side effects on Wegovy and transitioned to Zepbound  Zepbound 10mg - had side effects of nausea and vomiting and self discontinued  Returned to clinic 9/2024 and restarted Zepbound with a slower dose increase plan        Anti-obesity medication history    Current:   Zepbound 5mg - has been on this dose for 2 weeks. Takes injections on Wednesdays.   Has been sick on the past 2 weekends. Will have diarrhea and belching. Had 1 episode of vomiting when she tried to eat. This will last  around 36hrs. Cannot keep any food or water down. Was worried she needed to go to go get fluids. Took imodium with minimal relief. Took zofran with some relief. Is also very gassy the rest of the week.  Her  also has some of these symptoms, but is also on a GLP-1. Did not have these symptoms on the 2.5mg, but did not feel as full.     Topiramate 75mg - is taking in the evening. Does not think there are any side effects. Started before restarted Zepbound. Does think it was helpful on own.      Naltrexone - taking currently for pain.       Recent diet changes: Will be seeing dietitian today. Wants to get back on track and seeing them more consistently.     Recent exercise/activity changes: has been back to swimming.       CURRENT WEIGHT:   256 lbs 0 oz    Initial Weight (lbs): 304 lbs  Last Visits Weight: 121.1 kg (267 lb)  Cumulative weight loss (lbs): 48  Weight Loss Percentage: 15.79%    Wt Readings from Last 5 Encounters:   11/04/24 116.1 kg (256 lb)   11/04/24 116.1 kg (256 lb)   09/12/24 121.1 kg (267 lb)   09/05/24 121.1 kg (267 lb)   07/02/24 117.5 kg (258 lb 15.9 oz)             11/3/2024     2:14 PM   Changes and Difficulties   I have made the following changes to my diet since my last visit: Eating less, healther   With regards to my diet, I am still struggling with: Eating enough   I have made the following changes to my activity/exercise since my last visit: Pool         MEDICATIONS:   Current Outpatient Medications   Medication Sig Dispense Refill     albuterol (PROAIR HFA/PROVENTIL HFA/VENTOLIN HFA) 108 (90 Base) MCG/ACT inhaler Inhale 2 puffs into the lungs every 6 hours as needed for shortness of breath or wheezing 18 g 5     azelastine (ASTELIN) 0.1 % nasal spray Spray 2 sprays into both nostrils 2 times daily as needed for rhinitis 30 mL 3     azelastine (OPTIVAR) 0.05 % ophthalmic solution Apply 1 drop to eye 2 times daily as needed (itchy/watery eyes) 6 mL 3     B Complex Vitamins (VITAMIN  B COMPLEX PO) 3 drops per day       budesonide-formoterol (SYMBICORT) 80-4.5 MCG/ACT Inhaler Inhale 2 puffs into the lungs every 4 hours as needed (Wheezing, chest tightness, shortness of breath, or persistent cough) 10.2 g 3     cholecalciferol (VITAMIN D) 1000 UNIT tablet 3 drops per day       colchicine (COLCYRS) 0.6 MG tablet Take 2 tablets orally at onset of symptoms and one more tablet 1 hour later, and then one tablet twice a day until flare resolves.       FLUoxetine (PROZAC) 20 MG capsule Take 20 mg by mouth       fluticasone (FLONASE) 50 MCG/ACT nasal spray Spray 1-2 sprays into both nostrils daily 16 g 3     ipratropium - albuterol 0.5 mg/2.5 mg/3 mL (DUONEB) 0.5-2.5 (3) MG/3ML neb solution Take 1 vial (3 mLs) by nebulization every 6 hours as needed for shortness of breath or wheezing 960 mL 4     levonorgestrel (MIRENA) 20 MCG/24HR IUD 1 each by Intrauterine route once       MAGNESIUM CITRATE PO 3 drops per day       Naltrexone HCl POWD        Naproxen Sodium (ALEVE PO) Take 220 mg by mouth 2 times daily as needed        order for DME Equipment being ordered: Nebulizer 1 Device 0     rizatriptan (MAXALT-MLT) 10 MG ODT tab Take 1 tablet (10 mg) by mouth at onset of headache for migraine May repeat in 2 hours. Max 3 tablets/24 hours. 18 tablet 3     tirzepatide-Weight Management (ZEPBOUND) 5 MG/0.5ML prefilled pen Inject 0.5 mLs (5 mg) subcutaneously every 7 days. After completing 2.5 mg doses (start week 5). 2 mL 1     topiramate (TOPAMAX) 25 MG tablet Take 3 tablets (75 mg) by mouth at bedtime. 25mg at bedtime for week 1, 50mg at bedtime for 1 week, and 75mg at bedtime thereafter 90 tablet 3     Turmeric 450 MG CAPS 1 scoop per day       benzonatate (TESSALON) 100 MG capsule Take 1 capsule (100 mg) by mouth 3 times daily as needed for cough (Patient not taking: Reported on 7/2/2024) 90 capsule 0     cetirizine-psuedoePHEDrine (ZYRTEC-D) 5-120 MG per tablet Take 1 tablet by mouth 2 times daily (Patient  "not taking: Reported on 7/2/2024) 90 tablet 3     COMPOUNDED NON-CONTROLLED SUBSTANCE (CMPD RX) - PHARMACY TO MIX COMPOUNDED MEDICATION LOW DOSE NALTREXONE 6MG one tablet qhs (Patient not taking: Reported on 11/4/2024) 90 capsule 4     gabapentin (NEURONTIN) 300 MG capsule Take 2 capsules  (600mg) in the morning and 2 capsules (600mg) at bedtime. 90day supply (Patient taking differently: Take 300 mg by mouth 2 times daily) 360 capsule 1     meloxicam (MOBIC) 15 MG tablet Take 15 mg by mouth daily       ondansetron (ZOFRAN ODT) 4 MG ODT tab Take 1 tablet (4 mg) by mouth every 8 hours as needed for nausea (Patient not taking: Reported on 7/2/2024) 20 tablet 0     predniSONE (DELTASONE) 20 MG tablet Take 2 tabs daily x 7 days (Patient not taking: Reported on 7/2/2024) 14 tablet 0     tirzepatide-Weight Management (ZEPBOUND) 2.5 MG/0.5ML prefilled pen Inject 0.5 mLs (2.5 mg) subcutaneously every 7 days. For 4 weeks. 2 mL 0           11/3/2024     2:14 PM   Weight Loss Medication History Reviewed With Patient   Which weight loss medications are you currently taking on a regular basis? Topamax (topiramate)   Are you having any side effects from the weight loss medication that we have prescribed you? Yes   If you are having side effects please describe: Diahiara, vomitting         Anti-obesity medication ROS:    HEENT  Hx of glaucoma: No    Cardiovascular  CAD:No  HTN:No    Gastrointestinal  GERD:No  Constipation:No  Liver Dz:No  H/O Pancreatitis:No    Psychiatric  Bipolar: No  Anxiety:Yes  Depression:Yes  History of alcohol/drug abuse:  hx of ETOH abuse, sober for 24yrs  Hx of eating disorder:No    Endocrine  Personal or family hx of MTC or MEN2:No  Diabetes/prediabetes: No    Neurologic:  Hx of seizures: No  Hx of migraines: Yes  Memory Impairment: No      History of kidney stones: No  Kidney disease: No  Current birth control:  IUD    Taking Opioid/Narcotic: No      PHYSICAL EXAM:  Objective   Ht 1.626 m (5' 4.02\")   " Wt 116.1 kg (256 lb)   BMI 43.92 kg/m             Vitals:  No vitals were obtained today due to virtual visit.    GENERAL: alert and no distress  EYES: Eyes grossly normal to inspection.  No discharge or erythema, or obvious scleral/conjunctival abnormalities.  RESP: No audible wheeze, cough, or visible cyanosis.    SKIN: Visible skin clear. No significant rash, abnormal pigmentation or lesions.  NEURO: Cranial nerves grossly intact.  Mentation and speech appropriate for age.  PSYCH: Appropriate affect, tone, and pace of words        Sincerely,    Sofie Blanton PA-C      37 minutes spent by me on the date of the encounter doing chart review, history and exam, documentation and further activities per the note    The longitudinal plan of care for the diagnosis(es)/condition(s) as documented were addressed during this visit. Due to the added complexity in care, I will continue to support Paige in the subsequent management and with ongoing continuity of care.      Again, thank you for allowing me to participate in the care of your patient.      Sincerely,    Sofie Blanton PA-C

## 2024-11-04 NOTE — LETTER
"11/4/2024       RE: Paige Fajardo  [address]  [place] [address]     Dear Colleague,    Thank you for referring your patient, Paige aFjardo, to the [facility] WEIGHT MANAGEMENT CLINIC [place] at [facility]. Please see a copy of my visit note below.    Video-Visit Details    Type of service:  Video Visit    Video Start Time:  2:35 PM  Video End Time: 3:02 PM    Originating Location (pt. Location): Home    Distant Location (provider location):  Offsite (providers home)     Platform used for Video Visit: [employer]     Weight Management Nutrition Consultation    Paige Fajardo is a 54 year old female presents today for weight management nutrition consultation.  Patient referred by Dr. Reed on August 2, 2022.    Patient with Co-morbidities of obesity including:  Type II DM no  Renal Failure no  Sleep apnea yes  Hypertension no   Dyslipidemia no  Joint pain no  Back pain yes  GERD no     PMH:   Depression  Swelling  Fatigue  Gout   Asthma  Fibromyalgia   Covid at least 2x  Weight gain associated with prednisone (was on for covid and gout)      Hx of alcohol abuse 20+ years ago    Anthropometrics:  Highest weight per pt: 338 lbs  Weight 8/2/22: 304 lbs with BMI 49.07    Estimated body mass index is 43.92 kg/m  as calculated from the following:    Height as of an earlier encounter on 11/4/24: 1.626 m (5' 4.02\").    Weight as of an earlier encounter on 11/4/24: 116.1 kg (256 lb).     Current weight: 259 lbs, pt report    Medications for Weight Loss:  Zepbound (was on Wegovy previously) - Stopped, was having significant side effects. Plan is to stop this med all together. Planning to try to stay on Topiramate.     Supplements:  Vit D 3,000 IUs/day - Usually decreases as weather gets nice  Turmeric   B-complex  Calcium carbonate - only if stomach is upset   Mg Citrate     Hx of high cholesterol     Labs 3/10/23 (when in ED for dehydration)  Calcium slightly " elevated at 10.6  eGFR 81    NUTRITION HISTORY    NKFA  Limits dairy - notices clogged sinuses and worsening asthma symptoms per pt.   Does use dairy alternatives.     Has not met with a RD before.    Pt goals: lose weight, learn about food choices (what is more nutritious), feel better    Please see previous RD notes for more information.     Feb 2024:  Had to switch medications per insurance. Feeling hungrier now. Trying to eat nutrient dense (filling foods).     Was sick most of Jan - not sure if medication or virus related but others around her were sick. Lots of nausea, vomiting and diarrhea. Finding when she gets sick it is hard to get over. Got dehydrated and was hard to come back. Went on short-term steroids and gained about 12 lbs per pt.    No vomiting or diarrhea this week.    Starting making apanra pudding with fruit on side for breakfast.     Attending a local support group in her community - has been super helpful.    PA: doing well post hip surgery. worked up to a full mile in the pool, also using treadmill. Doing yoga. Walking dog. Went to [place] since last seen - walked 4 miles/day. Aiming for 3x/week at gym   - Signed up for a 5k (got clearance from team)     May 2024:  Stopped medication due to significant side effects. Last shot 2 weeks ago. Has stayed stable so far.   Really wants to focus on nutrition and exercise now to help with preventing weight gain.    Trying to be mindful of food choices. Buffet this morning - skipped the cinnamon rolls and bagels.   made a chocolate mousse made with cottage cheese as a base. Loved it.    Use air fryer and grilling often.   Substituting soy sauce with liquid aminos.   Keeping nuts stocked at home.    PA:  - yoga weekly  - did a 5k with her dog, utilized couch to 5k program. Didn't run whole thing but felt really good. Was not sore or in pain. Huge Victory per pt    June 2024:  Finding it hard to balance all the things and focus on nutrition and  exercise. Reports her hip pain has gotten worse and she is worried it is torn. Has an appointment to get hip looked at today. Patient is able to still go on walks and do her outside chores. She has been doing well with drinking a lot of water. Has a gym membership and wants to start swimming. Thinking about purchasing e-bikes.  is the main cook. She is trying to find ways to make more healthful ingredient swaps. Finds it hard sometimes to navigate the summer and all the events that are centered around food.     November 2024: Has been having significant side effects while on Zepbound and it was determined it was best to come off of the medication. She is hoping to stay on Topiramate.     Wants to work closely with dietitian team to try to work on lifestyle and diet habits that will help her with her weight loss journey due to this med change.     Currently the Zepbound is still in her system so she is noticing she has good appetite suppression still.  cooks half the time. Wants to try to eat 3 meals a day and focus on getting good amounts of protein and fiber in her diet. Resumed her gym membership and has been going to the pool almost everyday.     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   has diabetes     Nutrition Prescription  Recommended energy/nutrient modification.    Nutrition Diagnosis  Food and nutrition related knowledge deficit r/t lack of prior exposure to nutrition education aeb pt report and interest in learning     Obesity r/t long history of positive energy balance aeb BMI >30.    Nutrition Intervention  Reviewed and modified previous goals  Answered pt questions  Coordination of care   Nutrition education - Prioritizing protein at meals to help with keeping campbell longer and curbing cravings. Incorporating a lot of fruits and vegetables. Getting the movement in within limitations.   AVS and  handouts via MyChart    Patient demonstrates understanding.    Expected Engagement: good    Nutrition Goals  1) Have a protein source at all meals. Aim for 60-90 grams of protein daily.   2) Boost fiber in your diet to help with fullness   3) Continue with physical activity as able    Five Food Groups Pantry List  Vegetables: Keep a variety of canned tomatoes in stock (diced, crushed, whole, stewed). Use them in soups, stews, sauces, casseroles and more! Also,  a jar of your favorite pasta sauce. Dried mushrooms are another great pantry item because they can add depth of flavor to your meals.    Fruits: Dried fruits such as raisins, dried cranberries and dried apricots provide dietary fiber. They also can add a punch of flavor to your morning breakfast, midday salad and dinner grains.    Milk and Dairy Products: Dried milk is a great back-up item to have on stock. You can use it in your coffee or tea. Boxed milk also is available in single-serving packages and is a great item for lunch boxes. Evaporated milk, available in cans in the baking aisle, can be substituted for liquid milk in most recipes.    Protein Foods: Stock up on canned or dried lentils and black, domingo, cannellini, garbanzo and kidney beans. Toss cooked beans in salads, soups, stews and other dishes. Canned tuna, anchovies and sardines.    Grains: Keep a stash of oatmeal, buckwheat and other whole-grain cereals in the pantry. For an extra boost, add nuts and fresh berries to these hot cereals. Barley, farro, quinoa and other grains are staples for healthy meals. Also, keep a variety of rice on hand. Long grain, short grain, basmati and brown rice. Spaghetti, ziti, penne and other pastas are great for an easy, quick and filling meal. Give yourself an extra nutrition boost by buying whole-grain pasta or trying pasta made from legumes.    Also, stock up on:  Condiments: Ketchup, mustard and relish can be stored in the pantry until opened. Once  you open them, keep them in the fridge.  Oil and vinegar: Extra-virgin olive oil is a versatile, heart-healthy option. Other oils, such as peanut, walnut and sesame, add a burst of flavor to meals.  different types of vinegar, such as cider, white and balsamic. Each imparts a unique flavor to your recipes.  Stock: Vegetable, chicken and beef stock are the basics of many recipes. Opt for low-sodium versions or ones with no added salt.  Herbs and spices:  small containers of ground herbs and spices. That way they are as fresh as possible when you use them.  Flax and other seeds: Flax and aparna seeds deliver protein, dietary fiber and omega-3 fatty acids. Add them to cereal, salads, sauces and home-baked goods. If you buy whole flaxseed, make sure you grind it up before eating so your body can absorb the nutrients.    Five Food Groups Freezer List  Vegetables:  some of your favorite frozen veggies. Look for packages without sodium. And, while you are in the produce aisle, grab some fresh herbs. When you get home, fill ice cube trays with chopped herbs, top off the herbs with boiling water, and carefully place in the freezer. Add these herb cubes as you prepare meals for a punch of freshness.    Fruits: Stash frozen berries and other fruits in the freezer.     Milk and Dairy Products: Freeze Parmesan and other pre-shredded cheeses -- toss them into soups, stews and pasta dishes.     Protein Foods: Stock up on salmon and other fatty fishes to ensure you have ready access to healthy fats. Lean meats and poultry also store well in the freezer. One tip: Make sure you move it to the refrigerator one day before cooking to give adequate time for defrosting. Keep a variety of nuts in the freezer. This helps prevent them from spoiling. Add them to cold cereal, salads, hot grains and other dishes.    Grains: Whole-grain corn tortillas freeze well and can be used for quick breakfasts, lunches or dinners. Can t  "eat that loaf of bread fast enough while it is fresh? Make it a habit to freeze part of the loaf and defrost slices as you need them.    Additional resources:   Protein Sources   [email or web address]     Quick/Easy Protein Sources:  Hard boiled eggs  Part-skim cheese sticks  Baby Bell cheese rounds  Low-fat/low-sugar Greek yogurt  Low-fat cottage cheese  Lean deli meat (chicken/turkey/ham)  Roasted chickpeas or lentils  Nuts   Turkey meat stick  Protein shake/bar  \"P3\" snack (cheese, nuts, deli meat)  [employer]'s \"Protein Bread\"   \"Egglife\" egg white wrap    Tuna/salmon can/packet      Non-meat protein Ideas  Quinoa  Eggs  Dairy (Cottage cheese, low fat cheese, greek yogurt)   Nuts  Beans  Lentils  Protein pasta   Nutritional yeast  Garden of life raw meal powder  Liquid aminos  Homemade meats - a taco meat could be made with chopped cauliflower/mushroom as an example   Hummus (could do homemade if preferred)  Hemp hearts   Tofu  Seitan     Complex Carbohydrates high in protein  Quinoa  Brown Rice  Chick Pea  Buckwheat  Sweet Potatoes  Lentils  Legumes  Lau Beans  Black Beans   Farro   Kidney Beans     Faster meal component ideas:   Protein   Crockpot chicken  Rotisserie chicken  Deli meat  Ready made meat from store ([employer], [employer])  Cottage cheese  Greek yogurt  String cheese  Scrambled/hard boiled eggs  Canned tuna/chicken/salmon (or pouches)  Frozen, already cooked shrimp  Nuts/Seeds (handful)  Nut Butter (2 tbsp)  Grains  Minute grain cups (lentils, rice, quinoa)   90 second grain pouches  Whole grain bread (i.e. Tony bread, etc.)  Carb balance tortillas  Quick oats  Whole wheat crackers  Fruit  Apples  Bananas  Peaches  Pears  Plums  Grapes  Berries (strawberries, blueberries, raspberries, blackberries)  Cherries  Frozen fruit  Fruit canned in 100% fruit juice  Vegetables  Mini cucumbers  Baby carrots  Mini bell peppers  Snap peas  Pre-cut vegetables (i.e. broccoli, brussels sprouts, " etc.)  Frozen vegetables (steam in bag)  Canned vegetables (recommend low sodium options)  Other   Hemp hearts, aparna, and other seeds      High protein breakfast recipes  [email or web address]-protein-breakfast-[email or web address]  [email or web address]-meal-prep-[email or web address]    Follow-Up: January 17 with Christina Martell RD    Time spent with patient: 27 minutes.      Again, thank you for allowing me to participate in the care of your patient.      Sincerely,    Cherry Reis RD

## 2024-11-04 NOTE — PATIENT INSTRUCTIONS
Nicolas Gibson,     Follow-up with RD on January 17 with Christina Martell RD    Thank you,    Cherry Reis, KRISTINA, LD  If you would like to schedule or reschedule an appointment with the RD, please call [phone]    Nutrition Goals  1) Have a protein source at all meals. Aim for 60-90 grams of protein daily.   2) Boost fiber in your diet to help with fullness   3) Continue with physical activity as able    Five Food Groups Pantry List  Vegetables: Keep a variety of canned tomatoes in stock (diced, crushed, whole, stewed). Use them in soups, stews, sauces, casseroles and more! Also,  a jar of your favorite pasta sauce. Dried mushrooms are another great pantry item because they can add depth of flavor to your meals.    Fruits: Dried fruits such as raisins, dried cranberries and dried apricots provide dietary fiber. They also can add a punch of flavor to your morning breakfast, midday salad and dinner grains.    Milk and Dairy Products: Dried milk is a great back-up item to have on stock. You can use it in your coffee or tea. Boxed milk also is available in single-serving packages and is a great item for lunch boxes. Evaporated milk, available in cans in the baking aisle, can be substituted for liquid milk in most recipes.    Protein Foods: Stock up on canned or dried lentils and black, domingo, cannellini, garbanzo and kidney beans. Toss cooked beans in salads, soups, stews and other dishes. Canned tuna, anchovies and sardines.    Grains: Keep a stash of oatmeal, buckwheat and other whole-grain cereals in the pantry. For an extra boost, add nuts and fresh berries to these hot cereals. Barley, farro, quinoa and other grains are staples for healthy meals. Also, keep a variety of rice on hand. Long grain, short grain, basmati and brown rice. Spaghetti, ziti, penne and other pastas are great for an easy, quick and filling meal. Give yourself an extra nutrition boost by buying whole-grain pasta or trying pasta made from  legumes.    Also, stock up on:  Condiments: Ketchup, mustard and relish can be stored in the pantry until opened. Once you open them, keep them in the fridge.  Oil and vinegar: Extra-virgin olive oil is a versatile, heart-healthy option. Other oils, such as peanut, walnut and sesame, add a burst of flavor to meals.  different types of vinegar, such as cider, white and balsamic. Each imparts a unique flavor to your recipes.  Stock: Vegetable, chicken and beef stock are the basics of many recipes. Opt for low-sodium versions or ones with no added salt.  Herbs and spices:  small containers of ground herbs and spices. That way they are as fresh as possible when you use them.  Flax and other seeds: Flax and aparna seeds deliver protein, dietary fiber and omega-3 fatty acids. Add them to cereal, salads, sauces and home-baked goods. If you buy whole flaxseed, make sure you grind it up before eating so your body can absorb the nutrients.    Five Food Groups Freezer List  Vegetables:  some of your favorite frozen veggies. Look for packages without sodium. And, while you are in the produce aisle, grab some fresh herbs. When you get home, fill ice cube trays with chopped herbs, top off the herbs with boiling water, and carefully place in the freezer. Add these herb cubes as you prepare meals for a punch of freshness.    Fruits: Stash frozen berries and other fruits in the freezer.     Milk and Dairy Products: Freeze Parmesan and other pre-shredded cheeses -- toss them into soups, stews and pasta dishes.     Protein Foods: Stock up on salmon and other fatty fishes to ensure you have ready access to healthy fats. Lean meats and poultry also store well in the freezer. One tip: Make sure you move it to the refrigerator one day before cooking to give adequate time for defrosting. Keep a variety of nuts in the freezer. This helps prevent them from spoiling. Add them to cold cereal, salads, hot grains and other  "dishes.    Grains: Whole-grain corn tortillas freeze well and can be used for quick breakfasts, lunches or dinners. Can t eat that loaf of bread fast enough while it is fresh? Make it a habit to freeze part of the loaf and defrost slices as you need them.    Additional resources:   Protein Sources   http://[email or web address]     Quick/Easy Protein Sources:  Hard boiled eggs  Part-skim cheese sticks  Baby Bell cheese rounds  Low-fat/low-sugar Greek yogurt  Low-fat cottage cheese  Lean deli meat (chicken/turkey/ham)  Roasted chickpeas or lentils  Nuts   Turkey meat stick  Protein shake/bar  \"P3\" snack (cheese, nuts, deli meat)  Aldi's \"Protein Bread\"   \"Egglife\" egg white wrap    Tuna/salmon can/packet      Non-meat protein Ideas  Quinoa  Eggs  Dairy (Cottage cheese, low fat cheese, greek yogurt)   Nuts  Beans  Lentils  Protein pasta   Nutritional yeast  Garden of life raw meal powder  Liquid aminos  Homemade meats - a taco meat could be made with chopped cauliflower/mushroom as an example   Hummus (could do homemade if preferred)  Hemp hearts   Tofu  Seitan     Complex Carbohydrates high in protein  Quinoa  Brown Rice  Chick Pea  Buckwheat  Sweet Potatoes  Lentils  Legumes  Lau Beans  Black Beans   Farro   Kidney Beans     Faster meal component ideas:   Protein   Crockpot chicken  Rotisserie chicken  Deli meat  Ready made meat from store ([employer])  Cottage cheese  Greek yogurt  String cheese  Scrambled/hard boiled eggs  Canned tuna/chicken/salmon (or pouches)  Frozen, already cooked shrimp  Nuts/Seeds (handful)  Nut Butter (2 tbsp)  Grains  Minute grain cups (lentils, rice, quinoa)   90 second grain pouches  Whole grain bread (i.e. Tony bread, etc.)  Carb balance tortillas  Quick oats  Whole wheat crackers  Fruit  Apples  Bananas  Peaches  Pears  Plums  Grapes  Berries (strawberries, blueberries, raspberries, blackberries)  Cherries  Frozen fruit  Fruit canned in 100% fruit " juice  Vegetables  Mini cucumbers  Baby carrots  Mini bell peppers  Snap peas  Pre-cut vegetables (i.e. broccoli, brussels sprouts, etc.)  Frozen vegetables (steam in bag)  Canned vegetables (recommend low sodium options)  Other   Hemp hearts, aparna, and other seeds      High protein breakfast recipes  [email or web address]-protein-breakfast-ideas/  https://[employer]/breakfast-meal-prep-ideas/    COMPREHENSIVE WEIGHT MANAGEMENT PROGRAM  VIRTUAL SUPPORT GROUPS    At [facility], our Comprehensive Weight Management program offers on-line support groups for patients who are working on weight loss and considering, preparing for, or have had weight loss surgery.     There is no cost for this opportunity.  You are invited to attend the?Virtual Support Groups?provided by any of the following locations:    [facility] via Microsoft Teams with Carmelina Herrera RD, RN  2.   [place] via CenTrak with Socrates Anne, PhD, LP  3.   [place] via CenTrak with Sarah Borrego RN  4.   [facility] via a Zoom Meeting with GILDA Robledo    The following Support Group information can also be found on our website:  [email or web address]      [facility]   WEIGHT LOSS SURGERY SUPPORT GROUP  The support group is a patient-lead forum that meets monthly to share experiences, encouragement and education. It is open to those who have had weight loss surgery, are scheduled for surgery, or are considering surgery.   WHEN: 3rd Wednesday of each month from 5:00PM - 6:00PM using Microsoft Teams.   FACILITATOR: Led by Carmelina Savage RD, LD, RN, the program's Clinical Coordinator.   TO REGISTER: Please contact the clinic via Guided Interventions or call the nurse line directly at [phone] to inform our staff that you would like an invite sent to you and to let us know the email you would like the invite sent  "to. Prior to the meeting, a link with directions on how to join the meeting will be sent to you.    2024 Meetings September 18: Isabel Velásquez, PhD, Outpatient Clinical Therapist, \"Pro Tips for Habit Change\".  October 16:  Let's Talk  This will be a time of group support.??   November 20:  Let's Talk  about How to Navigate the Upcoming Holiday Season.  December 18:  Let's Talk  and Celebrate the past year.       [facility] BARIATRIC CARE SUPPORT GROUP  This is open to all pre- and post- operative bariatric surgery patients as well as their support system.   WHEN: 3rd Tuesday of each month from 6:30 PM - 8:00 PM using Microsoft Teams.   FACILITATOR: Led by Socrates Anne, Ph.D who is a Licensed Psychologist with the [facility] Comprehensive Weight Management Program.   TO REGISTER: Please send an email to Socrates Anne, Ph.D., LP at?[email or web address]?if you would like an invitation to the group. Prior to the meeting, a link with directions on how to join the meeting will be sent to you.    2024 Meetings September 17: IN PERSON MEETING. [facility], [facility] [place], [address], 1st floor Conference Room.  October 15: Date changed to OCTOBER 8th (2nd Tuesday).   November 19: \"Holiday Eating\", Belkis Wheeler, KRISTINA, LD.  December 17: \"Hospital Stay and Compliance\", Sarah Borrego RN, CBN.      [facility] POST-OPERATIVE BARIATRIC SURGERY SUPPORT GROUP  This is a support group for [facility] bariatric patients (and those external to [facility]) who have had bariatric surgery and are at least 3 months post-surgery.  WHEN: 4th Thursday of the month from 11:00 AM - 12:00 PM using Microsoft Teams.   FACILITATOR: Led by Certified Bariatric Nurse, Sarah Borrego RN.   TO REGISTER: Please send an email to Sarah at [email or web address] if you would like an " invitation to the group.  Prior to the meeting, a link with directions on how to join the meeting will be sent to you.    2024 Meetings September 26 October 24 November 28: No meeting  December 26    [facility]   HEALTHY LIFESTYLE COACHING GROUP  This is a 60 minute virtual coaching group for those who want to lead a healthier lifestyle. Come together to set goals and overcome barriers in a supportive group environment. We will address the four pillars of health: nutrition, exercise, sleep, and emotional well-being. This group is highly recommended for those who are participating in the 24 week Healthy Lifestyle Plan and our Health Coaching sessions.   WHEN: 1st Friday of the month, 12:30 PM - 1:30 PM   using a Zoom meeting.     FACILITATOR: Led by National Board-Certified Health and , Sarah Silva [facility].  TO REGISTER: Please call the [employer] at [phone] to register. You will get an appointment to attend in [employer]. Fifteen minutes prior to the meeting, complete the e-check in and you will get the link to join the meeting.  There is no charge to attend this group and space is limited.  Please register for each month you wish to attend    2024 Meetings  September 5 No meeting.  October 4 November 1 December 6

## 2024-11-04 NOTE — PROGRESS NOTES
Virtual Visit Details    Type of service:  Video Visit     Originating Location (pt. Location): Home    Distant Location (provider location):  On-site  Platform used for Video Visit: [facility] Medical Weight Management Note     Paige Fajardo  MRN:  [id number]  :  1970  VIRI:  2024    Dear Francesca Samson MD,    I had the pleasure of seeing your patient Paige Fajardo. She is a 54 year old female who I am continuing to see for treatment of obesity related to:        2022    10:09 AM   --   I have the following health issues associated with obesity Sleep Apnea    Asthma    Stress Incontinence   I have the following symptoms associated with obesity Knee Pain    Depression    Lower Extremity Swelling    Back Pain    Fatigue    Hip Pain       Assessment & Plan   Problem List Items Addressed This Visit       Class 3 severe obesity with serious comorbidity and body mass index (BMI) of 45.0 to 49.9 in adult (H)     I last saw Paige 2023. She was on Wegovy 1.7mg and was seeing minimal side effects. She began to see increase side effects and was transitioned to Zepbound. Due to shortages was transitioned to Zepbound 10mg. She then had side effects of nausea and vomiting and discontinued in 2024. She returned to clinic in September and was seen by Sury Perry CNP. Zepbound restarted at lowest dose with a slower ramp up.     She took Zepbound 2.5mg for 4 weeks. Minimal side effects of nausea, but did not see any hunger control. Then dose increased to the 5mg and since then will have 3 days of nausea, upset stomach, and diarrhea. She can have minimal food or drink during this time. Side effects are not getting better with continued use. Due to this, she will discontinue Zepbound at this time.     Discussed next steps. I do think lower doses of Wegovy, 0.5mg or 1.0mg might be appropriate. Discussed also decreasing back to Zepbound 2.5mg for another month or two and trying to re ramp back up to  5mg. However, her insurance will no longer be covering this medication and she has the option of her husbands, but would be more out of pocket. We discussed cash prices for zepbound and compounded semaglutide, but not price appropriate at this time. With this, I do not think continuing on a GLP-1 at this time would make sense. Discussed options of wellbutrin or Phentermine. However, she has a hx of anxiety and is on medications for this. She does not want to start anything that could influence that. So, today she will continue on Topiramate.          Relevant Medications    topiramate (TOPAMAX) 25 MG tablet    Other Relevant Orders    [facility] Follow-Up Order        Continue Topiramate 75mg at night. Refills sent   Stop Zepbound. Can consider restarting Wegovy and only ramping up to lower doses in the future if needed  Dalia Reis RD today   Sofie Yoon in 3 months     INTERVAL HISTORY:  New [facility] with Dr. Reed - 8/2/22  Started on Ozempic and ramped up to 2.0mg and then transitioned to Wegovy 2.4mg due to insurance coverage  Side effects and decreased Wegovy 1.7mg  Continued to have side effects on Wegovy and transitioned to Zepbound  Zepbound 10mg - had side effects of nausea and vomiting and self discontinued  Returned to clinic 9/2024 and restarted Zepbound with a slower dose increase plan        Anti-obesity medication history    Current:   Zepbound 5mg - has been on this dose for 2 weeks. Takes injections on Wednesdays.   Has been sick on the past 2 weekends. Will have diarrhea and belching. Had 1 episode of vomiting when she tried to eat. This will last around 36hrs. Cannot keep any food or water down. Was worried she needed to go to go get fluids. Took imodium with minimal relief. Took zofran with some relief. Is also very gassy the rest of the week.  Her  also has some of these symptoms, but is also on a GLP-1. Did not have these symptoms on the 2.5mg, but did not  feel as full.     Topiramate 75mg - is taking in the evening. Does not think there are any side effects. Started before restarted Zepbound. Does think it was helpful on own.      Naltrexone - taking currently for pain.       Recent diet changes: Will be seeing dietitian today. Wants to get back on track and seeing them more consistently.     Recent exercise/activity changes: has been back to swimming.       CURRENT WEIGHT:   256 lbs 0 oz    Initial Weight (lbs): 304 lbs  Last Visits Weight: 121.1 kg (267 lb)  Cumulative weight loss (lbs): 48  Weight Loss Percentage: 15.79%    Wt Readings from Last 5 Encounters:   11/04/24 116.1 kg (256 lb)   11/04/24 116.1 kg (256 lb)   09/12/24 121.1 kg (267 lb)   09/05/24 121.1 kg (267 lb)   07/02/24 117.5 kg (258 lb 15.9 oz)             11/3/2024     2:14 PM   Changes and Difficulties   I have made the following changes to my diet since my last visit: Eating less, healther   With regards to my diet, I am still struggling with: Eating enough   I have made the following changes to my activity/exercise since my last visit: Pool         MEDICATIONS:   Current Outpatient Medications   Medication Sig Dispense Refill    albuterol (PROAIR HFA/PROVENTIL HFA/VENTOLIN HFA) 108 (90 Base) MCG/ACT inhaler Inhale 2 puffs into the lungs every 6 hours as needed for shortness of breath or wheezing 18 g 5    azelastine (ASTELIN) 0.1 % nasal spray Spray 2 sprays into both nostrils 2 times daily as needed for rhinitis 30 mL 3    azelastine (OPTIVAR) 0.05 % ophthalmic solution Apply 1 drop to eye 2 times daily as needed (itchy/watery eyes) 6 mL 3    B Complex Vitamins (VITAMIN B COMPLEX PO) 3 drops per day      budesonide-formoterol (SYMBICORT) 80-4.5 MCG/ACT Inhaler Inhale 2 puffs into the lungs every 4 hours as needed (Wheezing, chest tightness, shortness of breath, or persistent cough) 10.2 g 3    cholecalciferol (VITAMIN D) 1000 UNIT tablet 3 drops per day      colchicine (COLCYRS) 0.6 MG tablet  Take 2 tablets orally at onset of symptoms and one more tablet 1 hour later, and then one tablet twice a day until flare resolves.      FLUoxetine (PROZAC) 20 MG capsule Take 20 mg by mouth      fluticasone (FLONASE) 50 MCG/ACT nasal spray Spray 1-2 sprays into both nostrils daily 16 g 3    ipratropium - albuterol 0.5 mg/2.5 mg/3 mL (DUONEB) 0.5-2.5 (3) MG/3ML neb solution Take 1 vial (3 mLs) by nebulization every 6 hours as needed for shortness of breath or wheezing 960 mL 4    levonorgestrel (MIRENA) 20 MCG/24HR IUD 1 each by Intrauterine route once      MAGNESIUM CITRATE PO 3 drops per day      Naltrexone HCl POWD       Naproxen Sodium (ALEVE PO) Take 220 mg by mouth 2 times daily as needed       order for DME Equipment being ordered: Nebulizer 1 Device 0    rizatriptan (MAXALT-MLT) 10 MG ODT tab Take 1 tablet (10 mg) by mouth at onset of headache for migraine May repeat in 2 hours. Max 3 tablets/24 hours. 18 tablet 3    tirzepatide-Weight Management (ZEPBOUND) 5 MG/0.5ML prefilled pen Inject 0.5 mLs (5 mg) subcutaneously every 7 days. After completing 2.5 mg doses (start week 5). 2 mL 1    topiramate (TOPAMAX) 25 MG tablet Take 3 tablets (75 mg) by mouth at bedtime. 25mg at bedtime for week 1, 50mg at bedtime for 1 week, and 75mg at bedtime thereafter 90 tablet 3    Turmeric 450 MG CAPS 1 scoop per day      benzonatate (TESSALON) 100 MG capsule Take 1 capsule (100 mg) by mouth 3 times daily as needed for cough (Patient not taking: Reported on 7/2/2024) 90 capsule 0    cetirizine-psuedoePHEDrine (ZYRTEC-D) 5-120 MG per tablet Take 1 tablet by mouth 2 times daily (Patient not taking: Reported on 7/2/2024) 90 tablet 3    COMPOUNDED NON-CONTROLLED SUBSTANCE (CMPD RX) - PHARMACY TO MIX COMPOUNDED MEDICATION LOW DOSE NALTREXONE 6MG one tablet qhs (Patient not taking: Reported on 11/4/2024) 90 capsule 4    gabapentin (NEURONTIN) 300 MG capsule Take 2 capsules  (600mg) in the morning and 2 capsules (600mg) at bedtime.  "90day supply (Patient taking differently: Take 300 mg by mouth 2 times daily) 360 capsule 1    meloxicam (MOBIC) 15 MG tablet Take 15 mg by mouth daily      ondansetron (ZOFRAN ODT) 4 MG ODT tab Take 1 tablet (4 mg) by mouth every 8 hours as needed for nausea (Patient not taking: Reported on 7/2/2024) 20 tablet 0    predniSONE (DELTASONE) 20 MG tablet Take 2 tabs daily x 7 days (Patient not taking: Reported on 7/2/2024) 14 tablet 0    tirzepatide-Weight Management (ZEPBOUND) 2.5 MG/0.5ML prefilled pen Inject 0.5 mLs (2.5 mg) subcutaneously every 7 days. For 4 weeks. 2 mL 0           11/3/2024     2:14 PM   Weight Loss Medication History Reviewed With Patient   Which weight loss medications are you currently taking on a regular basis? Topamax (topiramate)   Are you having any side effects from the weight loss medication that we have prescribed you? Yes   If you are having side effects please describe: Juan Carlos, vomitting         Anti-obesity medication ROS:    HEENT  Hx of glaucoma: No    Cardiovascular  CAD:No  HTN:No    Gastrointestinal  GERD:No  Constipation:No  Liver Dz:No  H/O Pancreatitis:No    Psychiatric  Bipolar: No  Anxiety:Yes  Depression:Yes  History of alcohol/drug abuse:  hx of ETOH abuse, sober for 24yrs  Hx of eating disorder:No    Endocrine  Personal or family hx of MTC or MEN2:No  Diabetes/prediabetes: No    Neurologic:  Hx of seizures: No  Hx of migraines: Yes  Memory Impairment: No      History of kidney stones: No  Kidney disease: No  Current birth control:  IUD    Taking Opioid/Narcotic: No      PHYSICAL EXAM:  Objective    Ht 1.626 m (5' 4.02\")   Wt 116.1 kg (256 lb)   BMI 43.92 kg/m             Vitals:  No vitals were obtained today due to virtual visit.    GENERAL: alert and no distress  EYES: Eyes grossly normal to inspection.  No discharge or erythema, or obvious scleral/conjunctival abnormalities.  RESP: No audible wheeze, cough, or visible cyanosis.    SKIN: Visible skin clear. No " significant rash, abnormal pigmentation or lesions.  NEURO: Cranial nerves grossly intact.  Mentation and speech appropriate for age.  PSYCH: Appropriate affect, tone, and pace of words        Sincerely,    Sofie Blanton PA-C      37 minutes spent by me on the date of the encounter doing chart review, history and exam, documentation and further activities per the note    The longitudinal plan of care for the diagnosis(es)/condition(s) as documented were addressed during this visit. Due to the added complexity in care, I will continue to support Paige in the subsequent management and with ongoing continuity of care.

## 2024-11-07 NOTE — ASSESSMENT & PLAN NOTE
I last saw Paige 9/2023. She was on Wegovy 1.7mg and was seeing minimal side effects. She began to see increase side effects and was transitioned to Zepbound. Due to shortages was transitioned to Zepbound 10mg. She then had side effects of nausea and vomiting and discontinued in June 2024. She returned to clinic in September and was seen by Sury Perry CNP. Zepbound restarted at lowest dose with a slower ramp up.     She took Zepbound 2.5mg for 4 weeks. Minimal side effects of nausea, but did not see any hunger control. Then dose increased to the 5mg and since then will have 3 days of nausea, upset stomach, and diarrhea. She can have minimal food or drink during this time. Side effects are not getting better with continued use. Due to this, she will discontinue Zepbound at this time.     Discussed next steps. I do think lower doses of Wegovy, 0.5mg or 1.0mg might be appropriate. Discussed also decreasing back to Zepbound 2.5mg for another month or two and trying to re ramp back up to 5mg. However, her insurance will no longer be covering this medication and she has the option of her husbands, but would be more out of pocket. We discussed cash prices for zepbound and compounded semaglutide, but not price appropriate at this time. With this, I do not think continuing on a GLP-1 at this time would make sense. Discussed options of wellbutrin or Phentermine. However, she has a hx of anxiety and is on medications for this. She does not want to start anything that could influence that. So, today she will continue on Topiramate.

## 2024-11-07 NOTE — PATIENT INSTRUCTIONS
"Nicolas Gibson,   Thank you for allowing us the privilege of caring for you. We hope we provided you with the excellent service you deserve.   Please let us know if there is anything else we can do for you so that we can be sure you are completely satisfied with your care experience.    To ensure the quality of our services you may be receiving a patient satisfaction survey from an independent patient satisfaction monitoring company.    The greatest compliment you can give is a \"Likely to Recommend\"    Your visit was with Sofie Yoon THAI Blanton today.    Instructions per today's visit:     Continue Topiramate 75mg at night. Refills sent   Stop Zepbound. Can consider restarting Wegovy and only ramping up to lower doses in the future if needed  Dalia Reis RD today   Sofie Yoon in 3 months     ___________________________________________________________________________  Important contact and scheduling information:  Please call our contact center at [phone] to schedule your next appointments.  For any nursing questions or concerns call Michelle Cano LPN at [phone] or Evelyn Wasserman RN at [phone]  Please call during clinic hours Monday through Friday 8:00a - 4:00p if you have questions or you can contact us via [employer] at anytime and we will reply during clinic hours.    Lab results will be communicated through My Chart or letter (if My Chart not used). Please call the clinic if you have not received communication after 1 week or if you have any questions.?  Clinic Fax: [phone]  __________________________________________________________________________    If labs were ordered today:    Please make an appointment to have them drawn at your convenience.     To schedule the Lab Appointment using [employer]:  Select \"Schedule an Appointment\"  Select \"Lab Only\"  For \"A couple of questions\", select \"Other\"  For \"Which locations work for you?, select the location and set up the appointment    To schedule by phone call " [phone] to schedule a lab only appointment at any [facility] lab.  ___________________________________________________________________________  Work with A Health !  Virtual Sessions are Available through [facility] Clinics    To learn more, call to schedule a free, Health  Q&A appointment: [phone]     What is Health Coaching?  Do you know what you are supposed to do, but you just aren't doing it?  Then, HEALTH COACHING may help you!   Get unstuck and move forward with the support of a professionally trained NBC-HWC (National Board-Certified Health and ) who uses evidence-based approaches to help you move forward with healthy lifestyle changes in the areas of weight loss, stress management and overall well-being.    Health Coaches help you identify goals that will work best for you. Health Coaches provide support and encouragement with overcoming barriers and help you to find inspiration and motivation to lead a healthy lifestyle.    Option one:  Health Coaching 3-Pack; Three, 30-minute Health Coaching Visits, for $99  Visits are done virtually (phone or video)  This is a self pay service; we do not accept insurance for dorothy coaching.    Option two:   The 24 week Plan; 11 Health Coaching Visits, and a 7 months subscription to [employer]-- on-demand fitness, nutrition and mindfulness classes, for $499 (employee discounts may be available). Participants will also meet regularly with a weight management Medical Provider and a Registered/Licensed Dietician.  This is a self-pay service; we do not accept insurance for health coaching.    To Schedule a free Health  Q&A appointment to learn more,  call [phone].  ____________________________________________________________________  [facility]  Healthy Lifestyle Group    Healthy Lifestyle Group  This is a 60 minute virtual coaching group for those who want  "to lead a healthier lifestyle. Come together to set goals and overcome barriers in a supportive group environment. We will address the four pillars of health--nutrition, exercise, sleep and emotional well-being.  This group is highly recommended for those who are participating in the 24 week Healthy Lifestyle Plan and our Health Coaching sessions.    WHEN: This group meets the first Friday of the month, 12:30 PM - 1:30 PM online, via a zoom meeting.      FACILITATOR: Led by National Board Certified Health and , Sarah Silva [facility].    TO REGISTER: Please call the Call Center at [phone] to register. You will get an appointment to attend in [employer]. Fifteen minutes prior to the meeting, complete the e-check in and you will get the link to join the meeting.  There is no charge to attend this group and space is limited.      2023 and 2024 Meeting Topics and Dates:    November 3: Introduction to Mindfulness (Learn simple and effective mindfulness practices and how it can benefit you)    December 8: Let's Talk (guided discussion on our wins and challenges)    January 5: New Years Vision: Manifest your Best 2024! (Guided imagery,  journaling and discussion)    February 2: Let's Talk    March 1: 10 Percent Happier by Zac Ruiz (Book Bites; a guided discussion on the nuggets of wisdom from favorite wellness books; no need to read the book but highly encouraged)    April 5: Let's Talk    May 3: \"Essentialism; The Disciplined Pursuit of Less by Lambert Blackwell (book bites discussion)    June 7: Let's Talk    July 5: NO MEETING, off for the 4th of July Holiday    August 2: The Blue Zones, Secrets for Living a Longer Life by Zac Ortiz (book bites discussion)      If you would like bariatric surgery specific support group info please let your care team know.         Thank you,   [facility] Management Team                          "

## 2024-11-12 ENCOUNTER — ANCILLARY PROCEDURE (OUTPATIENT)
Dept: ULTRASOUND IMAGING | Facility: CLINIC | Age: 54
End: 2024-11-12
Attending: FAMILY MEDICINE
Payer: COMMERCIAL

## 2024-11-12 ENCOUNTER — ANCILLARY PROCEDURE (OUTPATIENT)
Dept: MAMMOGRAPHY | Facility: CLINIC | Age: 54
End: 2024-11-12
Attending: FAMILY MEDICINE
Payer: COMMERCIAL

## 2024-11-12 DIAGNOSIS — R92.8 BI-RADS CATEGORY 3 MAMMOGRAM RESULT: ICD-10-CM

## 2024-11-12 PROCEDURE — 76642 ULTRASOUND BREAST LIMITED: CPT | Mod: LT

## 2024-11-12 PROCEDURE — 77065 DX MAMMO INCL CAD UNI: CPT | Mod: LT

## 2024-11-12 PROCEDURE — G0279 TOMOSYNTHESIS, MAMMO: HCPCS

## 2024-11-15 ENCOUNTER — TELEPHONE (OUTPATIENT)
Dept: ENDOCRINOLOGY | Facility: CLINIC | Age: 54
End: 2024-11-15
Payer: COMMERCIAL

## 2024-11-15 NOTE — TELEPHONE ENCOUNTER
Left Voicemail (1st Attempt) for the patient to call back and schedule the following:    Appointment type: return  Provider: Sofie Blanton  Return date: 2/4/2025   Specialty phone number: [phone]  Additional appointment(s) needed: n/a  Additonal Notes: n/a

## 2024-11-19 ENCOUNTER — ANCILLARY PROCEDURE (OUTPATIENT)
Dept: BONE DENSITY | Facility: CLINIC | Age: 54
End: 2024-11-19
Attending: OBSTETRICS & GYNECOLOGY
Payer: COMMERCIAL

## 2024-11-19 DIAGNOSIS — E28.39 ESTROGEN DEFICIENCY: ICD-10-CM

## 2025-02-05 DIAGNOSIS — E66.813 CLASS 3 SEVERE OBESITY WITH SERIOUS COMORBIDITY AND BODY MASS INDEX (BMI) OF 45.0 TO 49.9 IN ADULT, UNSPECIFIED OBESITY TYPE (H): ICD-10-CM

## 2025-02-05 DIAGNOSIS — E66.01 CLASS 3 SEVERE OBESITY WITH SERIOUS COMORBIDITY AND BODY MASS INDEX (BMI) OF 45.0 TO 49.9 IN ADULT, UNSPECIFIED OBESITY TYPE (H): ICD-10-CM

## 2025-02-07 ENCOUNTER — MYC MEDICAL ADVICE (OUTPATIENT)
Dept: ENDOCRINOLOGY | Facility: CLINIC | Age: 55
End: 2025-02-07
Payer: COMMERCIAL

## 2025-02-07 DIAGNOSIS — E66.813 CLASS 3 SEVERE OBESITY WITH SERIOUS COMORBIDITY AND BODY MASS INDEX (BMI) OF 45.0 TO 49.9 IN ADULT, UNSPECIFIED OBESITY TYPE (H): Primary | ICD-10-CM

## 2025-02-07 DIAGNOSIS — E66.01 CLASS 3 SEVERE OBESITY WITH SERIOUS COMORBIDITY AND BODY MASS INDEX (BMI) OF 45.0 TO 49.9 IN ADULT, UNSPECIFIED OBESITY TYPE (H): Primary | ICD-10-CM

## 2025-02-10 RX ORDER — TOPIRAMATE 25 MG/1
75 TABLET, FILM COATED ORAL DAILY
Qty: 90 TABLET | Refills: 2 | Status: SHIPPED | OUTPATIENT
Start: 2025-02-10

## 2025-02-11 RX ORDER — TOPIRAMATE 25 MG/1
75 TABLET, FILM COATED ORAL AT BEDTIME
Qty: 90 TABLET | Refills: 3 | OUTPATIENT
Start: 2025-02-11

## 2025-02-11 NOTE — TELEPHONE ENCOUNTER
Signed Yesterday (2/10/2025):   topiramate (TOPAMAX) 25 MG tablet   Sig: Take 3 tablets (75 mg) by mouth daily.   Disp: 90 tablet    Refills: 2   Signed by: Sofie Blanton PA-C     Refill Decision:  Refused. Duplicate request, addressed in alternate encounter.

## 2025-05-01 ENCOUNTER — TELEPHONE (OUTPATIENT)
Dept: SCHEDULING | Facility: CLINIC | Age: 55
End: 2025-05-01
Payer: COMMERCIAL

## 2025-05-01 NOTE — TELEPHONE ENCOUNTER
Called patient and left message to call back. Review of Wily shows that the test was not completed. Patient will need to redo it. Or come in for a Nox.

## 2025-05-01 NOTE — TELEPHONE ENCOUNTER
General Call    Contacts       Contact Date/Time Type Contact Phone/Fax    05/01/2025 08:04 AM CDT Phone (Incoming) Paige Fajardo (Self) [phone] (M)          Reason for Call:  CPAP Machine Supplies    What are your questions or concerns:  Pt needs a new prescription for machine supplies, pt is going through [facility]. Please renew prescription.     Pt also stated that she had a Watchpat study done but she never got the results of that study and the clinic says they never got the information. Pt still has the device, wondering if it can still be downloaded and sent to the clinic, or can she reuse it? Please advise patient.      Date of last appointment with provider: 06/12/2024    Could we send this information to you in [facility] or would you prefer to receive a phone call?:   Patient would prefer a phone call   Okay to leave a detailed message?: Yes at Cell number on file:    Telephone Information:   Mobile [phone]

## 2025-05-06 ASSESSMENT — ASTHMA QUESTIONNAIRES
QUESTION_1 LAST FOUR WEEKS HOW MUCH OF THE TIME DID YOUR ASTHMA KEEP YOU FROM GETTING AS MUCH DONE AT WORK, SCHOOL OR AT HOME: NONE OF THE TIME
ACT_TOTALSCORE: 24
QUESTION_4 LAST FOUR WEEKS HOW OFTEN HAVE YOU USED YOUR RESCUE INHALER OR NEBULIZER MEDICATION (SUCH AS ALBUTEROL): NOT AT ALL
QUESTION_2 LAST FOUR WEEKS HOW OFTEN HAVE YOU HAD SHORTNESS OF BREATH: ONCE OR TWICE A WEEK
QUESTION_5 LAST FOUR WEEKS HOW WOULD YOU RATE YOUR ASTHMA CONTROL: COMPLETELY CONTROLLED
QUESTION_3 LAST FOUR WEEKS HOW OFTEN DID YOUR ASTHMA SYMPTOMS (WHEEZING, COUGHING, SHORTNESS OF BREATH, CHEST TIGHTNESS OR PAIN) WAKE YOU UP AT NIGHT OR EARLIER THAN USUAL IN THE MORNING: NOT AT ALL

## 2025-05-07 ENCOUNTER — OFFICE VISIT (OUTPATIENT)
Dept: PULMONOLOGY | Facility: CLINIC | Age: 55
End: 2025-05-07
Attending: NURSE PRACTITIONER
Payer: COMMERCIAL

## 2025-05-07 VITALS
SYSTOLIC BLOOD PRESSURE: 114 MMHG | HEART RATE: 68 BPM | DIASTOLIC BLOOD PRESSURE: 62 MMHG | BODY MASS INDEX: 48.41 KG/M2 | WEIGHT: 282 LBS | OXYGEN SATURATION: 99 %

## 2025-05-07 DIAGNOSIS — J45.40 MODERATE PERSISTENT ASTHMA WITHOUT COMPLICATION: Primary | ICD-10-CM

## 2025-05-07 PROCEDURE — 99214 OFFICE O/P EST MOD 30 MIN: CPT | Performed by: NURSE PRACTITIONER

## 2025-05-07 PROCEDURE — 3074F SYST BP LT 130 MM HG: CPT | Performed by: NURSE PRACTITIONER

## 2025-05-07 PROCEDURE — 3078F DIAST BP <80 MM HG: CPT | Performed by: NURSE PRACTITIONER

## 2025-05-07 RX ORDER — DOXYCYCLINE HYCLATE 100 MG
1 TABLET ORAL
COMMUNITY
Start: 2025-03-06

## 2025-05-07 RX ORDER — ALBUTEROL SULFATE 90 UG/1
2 INHALANT RESPIRATORY (INHALATION) EVERY 6 HOURS PRN
Qty: 18 G | Refills: 5 | Status: SHIPPED | OUTPATIENT
Start: 2025-05-07

## 2025-05-07 RX ORDER — FLUOXETINE HYDROCHLORIDE 40 MG/1
40 CAPSULE ORAL DAILY
COMMUNITY
Start: 2025-03-06

## 2025-05-07 RX ORDER — BUDESONIDE AND FORMOTEROL FUMARATE DIHYDRATE 80; 4.5 UG/1; UG/1
AEROSOL RESPIRATORY (INHALATION)
Qty: 20.4 G | Refills: 11 | Status: SHIPPED | OUTPATIENT
Start: 2025-05-07

## 2025-05-07 RX ORDER — PREDNISONE 20 MG/1
TABLET ORAL
Qty: 14 TABLET | Refills: 0 | Status: SHIPPED | OUTPATIENT
Start: 2025-05-07

## 2025-05-07 NOTE — PROGRESS NOTES
Pulmonary Clinic Follow-Up          Assessment/Plan:     Paige Fajardo is a 54 year old never smoker with history of asthma and allergic rhinitis, presenting today for annual follow-up.    Moderate persistent asthma  Pulmonary function testing in 2022 with normal spirometry and lung volumes.  She has specific triggers/times of year when her asthma becomes an issue and does well the rest of the year without medications or respiratory limitations.  Eosinophils 0.1 in 2021, IgE 27 in 2024.    She was previously on Qvar, this was no longer covered by insurance, she is now on Symbicort.  She is doing SMART therapy and greatly enjoying this inhaler.  ACT score 24 today.     Plan:  - continue low dose Symbicort two inhalations BID, also one inhalation PRN - max 12 inhalations/day.  She can decrease this to just PRN when she is feeling well.   - continue albuterol inhaler and duonebs PRN.  - she is UTD with COVID booster, annual influenza vaccine, and pneumococcal vaccine.  - Action plan:  Prednisone 40mg x7 days.  I have given her a script for this to keep on hand and start when needed.      Follow-up  - annually    Marlys Francis CNP  Pulmonary Medicine  [facility]  [phone]       CC:     Asthma follow-up     HPI:     Paige Fajardo is a 54 year old never smoker with history of asthma and allergic rhinitis, presenting today for annual follow-up.    Feeling breathing is stable, aside from when she is in air conditioning.  Her office does not have air conditioning.  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  She does like to keep prednisone on hand.  She enjoys the symbicort, likes having only one inhaler.           5/7/2024     7:00 AM 7/2/2024     7:43 AM 5/6/2025    11:41 AM   ACT Total Scores   ACT TOTAL SCORE (Goal Greater than or Equal to 20) 24 20 24    In the past 12 months, how many times did you visit the emergency  room for your asthma without being admitted to the hospital? 0 0 0   In the past 12 months, how many times were you hospitalized overnight because of your asthma? 0 0 0       Patient-reported            ROS:     6-point ROS performed and is negative aside from those listed in HPI     Medical history:     Past Medical History:   Diagnosis Date    Allergic rhinitis     ALLERGIC RHINITIS NOS 3/8/2005    ANXIETY STATE NOS 3/8/2005    Complication of anesthesia     Cystic Fibrosis Screening negative 5/1/2009    [facility]    Dysmenorrhea 10/12/2008    Family history of aneurysm 4/28/2011    Fibromyalgia     Dx by head/neck/pain & Rheumatology    Fibromyalgia 4/15/2010    Pain Clinic    Hyperlipidemia LDL goal <160 11/1/2013    INSOMNIA NEC 3/8/2005    Migraine     Migraine 4/22/2012    Pain Clinic     Mild major depression     Obesity 11/1/2013    Other and unspecified alcohol dependence, unspecified drinking behavior     Sober since 7/00    Other anxiety states     Other chronic pain     from fibromyalgia    Patellofemoral disorder     bilateral    Uncomplicated asthma     Not considered Asthma but I have breathing problems     Past Surgical History:   Procedure Laterality Date    ARTHROSCOPY KNEE RT/LT  1997    Left     DILATION AND CURETTAGE N/A 6/14/2017    Procedure: DILATION AND CURETTAGE;;  Surgeon: Livia Barnett DO;  Location: [facility] OR    EXAM UNDER ANESTHESIA, ULTRASOUND N/A 6/14/2017    Procedure: EXAM UNDER ANESTHESIA, ULTRASOUND;  Ultrasound guided cervical dilation, IUD placement, Endometrial biopsy, repair of unavoidable cervical laceration;  Surgeon: Livia Barnett DO;  Location:  OR     TOOTH EXTRACTION W/FORCEP  1998    wisdom teeth     INSERT INTRAUTERINE DEVICE N/A 6/14/2017    Procedure: INSERT INTRAUTERINE DEVICE;;  Surgeon: Livia Barnett DO;  Location: [facility] OR     Social History     Tobacco Use    Smoking status: Never     Passive exposure: Past    Smokeless  tobacco: Never   Vaping Use    Vaping status: Never Used   Substance Use Topics    Alcohol use: No     Alcohol/week: 0.0 standard drinks of alcohol     Comment: recovering    Drug use: No     Current Outpatient Medications   Medication Sig Dispense Refill    albuterol (PROAIR HFA/PROVENTIL HFA/VENTOLIN HFA) 108 (90 Base) MCG/ACT inhaler Inhale 2 puffs into the lungs every 6 hours as needed for shortness of breath or wheezing. 18 g 5    azelastine (ASTELIN) 0.1 % nasal spray Spray 2 sprays into both nostrils 2 times daily as needed for rhinitis 30 mL 3    azelastine (OPTIVAR) 0.05 % ophthalmic solution Apply 1 drop to eye 2 times daily as needed (itchy/watery eyes) 6 mL 3    B Complex Vitamins (VITAMIN B COMPLEX PO) 3 drops per day      benzonatate (TESSALON) 100 MG capsule Take 1 capsule (100 mg) by mouth 3 times daily as needed for cough 90 capsule 0    budesonide-formoterol (SYMBICORT/BREYNA) 80-4.5 MCG/ACT Inhaler Inhale 2 puffs twice daily plus 1 puff as needed. May use up to 12 puffs per day 20.4 g 11    cholecalciferol (VITAMIN D) 1000 UNIT tablet 3 drops per day      colchicine (COLCYRS) 0.6 MG tablet Take 2 tablets orally at onset of symptoms and one more tablet 1 hour later, and then one tablet twice a day until flare resolves.      doxycycline hyclate (VIBRA-TABS) 100 MG tablet Take 1 tablet by mouth 2 times daily.      FLUoxetine (PROZAC) 20 MG capsule Take 20 mg by mouth      FLUoxetine (PROZAC) 40 MG capsule Take 40 mg by mouth daily.      fluticasone (FLONASE) 50 MCG/ACT nasal spray Spray 1-2 sprays into both nostrils daily 16 g 3    gabapentin (NEURONTIN) 300 MG capsule Take 2 capsules  (600mg) in the morning and 2 capsules (600mg) at bedtime. 90day supply (Patient taking differently: Take 300 mg by mouth 2 times daily) 360 capsule 1    ipratropium - albuterol 0.5 mg/2.5 mg/3 mL (DUONEB) 0.5-2.5 (3) MG/3ML neb solution Take 1 vial (3 mLs) by nebulization every 6 hours as needed for shortness of breath  or wheezing 960 mL 4    levonorgestrel (MIRENA) 20 MCG/24HR IUD 1 each by Intrauterine route once      MAGNESIUM CITRATE PO 3 drops per day      meloxicam (MOBIC) 15 MG tablet Take 15 mg by mouth daily      Naltrexone HCl POWD       Naproxen Sodium (ALEVE PO) Take 220 mg by mouth 2 times daily as needed       order for DME Equipment being ordered: Nebulizer 1 Device 0    predniSONE (DELTASONE) 20 MG tablet Take 2 tabs daily x 7 days 14 tablet 0    rizatriptan (MAXALT-MLT) 10 MG ODT tab Take 1 tablet (10 mg) by mouth at onset of headache for migraine May repeat in 2 hours. Max 3 tablets/24 hours. 18 tablet 3    tirzepatide-Weight Management (ZEPBOUND) 2.5 MG/0.5ML prefilled pen Inject 0.5 mLs (2.5 mg) subcutaneously every 7 days. For 4 weeks. 2 mL 0    topiramate (TOPAMAX) 25 MG tablet Take 3 tablets (75 mg) by mouth daily. 90 tablet 2    topiramate (TOPAMAX) 25 MG tablet Take 3 tablets (75 mg) by mouth at bedtime. 25mg at bedtime for week 1, 50mg at bedtime for 1 week, and 75mg at bedtime thereafter 90 tablet 3    Turmeric 450 MG CAPS 1 scoop per day      cetirizine-psuedoePHEDrine (ZYRTEC-D) 5-120 MG per tablet Take 1 tablet by mouth 2 times daily (Patient not taking: Reported on 5/7/2025) 90 tablet 3    COMPOUNDED NON-CONTROLLED SUBSTANCE (CMPD RX) - PHARMACY TO MIX COMPOUNDED MEDICATION LOW DOSE NALTREXONE 6MG one tablet qhs (Patient not taking: Reported on 5/7/2025) 90 capsule 4    ondansetron (ZOFRAN ODT) 4 MG ODT tab Take 1 tablet (4 mg) by mouth every 8 hours as needed for nausea (Patient not taking: Reported on 7/2/2024) 20 tablet 0    tirzepatide-Weight Management (ZEPBOUND) 5 MG/0.5ML prefilled pen Inject 0.5 mLs (5 mg) subcutaneously every 7 days. After completing 2.5 mg doses (start week 5). 2 mL 1     No current facility-administered medications for this visit.          Physical Exam:       /62 (BP Location: Left arm, Patient Position: Sitting, Cuff Size: Adult Regular)   Pulse 68   Wt 127.9 kg  (282 lb)   SpO2 99%   BMI 48.41 kg/m    Gen: adult female, appears in NAD  HEENT:  clear conjunctivae  CV: RRR, no M/G/R  Resp: CTA bilaterally, respirations even and unlabored, on RA, no cough.  Skin: no apparent rashes on visible skin  Ext: no cyanosis, no clubbing, no BLE edema  Neuro: alert, nonfocal     Data:       Imaging studies:   #. CT chest, 1/6/22:  LUNGS AND PLEURA: Lungs are clear. No pleural effusion. No interstitial disease/fibrosis or bronchiectasis. Couple tiny likely incidental 2-3 mm lung nodules, largest fissural nodule right midlung.   MEDIASTINUM/AXILLAE: No lymphadenopathy. No thoracic aortic aneurysm. Normal heart size without pericardial effusion.   CORONARY ARTERY CALCIFICATION: None.   UPPER ABDOMEN: No significant finding.   MUSCULOSKELETAL: Unremarkable.       PFT:  #. 12/7/2016: on personal review -- normal spirometry that is not significantly changed since last evaluation 08/03/16.    #. 3/8/2022: normal spirometry without clinically significant bronchodilator response, normal lung volumes, and supra-normal diffusion capacity. Consistent w/ prior diagnosis of asthma. There is clinically notable decrease in FEV1 compared to testing in 08/2016.

## 2025-05-07 NOTE — PATIENT INSTRUCTIONS
It was a pleasure to see you in clinic today.   Here is what we discussed:    Continue Symbicort two puffs twice daily, rinse/gargle after use.  You can also use one puff as needed in between - max 12 puffs/day.   Continue Albuterol every 4-6 hours as needed for shortness of breath or wheezing.  Call my nurse, Geovanni ([phone]) with any change or worsening of your breathing.  Start the prednisone if you need it, and let us know if you need a refill.   Follow-up in one year.     Marlys Francis, CNP  Pulmonary Medicine  [facility]  [phone]

## 2025-05-29 ENCOUNTER — TRANSCRIBE ORDERS (OUTPATIENT)
Dept: OTHER | Age: 55
End: 2025-05-29

## 2025-05-29 DIAGNOSIS — M79.7 FIBROMYALGIA: Primary | ICD-10-CM

## 2025-05-30 ENCOUNTER — TELEPHONE (OUTPATIENT)
Dept: PULMONOLOGY | Facility: CLINIC | Age: 55
End: 2025-05-30
Payer: COMMERCIAL

## 2025-05-30 NOTE — TELEPHONE ENCOUNTER
[facility] Call Center    Phone Message    May a detailed message be left on voicemail: yes     Reason for Call: Other: Per patient, she tried to schedule her referral for pain clinic at [facility] but the  says patient needs a referral from an [facility] provider and was advised to try pulmonology. She is wondering if that's something that Marlys Francis could help with. Please advise patient.      Action Taken: Message routed to:  Other: PULM    Travel Screening: Not Applicable     Date of Service: 5/30/25

## 2025-06-02 ENCOUNTER — TELEPHONE (OUTPATIENT)
Dept: PALLIATIVE MEDICINE | Facility: CLINIC | Age: 55
End: 2025-06-02
Payer: COMMERCIAL

## 2025-06-02 NOTE — TELEPHONE ENCOUNTER
Per Marlys Francis CNP:  I am unsure what she needs a pain clinic referral for, I would defer to primary care.     Spoke with Paige. She does have referral through her PCP. They are saying her PCP is an outside provider. They want a [facility] referral, even though she has been seen by Dr. Garrido many times. There is now a message to Dr. Garrido in her chart. Hopefully, this will take care of the issues. She will call us back is she has more issues.

## 2025-06-02 NOTE — TELEPHONE ENCOUNTER
Called pt. Advised that referral must come from PCP as referral notes that PCP would reassume care after stabilization of chronic pain. Pt will need to have her PCP refer her to [facility] or another outside clinic.   Pt states that is long term patient. Advised that as last seen 2022 is no longer active patient Advised per clinic policy needs to have established care with [facility] PCP.     Ana M JUSTICE, RN Care Coordinator  [facility]  Pain [facility]

## 2025-06-02 NOTE — TELEPHONE ENCOUNTER
[facility] Call Center    Phone Message    May a detailed message be left on voicemail: yes     Reason for Call: Other: Patient called to schedule an appt to reestablish care with Dr Garrido for a fibromayalgia flare up. Pts PCP would rather pt see Dr Garrido since she knows patient well, however patients PCP is not through [facility]. Writer is aware that per protocols patient would need a  [facility] PCP in order to schedule with Dr Garrido. Pt got a referral but was from her external PCP so was declined. Pt stated her Pulmonologist should be sending a referral who is internal. Pt also noted that she knows Dr Garrido well, and if Dr Garrido knew that she was having a hard time getting in that she would want her to reach out. Writer sending message for clinic to review if it is ok for pt to re establish with Dr Garrido. Please call pt to advise.        Action Taken: Message routed to:  Other: Pain     Travel Screening: Not Applicable     Date of Service:

## 2025-06-15 ENCOUNTER — HEALTH MAINTENANCE LETTER (OUTPATIENT)
Age: 55
End: 2025-06-15

## 2025-06-18 ENCOUNTER — TELEPHONE (OUTPATIENT)
Dept: SLEEP MEDICINE | Facility: CLINIC | Age: 55
End: 2025-06-18
Payer: COMMERCIAL

## 2025-06-18 NOTE — TELEPHONE ENCOUNTER
Patient  called and said that she still  can not get the watchpat to work or upload. I told her in a voicemail that I will try to send out one more to her and see if that works. I also said I will send a mychart with this information as well so that she can respond to that if it is easier for her and let me know if she is okay with the plan. If I hear back I will add her to the schedule for next week.

## 2025-06-24 ENCOUNTER — TELEPHONE (OUTPATIENT)
Dept: OBGYN | Facility: CLINIC | Age: 55
End: 2025-06-24
Payer: COMMERCIAL

## 2025-06-24 ASSESSMENT — SLEEP AND FATIGUE QUESTIONNAIRES
HOW LIKELY ARE YOU TO NOD OFF OR FALL ASLEEP WHEN YOU ARE A PASSENGER IN A CAR FOR AN HOUR WITHOUT A BREAK: HIGH CHANCE OF DOZING
HOW LIKELY ARE YOU TO NOD OFF OR FALL ASLEEP WHILE LYING DOWN TO REST IN THE AFTERNOON WHEN CIRCUMSTANCES PERMIT: HIGH CHANCE OF DOZING
HOW LIKELY ARE YOU TO NOD OFF OR FALL ASLEEP WHILE SITTING QUIETLY AFTER LUNCH WITHOUT ALCOHOL: SLIGHT CHANCE OF DOZING
HOW LIKELY ARE YOU TO NOD OFF OR FALL ASLEEP IN A CAR, WHILE STOPPED FOR A FEW MINUTES IN TRAFFIC: SLIGHT CHANCE OF DOZING
HOW LIKELY ARE YOU TO NOD OFF OR FALL ASLEEP WHILE WATCHING TV: MODERATE CHANCE OF DOZING
HOW LIKELY ARE YOU TO NOD OFF OR FALL ASLEEP WHILE SITTING INACTIVE IN A PUBLIC PLACE: SLIGHT CHANCE OF DOZING
HOW LIKELY ARE YOU TO NOD OFF OR FALL ASLEEP WHILE SITTING AND TALKING TO SOMEONE: WOULD NEVER DOZE
HOW LIKELY ARE YOU TO NOD OFF OR FALL ASLEEP WHILE SITTING AND READING: HIGH CHANCE OF DOZING

## 2025-06-24 NOTE — TELEPHONE ENCOUNTER
Calling patient back, patient is requesting a referral to Yvonne Garrido MD at the [facility].     States her primary care is an [facility] provider, Dr. Francesca Samson.  Dr. Samson sent a referral but Pain Clinic rejected it.  States the referral will need to come from a [facility] provider.     Patient states she last seen Dr. Francesca Samson at [facility] on 6/06/2025, for the hip pain, knee pain and more.  Pt was advised the pain is beyond her PCP's capacity and advised pt to try to get a referral to the [facility] for fibromyalgia by Dr. Zuñiga.  Since Dr. Zuñiga is a [facility] provider.       Patient had an appointment scheduled with Dr. Zuñiga on 6/25/2025 but, had to reschedule d/t provider being out.  Reschedule with Dr. Zuñiga for August 1,2025.  Patient is asking for a message to be sent to provider to place a referral prior to 8/01/2025 appointment.     Routing to provider for referral.     Gale ARMENTA RN - [facility]

## 2025-06-24 NOTE — TELEPHONE ENCOUNTER
[facility] Call Center    Phone Message    May a detailed message be left on voicemail: yes     Reason for Call: Other: Pt had appt with Dr. Zuñiga on 6/25, but it was canceled due to provider being out sick. Pt states she needs to be seen ASAP and didn't want to wait until the next opening in July. She is wondering if Dr. Zuñiga could put in a referral for her for pain management, as her pcp is outside of [place] and they won't accept her referral. Please let her know if this referral can be placed or if she needs to be seen first. She requests a call back from one of the nurses.      Action Taken: Other: mg maria     Travel Screening: Not Applicable     Date of Service:

## 2025-06-26 ENCOUNTER — TELEPHONE (OUTPATIENT)
Dept: PALLIATIVE MEDICINE | Facility: CLINIC | Age: 55
End: 2025-06-26
Payer: COMMERCIAL

## 2025-06-26 DIAGNOSIS — M79.7 FIBROMYALGIA: Primary | ICD-10-CM

## 2025-06-26 NOTE — TELEPHONE ENCOUNTER
See TE below from 06/02/25.     Referral was placed by an OB/GYN. Pt called and was advised this would be for a one time consultation if scheduled, as for continued care would need a PCP within the system.  Pt elected to schedule an appointment, was made well aware that this a one time consult with recommendation back to referring. As previously discussed for continued pain management needs to have established care with [facility] PCP.     Routing to  to schedule for one time consultation.     FYI to referring provider group      06/02/25  Called pt. Advised that referral must come from PCP as referral notes that PCP would reassume care after stabilization of chronic pain. Pt will need to have her PCP refer her to [facility] or another outside clinic.   Pt states that is long term patient. Advised that as last seen 2022 is no longer active patient Advised per clinic policy needs to have established care with [facility] PCP.      Ana M JUSTICE, RN Care Coordinator  [facility]  Pain Management

## 2025-06-26 NOTE — TELEPHONE ENCOUNTER
"Krystal Zuñiga, DO to  Ob/Gyn Triage  (Selected Message)        6/26/25 12:39 PM  \"Please let this patient know that a referral to the [facility] has been placed.\"    Called and spoke with pt and let her know that Dr. Zuñiga placed a referral for a [facility] pain clinic.    Alley Palacios RN- OB/GYN group      "

## 2025-06-26 NOTE — TELEPHONE ENCOUNTER
[facility] Call Center    Phone Message    May a detailed message be left on voicemail: yes     Reason for Call: Other: Patient is calling stating that a nurse from her referring provider was supposed to be calling to discuss getting this patient scheduled with us again. Please review and call back.      Action Taken: Message routed to:  Other: [facility]    Travel Screening: Not Applicable

## 2025-06-27 ENCOUNTER — VIRTUAL VISIT (OUTPATIENT)
Dept: SLEEP MEDICINE | Facility: CLINIC | Age: 55
End: 2025-06-27
Payer: COMMERCIAL

## 2025-06-27 DIAGNOSIS — G47.10 EXCESSIVE SLEEPINESS: Primary | ICD-10-CM

## 2025-06-30 ENCOUNTER — DOCUMENTATION ONLY (OUTPATIENT)
Dept: SLEEP MEDICINE | Facility: CLINIC | Age: 55
End: 2025-06-30
Payer: COMMERCIAL

## 2025-07-09 ENCOUNTER — MYC MEDICAL ADVICE (OUTPATIENT)
Dept: INTERNAL MEDICINE | Facility: CLINIC | Age: 55
End: 2025-07-09
Payer: COMMERCIAL

## 2025-07-09 NOTE — TELEPHONE ENCOUNTER
"I just spoke to this patient (she was on the waitlist) about scheduling an earlier appt with Dr. Roberts.     After speaking with her, I am not going to reschedule the appointment, and I did refer her to Patient Relations. (She already had their number and had been in touch.) Our conversation was very cordial throughout.    She had a number of concerns or points to communicate:    1. She was told that she could not schedule with Dr. Yvonne Garrido. That is who she would prefer to see and has seen in the past (11/10/22 was last visit).    2. She does not understand why see cannot be seen for on-going comprehensive pain management. Her appointment was scheduled for a one-time consultation, and I let her know that was the case. (She stated that she was, \"threatened that she would only be allowed to be seen once.\") I'm assuming that the limited referral had to do with receiving it from a [facility] provider who is not her PCP. Her PCP is not in the [facility] system. During our conversation, she essentially explained this to me but then expressed that she did not understand why that was the case.    3. She would like to be seen earlier, but she had read an article mentioning Dr. Palma and thought she would like to see him.    4. She is willing to go to any clinic location.     I am folllowing up with Deborah Carty with this information to ask for guidance/follow up.   "

## 2025-07-14 NOTE — PROGRESS NOTES
Watch Pat has been scored using rule 1B, 4%.  Patient to follow up with provider to determine appropriate therapy.    PAT AHI: 1.2    Ordering Provider: Dr. Bates

## 2025-07-28 NOTE — PROCEDURES
"WatchPAT - HOME SLEEP STUDY INTERPRETATION    Patient: Paige Fajardo  MRN: [id number]  YOB: 1970  Study Date: 6/30/2025  Referring Provider: Francesca Samson MD  Ordering Provider: Elisabeth Humphries MD    Chain of custody patient verification was not enabled.  Chain of custody verification was not present throughout the entire study.     Indications for Home Study: Paige Fajardo is a 54 year old female with a history of DMITRIY is currently treated with CPAP with pressure settings 5 to 15 cm water.  Watch PAT HST is obtained with the CPAP device at the current pressure settings , to check for possible hypoxemia as a  possible contributing factor for the symptoms of nonrestorative sleep fatigue and EDS     Estimated body mass index is 48.41 kg/m  as calculated from the following:    Height as of 1/17/25: 1.626 m (5' 4\").    Weight as of 5/7/25: 127.9 kg (282 lb).  Total score - D Hanis: (Patient-Rptd) 14 (6/24/2025 12:11 PM)    Data: A full night home sleep study was performed recording the standard physiologic parameters including peripheral arterial tonometry (PAT), sound/snoring, body position,  movement, sound, and oxygen saturation by pulse oximetry. Pulse rate was estimated by oximetry recording. Sleep staging (wake, REM, light, and deep sleep) was derived from PAT signal.  This study was considered adequate based on > 4 hours of quality oximetry and respiratory recording. As specified by the AASM Manual for the Scoring of Sleep and Associated events, version 2.3, Rule VIII.D 1B, 4% oxygen desaturation scoring for hypopneas is used as a standard of care on all home sleep apnea testing.    Total Recording Time: 7 hrs, 56 min  Total Sleep Time: 6 hrs, 41 min  % of Sleep Time REM: 23.5%    Respiratory:  Snoring: Snoring was present.  Respiratory events: The PAT respiratory disturbance index [pRDI] was 7.2 events per hour.  The PAT apnea/hypopnea index [pAHI] was 1.2 events per " hour.  VICTORINO was 1.1 events per hour.  During REM sleep the pAHI was 0.  Sleep Associated Hypoxemia: sustained hypoxemia was not present. Mean oxygen saturation was 95%.  Minimum was 89%.  Time with saturation less than 88% was 0 minutes.    Heart Rate: By pulse oximetry normal rate was noted.     Position: Percent of time spent: supine -36.8%, prone -17.8%, on right -15.8%, on left -29.6%.  pAHI was 2.0 per hour supine, 2.6 per hour prone, 0 per hour on right side, and 0 per hour on left side.     Assessment:   With CPAP at the current pressure settings ranging 5 to 15 cm water, there is no evidence of clinically significant obstructive sleep apnea or sleep associated hypoxemia.      Recommendations:  Recommend continue using the auto CPAP at the current pressure settings regularly during sleep and get the supplies for the device replaced regularly.  Suggest optimizing sleep hygiene and avoiding sleep deprivation.  Weight management.    Diagnosis Code(s): Obstructive Sleep Apnea G47.33    Elisabeth Humphries MD, July 28, 2025   Diplomate, American Board of Internal Medicine, Sleep Medicine

## 2025-07-30 ASSESSMENT — ANXIETY QUESTIONNAIRES
1. FEELING NERVOUS, ANXIOUS, OR ON EDGE: SEVERAL DAYS
3. WORRYING TOO MUCH ABOUT DIFFERENT THINGS: NOT AT ALL
7. FEELING AFRAID AS IF SOMETHING AWFUL MIGHT HAPPEN: NOT AT ALL
2. NOT BEING ABLE TO STOP OR CONTROL WORRYING: NOT AT ALL
8. IF YOU CHECKED OFF ANY PROBLEMS, HOW DIFFICULT HAVE THESE MADE IT FOR YOU TO DO YOUR WORK, TAKE CARE OF THINGS AT HOME, OR GET ALONG WITH OTHER PEOPLE?: NOT DIFFICULT AT ALL
GAD7 TOTAL SCORE: 2
IF YOU CHECKED OFF ANY PROBLEMS ON THIS QUESTIONNAIRE, HOW DIFFICULT HAVE THESE PROBLEMS MADE IT FOR YOU TO DO YOUR WORK, TAKE CARE OF THINGS AT HOME, OR GET ALONG WITH OTHER PEOPLE: NOT DIFFICULT AT ALL
5. BEING SO RESTLESS THAT IT IS HARD TO SIT STILL: NOT AT ALL
6. BECOMING EASILY ANNOYED OR IRRITABLE: SEVERAL DAYS
7. FEELING AFRAID AS IF SOMETHING AWFUL MIGHT HAPPEN: NOT AT ALL
GAD7 TOTAL SCORE: 2
GAD7 TOTAL SCORE: 2
4. TROUBLE RELAXING: NOT AT ALL

## 2025-07-30 ASSESSMENT — PAIN SCALES - PAIN ENJOYMENT GENERAL ACTIVITY SCALE (PEG)
INTERFERED_ENJOYMENT_LIFE: 8
PEG_TOTALSCORE: 7
INTERFERED_GENERAL_ACTIVITY: 8
AVG_PAIN_PASTWEEK: 5
INTERFERED_ENJOYMENT_LIFE: 8
AVG_PAIN_PASTWEEK: 5
INTERFERED_GENERAL_ACTIVITY: 8
PEG_TOTALSCORE: 7

## 2025-08-04 ENCOUNTER — OFFICE VISIT (OUTPATIENT)
Dept: ANESTHESIOLOGY | Facility: CLINIC | Age: 55
End: 2025-08-04
Attending: OBSTETRICS & GYNECOLOGY
Payer: COMMERCIAL

## 2025-08-04 VITALS
RESPIRATION RATE: 16 BRPM | OXYGEN SATURATION: 97 % | HEART RATE: 86 BPM | SYSTOLIC BLOOD PRESSURE: 137 MMHG | DIASTOLIC BLOOD PRESSURE: 80 MMHG

## 2025-08-04 DIAGNOSIS — M79.18 MYOFASCIAL PAIN SYNDROME: ICD-10-CM

## 2025-08-04 DIAGNOSIS — M79.7 FIBROMYALGIA: Primary | ICD-10-CM

## 2025-08-04 PROCEDURE — 3079F DIAST BP 80-89 MM HG: CPT | Performed by: STUDENT IN AN ORGANIZED HEALTH CARE EDUCATION/TRAINING PROGRAM

## 2025-08-04 PROCEDURE — 99204 OFFICE O/P NEW MOD 45 MIN: CPT | Performed by: STUDENT IN AN ORGANIZED HEALTH CARE EDUCATION/TRAINING PROGRAM

## 2025-08-04 PROCEDURE — 3075F SYST BP GE 130 - 139MM HG: CPT | Performed by: STUDENT IN AN ORGANIZED HEALTH CARE EDUCATION/TRAINING PROGRAM

## 2025-08-04 RX ORDER — PREGABALIN 100 MG/1
100 CAPSULE ORAL 2 TIMES DAILY
Qty: 60 CAPSULE | Refills: 0 | Status: SHIPPED | OUTPATIENT
Start: 2025-08-04

## 2025-08-05 ENCOUNTER — PATIENT OUTREACH (OUTPATIENT)
Dept: CARE COORDINATION | Facility: CLINIC | Age: 55
End: 2025-08-05
Payer: COMMERCIAL

## 2025-08-07 ENCOUNTER — PATIENT OUTREACH (OUTPATIENT)
Dept: CARE COORDINATION | Facility: CLINIC | Age: 55
End: 2025-08-07
Payer: COMMERCIAL

## (undated) DEVICE — CATH INTERMITTENT CLEAN-CATH FEMALE 14FR 6" VINYL LF 420614

## (undated) DEVICE — BAG CLEAR TRASH 1.3M 39X33" P4040C

## (undated) DEVICE — GLOVE PROTEXIS POWDER FREE SMT 6.0  2D72PT60X

## (undated) DEVICE — SUCTION CURETTE 3MM ENDOMETRIAL MX140

## (undated) DEVICE — LINEN TOWEL PACK X5 5464

## (undated) DEVICE — PACK MINOR LITHOTOMY RIDGES

## (undated) DEVICE — PAD CHUX UNDERPAD 30X36" P3036C

## (undated) DEVICE — LINEN HALF SHEET 5512

## (undated) DEVICE — GLOVE PROTEXIS BLUE W/NEU-THERA 6.5  2D73EB65

## (undated) DEVICE — SOL NACL 0.9% IRRIG 1000ML BOTTLE 2F7124

## (undated) DEVICE — SOL WATER IRRIG 1000ML BOTTLE 2F7114

## (undated) DEVICE — SU VICRYL 0 UR-6 27" J603H

## (undated) DEVICE — LINEN FULL SHEET 5511

## (undated) RX ORDER — ONDANSETRON 2 MG/ML
INJECTION INTRAMUSCULAR; INTRAVENOUS
Status: DISPENSED
Start: 2017-06-14

## (undated) RX ORDER — DEXAMETHASONE SODIUM PHOSPHATE 4 MG/ML
INJECTION, SOLUTION INTRA-ARTICULAR; INTRALESIONAL; INTRAMUSCULAR; INTRAVENOUS; SOFT TISSUE
Status: DISPENSED
Start: 2017-06-14

## (undated) RX ORDER — GLYCOPYRROLATE 0.2 MG/ML
INJECTION INTRAMUSCULAR; INTRAVENOUS
Status: DISPENSED
Start: 2017-06-14

## (undated) RX ORDER — LIDOCAINE HYDROCHLORIDE 10 MG/ML
INJECTION, SOLUTION EPIDURAL; INFILTRATION; INTRACAUDAL; PERINEURAL
Status: DISPENSED
Start: 2017-06-14

## (undated) RX ORDER — PROPOFOL 10 MG/ML
INJECTION, EMULSION INTRAVENOUS
Status: DISPENSED
Start: 2017-06-14

## (undated) RX ORDER — KETOROLAC TROMETHAMINE 30 MG/ML
INJECTION, SOLUTION INTRAMUSCULAR; INTRAVENOUS
Status: DISPENSED
Start: 2017-06-14

## (undated) RX ORDER — ACETAMINOPHEN 10 MG/ML
INJECTION, SOLUTION INTRAVENOUS
Status: DISPENSED
Start: 2017-06-14

## (undated) RX ORDER — FENTANYL CITRATE 50 UG/ML
INJECTION, SOLUTION INTRAMUSCULAR; INTRAVENOUS
Status: DISPENSED
Start: 2017-06-14